# Patient Record
Sex: FEMALE | Race: WHITE | NOT HISPANIC OR LATINO | Employment: OTHER | URBAN - METROPOLITAN AREA
[De-identification: names, ages, dates, MRNs, and addresses within clinical notes are randomized per-mention and may not be internally consistent; named-entity substitution may affect disease eponyms.]

---

## 2017-01-04 ENCOUNTER — HOSPITAL ENCOUNTER (OUTPATIENT)
Dept: OTHER | Facility: HOSPITAL | Age: 77
Discharge: HOME | End: 2017-01-04
Attending: INTERNAL MEDICINE | Admitting: INTERNAL MEDICINE

## 2017-01-11 ENCOUNTER — HOSPITAL ENCOUNTER (OUTPATIENT)
Dept: PHARMACY | Facility: HOSPITAL | Age: 77
Discharge: HOME | End: 2017-01-11
Attending: INTERNAL MEDICINE | Admitting: INTERNAL MEDICINE

## 2017-01-18 ENCOUNTER — HOSPITAL ENCOUNTER (OUTPATIENT)
Dept: OTHER | Facility: HOSPITAL | Age: 77
Discharge: HOME | End: 2017-01-18
Attending: INTERNAL MEDICINE | Admitting: INTERNAL MEDICINE

## 2017-01-25 ENCOUNTER — HOSPITAL ENCOUNTER (OUTPATIENT)
Dept: PHARMACY | Facility: HOSPITAL | Age: 77
Discharge: HOME | End: 2017-01-25
Attending: INTERNAL MEDICINE | Admitting: INTERNAL MEDICINE

## 2017-02-08 ENCOUNTER — HOSPITAL ENCOUNTER (OUTPATIENT)
Dept: PHARMACY | Facility: HOSPITAL | Age: 77
Discharge: HOME | End: 2017-02-08
Attending: INTERNAL MEDICINE | Admitting: INTERNAL MEDICINE

## 2017-03-01 ENCOUNTER — HOSPITAL ENCOUNTER (OUTPATIENT)
Dept: PHARMACY | Facility: HOSPITAL | Age: 77
Discharge: HOME | End: 2017-03-01
Attending: INTERNAL MEDICINE | Admitting: INTERNAL MEDICINE

## 2017-03-08 ENCOUNTER — HOSPITAL ENCOUNTER (OUTPATIENT)
Dept: PHARMACY | Facility: HOSPITAL | Age: 77
Discharge: HOME | End: 2017-03-08
Attending: INTERNAL MEDICINE | Admitting: INTERNAL MEDICINE

## 2017-03-22 ENCOUNTER — HOSPITAL ENCOUNTER (OUTPATIENT)
Dept: PHARMACY | Facility: HOSPITAL | Age: 77
Discharge: HOME | End: 2017-03-22
Attending: INTERNAL MEDICINE | Admitting: INTERNAL MEDICINE

## 2017-04-12 ENCOUNTER — HOSPITAL ENCOUNTER (OUTPATIENT)
Dept: OTHER | Facility: HOSPITAL | Age: 77
Discharge: HOME | End: 2017-04-12
Attending: INTERNAL MEDICINE | Admitting: INTERNAL MEDICINE

## 2017-05-03 ENCOUNTER — HOSPITAL ENCOUNTER (OUTPATIENT)
Dept: PHARMACY | Facility: HOSPITAL | Age: 77
Discharge: HOME | End: 2017-05-03
Attending: INTERNAL MEDICINE | Admitting: INTERNAL MEDICINE

## 2017-05-31 ENCOUNTER — HOSPITAL ENCOUNTER (OUTPATIENT)
Dept: PHARMACY | Facility: HOSPITAL | Age: 77
Discharge: HOME | End: 2017-05-31
Attending: INTERNAL MEDICINE | Admitting: INTERNAL MEDICINE

## 2017-06-26 ENCOUNTER — HOSPITAL ENCOUNTER (OUTPATIENT)
Dept: PHARMACY | Facility: HOSPITAL | Age: 77
Discharge: HOME | End: 2017-06-26
Attending: INTERNAL MEDICINE | Admitting: INTERNAL MEDICINE

## 2017-08-23 ENCOUNTER — HOSPITAL ENCOUNTER (OUTPATIENT)
Dept: PHARMACY | Facility: HOSPITAL | Age: 77
Discharge: HOME | End: 2017-08-23
Attending: INTERNAL MEDICINE | Admitting: INTERNAL MEDICINE

## 2017-10-04 ENCOUNTER — HOSPITAL ENCOUNTER (OUTPATIENT)
Dept: PHARMACY | Facility: HOSPITAL | Age: 77
Discharge: HOME | End: 2017-10-04
Attending: INTERNAL MEDICINE | Admitting: INTERNAL MEDICINE

## 2017-11-15 ENCOUNTER — HOSPITAL ENCOUNTER (OUTPATIENT)
Dept: PHARMACY | Facility: HOSPITAL | Age: 77
Discharge: HOME | End: 2017-11-15
Attending: INTERNAL MEDICINE | Admitting: INTERNAL MEDICINE

## 2017-12-27 ENCOUNTER — HOSPITAL ENCOUNTER (OUTPATIENT)
Dept: PHARMACY | Facility: HOSPITAL | Age: 77
Discharge: HOME | End: 2017-12-27
Attending: INTERNAL MEDICINE | Admitting: INTERNAL MEDICINE

## 2018-02-07 ENCOUNTER — HOSPITAL ENCOUNTER (OUTPATIENT)
Dept: PHARMACY | Facility: HOSPITAL | Age: 78
Discharge: HOME | End: 2018-02-07
Attending: INTERNAL MEDICINE | Admitting: INTERNAL MEDICINE

## 2018-03-22 ENCOUNTER — HOSPITAL ENCOUNTER (OUTPATIENT)
Dept: PHARMACY | Facility: HOSPITAL | Age: 78
Discharge: HOME | End: 2018-03-22
Attending: INTERNAL MEDICINE | Admitting: INTERNAL MEDICINE

## 2018-05-02 ENCOUNTER — HOSPITAL ENCOUNTER (OUTPATIENT)
Dept: PHARMACY | Facility: HOSPITAL | Age: 78
Discharge: HOME | End: 2018-05-02
Attending: INTERNAL MEDICINE | Admitting: INTERNAL MEDICINE

## 2018-06-13 ENCOUNTER — HOSPITAL ENCOUNTER (OUTPATIENT)
Dept: PHARMACY | Facility: HOSPITAL | Age: 78
Discharge: HOME | End: 2018-06-13
Attending: INTERNAL MEDICINE | Admitting: INTERNAL MEDICINE

## 2018-07-20 ENCOUNTER — HOSPITAL ENCOUNTER (OUTPATIENT)
Dept: URGENT CARE | Facility: CLINIC | Age: 78
Setting detail: SPECIMEN
Discharge: HOME | End: 2018-07-20
Attending: EMERGENCY MEDICINE | Admitting: EMERGENCY MEDICINE

## 2018-07-20 LAB
BACTERIA SPEC AEROBE CULT: NORMAL
Lab: NORMAL
MICRO REPORT STATUS: NORMAL
SPECIMEN SOURCE: NORMAL

## 2018-07-25 ENCOUNTER — HOSPITAL ENCOUNTER (OUTPATIENT)
Dept: PHARMACY | Facility: HOSPITAL | Age: 78
Discharge: HOME | End: 2018-07-25
Attending: INTERNAL MEDICINE | Admitting: INTERNAL MEDICINE

## 2018-09-05 ENCOUNTER — HOSPITAL ENCOUNTER (OUTPATIENT)
Dept: PHARMACY | Facility: HOSPITAL | Age: 78
Discharge: HOME | End: 2018-09-05
Attending: INTERNAL MEDICINE | Admitting: INTERNAL MEDICINE

## 2018-10-17 ENCOUNTER — HOSPITAL ENCOUNTER (OUTPATIENT)
Dept: PHARMACY | Facility: HOSPITAL | Age: 78
Discharge: HOME | End: 2018-10-17
Attending: INTERNAL MEDICINE | Admitting: INTERNAL MEDICINE

## 2018-11-28 ENCOUNTER — HOSPITAL ENCOUNTER (OUTPATIENT)
Dept: PHARMACY | Facility: HOSPITAL | Age: 78
Discharge: HOME | End: 2018-11-28
Attending: INTERNAL MEDICINE | Admitting: INTERNAL MEDICINE

## 2019-01-09 ENCOUNTER — HOSPITAL ENCOUNTER (OUTPATIENT)
Dept: PHARMACY | Facility: HOSPITAL | Age: 79
Discharge: HOME | End: 2019-01-09
Attending: INTERNAL MEDICINE | Admitting: INTERNAL MEDICINE

## 2019-02-20 ENCOUNTER — HOSPITAL ENCOUNTER (OUTPATIENT)
Dept: GENERAL RADIOLOGY | Facility: HOSPITAL | Age: 79
Discharge: HOME | End: 2019-02-20
Attending: INTERNAL MEDICINE | Admitting: INTERNAL MEDICINE

## 2019-03-25 ENCOUNTER — HOSPITAL ENCOUNTER (OUTPATIENT)
Dept: PHARMACY | Facility: HOSPITAL | Age: 79
Discharge: HOME | End: 2019-03-25
Attending: INTERNAL MEDICINE | Admitting: INTERNAL MEDICINE

## 2019-04-03 ENCOUNTER — HOSPITAL ENCOUNTER (OUTPATIENT)
Dept: PHARMACY | Facility: HOSPITAL | Age: 79
Discharge: HOME | End: 2019-04-03
Attending: INTERNAL MEDICINE | Admitting: INTERNAL MEDICINE

## 2019-05-08 ENCOUNTER — HOSPITAL ENCOUNTER (OUTPATIENT)
Dept: PHARMACY | Facility: HOSPITAL | Age: 79
Discharge: HOME | End: 2019-05-08
Attending: INTERNAL MEDICINE | Admitting: INTERNAL MEDICINE

## 2019-05-16 ENCOUNTER — HOSPITAL ENCOUNTER (OUTPATIENT)
Dept: PHARMACY | Facility: HOSPITAL | Age: 79
Discharge: HOME | End: 2019-05-16
Attending: INTERNAL MEDICINE | Admitting: INTERNAL MEDICINE

## 2019-06-06 ENCOUNTER — HOSPITAL ENCOUNTER (OUTPATIENT)
Dept: PHARMACY | Facility: HOSPITAL | Age: 79
Discharge: HOME | End: 2019-06-06
Attending: INTERNAL MEDICINE | Admitting: INTERNAL MEDICINE

## 2019-06-17 ENCOUNTER — ANTICOAGULATION VISIT (OUTPATIENT)
Dept: PHARMACY | Facility: HOSPITAL | Age: 79
End: 2019-06-17

## 2019-06-17 DIAGNOSIS — I82.5Z9 CHRONIC DEEP VEIN THROMBOSIS (DVT) OF DISTAL VEIN OF LOWER EXTREMITY, UNSPECIFIED LATERALITY (HCC): Primary | ICD-10-CM

## 2019-06-17 LAB — INR PPP: 3.2 (ref 0.9–1.1)

## 2019-06-17 PROCEDURE — G0463 HOSPITAL OUTPT CLINIC VISIT: HCPCS

## 2019-06-17 PROCEDURE — 85610 PROTHROMBIN TIME: CPT

## 2019-06-17 PROCEDURE — 36416 COLLJ CAPILLARY BLOOD SPEC: CPT

## 2019-06-17 RX ORDER — WARFARIN SODIUM 2.5 MG/1
2.5 TABLET ORAL
COMMUNITY
Start: 2018-11-27 | End: 2023-02-24 | Stop reason: SDUPTHER

## 2019-06-17 RX ORDER — OMEPRAZOLE 40 MG/1
40 CAPSULE, DELAYED RELEASE ORAL DAILY PRN
COMMUNITY
Start: 2015-07-01 | End: 2022-02-27

## 2019-06-17 RX ORDER — WARFARIN SODIUM 2.5 MG/1
2.5 TABLET ORAL NIGHTLY
COMMUNITY
Start: 2015-01-18

## 2019-06-17 RX ORDER — ATENOLOL 100 MG/1
100 TABLET ORAL DAILY
COMMUNITY
Start: 2014-11-13

## 2019-06-17 RX ORDER — FENOFIBRATE 150 MG/1
160 CAPSULE ORAL DAILY
COMMUNITY
Start: 2014-11-13 | End: 2022-02-27

## 2019-06-17 RX ORDER — HYDROCHLOROTHIAZIDE 25 MG/1
50 TABLET ORAL DAILY
COMMUNITY
Start: 2014-11-13 | End: 2020-10-01

## 2019-06-17 RX ORDER — LEVOTHYROXINE SODIUM 0.05 MG/1
50 TABLET ORAL DAILY
COMMUNITY
Start: 2016-09-25 | End: 2020-10-01

## 2019-06-17 NOTE — PROGRESS NOTES
Anticoagulation Clinic Follow up Note    Today's INR: INR=3.2supratherapeutic    Previous INR:INR 3.2 from 1 week ago supratherapeutic    Previous dose change: no change  Missed Doses: none    Mediation changes: none    Diet changes: none    Lifestyle changes: none    Signs and Symptoms of bleeding: none    Disease state exacerbations: none    Planned procedures: none    Additional Education (if needed): not applicable     Thi VERONIKA Brain was provided dosing instructions and expressed verbal understanding and has no further questions at this time.    Visit specific notes: none    Plan:  1. Hold dose today then resume normal dosing: coumadin 2.5 mg daily except 5 mg on W, F and Sundays  2. RTC in 2 weeks      Deanna Eng  6/17/2019  11:07 AM

## 2019-07-03 ENCOUNTER — ANTICOAGULATION VISIT (OUTPATIENT)
Dept: PHARMACY | Facility: HOSPITAL | Age: 79
End: 2019-07-03

## 2019-07-03 DIAGNOSIS — I82.4Y9 DEEP VEIN THROMBOSIS (DVT) OF PROXIMAL LOWER EXTREMITY, UNSPECIFIED CHRONICITY, UNSPECIFIED LATERALITY (HCC): ICD-10-CM

## 2019-07-03 PROBLEM — I82.409 DVT (DEEP VENOUS THROMBOSIS): Status: ACTIVE | Noted: 2019-07-03

## 2019-07-03 LAB — INR PPP: 2.7 (ref 0.9–1.1)

## 2019-07-03 PROCEDURE — 36416 COLLJ CAPILLARY BLOOD SPEC: CPT

## 2019-07-03 PROCEDURE — G0463 HOSPITAL OUTPT CLINIC VISIT: HCPCS

## 2019-07-03 PROCEDURE — 85610 PROTHROMBIN TIME: CPT

## 2019-07-03 NOTE — PROGRESS NOTES
Anticoagulation Clinic Follow up Note    Today's INR: (2.7 , therapeutic)    Previous INR: (3.2, supratherapeutic, from 2 weeks ago )    Previous dose change: 1 held dose and then return to total weekly dose    Missed Doses: none    Mediation changes: none    Diet changes: none    Lifestyle changes: none    Signs and Symptoms of bleeding: none    Disease state exacerbations: none    Planned procedures: none    Additional Education (if needed): not applicable     Thi Allen was provided dosing instructions and expressed verbal understanding and has no further questions at this time.    Visit specific notes: none    Plan:  1. No Change. 2.5mg daily except 5mg WFSu  2. RTC in 3 weeks      Kirsten Smith RPH  7/3/2019  11:28 AM

## 2019-07-24 ENCOUNTER — ANTICOAGULATION VISIT (OUTPATIENT)
Dept: PHARMACY | Facility: HOSPITAL | Age: 79
End: 2019-07-24

## 2019-07-24 DIAGNOSIS — I82.4Y9 DEEP VEIN THROMBOSIS (DVT) OF PROXIMAL LOWER EXTREMITY, UNSPECIFIED CHRONICITY, UNSPECIFIED LATERALITY (HCC): ICD-10-CM

## 2019-07-24 LAB — INR PPP: 2.4 (ref 0.9–1.1)

## 2019-07-24 PROCEDURE — 36416 COLLJ CAPILLARY BLOOD SPEC: CPT

## 2019-07-24 PROCEDURE — 85610 PROTHROMBIN TIME: CPT

## 2019-08-21 ENCOUNTER — ANTICOAGULATION VISIT (OUTPATIENT)
Dept: PHARMACY | Facility: HOSPITAL | Age: 79
End: 2019-08-21

## 2019-08-21 DIAGNOSIS — I82.4Y9 DEEP VEIN THROMBOSIS (DVT) OF PROXIMAL LOWER EXTREMITY, UNSPECIFIED CHRONICITY, UNSPECIFIED LATERALITY (HCC): ICD-10-CM

## 2019-08-21 LAB — INR PPP: 2.3 (ref 0.9–1.1)

## 2019-08-21 PROCEDURE — 36416 COLLJ CAPILLARY BLOOD SPEC: CPT | Performed by: INTERNAL MEDICINE

## 2019-08-21 PROCEDURE — 85610 PROTHROMBIN TIME: CPT | Performed by: INTERNAL MEDICINE

## 2019-08-21 NOTE — PROGRESS NOTES
I have reviewed the notes, assessments, and/or procedures performed by Acosta Lewis, I concur with her/his documentation of Thi Allen.

## 2019-09-18 ENCOUNTER — APPOINTMENT (OUTPATIENT)
Dept: PHARMACY | Facility: HOSPITAL | Age: 79
End: 2019-09-18

## 2019-09-25 ENCOUNTER — ANTICOAGULATION VISIT (OUTPATIENT)
Dept: PHARMACY | Facility: HOSPITAL | Age: 79
End: 2019-09-25

## 2019-09-25 DIAGNOSIS — I82.4Y9 DEEP VEIN THROMBOSIS (DVT) OF PROXIMAL LOWER EXTREMITY, UNSPECIFIED CHRONICITY, UNSPECIFIED LATERALITY (HCC): ICD-10-CM

## 2019-09-25 LAB — INR PPP: 2.8 (ref 0.9–1.1)

## 2019-09-25 PROCEDURE — 36416 COLLJ CAPILLARY BLOOD SPEC: CPT | Performed by: INTERNAL MEDICINE

## 2019-09-25 PROCEDURE — 85610 PROTHROMBIN TIME: CPT | Performed by: INTERNAL MEDICINE

## 2019-09-25 NOTE — PROGRESS NOTES
Anticoagulation Clinic Follow up Note    Today's INR:2.8 therapeutic)    Previous INR:2.3 therapeutic from 4 week(s) ago)    Previous dose change:no change    Missed Doses: took dose late on Saturday night at 4 am Sunday Am so took 1/2 dose 2.5mg on Sunday Pm because afraid of taking too much inn 12 hr period per patient statement    Medication changes: none    Diet changes: none    Lifestyle changes: none    Signs and Symptoms of bleeding: none    Disease state exacerbations: none    Planned procedures: none    Additional Education (if needed): not applicable    Thi Allen was provided dosing instructions and expressed verbal understanding and has no further questions at this time.    Visit specific notes: none    Plan:  1. No change; coumadin 2.5 mg daily except 5 mg on Su, wed and Fridays.  2. RTC in 4 week(s).      Deanna Eng RPH  9/25/2019  10:28 AM

## 2019-10-21 PROBLEM — I65.23 CAROTID STENOSIS, BILATERAL: Status: ACTIVE | Noted: 2019-06-01

## 2019-10-21 PROBLEM — I10 ESSENTIAL HYPERTENSION WITH GOAL BLOOD PRESSURE LESS THAN 130/80: Status: ACTIVE | Noted: 2019-10-21

## 2019-10-21 PROBLEM — E03.4 HYPOTHYROIDISM DUE TO ACQUIRED ATROPHY OF THYROID: Status: ACTIVE | Noted: 2019-10-21

## 2019-10-21 PROBLEM — E78.1 HYPERTRIGLYCERIDEMIA: Status: ACTIVE | Noted: 2019-10-21

## 2019-10-21 PROBLEM — G56.03 CARPAL TUNNEL SYNDROME, BILATERAL: Status: ACTIVE | Noted: 2018-06-15

## 2019-10-21 PROBLEM — E07.9 THYROID DISORDER: Status: ACTIVE | Noted: 2019-10-21

## 2019-10-21 PROCEDURE — 87086 URINE CULTURE/COLONY COUNT: CPT | Performed by: NURSE PRACTITIONER

## 2019-10-21 PROCEDURE — 87147 CULTURE TYPE IMMUNOLOGIC: CPT | Performed by: NURSE PRACTITIONER

## 2019-10-21 PROCEDURE — 87186 SC STD MICRODIL/AGAR DIL: CPT | Performed by: NURSE PRACTITIONER

## 2019-10-23 ENCOUNTER — ANTICOAGULATION VISIT (OUTPATIENT)
Dept: PHARMACY | Facility: HOSPITAL | Age: 79
End: 2019-10-23

## 2019-10-23 DIAGNOSIS — I82.4Z9 DEEP VEIN THROMBOSIS (DVT) OF DISTAL VEIN OF LOWER EXTREMITY, UNSPECIFIED CHRONICITY, UNSPECIFIED LATERALITY (HCC): ICD-10-CM

## 2019-10-23 LAB — INR PPP: 3.3 (ref 1.8–2.8)

## 2019-10-23 PROCEDURE — 36416 COLLJ CAPILLARY BLOOD SPEC: CPT

## 2019-10-23 PROCEDURE — G0463 HOSPITAL OUTPT CLINIC VISIT: HCPCS

## 2019-10-23 PROCEDURE — 85610 PROTHROMBIN TIME: CPT

## 2019-10-23 NOTE — PROGRESS NOTES
Anticoagulation Clinic Follow up Note    Today's INR: 3.3 (supratherapeutic)    Previous INR: 2.8 (therapeutic from 4 week(s) ago)    Previous dose change: no change    Missed Doses: none    Medication changes: Patient reports starting to take cephalexin (may increase INR) on Monday, 10/21, for treatment of UTI for 7 days (stop date 10/28).    Diet changes: none    Lifestyle changes: none    Signs and Symptoms of bleeding: none    Disease state exacerbations: none    Planned procedures: none    Additional Education (if needed): Educated patient on signs/symptoms of bleeding including, but not limited to, blood in the stool/urine and bleeding that doesn't stop with applied pressure. Advised patient to go to the ER or call provider if any of these occur. Patient expressed understanding.    Thi Allen was provided dosing instructions and expressed verbal understanding and has no further questions at this time.    Visit specific notes: Given temporary cause of increase in INR, will decrease today's dose to 2.5 mg and resume previous therapeutic regimen. This change will result in a 10% decrease in TWD for this week.    Plan:  1. Decrease dose today (10/23) to 2.5 mg, then resume regimen of 2.5 mg daily except 5 mg on Sundays, Wednesdays, and Fridays  2. RTC in 2 week(s).      Zeina Vela RPH  10/23/2019  9:32 AM

## 2019-10-24 ENCOUNTER — TELEPHONE (OUTPATIENT)
Dept: URGENT CARE | Facility: CLINIC | Age: 79
End: 2019-10-24

## 2019-11-06 ENCOUNTER — ANTICOAGULATION VISIT (OUTPATIENT)
Dept: PHARMACY | Facility: HOSPITAL | Age: 79
End: 2019-11-06

## 2019-11-06 DIAGNOSIS — I82.4Z9 DEEP VEIN THROMBOSIS (DVT) OF DISTAL VEIN OF LOWER EXTREMITY, UNSPECIFIED CHRONICITY, UNSPECIFIED LATERALITY (HCC): ICD-10-CM

## 2019-11-06 LAB — INR PPP: 2.6 (ref 1.8–2.8)

## 2019-11-06 PROCEDURE — 85610 PROTHROMBIN TIME: CPT

## 2019-11-06 PROCEDURE — G0463 HOSPITAL OUTPT CLINIC VISIT: HCPCS

## 2019-11-06 PROCEDURE — 36416 COLLJ CAPILLARY BLOOD SPEC: CPT

## 2019-11-06 NOTE — PROGRESS NOTES
Anticoagulation Clinic Follow up Note    Today's INR: 2.6 (therapeutic)    Previous INR: 3.3 (supratherapeutic from 2 week(s) ago) due to interaction with cephalexin (can increase INR) with last dose taken on 10/28    Previous dose change: no change    Missed Doses: none    Medication changes: none    Diet changes: none    Lifestyle changes: none    Signs and Symptoms of bleeding: none    Disease state exacerbations: none    Planned procedures: Patient reports that she will need to have her pacemaker battery replaced sometime soon. As of her last cardiology visit, her battery was at 50%. Instructed patient to inform [facility] of when this would occur and patient agreed.     Additional Education (if needed): not applicable    Thi Allen was provided dosing instructions and expressed verbal understanding and has no further questions at this time.    Visit specific notes: n/a    Plan:  1. no change; warfarin 2.5 mg PO daily, except warfarin 5 mg PO on Sundays, Wednesdays, and Fridays. .  2. RTC in 4 week(s).      Zeina Vela RPH  11/6/2019  11:05 AM

## 2019-12-04 ENCOUNTER — ANTICOAGULATION VISIT (OUTPATIENT)
Dept: PHARMACY | Facility: HOSPITAL | Age: 79
End: 2019-12-04

## 2019-12-04 DIAGNOSIS — I82.4Z9 DEEP VEIN THROMBOSIS (DVT) OF DISTAL VEIN OF LOWER EXTREMITY, UNSPECIFIED CHRONICITY, UNSPECIFIED LATERALITY (HCC): ICD-10-CM

## 2019-12-04 LAB — INR PPP: 3.1 (ref 1.8–2.8)

## 2019-12-04 PROCEDURE — 85610 PROTHROMBIN TIME: CPT

## 2019-12-04 PROCEDURE — 36416 COLLJ CAPILLARY BLOOD SPEC: CPT

## 2019-12-04 PROCEDURE — G0463 HOSPITAL OUTPT CLINIC VISIT: HCPCS

## 2019-12-04 NOTE — PROGRESS NOTES
Anticoagulation Clinic Follow up Note    Today's INR: 3.1 (supratherapeutic)    Previous INR: 2.6 (therapeutic from 4 week(s) ago)    Previous dose change: no change    Missed Doses: none    Medication changes: Patient reports pulling a muscle in her back last week and taking Tylenol for the pain (could increase INR).     Diet changes: Patient reports not having any leafy green vegetables recently and says she usually has at least a few servings (could increase INR).    Lifestyle changes: none    Signs and Symptoms of bleeding: none    Disease state exacerbations: none    Planned procedures: none    Additional Education (if needed): not applicable    Thi Allen was provided dosing instructions and expressed verbal understanding and has no further questions at this time.    Visit specific notes: none    Plan:  1. no change; warfarin 2.5 mg PO daily, except warfarin 5 mg PO on Sunday, Wednesday, and Friday. .  2. Consume 1-2 servings of vitamin K containing foods  2. RTC in 2 week(s).      Zeina Vela, PharmD  12/4/2019  9:57 AM

## 2019-12-18 ENCOUNTER — ANTICOAGULATION VISIT (OUTPATIENT)
Dept: PHARMACY | Facility: HOSPITAL | Age: 79
End: 2019-12-18

## 2019-12-18 DIAGNOSIS — I82.4Z9 DEEP VEIN THROMBOSIS (DVT) OF DISTAL VEIN OF LOWER EXTREMITY, UNSPECIFIED CHRONICITY, UNSPECIFIED LATERALITY (HCC): ICD-10-CM

## 2019-12-18 LAB — INR PPP: 3.8 (ref 1.8–2.8)

## 2019-12-18 PROCEDURE — G0463 HOSPITAL OUTPT CLINIC VISIT: HCPCS

## 2019-12-18 PROCEDURE — 36416 COLLJ CAPILLARY BLOOD SPEC: CPT

## 2019-12-18 PROCEDURE — 85610 PROTHROMBIN TIME: CPT

## 2019-12-26 ENCOUNTER — ANTICOAGULATION VISIT (OUTPATIENT)
Dept: PHARMACY | Facility: HOSPITAL | Age: 79
End: 2019-12-26

## 2019-12-26 DIAGNOSIS — I82.4Z9 DEEP VEIN THROMBOSIS (DVT) OF DISTAL VEIN OF LOWER EXTREMITY, UNSPECIFIED CHRONICITY, UNSPECIFIED LATERALITY (HCC): ICD-10-CM

## 2019-12-26 LAB — INR PPP: 3.9 (ref 1.8–2.8)

## 2019-12-26 PROCEDURE — 85610 PROTHROMBIN TIME: CPT

## 2019-12-26 PROCEDURE — 36416 COLLJ CAPILLARY BLOOD SPEC: CPT

## 2019-12-26 PROCEDURE — G0463 HOSPITAL OUTPT CLINIC VISIT: HCPCS

## 2019-12-26 NOTE — PROGRESS NOTES
Anticoagulation Clinic Follow up Note    Today's INR: 3.9 (supratherapeutic)    Previous INR: 3.8 (supratherapeutic from 1 week(s) ago)    Previous dose change: Held dose then resumed previous regimen    Missed Doses: none    Medication changes: Patient completed course of Bactrim for a UTI on 12/19 (should have no effect on today's INR). Patient also has been taking Tylenol for a hurt back the past 3-4 days (may increase INR).    Diet changes: Patient reports she has not had much of an appetite and has not ate as much vitamin K containing foods as she typically does (may increase INR).    Lifestyle changes: none    Signs and Symptoms of bleeding: none    Disease state exacerbations: none    Planned procedures: none    Additional Education (if needed): not applicable    Thi Allen was provided dosing instructions and expressed verbal understanding and has no further questions at this time.    Visit specific notes: INR has steadily increased since early December. Patient has reported at the last three appointments that she has not had much of an appetite and has decreased her vitamin K intake. Given this, will make small decrease in TWD.    Plan:  1. decrease by 10 %; warfarin 2.5 mg PO daily, except warfarin 5 mg PO on Sundays and Wednesdays. .  2. RTC in 1.5 week(s).      Zeina Vela, PharmD, BCPS  12/26/2019  11:01 AM

## 2020-01-06 ENCOUNTER — ANTICOAGULATION VISIT (OUTPATIENT)
Dept: PHARMACY | Facility: HOSPITAL | Age: 80
End: 2020-01-06

## 2020-01-06 DIAGNOSIS — I82.4Z9 DEEP VEIN THROMBOSIS (DVT) OF DISTAL VEIN OF LOWER EXTREMITY, UNSPECIFIED CHRONICITY, UNSPECIFIED LATERALITY (HCC): ICD-10-CM

## 2020-01-06 LAB — INR PPP: 2.5 (ref 1.8–2.8)

## 2020-01-06 PROCEDURE — 36416 COLLJ CAPILLARY BLOOD SPEC: CPT

## 2020-01-06 PROCEDURE — G0463 HOSPITAL OUTPT CLINIC VISIT: HCPCS

## 2020-01-06 PROCEDURE — 85610 PROTHROMBIN TIME: CPT

## 2020-01-06 NOTE — PROGRESS NOTES
Anticoagulation Clinic Follow up Note    Today's INR: 2.5 (therapeutic)    Previous INR: 3.9 (supratherapeutic from 2 week(s) ago)    Previous dose change: decrease by 10 %    Missed Doses: none    Medication changes: none    Diet changes: Patient reports an increase in intake of vitamin k containing foods (may decrease INR).     Lifestyle changes: none    Signs and Symptoms of bleeding: none    Disease state exacerbations: none    Planned procedures: none    Additional Education (if needed): not applicable    Thi Allen was provided dosing instructions and expressed verbal understanding and has no further questions at this time.    Visit specific notes: Patient reports an appointment with a Urologist tomorrow (1/7/20). Patient said she would call [facility] if Urologist makes changes to any medications.    Plan:  1. no change; warfarin 2.5 mg PO daily, except warfarin 5 mg PO on Sundays and Wednesdays. .  2. RTC in 2 week(s).      Zeina Vela, PharmD, BCPS  1/6/2020  11:28 AM

## 2020-01-20 ENCOUNTER — ANTICOAGULATION VISIT (OUTPATIENT)
Dept: PHARMACY | Facility: HOSPITAL | Age: 80
End: 2020-01-20

## 2020-01-20 DIAGNOSIS — I82.4Z9 DEEP VEIN THROMBOSIS (DVT) OF DISTAL VEIN OF LOWER EXTREMITY, UNSPECIFIED CHRONICITY, UNSPECIFIED LATERALITY (HCC): ICD-10-CM

## 2020-01-20 LAB — INR PPP: 3.4 (ref 0.9–1.1)

## 2020-01-20 PROCEDURE — 36416 COLLJ CAPILLARY BLOOD SPEC: CPT

## 2020-01-20 PROCEDURE — G0463 HOSPITAL OUTPT CLINIC VISIT: HCPCS

## 2020-01-20 PROCEDURE — 85610 PROTHROMBIN TIME: CPT

## 2020-01-20 NOTE — PROGRESS NOTES
Anticoagulation Clinic Follow up Note    Today's INR: 3.4 supratherapeutic    Previous INR: 2.5 therapeutic from2 week(s) ago)    Previous dose change: nochange warfarin:[id number]}    Missed Doses: none    Medication changes: keflex x 7 days for uti ( third round of AB's for UTI)  Has 1 more round of AB so will not change dose.    Diet changes: none    Lifestyle changes: none    Signs and Symptoms of bleeding: none    Disease state exacerbations: none    Planned procedures: none    Additional Education (if needed): not applicable    Thi Allen was provided dosing instructions and expressed verbal understanding and has no further questions at this time.    Visit specific notes: none    Plan:  1.no change warfarin warfarin 2.5 mg daily except 5 mg on Su and Wed.  2. RTC in 4 week(s). ( same schedule as )  3. Eat 1 serving of Vit K containing foods today and tomorrow to lower the INR somewhat.      Deanna Eng RPH  1/20/2020  10:54 AM

## 2020-02-10 ENCOUNTER — ANTICOAGULATION VISIT (OUTPATIENT)
Dept: PHARMACY | Facility: HOSPITAL | Age: 80
End: 2020-02-10

## 2020-02-10 DIAGNOSIS — I82.4Z9 DEEP VEIN THROMBOSIS (DVT) OF DISTAL VEIN OF LOWER EXTREMITY, UNSPECIFIED CHRONICITY, UNSPECIFIED LATERALITY (HCC): ICD-10-CM

## 2020-02-10 LAB — INR PPP: 1.7 (ref 1.8–2.8)

## 2020-02-10 PROCEDURE — G0463 HOSPITAL OUTPT CLINIC VISIT: HCPCS

## 2020-02-10 PROCEDURE — 85610 PROTHROMBIN TIME: CPT

## 2020-02-10 PROCEDURE — 36416 COLLJ CAPILLARY BLOOD SPEC: CPT

## 2020-02-10 NOTE — PROGRESS NOTES
Anticoagulation Clinic Follow up Note    Today's INR: 1.7 (subtherapeutic)    Previous INR: 3.4 (supratherapeutic from 4 week(s) ago)    Previous dose change: no change    Missed Doses: none    Medication changes: Patient reports last dose of cephalexin was 1/22 (no effect on today's INR).    Diet changes: Patient reports having increased portions of broccoli since her last appointment (may decrease INR).    Lifestyle changes: none    Signs and Symptoms of bleeding: none    Disease state exacerbations: none    Planned procedures: Patient reports an upcoming eye procedure (potentially 3/17). She states her optometrist wants INR to be <2 prior to the surgery. Patient to discuss with optometrist whether she needs to hold warfarin and if she needs to have INR checked sooner than her next appointment. Patient to call [facility] after.    Additional Education (if needed): not applicable    Thi Allen was provided dosing instructions and expressed verbal understanding and has no further questions at this time.    Visit specific notes: Of note, patient reported her 5 mg days as Wednesday and Saturday (previously instructed to be Wednesday and Sunday). Changed instructions to reflect what patient has been taking.    [facility] will have patient return in 4 weeks given INR only slightly subtherapeutic (goal of 1.8-2.8) and patient likes to come to clinic when her  is also scheduled.    Plan:  1. no change; warfarin 2.5 mg PO daily, except warfarin 5 mg PO on Wednesday and Saturday.   2. RTC in 4 week(s).      Zeina Vela, PharmD, BCPS  2/10/2020  11:30 AM

## 2020-03-09 ENCOUNTER — ANTICOAGULATION VISIT (OUTPATIENT)
Dept: PHARMACY | Facility: HOSPITAL | Age: 80
End: 2020-03-09

## 2020-03-09 DIAGNOSIS — I82.409 ACUTE DEEP VEIN THROMBOSIS (DVT) OF LOWER EXTREMITY, UNSPECIFIED LATERALITY, UNSPECIFIED VEIN (HCC): ICD-10-CM

## 2020-03-09 LAB
INR PPP: 2.6 (ref 0.9–1.1)
INR PPP: 2.6 (ref 1.8–2.8)

## 2020-03-09 PROCEDURE — 85610 PROTHROMBIN TIME: CPT

## 2020-03-09 PROCEDURE — G0463 HOSPITAL OUTPT CLINIC VISIT: HCPCS

## 2020-03-09 PROCEDURE — 36416 COLLJ CAPILLARY BLOOD SPEC: CPT

## 2020-03-09 NOTE — PROGRESS NOTES
Anticoagulation Clinic Follow up Note    Today's INR: 2.6 (therapeutic)    Previous INR: 1.7 (subtherapeutic from 4 week(s) ago)    Previous dose change: no change    Missed Doses: none    Medication changes: none    Diet changes: none    Lifestyle changes: none    Signs and Symptoms of bleeding: none    Disease state exacerbations: none    Planned procedures: Eye surgery (3/17). Patient to follow-up with physician's office regarding which warfarin doses (if any) are to be held before her procedure.   Additional Education (if needed): not applicable    Thi Allen was provided dosing instructions and expressed verbal understanding and has no further questions at this time.    Visit specific notes: none    Plan:  1. no change; warfarin 2.5 mg PO daily, except warfarin 5 mg PO on Wednesdays, Saturdays. .  2. RTC in 4 week(s).      Abdiel Gonzales, Pharmacy Intern  3/9/2020  1:54 PM

## 2020-03-16 ENCOUNTER — ANTICOAGULATION VISIT (OUTPATIENT)
Dept: PHARMACY | Facility: HOSPITAL | Age: 80
End: 2020-03-16

## 2020-03-16 DIAGNOSIS — I82.409 ACUTE DEEP VEIN THROMBOSIS (DVT) OF LOWER EXTREMITY, UNSPECIFIED LATERALITY, UNSPECIFIED VEIN (HCC): ICD-10-CM

## 2020-03-16 LAB — INR PPP: 1.4 (ref 1.8–2.8)

## 2020-03-16 PROCEDURE — 36416 COLLJ CAPILLARY BLOOD SPEC: CPT

## 2020-03-16 PROCEDURE — 85610 PROTHROMBIN TIME: CPT

## 2020-03-16 PROCEDURE — G0463 HOSPITAL OUTPT CLINIC VISIT: HCPCS

## 2020-03-16 NOTE — PROGRESS NOTES
Anticoagulation Clinic Follow up Progress Note    Today's INR: 1.4 (subtherapeutic)    Previous INR: 2.6 (therapeutic from 1 week(s) ago)    Previous dose change: no change    Missed Doses: Patient began holding warfarin this past Saturday (3/14).    Medication changes: none    Diet changes: none    Lifestyle changes: none    Signs and Symptoms of bleeding: none    Disease state exacerbations: none    Planned procedures: Patient is having eye surgery tomorrow and needed INR below 2.    Additional Education (if needed): not applicable    Thi Allen was provided dosing instructions and expressed verbal understanding and has no further questions at this time.    Visit specific notes: Of note, patient has been injecting Lovenox 40 mg SQ daily since 3/14 because she reports this is what she was instructed to do when holding warfarin prior to her last eye surgery.    Plan:  1. Hold dose today as instructed by eye surgeon  2. Resume previous therapeutic regimen tomorrow after surgery; warfarin 2.5 mg PO daily, except warfarin 5 mg PO on Wednesday and Saturday.   3. RTC in 1 week(s).      Zeina Vela, PharmD, BCPS  3/16/2020  13:53

## 2020-03-23 ENCOUNTER — ANTICOAGULATION VISIT (OUTPATIENT)
Dept: PHARMACY | Facility: HOSPITAL | Age: 80
End: 2020-03-23

## 2020-03-23 DIAGNOSIS — I82.409 ACUTE DEEP VEIN THROMBOSIS (DVT) OF LOWER EXTREMITY, UNSPECIFIED LATERALITY, UNSPECIFIED VEIN (HCC): ICD-10-CM

## 2020-03-23 LAB — INR PPP: 1.5 (ref 1.8–2.8)

## 2020-03-23 PROCEDURE — G0463 HOSPITAL OUTPT CLINIC VISIT: HCPCS

## 2020-03-23 PROCEDURE — 85610 PROTHROMBIN TIME: CPT

## 2020-03-23 PROCEDURE — 36416 COLLJ CAPILLARY BLOOD SPEC: CPT

## 2020-03-23 NOTE — PROGRESS NOTES
Anticoagulation Clinic Follow up Progress Note    Today's INR: 1.5 (subtherapeutic)    Previous INR: 1.4 (subtherapeutic from 1 week(s) ago)    Previous dose change: Patient held dose that day due to procedure scheduled 3/17    Missed Doses: none    Medication changes: none    Diet changes: none    Lifestyle changes: none    Signs and Symptoms of bleeding: none    Disease state exacerbations: none    Planned procedures: none    Additional Education (if needed): Educated patient on signs/symptoms of a clot which include, but are not limited to, swelling, tenderness, or redness in one arm or leg or sudden shortness of breath. Instructed patient to call [facility], provider, or go to the ER if any of these occur and patient expressed understanding.    Thi Allen was provided dosing instructions and expressed verbal understanding and has no further questions at this time.    Visit specific notes: Patient has been previously therapeutic on this regimen.     Plan:  1. Resume previous regimen; warfarin 2.5 mg PO daily, except warfarin 5 mg PO on Wednesday and Saturday.   2. RTC in 2 week(s).      Zeina Vela, PharmD, BCPS  3/23/2020  11:27

## 2020-04-06 ENCOUNTER — APPOINTMENT (OUTPATIENT)
Dept: PHARMACY | Facility: HOSPITAL | Age: 80
End: 2020-04-06

## 2020-05-04 ENCOUNTER — ANTICOAGULATION VISIT (OUTPATIENT)
Dept: PHARMACY | Facility: HOSPITAL | Age: 80
End: 2020-05-04

## 2020-05-04 DIAGNOSIS — I82.409 ACUTE DEEP VEIN THROMBOSIS (DVT) OF LOWER EXTREMITY, UNSPECIFIED LATERALITY, UNSPECIFIED VEIN (HCC): ICD-10-CM

## 2020-05-04 LAB — INR PPP: 2.1 (ref 1.8–2.2)

## 2020-05-04 PROCEDURE — 85610 PROTHROMBIN TIME: CPT

## 2020-05-04 PROCEDURE — 36416 COLLJ CAPILLARY BLOOD SPEC: CPT

## 2020-05-04 PROCEDURE — G0463 HOSPITAL OUTPT CLINIC VISIT: HCPCS

## 2020-05-04 NOTE — PROGRESS NOTES
Anticoagulation Clinic Follow up Progress Note    Today's INR: 2.1 (therapeutic)    Previous INR: 1.5 (subtherapeutic from 6 week(s) ago due to holding for an eye procedure)    Previous dose change: no change    Missed Doses: none    Medication changes: none    Diet changes: none    Lifestyle changes: none    Signs and Symptoms of bleeding: none    Disease state exacerbations: none    Planned procedures: none    Additional Education (if needed): not applicable    Thi Allen was provided dosing instructions and expressed verbal understanding and has no further questions at this time.    Visit specific notes: This was a mobile INR appointment to reduce patient's risk of exposure during COVID-19 pandemic.    Plan:  1. no change; warfarin 2.5 mg PO daily, except warfarin 5 mg PO on Wednesday and Saturday.   2. RTC in 4 week(s).      Zeina Vela, PharmD, BCPS  5/4/2020  11:34

## 2020-06-01 ENCOUNTER — ANTICOAGULATION VISIT (OUTPATIENT)
Dept: PHARMACY | Facility: HOSPITAL | Age: 80
End: 2020-06-01

## 2020-06-01 DIAGNOSIS — I82.409 DEEP VEIN THROMBOSIS (DVT) OF LOWER EXTREMITY, UNSPECIFIED CHRONICITY, UNSPECIFIED LATERALITY, UNSPECIFIED VEIN (HCC): ICD-10-CM

## 2020-06-01 LAB — INR PPP: 2.7 (ref 1.8–2.8)

## 2020-06-01 PROCEDURE — G0463 HOSPITAL OUTPT CLINIC VISIT: HCPCS

## 2020-06-01 PROCEDURE — 85610 PROTHROMBIN TIME: CPT

## 2020-06-01 PROCEDURE — 36416 COLLJ CAPILLARY BLOOD SPEC: CPT

## 2020-06-01 NOTE — PROGRESS NOTES
Anticoagulation Clinic Follow up Progress Note    Today's INR: 2.7 (therapeutic)    Previous INR: 2.1 (therapeutic from 2 week(s) ago)    Previous dose change: no change    Missed Doses: none    Medication changes: none    Diet changes: none    Lifestyle changes: none    Signs and Symptoms of bleeding: none    Disease state exacerbations: none    Planned procedures: none    Additional Education (if needed): not applicable    Thi Allen was provided dosing instructions and expressed verbal understanding and has no further questions at this time.    Visit specific notes: none    Plan:  1. no change; warfarin 2.5 mg PO daily, except warfarin 5 mg PO on Wed/Sat. .  2. RTC in 4 week(s).      Christie Carvalho, PharmD  6/1/2020  13:52

## 2020-06-29 ENCOUNTER — ANTICOAGULATION VISIT (OUTPATIENT)
Dept: PHARMACY | Facility: HOSPITAL | Age: 80
End: 2020-06-29

## 2020-06-29 DIAGNOSIS — I82.409 DEEP VEIN THROMBOSIS (DVT) OF LOWER EXTREMITY, UNSPECIFIED CHRONICITY, UNSPECIFIED LATERALITY, UNSPECIFIED VEIN (HCC): ICD-10-CM

## 2020-06-29 LAB — INR PPP: 2.2 (ref 0.9–1.1)

## 2020-06-29 PROCEDURE — 36416 COLLJ CAPILLARY BLOOD SPEC: CPT

## 2020-06-29 PROCEDURE — G0463 HOSPITAL OUTPT CLINIC VISIT: HCPCS

## 2020-06-29 PROCEDURE — 85610 PROTHROMBIN TIME: CPT

## 2020-06-29 NOTE — PROGRESS NOTES
Anticoagulation Clinic Follow up Progress Note    Today's INR: 2.2    Previous INR: 2.7  from 4 week(s) ago)    Previous dose change: nochange     Missed Doses: none    Medication changes: none    Diet changes: none    Lifestyle changes: none    Signs and Symptoms of bleeding: none    Disease state exacerbations: none    Planned procedures: none    Additional Education (if needed): not applicable    Thi Allen was provided dosing instructions and expressed verbal understanding and has no further questions at this time.    Visit specific notes: none    Plan:  1.nochange warfarin:2.5 mg daily except 5mg wed and sat.  2. RTC in 5 week(s).      Deanna Eng RPH  6/29/2020  10:32

## 2020-08-05 ENCOUNTER — ANTICOAGULATION VISIT (OUTPATIENT)
Dept: PHARMACY | Facility: HOSPITAL | Age: 80
End: 2020-08-05

## 2020-08-05 DIAGNOSIS — I82.409 DEEP VEIN THROMBOSIS (DVT) OF LOWER EXTREMITY, UNSPECIFIED CHRONICITY, UNSPECIFIED LATERALITY, UNSPECIFIED VEIN (HCC): ICD-10-CM

## 2020-08-05 LAB — INR PPP: 1.8 (ref 0.9–1.1)

## 2020-08-05 PROCEDURE — 36416 COLLJ CAPILLARY BLOOD SPEC: CPT

## 2020-08-05 PROCEDURE — G0463 HOSPITAL OUTPT CLINIC VISIT: HCPCS

## 2020-08-05 PROCEDURE — 85610 PROTHROMBIN TIME: CPT

## 2020-08-05 NOTE — PROGRESS NOTES
Anticoagulation Clinic Follow up Progress Note    Today's INR: 1.8 (therapeutic)    Previous INR: 2.2 (therapeutic from 5 week(s) ago)    Previous dose change: no change    Missed Doses: none    Medication changes: none    Diet changes: none    Lifestyle changes: none    Signs and Symptoms of bleeding: none    Disease state exacerbations: none    Planned procedures: Patient reports she has a procedure scheduled on 8/12 to have her pacemaker replaced. Dr. ANABEL Thomas is performing the procedure and would like the patient to have her INR checked again on 8/10 and the results sent over. Once they have the 8/10 INR result they will then decide what to do with the patient's warfarin (holding, bridging, etc.). Per phone conversation with the office, they would like us to collect the INR then call Kennedi CARBALLO at [phone] ext. [phone] and relay the results. They will  the patient on what they would like her to do prior to the procedure.     Additional Education (if needed): not applicable    Thi Allen was provided dosing instructions and expressed verbal understanding and has no further questions at this time.    Visit specific notes: none    Plan:  1. no change; warfarin 2.5 mg PO daily, except warfarin 5 mg PO on Wednesday and Saturday.   2. RTC in 5 day(s).      Mg Alberto, Pharmacy Intern  8/5/2020  11:33

## 2020-08-05 NOTE — PROGRESS NOTES
I have reviewed the notes, assessments, and/or procedures performed by the pharmacy intern, I concur with her/his documentation of Thi Allen.

## 2020-08-10 ENCOUNTER — ANTICOAGULATION VISIT (OUTPATIENT)
Dept: PHARMACY | Facility: HOSPITAL | Age: 80
End: 2020-08-10

## 2020-08-10 DIAGNOSIS — I82.409 DEEP VEIN THROMBOSIS (DVT) OF LOWER EXTREMITY, UNSPECIFIED CHRONICITY, UNSPECIFIED LATERALITY, UNSPECIFIED VEIN (HCC): ICD-10-CM

## 2020-08-10 LAB — INR PPP: 2.1 (ref 0.9–1.1)

## 2020-08-10 PROCEDURE — 85610 PROTHROMBIN TIME: CPT

## 2020-08-10 PROCEDURE — 36416 COLLJ CAPILLARY BLOOD SPEC: CPT

## 2020-08-10 PROCEDURE — G0463 HOSPITAL OUTPT CLINIC VISIT: HCPCS

## 2020-08-10 NOTE — PROGRESS NOTES
Anticoagulation Clinic Follow up Progress Note    Today's INR: 2.1 (therapeutic)    Previous INR: 1.8 (therapeutic from 5 day(s) ago)    Previous dose change: no change    Missed Doses: none    Medication changes: none    Diet changes: none    Lifestyle changes: Patient reports she doesn't usually drink, but was celebrating buying a car this weekend and drank 1 beer on Saturday 8/8.    Signs and Symptoms of bleeding: none    Disease state exacerbations: none    Planned procedures: Patient is to receive a new pacemaker this Wednesday 8/12.    Additional Education (if needed): not applicable    Thi Allen was provided dosing instructions and expressed verbal understanding and has no further questions at this time.    Visit specific notes: Spoke with Kennedi Fuentes from the office of Mion Amado who is performing the procedure and gave Kennedi Fuentes the INR results for today. She stated because the patient is within her goal range that warfarin should be continued  in anticipation of the procedure. Kennedi Fuentes can be reached at [phone] ex. [phone] for follow questions.     Plan:  1. no change; warfarin 2.5 mg PO daily, except warfarin 5 mg PO on Wednesday and Saturday. .  2. RTC in 1.5 week(s).      Mg Alberto, Pharmacy Intern  8/10/2020  11:57

## 2020-08-17 ENCOUNTER — ANTICOAGULATION VISIT (OUTPATIENT)
Dept: PHARMACY | Facility: HOSPITAL | Age: 80
End: 2020-08-17

## 2020-08-17 DIAGNOSIS — I82.409 DEEP VEIN THROMBOSIS (DVT) OF LOWER EXTREMITY, UNSPECIFIED CHRONICITY, UNSPECIFIED LATERALITY, UNSPECIFIED VEIN (HCC): ICD-10-CM

## 2020-08-17 LAB — INR PPP: 2.1 (ref 1.8–2.8)

## 2020-08-17 PROCEDURE — G0463 HOSPITAL OUTPT CLINIC VISIT: HCPCS

## 2020-08-17 PROCEDURE — 85610 PROTHROMBIN TIME: CPT

## 2020-08-17 PROCEDURE — 36416 COLLJ CAPILLARY BLOOD SPEC: CPT

## 2020-08-17 NOTE — PROGRESS NOTES
Anticoagulation Clinic Follow up Progress Note    Today's INR: 2.1 (therapeutic)    Previous INR: 2.1 (therapeutic from 1 week(s) ago)    Previous dose change: no change    Missed Doses: none    Medication changes: Patient reports starting cephalexin last Wednesday (8/12) x 7 days (may increase INR). Effects of this interaction should be reflected in today's INR.     Diet changes: none    Lifestyle changes: none    Signs and Symptoms of bleeding: none    Disease state exacerbations: none    Planned procedures: none    Additional Education (if needed): not applicable    Thi Allen was provided dosing instructions and expressed verbal understanding and has no further questions at this time.    Visit specific notes: none    Plan:  1. no change; warfarin 2.5 mg PO daily, except warfarin 5 mg PO on Wednesday and Saturday.   2. RTC in 4 week(s).      Zeina Vela, PharmD  8/17/2020  12:33

## 2020-08-20 ENCOUNTER — APPOINTMENT (OUTPATIENT)
Dept: PHARMACY | Facility: HOSPITAL | Age: 80
End: 2020-08-20

## 2020-09-02 ENCOUNTER — TELEPHONE (OUTPATIENT)
Dept: PHARMACY | Facility: HOSPITAL | Age: 80
End: 2020-09-02

## 2020-09-02 NOTE — TELEPHONE ENCOUNTER
Patient called to report her sister is in the hospital and she is going to have to travel out of state. She says she may not be back for a while. Will cancel patient's appointment scheduled for 9/9 and patient to call to reschedule when she returns.

## 2020-09-16 ENCOUNTER — APPOINTMENT (OUTPATIENT)
Dept: PHARMACY | Facility: HOSPITAL | Age: 80
End: 2020-09-16

## 2020-10-01 ENCOUNTER — ANTICOAGULATION VISIT (OUTPATIENT)
Dept: PHARMACY | Facility: HOSPITAL | Age: 80
End: 2020-10-01

## 2020-10-01 DIAGNOSIS — I82.409 DEEP VEIN THROMBOSIS (DVT) OF LOWER EXTREMITY, UNSPECIFIED CHRONICITY, UNSPECIFIED LATERALITY, UNSPECIFIED VEIN (HCC): Primary | ICD-10-CM

## 2020-10-01 LAB — INR PPP: 2.7 (ref 0.9–1.1)

## 2020-10-01 PROCEDURE — 85610 PROTHROMBIN TIME: CPT

## 2020-10-01 PROCEDURE — G0463 HOSPITAL OUTPT CLINIC VISIT: HCPCS

## 2020-10-01 PROCEDURE — 36416 COLLJ CAPILLARY BLOOD SPEC: CPT

## 2020-10-01 NOTE — PROGRESS NOTES
Anticoagulation Clinic Follow up Progress Note    Today's INR: 2.7 (therapeutic)    Previous INR: 2.1 (therapeutic from 6 week(s) ago)    Previous dose change: no change    Missed Doses: none    Medication changes: none    Diet changes: none    Lifestyle changes: none    Signs and Symptoms of bleeding: none    Disease state exacerbations: none    Planned procedures: none    Additional Education (if needed): not applicable    Thi Allen was provided dosing instructions and expressed verbal understanding and has no further questions at this time.    Visit specific notes: Patient initially scheduled to return to clinic 4 weeks from last visit (8/17); however, patient unable to return 4 weeks later due to patients sister becoming ill and patient having to leave [facility] to attend to her [facility].     Plan:  1. no change; warfarin 2.5 mg PO daily, except warfarin 5 mg PO on Wednesday and Saturday. .  2. RTC in 6 week(s).      Allan Richmond, Pharmacy Intern  10/1/2020  12:55 EDT

## 2020-10-01 NOTE — PROGRESS NOTES
I have reviewed the notes, assessments, and/or procedures performed by Allan Price, I concur with her/his documentation of Thi Allen.

## 2020-11-12 ENCOUNTER — APPOINTMENT (OUTPATIENT)
Dept: PHARMACY | Facility: HOSPITAL | Age: 80
End: 2020-11-12

## 2020-11-12 ENCOUNTER — ANTICOAGULATION VISIT (OUTPATIENT)
Dept: PHARMACY | Facility: HOSPITAL | Age: 80
End: 2020-11-12

## 2020-11-12 LAB — INR PPP: 2.4 (ref 0.9–2.8)

## 2020-11-12 PROCEDURE — 36416 COLLJ CAPILLARY BLOOD SPEC: CPT

## 2020-11-12 PROCEDURE — G0463 HOSPITAL OUTPT CLINIC VISIT: HCPCS

## 2020-11-12 PROCEDURE — 85610 PROTHROMBIN TIME: CPT

## 2020-11-12 NOTE — PROGRESS NOTES
Anticoagulation Clinic Progress Note    INR Goal: 1.8-2.8  Today's INR: 2.4 (therapeutic)  Current warfarin dose: warfarin 2.5 mg PO daily, except warfarin 5 mg PO on Wednesday and Saturday.  Current total weekly dose = 22.5 mg per week.     Current Medications:   Prior to Admission medications    Medication Sig Start Date End Date Taking? Authorizing Provider   atenolol (TENORMIN) 100 MG tablet Take 100 mg by mouth Daily. 11/13/14   Drew Carson MD   Fenofibrate (LIPOFEN) 150 MG capsule Take 160 mg by mouth Daily. 11/13/14   Drew Carson MD   hydroCHLOROthiazide (HYDRODIURIL) 50 MG tablet Take 50 mg by mouth Daily. 8/31/19   Emergency, Nurse Juan RN   levothyroxine (SYNTHROID, LEVOTHROID) 50 MCG tablet TAKE 1 TABLET BY MOUTH ONCE DAILY 9/3/19   Emergency, Nurse Juan RN   lisinopril (PRINIVIL,ZESTRIL) 20 MG tablet Take 20 mg by mouth 2 (Two) Times a Day. 8/28/19   Emergency, Nurse Juan RN   Omega-3 Fatty Acids (FISH-EPA PO) Take 1,400 mg by mouth Daily.    Drew Carson MD   omeprazole (priLOSEC) 40 MG capsule Take 40 mg by mouth Daily As Needed. 7/1/15   Drew Carson MD   warfarin (COUMADIN) 2.5 MG tablet Take 2.5 mg by mouth. 11/27/18   Drew Carson MD   warfarin (COUMADIN) 5 MG tablet Take 5 mg by mouth 2 (Two) Times a Week. 1/18/15   Drew Carson MD       Clinic Interview:   Clinical Outcomes    Negatives:  Major bleeding event, Thromboembolic event, Anticoagulation-related hospital admission, Anticoagulation-related ED visit, Anticoagulation-related fatality   Patient Findings    Negatives:  Signs/symptoms of thrombosis, Signs/symptoms of bleeding, Laboratory test error suspected, Change in health, Change in alcohol use, Change in activity, Upcoming invasive procedure, Emergency department visit, Upcoming dental procedure, Missed doses, Extra doses, Change in medications, Change in diet/appetite, Hospital admission, Bruising, Other complaints         INR  History:  Anticoagulation Monitoring 2020 10/1/2020 2020   INR 2.10 2.70 2.40   INR Date 2020 10/1/2020 2020   INR Goal 2.0-2.5 2.0-2.5 2.0-2.5   Trend Same Same Same   Last Week Total 22.5 mg 22.5 mg 22.5 mg   Next Week Total 22.5 mg 22.5 mg 22.5 mg   Sun 2.5 mg 2.5 mg 2.5 mg   Mon 2.5 mg 2.5 mg 2.5 mg   Tue 2.5 mg 2.5 mg 2.5 mg   Wed 5 mg 5 mg 5 mg   Thu 2.5 mg 2.5 mg 2.5 mg   Fri 2.5 mg 2.5 mg 2.5 mg   Sat 5 mg 5 mg 5 mg   Visit Report - - -   Some recent data might be hidden       Anticoagulation Summary  As of 2020    INR goal:  2.0-2.5   TTR:  37.2 % (1.4 y)   INR used for dosin.40 (2020)   Warfarin maintenance plan:  5 mg every Wed, Sat; 2.5 mg all other days   Weekly warfarin total:  22.5 mg   No change documented:  Bekah Devi, Pharmacy Intern   Plan last modified:  Deanna Eng RPH (2020)   Next INR check:  2020   Priority:  Maintenance   Target end date:  Indefinite    Indications    DVT (deep venous thrombosis) (CMS/[facility]) [I82.409] [I82.409]             Anticoagulation Episode Summary     INR check location:  Anticoagulation Clinic    Preferred lab:      Send INR reminders to:  [facility] CLINICAL POOL    Comments:  **ACTUAL RANGE = 1.8-2.8**  CCA signed on: 2020  CCA Due: 21  Patient rights and responsibilities signed: 14        Anticoagulation Care Providers     Provider Role Specialty Phone number    Camila Lazcano MD Referring Internal Medicine [phone]          Education: Thi Allen has been instructed to call if any changes in medications, doses, concerns, etc. Patient was provided dosing instructions and expressed verbal understanding and has no further questions at this time.    Plan:  1. no change; warfarin 2.5 mg PO daily, except warfarin 5 mg PO on Wednesday and Saturday.    2. RTC in 6 week(s)    Bekah Devi, Pharmacy Intern  2020  11:16 EST

## 2020-12-23 ENCOUNTER — ANTICOAGULATION VISIT (OUTPATIENT)
Dept: PHARMACY | Facility: HOSPITAL | Age: 80
End: 2020-12-23

## 2020-12-23 LAB — INR PPP: 2.3 (ref 1.8–2.8)

## 2020-12-23 PROCEDURE — 36416 COLLJ CAPILLARY BLOOD SPEC: CPT | Performed by: INTERNAL MEDICINE

## 2020-12-23 PROCEDURE — 85610 PROTHROMBIN TIME: CPT | Performed by: INTERNAL MEDICINE

## 2020-12-23 PROCEDURE — G0463 HOSPITAL OUTPT CLINIC VISIT: HCPCS

## 2020-12-23 NOTE — PROGRESS NOTES
Anticoagulation Clinic Progress Note    INR Goal: 1.80-2.80  Today's INR: 2.3 (therapeutic)  Current warfarin dose: warfarin 2.5 mg PO daily, except warfarin 5 mg PO on /.  Current total weekly dose = 22.5 mg per week.     INR History:  Anticoagulation Monitoring 10/1/2020 2020 2020   INR 2.70 2.40 2.30   INR Date 10/1/2020 2020 2020   INR Goal 2.0-2.5 2.0-2.5 2.0-2.5   Trend Same Same Same   Last Week Total 22.5 mg 22.5 mg 22.5 mg   Next Week Total 22.5 mg 22.5 mg 22.5 mg   Sun 2.5 mg 2.5 mg 2.5 mg   Mon 2.5 mg 2.5 mg 2.5 mg   Tue 2.5 mg 2.5 mg 2.5 mg   Wed 5 mg 5 mg 5 mg   Thu 2.5 mg 2.5 mg 2.5 mg   Fri 2.5 mg 2.5 mg 2.5 mg   Sat 5 mg 5 mg 5 mg   Visit Report - - -   Some recent data might be hidden       Anticoagulation Summary  As of 2020    INR goal:  2.0-2.5   TTR:  41.9 % (1.5 y)   INR used for dosin.30 (2020)   Warfarin maintenance plan:  5 mg every Wed, Sat; 2.5 mg all other days   Weekly warfarin total:  22.5 mg   Plan last modified:  Deanna Eng RPH (2020)   Next INR check:  2/3/2021   Priority:  Maintenance   Target end date:  Indefinite    Indications    DVT (deep venous thrombosis) (CMS/[facility]) [I82.409] [I82.409]             Anticoagulation Episode Summary     INR check location:  Anticoagulation Clinic    Preferred lab:      Send INR reminders to:  [facility] CLINICAL POOL    Comments:  **ACTUAL RANGE = 1.8-2.8**  CCA signed on: 2020  CCA Due: 21  Patient rights and responsibilities signed: 14        Anticoagulation Care Providers     Provider Role Specialty Phone number    Camila Lazcano MD Referring Internal Medicine [phone]          Clinic Interview:   Clinical Outcomes    Negatives:  Major bleeding event, Thromboembolic event, Anticoagulation-related hospital admission, Anticoagulation-related ED visit, Anticoagulation-related fatality   Patient Findings    Negatives:  Signs/symptoms of thrombosis,  Signs/symptoms of bleeding, Laboratory test error suspected, Change in health, Change in alcohol use, Change in activity, Upcoming invasive procedure, Emergency department visit, Upcoming dental procedure, Missed doses, Extra doses, Change in medications, Change in diet/appetite, Hospital admission, Bruising, Other complaints         Current Medications:   Prior to Admission medications    Medication Sig Start Date End Date Taking? Authorizing Provider   atenolol (TENORMIN) 100 MG tablet Take 100 mg by mouth Daily. 11/13/14   Drew Carson MD   Fenofibrate (LIPOFEN) 150 MG capsule Take 160 mg by mouth Daily. 11/13/14   Drew Carson MD   hydroCHLOROthiazide (HYDRODIURIL) 50 MG tablet Take 50 mg by mouth Daily. 8/31/19   Emergency, Nurse Juan RN   levothyroxine (SYNTHROID, LEVOTHROID) 50 MCG tablet TAKE 1 TABLET BY MOUTH ONCE DAILY 9/3/19   Emergency, Nurse Juan RN   lisinopril (PRINIVIL,ZESTRIL) 20 MG tablet Take 20 mg by mouth 2 (Two) Times a Day. 8/28/19   Emergency, Nurse Juan RN   Omega-3 Fatty Acids (FISH-EPA PO) Take 1,400 mg by mouth Daily.    Drew Carson MD   omeprazole (priLOSEC) 40 MG capsule Take 40 mg by mouth Daily As Needed. 7/1/15   Drew Carson MD   warfarin (COUMADIN) 2.5 MG tablet Take 2.5 mg by mouth. 11/27/18   Drew Carson MD   warfarin (COUMADIN) 5 MG tablet Take 5 mg by mouth 2 (Two) Times a Week. 1/18/15   Drew Carson MD       Medical history:   Past Medical History:   Diagnosis Date   • Disease of thyroid gland    • Hyperlipidemia    • Hypertension        Social history:   Social History     Tobacco Use   • Smoking status: Former Smoker   • Smokeless tobacco: Never Used   • Tobacco comment: quit 35 yrs ago   Substance Use Topics   • Alcohol use: Yes     Frequency: Never     Comment: rare       Allergies:    Atorvastatin calcium, Colesevelam hcl, Colestipol hcl, Ezetimibe, Pitavastatin, Rosuvastatin calcium, and Statins    This patient  is managed via a collaborative agreement, pursuant to IC 25-26-16. The signed protocol is kept in the [facility] at [address] [place] [address].    Education: Thi Allen has been instructed to call if any changes in medications, doses, concerns, etc. Patient was provided dosing instructions and expressed verbal understanding and has no further questions at this time.    Plan:  1. no change; warfarin 2.5 mg PO daily, except warfarin 5 mg PO on Wednesdays/Saturdays.  Total weekly dose = 22.5 mg.   2. RTC in 6 week(s)    Machelle Nava, PharmD  12/23/2020  11:06 EST

## 2021-02-03 ENCOUNTER — ANTICOAGULATION VISIT (OUTPATIENT)
Dept: PHARMACY | Facility: HOSPITAL | Age: 81
End: 2021-02-03

## 2021-02-03 LAB — INR PPP: 2.3 (ref 0.9–1.1)

## 2021-02-03 PROCEDURE — 36416 COLLJ CAPILLARY BLOOD SPEC: CPT

## 2021-02-03 PROCEDURE — 85610 PROTHROMBIN TIME: CPT

## 2021-02-03 PROCEDURE — G0463 HOSPITAL OUTPT CLINIC VISIT: HCPCS

## 2021-02-03 NOTE — PROGRESS NOTES
Anticoagulation Clinic Progress Note    INR Goal: 1.8-2.8  Today's INR: 2.3 (therapeutic)  Current warfarin dose: warfarin 2.5 mg PO daily, except warfarin 5 mg PO on Wednesday and Saturday.  Current total weekly dose = 22.5 mg per week.     INR History:  Anticoagulation Monitoring 2020 2020 2/3/2021   INR 2.40 2.30 2.30   INR Date 2020 2020 2/3/2021   INR Goal 1.8-2.8 1.8-2.8 1.8-2.8   Trend Same Same Same   Last Week Total 22.5 mg 22.5 mg 22.5 mg   Next Week Total 22.5 mg 22.5 mg 22.5 mg   Sun 2.5 mg 2.5 mg 2.5 mg   Mon 2.5 mg 2.5 mg 2.5 mg   Tue 2.5 mg 2.5 mg 2.5 mg   Wed 5 mg 5 mg 5 mg   Thu 2.5 mg 2.5 mg 2.5 mg   Fri 2.5 mg 2.5 mg 2.5 mg   Sat 5 mg 5 mg 5 mg   Visit Report - - -   Some recent data might be hidden       Anticoagulation Summary  As of 2/3/2021    INR goal:  1.8-2.8   TTR:  60.2 % (1.6 y)   INR used for dosin.30 (2/3/2021)   Warfarin maintenance plan:  5 mg every Wed, Sat; 2.5 mg all other days   Weekly warfarin total:  22.5 mg   No change documented:  Fátima Dodge, PharmD   Plan last modified:  Deanna Eng RPH (2020)   Next INR check:  3/17/2021   Priority:  Maintenance   Target end date:  Indefinite    Indications    DVT (deep venous thrombosis) (CMS/[facility]) [I82.409] [I82.409]             Anticoagulation Episode Summary     INR check location:  Anticoagulation Clinic    Preferred lab:      Send INR reminders to:  [facility] CLINICAL POOL    Comments:  Patient rights and responsibilities signed: 14        Anticoagulation Care Providers     Provider Role Specialty Phone number    Camila Lazcano MD Referring Internal Medicine [phone]          Clinic Interview:   Clinical Outcomes    Negatives:  Major bleeding event, Thromboembolic event, Anticoagulation-related hospital admission, Anticoagulation-related ED visit, Anticoagulation-related fatality   Patient Findings    Negatives:  Signs/symptoms of thrombosis, Signs/symptoms of  bleeding, Laboratory test error suspected, Change in health, Change in alcohol use, Change in activity, Upcoming invasive procedure, Emergency department visit, Upcoming dental procedure, Missed doses, Extra doses, Change in medications, Change in diet/appetite, Hospital admission, Bruising, Other complaints         Current Medications:   Prior to Admission medications    Medication Sig Start Date End Date Taking? Authorizing Provider   atenolol (TENORMIN) 100 MG tablet Take 100 mg by mouth Daily. 11/13/14   Drew Carson MD   Fenofibrate (LIPOFEN) 150 MG capsule Take 160 mg by mouth Daily. 11/13/14   Drew Carson MD   hydroCHLOROthiazide (HYDRODIURIL) 50 MG tablet Take 50 mg by mouth Daily. 8/31/19   Emergency, Nurse Juan RN   levothyroxine (SYNTHROID, LEVOTHROID) 50 MCG tablet TAKE 1 TABLET BY MOUTH ONCE DAILY 9/3/19   Emergency, Nurse Juan RN   lisinopril (PRINIVIL,ZESTRIL) 20 MG tablet Take 20 mg by mouth 2 (Two) Times a Day. 8/28/19   [place], Nurse Juan RN   Omega-3 Fatty Acids (FISH-EPA PO) Take 1,400 mg by mouth Daily.    Drew Carson MD   omeprazole (priLOSEC) 40 MG capsule Take 40 mg by mouth Daily As Needed. 7/1/15   Drew Carson MD   warfarin (COUMADIN) 2.5 MG tablet Take 2.5 mg by mouth. 11/27/18   Drew Carson MD   warfarin (COUMADIN) 5 MG tablet Take 5 mg by mouth 2 (Two) Times a Week. 1/18/15   Drew Carson MD       Medical history:   Past Medical History:   Diagnosis Date   • Disease of thyroid gland    • Hyperlipidemia    • Hypertension        Social history:   Social History     Tobacco Use   • Smoking status: Former Smoker   • Smokeless tobacco: Never Used   • Tobacco comment: quit 35 yrs ago   Substance Use Topics   • Alcohol use: Yes     Frequency: Never     Comment: rare       Allergies:    Atorvastatin calcium, Colesevelam hcl, Colestipol hcl, Ezetimibe, Pitavastatin, Rosuvastatin calcium, and Statins    This patient is managed via a  collaborative agreement, pursuant to IC 25-26-16. The signed protocol is kept in the [facility] at [address] [place] [address].    Education: Thi Allen has been instructed to call if any changes in medications, doses, concerns, etc. Patient was provided dosing instructions and expressed verbal understanding and has no further questions at this time.    Plan:  1. no change; warfarin 2.5 mg PO daily, except warfarin 5 mg PO on Wednesday and Saturday.   Total weekly dose = 22.5 mg.   2. RTC in 6 week(s)    Fátima Dodge, PharmD  2/3/2021  11:31 EST

## 2021-02-19 ENCOUNTER — TELEPHONE (OUTPATIENT)
Dept: PHARMACY | Facility: HOSPITAL | Age: 81
End: 2021-02-19

## 2021-02-19 NOTE — TELEPHONE ENCOUNTER
The patient called to schedule appointment for visit to check the INR before surgery that she will have at the fifth of march for right breast biobsy. Patient appointment was created for the 3/3 at 3:15pm and the 3/4 at 9:45am. She also was advised to stop warfarin prior to the surgery day by doctor Neno 3 days prior to procedure (will stop warfarin 3/3, with plan to restart either 3/5 after surgery, or next day 3/6)

## 2021-02-22 PROBLEM — R20.2 NUMBNESS AND TINGLING IN BOTH HANDS: Status: ACTIVE | Noted: 2018-06-08

## 2021-02-22 PROBLEM — R39.15 URINARY URGENCY: Status: ACTIVE | Noted: 2019-02-26

## 2021-02-22 PROBLEM — N30.00 ACUTE CYSTITIS: Status: ACTIVE | Noted: 2018-07-20

## 2021-02-22 PROBLEM — I82.4Z9 LOWER LEG DVT (DEEP VENOUS THROMBOSIS): Status: ACTIVE | Noted: 2021-02-22

## 2021-02-22 PROBLEM — N28.1 BILATERAL RENAL CYSTS: Status: ACTIVE | Noted: 2020-01-01

## 2021-02-22 PROBLEM — I44.30 AV BLOCK: Status: ACTIVE | Noted: 2021-02-22

## 2021-02-22 PROBLEM — G56.10 MEDIAN NEUROPATHY: Status: ACTIVE | Noted: 2018-12-01

## 2021-02-22 PROBLEM — E03.4 HYPOTHYROIDISM DUE TO ACQUIRED ATROPHY OF THYROID: Status: ACTIVE | Noted: 2021-02-22

## 2021-02-22 PROBLEM — M81.0 OSTEOPOROSIS: Status: ACTIVE | Noted: 2021-02-22

## 2021-02-22 PROBLEM — I49.5 SSS (SICK SINUS SYNDROME): Status: ACTIVE | Noted: 2020-06-18

## 2021-02-22 PROBLEM — I65.23 CAROTID STENOSIS, BILATERAL: Status: ACTIVE | Noted: 2019-06-01

## 2021-02-22 PROBLEM — R20.0 NUMBNESS AND TINGLING IN BOTH HANDS: Status: ACTIVE | Noted: 2018-06-08

## 2021-03-03 ENCOUNTER — APPOINTMENT (OUTPATIENT)
Dept: PHARMACY | Facility: HOSPITAL | Age: 81
End: 2021-03-03

## 2021-03-04 ENCOUNTER — ANTICOAGULATION VISIT (OUTPATIENT)
Dept: PHARMACY | Facility: HOSPITAL | Age: 81
End: 2021-03-04

## 2021-03-04 LAB — INR PPP: 1.6 (ref 1.8–2.8)

## 2021-03-04 PROCEDURE — 85610 PROTHROMBIN TIME: CPT

## 2021-03-04 PROCEDURE — G0463 HOSPITAL OUTPT CLINIC VISIT: HCPCS

## 2021-03-04 PROCEDURE — 36416 COLLJ CAPILLARY BLOOD SPEC: CPT

## 2021-03-04 NOTE — PROGRESS NOTES
Anticoagulation Clinic Progress Note    INR Goal: 1.8-2.8  Today's INR: 1.6 (subtherapeutic)  Current warfarin dose: warfarin 2.5 mg PO daily, except warfarin 5 mg PO on Wednesday and Saturday. But patient has been holding since 3/2 for breast biopsy on 3/5. Current total weekly dose = 22.5 mg per week.     INR History:  Anticoagulation Monitoring 11/12/2020 12/23/2020 2/3/2021   INR 2.40 2.30 2.30   INR Date 11/12/2020 12/23/2020 2/3/2021   INR Goal 1.8-2.8 1.8-2.8 1.8-2.8   Trend Same Same Same   Last Week Total 22.5 mg 22.5 mg 22.5 mg   Next Week Total 22.5 mg 22.5 mg 22.5 mg   Sun 2.5 mg 2.5 mg 2.5 mg   Mon 2.5 mg 2.5 mg 2.5 mg   Tue 2.5 mg 2.5 mg 2.5 mg   Wed 5 mg 5 mg 5 mg   Thu 2.5 mg 2.5 mg 2.5 mg   Fri 2.5 mg 2.5 mg 2.5 mg   Sat 5 mg 5 mg 5 mg   Visit Report - - -   Some recent data might be hidden           Clinic Interview:   Clinical Outcomes    Negatives:  Major bleeding event, Thromboembolic event, Anticoagulation-related hospital admission, Anticoagulation-related ED visit, Anticoagulation-related fatality   Patient Findings    Positives:  Upcoming invasive procedure (Breast biopsy 3/5), Missed doses (Patient stopped warfarin on 3/2 for breast biopsy on 3/5.)   Negatives:  Signs/symptoms of thrombosis, Signs/symptoms of bleeding, Laboratory test error suspected, Change in health, Change in alcohol use, Change in activity, Emergency department visit, Upcoming dental procedure (PT stoped the warfarin on 03/01 for precedue appointment regarding chest biobsy ), Extra doses, Change in medications, Change in diet/appetite, Hospital admission, Bruising (PT stoped the warfarin on 03/01 to perform the breast biobsy procedure ), Other complaints             Current Medications:   Prior to Admission medications    Medication Sig Start Date End Date Taking? Authorizing Provider   atenolol (TENORMIN) 100 MG tablet Take 100 mg by mouth Daily. 11/13/14   Provider, MD Drew   cefdinir (OMNICEF) 300 MG capsule  Take 1 capsule by mouth 2 (Two) Times a Day. 2/22/21   Garret Garcia MD   Fenofibrate (LIPOFEN) 150 MG capsule Take 160 mg by mouth Daily. 11/13/14   Drew Carson MD   hydroCHLOROthiazide (HYDRODIURIL) 50 MG tablet Take 50 mg by mouth Daily. 8/31/19   Emergency, Nurse CRISTIANO Martinez   levothyroxine (SYNTHROID, LEVOTHROID) 50 MCG tablet TAKE 1 TABLET BY MOUTH ONCE DAILY 9/3/19   Emergency, Nurse CRISTIANO Martinez   lisinopril (PRINIVIL,ZESTRIL) 20 MG tablet Take 20 mg by mouth 2 (Two) Times a Day. 8/28/19   Emergency, Nurse CRISTIANO Martinez   Omega-3 Fatty Acids (FISH-EPA PO) Take 1,400 mg by mouth Daily.    Drew Carson MD   omeprazole (priLOSEC) 40 MG capsule Take 40 mg by mouth Daily As Needed. 7/1/15   Drew Carson MD   phenazopyridine (Pyridium) 200 MG tablet 1 tablet p.o. 3 times daily before meals 2/22/21   Garret Garcia MD   warfarin (COUMADIN) 2.5 MG tablet Take 2.5 mg by mouth. 11/27/18   Drew Carson MD   warfarin (COUMADIN) 5 MG tablet Take 5 mg by mouth 2 (Two) Times a Week. 1/18/15   Drew Carson MD       Medical history:   Past Medical History:   Diagnosis Date   • Disease of thyroid gland    • Hyperlipidemia    • Hypertension        Social history:   Social History     Tobacco Use   • Smoking status: Former Smoker   • Smokeless tobacco: Never Used   • Tobacco comment: quit 35 yrs ago   Substance Use Topics   • Alcohol use: Yes     Frequency: Never     Comment: rare       Allergies:    Atorvastatin calcium, Colesevelam hcl, Colestipol hcl, Ezetimibe, Pitavastatin, Rosuvastatin calcium, and Statins    Vaccine History:     There is no immunization history on file for this patient.    This patient is managed via a collaborative agreement, pursuant to IC 25-26-16. The signed protocol is kept in the [facility] at [address] [place] [address].    Education: Thi Allen has been instructed to call if any changes in medications,  doses, concerns, etc. Patient was provided dosing instructions and expressed verbal understanding and has no further questions at this time.    Plan:  1. Anticoagulation   - no change; warfarin 2.5 mg PO daily, except warfarin 5 mg PO on Wednesday and Saturday.   Total weekly dose = 22.5 mg.   - RTC in 1 week(s)    - PT will be having breast biopsy on 03/05 and warfarin treatment was stopped on 03/02 in preparation. Warfarin to be resumed at usual dose of 2.5mg daily except 5mg on Wednesday and Saturday by 03/06. PT will return for INR check in 1 week. If INR is therapeutic at that time, recommend resuming every 6 week appointments.    2. Vaccines   Patient is in need of the following vaccines: Pneumovax.  Patient declined vaccinations today.     Rogelio Rhoades, Pharmacy Intern  3/4/2021  10:30 EST

## 2021-03-12 ENCOUNTER — ANTICOAGULATION VISIT (OUTPATIENT)
Dept: PHARMACY | Facility: HOSPITAL | Age: 81
End: 2021-03-12

## 2021-03-12 LAB — INR PPP: 2.1 (ref 0.9–1.1)

## 2021-03-12 PROCEDURE — G0463 HOSPITAL OUTPT CLINIC VISIT: HCPCS

## 2021-03-12 PROCEDURE — 85610 PROTHROMBIN TIME: CPT

## 2021-03-12 PROCEDURE — 36416 COLLJ CAPILLARY BLOOD SPEC: CPT

## 2021-03-12 NOTE — PROGRESS NOTES
Anticoagulation Clinic Progress Note    INR Goal: 1.8-2.8  Today's INR: 2.1 (therapeutic)  Current warfarin dose: warfarin 2.5 mg PO daily, except warfarin 5 mg PO on Wednesday and Saturday .  Current total weekly dose = 22.5 mg per week.     INR History:  Anticoagulation Monitoring 2/3/2021 3/4/2021 3/12/2021   INR 2.30 1.60 2.10   INR Date 2/3/2021 3/4/2021 3/12/2021   INR Goal 1.8-2.8 1.8-2.8 1.8-2.8   Trend Same Same Same   Last Week Total 22.5 mg 15 mg 20 mg   Next Week Total 22.5 mg 17.5 mg 22.5 mg   Sun 2.5 mg 2.5 mg 2.5 mg   Mon 2.5 mg 2.5 mg 2.5 mg   Tue 2.5 mg 2.5 mg 2.5 mg   Wed 5 mg 5 mg 5 mg   Thu 2.5 mg Hold (3/4); Otherwise 2.5 mg 2.5 mg   Fri 2.5 mg Hold (3/5) 2.5 mg   Sat 5 mg 5 mg 5 mg   Visit Report - - -   Some recent data might be hidden       Anticoagulation Summary  As of 3/12/2021    INR goal:  1.8-2.8   TTR:  60.8 % (1.7 y)   INR used for dosin.10 (3/12/2021)   Warfarin maintenance plan:  5 mg every Wed, Sat; 2.5 mg all other days   Weekly warfarin total:  22.5 mg   No change documented:  Mini Farfan, PharmD   Plan last modified:  Deanna Eng RPH (2020)   Next INR check:  3/26/2021   Priority:  Maintenance   Target end date:  Indefinite    Indications    DVT (deep venous thrombosis) (CMS/[facility]) [I82.409] [I82.409]             Anticoagulation Episode Summary     INR check location:  Anticoagulation Clinic    Preferred lab:      Send INR reminders to:  [facility] CLINICAL POOL    Comments:  Patient rights and responsibilities signed: 14        Anticoagulation Care Providers     Provider Role Specialty Phone number    Camila Lazcano MD Referring Internal Medicine [phone]          Clinic Interview:   Patient Findings     Negatives:  Signs/symptoms of thrombosis, Signs/symptoms of bleeding,   Laboratory test error suspected, Change in health, Change in alcohol use,   Change in activity, Upcoming invasive procedure, Emergency department   visit, Upcoming  dental procedure, Missed doses, Extra doses, Change in   medications, Change in diet/appetite, Hospital admission, Bruising, Other   complaints      Clinical Outcomes     Negatives:  Major bleeding event, Thromboembolic event,   Anticoagulation-related hospital admission, Anticoagulation-related ED   visit, Anticoagulation-related fatality        Current Medications:   Prior to Admission medications    Medication Sig Start Date End Date Taking? Authorizing Provider   atenolol (TENORMIN) 100 MG tablet Take 100 mg by mouth Daily. 11/13/14   Drew Carson MD   cefdinir (OMNICEF) 300 MG capsule Take 1 capsule by mouth 2 (Two) Times a Day. 2/22/21   Garret Garcia MD   Fenofibrate (LIPOFEN) 150 MG capsule Take 160 mg by mouth Daily. 11/13/14   Drew Carson MD   hydroCHLOROthiazide (HYDRODIURIL) 50 MG tablet Take 50 mg by mouth Daily. 8/31/19   Emergency, Nurse CRISTIANO Martinez   levothyroxine (SYNTHROID, LEVOTHROID) 50 MCG tablet TAKE 1 TABLET BY MOUTH ONCE DAILY 9/3/19   Emergency, Nurse CRISTIANO Martinez   lisinopril (PRINIVIL,ZESTRIL) 20 MG tablet Take 20 mg by mouth 2 (Two) Times a Day. 8/28/19   Emergency, Nurse Juan RN   Omega-3 Fatty Acids (FISH-EPA PO) Take 1,400 mg by mouth Daily.    Drew Carson MD   omeprazole (priLOSEC) 40 MG capsule Take 40 mg by mouth Daily As Needed. 7/1/15   Drew Carson MD   phenazopyridine (Pyridium) 200 MG tablet 1 tablet p.o. 3 times daily before meals 2/22/21   Garret Garcia MD   warfarin (COUMADIN) 2.5 MG tablet Take 2.5 mg by mouth. 11/27/18   Drew Carson MD   warfarin (COUMADIN) 5 MG tablet Take 5 mg by mouth 2 (Two) Times a Week. 1/18/15   Drew Carson MD       Medical history:   Past Medical History:   Diagnosis Date   • Disease of thyroid gland    • Hyperlipidemia    • Hypertension        Social history:   Social History     Tobacco Use   • Smoking status: Former Smoker   • Smokeless tobacco: Never Used   • Tobacco comment:  quit 35 yrs ago   Substance Use Topics   • Alcohol use: Yes     Comment: rare       Allergies:    Atorvastatin calcium, Colesevelam hcl, Colestipol hcl, Ezetimibe, Pitavastatin, Rosuvastatin calcium, and Statins    Vaccine History:   Immunization History   Administered Date(s) Administered   • Fluzone High Dose =>65 Years ([employer] ONLY) 12/16/2020   • Hepatitis A 05/30/2018, 12/03/2018   • Hepatitis B 12/09/2019, 01/09/2020, 06/11/2020   • Pneumococcal Conjugate 13-Valent (PCV13) 10/13/2015   • Pneumococcal Polysaccharide (PPSV23) 09/28/2008   • Shingrix 06/11/2018, 12/03/2018   • Tdap 11/16/2016       This patient is managed via a collaborative agreement, pursuant to IC 25-26-16. The signed protocol is kept in the [facility] at [address] [place] [address].    Education: Thi Allen has been instructed to call if any changes in medications, doses, concerns, etc. Patient was provided dosing instructions and expressed verbal understanding and has no further questions at this time.    Plan:  1. Anticoagulation   - no change; warfarin 2.5 mg PO daily, except warfarin 5 mg PO on Wednesday and Saturday . .   Total weekly dose = 22.5 mg.   - RTC in 2 weeks on 3/26/21  - Patient resumed the treatment with the warfarin after her procedure, and she is therapeutic today on the low end. She agreed to come back after two weeks for follow up. If therapeutic visit, consider going back to q6 weeks monitoring.    Fátima Dodge, PharmD  3/15/2021  13:44 EDT

## 2021-03-16 ENCOUNTER — TELEPHONE (OUTPATIENT)
Dept: PHARMACY | Facility: HOSPITAL | Age: 81
End: 2021-03-16

## 2021-03-16 NOTE — TELEPHONE ENCOUNTER
Patient returned missed call from [facility] to verify date and time of her upcoming appointment. Pharmacist verified information and patient verbalized understanding to RTC on 3/26/21 at 10am.

## 2021-03-17 ENCOUNTER — APPOINTMENT (OUTPATIENT)
Dept: PHARMACY | Facility: HOSPITAL | Age: 81
End: 2021-03-17

## 2021-03-26 ENCOUNTER — APPOINTMENT (OUTPATIENT)
Dept: PHARMACY | Facility: HOSPITAL | Age: 81
End: 2021-03-26

## 2021-03-31 ENCOUNTER — ANTICOAGULATION VISIT (OUTPATIENT)
Dept: PHARMACY | Facility: HOSPITAL | Age: 81
End: 2021-03-31

## 2021-03-31 LAB — INR PPP: 2.6 (ref 1.8–2.8)

## 2021-03-31 PROCEDURE — 85610 PROTHROMBIN TIME: CPT

## 2021-03-31 PROCEDURE — G0463 HOSPITAL OUTPT CLINIC VISIT: HCPCS

## 2021-03-31 PROCEDURE — 36416 COLLJ CAPILLARY BLOOD SPEC: CPT

## 2021-03-31 NOTE — PROGRESS NOTES
Anticoagulation Clinic Progress Note    INR Goal: 1.8-2.8  Today's INR: 2.6 (therapeutic)  Current warfarin dose: warfarin 2.5 mg PO daily, except warfarin 5 mg PO on Wednesday and Saturday.  Current total weekly dose = 22.5 mg per week.     INR History:  Anticoagulation Monitoring 3/4/2021 3/12/2021 3/31/2021   INR 1.60 2.10 2.60   INR Date 3/4/2021 3/12/2021 3/31/2021   INR Goal 1.8-2.8 1.8-2.8 1.8-2.8   Trend Same Same Same   Last Week Total 15 mg 20 mg 22.5 mg   Next Week Total 17.5 mg 22.5 mg 22.5 mg   Sun 2.5 mg 2.5 mg 2.5 mg   Mon 2.5 mg 2.5 mg 2.5 mg   Tue 2.5 mg 2.5 mg 2.5 mg   Wed 5 mg 5 mg 5 mg   Thu Hold (3/4); Otherwise 2.5 mg 2.5 mg 2.5 mg   Fri Hold (3/5) 2.5 mg 2.5 mg   Sat 5 mg 5 mg 5 mg   Visit Report - - -   Some recent data might be hidden       Anticoagulation Summary  As of 3/31/2021    INR goal:  1.8-2.8   TTR:  61.9 % (1.8 y)   INR used for dosin.60 (3/31/2021)   Warfarin maintenance plan:  5 mg every Wed, Sat; 2.5 mg all other days   Weekly warfarin total:  22.5 mg   Plan last modified:  Deanna Eng RP (2020)   Next INR check:  2021   Priority:  Maintenance   Target end date:  Indefinite    Indications    DVT (deep venous thrombosis) (CMS/[facility]) [I82.409] [I82.409]             Anticoagulation Episode Summary     INR check location:  Anticoagulation Clinic    Preferred lab:      Send INR reminders to:  [facility] CLINICAL POOL    Comments:  Patient rights and responsibilities signed: 14        Anticoagulation Care Providers     Provider Role Specialty Phone number    Camila Lazcano MD Referring Internal Medicine [phone]          Clinic Interview:   Patient Findings     Negatives:  Signs/symptoms of thrombosis, Signs/symptoms of bleeding,   Laboratory test error suspected, Change in health, Change in alcohol use,   Change in activity, Upcoming invasive procedure, Emergency department   visit, Upcoming dental procedure, Missed doses, Extra doses,  Change in   medications, Change in diet/appetite, Hospital admission, Bruising, Other   complaints      Clinical Outcomes     Negatives:  Major bleeding event, Thromboembolic event,   Anticoagulation-related hospital admission, Anticoagulation-related ED   visit, Anticoagulation-related fatality        Current Medications:   Prior to Admission medications    Medication Sig Start Date End Date Taking? Authorizing Provider   atenolol (TENORMIN) 100 MG tablet Take 100 mg by mouth Daily. 11/13/14   Drew Carson MD   Fenofibrate (LIPOFEN) 150 MG capsule Take 160 mg by mouth Daily. 11/13/14   Drew Carson MD   hydroCHLOROthiazide (HYDRODIURIL) 50 MG tablet Take 50 mg by mouth Daily. 8/31/19   Emergency, Nurse Juan RN   levothyroxine (SYNTHROID, LEVOTHROID) 50 MCG tablet TAKE 1 TABLET BY MOUTH ONCE DAILY 9/3/19   Emergency, Nurse Juan RN   lisinopril (PRINIVIL,ZESTRIL) 20 MG tablet Take 20 mg by mouth 2 (Two) Times a Day. 8/28/19   Emergency, Nurse Juan RN   Omega-3 Fatty Acids (FISH-EPA PO) Take 1,400 mg by mouth Daily.    Drew Carson MD   omeprazole (priLOSEC) 40 MG capsule Take 40 mg by mouth Daily As Needed. 7/1/15   Drew Carson MD   warfarin (COUMADIN) 2.5 MG tablet Take 2.5 mg by mouth. 11/27/18   Drew Carson MD   warfarin (COUMADIN) 5 MG tablet Take 5 mg by mouth 2 (Two) Times a Week. 1/18/15   Drew Carson MD       Medical history:   Past Medical History:   Diagnosis Date   • Disease of thyroid gland    • Hyperlipidemia    • Hypertension        Social history:   Social History     Tobacco Use   • Smoking status: Former Smoker   • Smokeless tobacco: Never Used   • Tobacco comment: quit 35 yrs ago   Substance Use Topics   • Alcohol use: Yes     Comment: rare       Allergies:    Atorvastatin calcium, Colesevelam hcl, Colestipol hcl, Ezetimibe, Pitavastatin, Rosuvastatin calcium, and Statins    Vaccine History:   Immunization History   Administered Date(s)  Administered   • Fluzone High Dose =>65 Years ([employer] ONLY) 12/16/2020   • Hepatitis A 05/30/2018, 12/03/2018   • Hepatitis B 12/09/2019, 01/09/2020, 06/11/2020   • Pneumococcal Conjugate 13-Valent (PCV13) 10/13/2015   • Pneumococcal Polysaccharide (PPSV23) 09/28/2008   • Shingrix 06/11/2018, 12/03/2018   • Tdap 11/16/2016       This patient is managed via a collaborative agreement, pursuant to IC 25-26-16. The signed protocol is kept in the [facility] at [address] [place] [address].    Education: Thi Allen has been instructed to call if any changes in medications, doses, concerns, etc. Patient was provided dosing instructions and expressed verbal understanding and has no further questions at this time.    Plan:  1. Anticoagulation   - no change; warfarin 2.5 mg PO daily, except warfarin 5  mg PO on Wednesday and Saturday.   Total weekly dose = 22.5 mg.   - RTC in 6 week(s)    2. Vaccines   Patient is up-to-date on all vaccines at this time      Hattie Rodrigues [facility]  3/31/2021  11:47 EDT

## 2021-05-12 ENCOUNTER — ANTICOAGULATION VISIT (OUTPATIENT)
Dept: PHARMACY | Facility: HOSPITAL | Age: 81
End: 2021-05-12

## 2021-05-12 LAB — INR PPP: 2.2 (ref 1.8–2.8)

## 2021-05-12 PROCEDURE — G0463 HOSPITAL OUTPT CLINIC VISIT: HCPCS

## 2021-05-12 PROCEDURE — 36416 COLLJ CAPILLARY BLOOD SPEC: CPT

## 2021-05-12 PROCEDURE — 85610 PROTHROMBIN TIME: CPT

## 2021-05-12 NOTE — PROGRESS NOTES
Anticoagulation Clinic Progress Note    INR Goal: 1.8-2.8  Today's INR: 2.20 (therapeutic)  Current warfarin dose: warfarin 2.5 mg PO daily, except warfarin 5 mg PO on Wednesday and Saturday.  Current total weekly dose = 22.5 mg per week.     INR History:  Anticoagulation Monitoring 3/12/2021 3/31/2021 2021   INR 2.10 2.60 2.20   INR Date 3/12/2021 3/31/2021 2021   INR Goal 1.8-2.8 1.8-2.8 1.8-2.8   Trend Same Same Same   Last Week Total 20 mg 22.5 mg 22.5 mg   Next Week Total 22.5 mg 22.5 mg 22.5 mg   Sun 2.5 mg 2.5 mg 2.5 mg   Mon 2.5 mg 2.5 mg 2.5 mg   Tue 2.5 mg 2.5 mg 2.5 mg   Wed 5 mg 5 mg 5 mg   Thu 2.5 mg 2.5 mg 2.5 mg   Fri 2.5 mg 2.5 mg 2.5 mg   Sat 5 mg 5 mg 5 mg   Visit Report - - -   Some recent data might be hidden       Anticoagulation Summary  As of 2021    INR goal:  1.8-2.8   TTR:  64.3 % (1.9 y)   INR used for dosin.20 (2021)   Warfarin maintenance plan:  5 mg every Wed, Sat; 2.5 mg all other days   Weekly warfarin total:  22.5 mg   No change documented:  Isaias Nicholas, Pharmacy Intern   Plan last modified:  Deanna Eng RP (2020)   Next INR check:  2021   Priority:  Maintenance   Target end date:  Indefinite    Indications    DVT (deep venous thrombosis) (CMS/[facility]) [I82.409] [I82.409]             Anticoagulation Episode Summary     INR check location:  Anticoagulation Clinic    Preferred lab:      Send INR reminders to:  [facility] CLINICAL POOL    Comments:  Patient rights and responsibilities signed: 14        Anticoagulation Care Providers     Provider Role Specialty Phone number    Camila Lazcano MD Referring Internal Medicine [phone]          Clinic Interview:   Patient Findings     Positives:  Change in diet/appetite (Patient reported eating more vitamin   K rich foods (broccoli). )    Negatives:  Signs/symptoms of thrombosis, Signs/symptoms of bleeding,   Laboratory test error suspected, Change in health, Change in  alcohol use,   Change in activity, Upcoming invasive procedure, Emergency department   visit, Upcoming dental procedure, Missed doses, Extra doses, Change in   medications, Hospital admission, Bruising, Other complaints      Clinical Outcomes     Negatives:  Major bleeding event, Thromboembolic event,   Anticoagulation-related hospital admission, Anticoagulation-related ED   visit, Anticoagulation-related fatality        Current Medications:   Prior to Admission medications    Medication Sig Start Date End Date Taking? Authorizing Provider   atenolol (TENORMIN) 100 MG tablet Take 100 mg by mouth Daily. 11/13/14   Drew Carson MD   Fenofibrate (LIPOFEN) 150 MG capsule Take 160 mg by mouth Daily. 11/13/14   Drew Carson MD   hydroCHLOROthiazide (HYDRODIURIL) 50 MG tablet Take 50 mg by mouth Daily. 8/31/19   Emergency, Nurse Juan RN   levothyroxine (SYNTHROID, LEVOTHROID) 50 MCG tablet TAKE 1 TABLET BY MOUTH ONCE DAILY 9/3/19   Emergency, Nurse Juan RN   lisinopril (PRINIVIL,ZESTRIL) 20 MG tablet Take 20 mg by mouth 2 (Two) Times a Day. 8/28/19   Emergency, Nurse Juan RN   Omega-3 Fatty Acids (FISH-EPA PO) Take 1,400 mg by mouth Daily.    Drew Carson MD   omeprazole (priLOSEC) 40 MG capsule Take 40 mg by mouth Daily As Needed. 7/1/15   Drew Carson MD   warfarin (COUMADIN) 2.5 MG tablet Take 2.5 mg by mouth. 11/27/18   Drew Carson MD   warfarin (COUMADIN) 5 MG tablet Take 5 mg by mouth 2 (Two) Times a Week. 1/18/15   Drew Carson MD       Medical history:   Past Medical History:   Diagnosis Date   • Disease of thyroid gland    • Hyperlipidemia    • Hypertension        Social history:   Social History     Tobacco Use   • Smoking status: Former Smoker   • Smokeless tobacco: Never Used   • Tobacco comment: quit 35 yrs ago   Substance Use Topics   • Alcohol use: Yes     Comment: rare       Allergies:    Atorvastatin calcium, Colesevelam hcl, Colestipol hcl,  Ezetimibe, Pitavastatin, Rosuvastatin calcium, and Statins    Vaccine History:   Immunization History   Administered Date(s) Administered   • Fluzone High Dose =>65 Years ([employer] ONLY) 12/16/2020   • Hepatitis A 05/30/2018, 12/03/2018   • Hepatitis B 12/09/2019, 01/09/2020, 06/11/2020   • Pneumococcal Conjugate 13-Valent (PCV13) 10/13/2015   • Pneumococcal Polysaccharide (PPSV23) 09/28/2008   • Shingrix 06/11/2018, 12/03/2018   • Tdap 11/16/2016       This patient is managed via a collaborative agreement, pursuant to IC 25-26-16. The signed protocol is kept in the [facility] at [address] [place] [address].    Education: Thi Allen has been instructed to call if any changes in medications, doses, concerns, etc. Patient was provided dosing instructions and expressed verbal understanding and has no further questions at this time.    Plan:  1. Anticoagulation   - no change; warfarin 2.5 mg PO daily, except warfarin 5 mg PO on Wednesday and Saturday.   Total weekly dose = 22.5 mg.   - RTC in 6 weeks        Isaias Nicholas, Pharmacy Intern  5/12/2021  16:26 EDT

## 2021-05-12 NOTE — PROGRESS NOTES
Anticoagulation Clinic Progress Note    INR Goal: 1.8-2.8  Today's INR: 2.20 (therapeutic)  Current warfarin dose: warfarin 2.5 mg PO daily, except warfarin 5 mg PO on Wednesday and Saturday.  Current total weekly dose = 22.5 mg per week.     INR History:  Anticoagulation Monitoring 3/12/2021 3/31/2021 2021   INR 2.10 2.60 2.20   INR Date 3/12/2021 3/31/2021 2021   INR Goal 1.8-2.8 1.8-2.8 1.8-2.8   Trend Same Same Same   Last Week Total 20 mg 22.5 mg 22.5 mg   Next Week Total 22.5 mg 22.5 mg 22.5 mg   Sun 2.5 mg 2.5 mg 2.5 mg   Mon 2.5 mg 2.5 mg 2.5 mg   Tue 2.5 mg 2.5 mg 2.5 mg   Wed 5 mg 5 mg 5 mg   Thu 2.5 mg 2.5 mg 2.5 mg   Fri 2.5 mg 2.5 mg 2.5 mg   Sat 5 mg 5 mg 5 mg   Visit Report - - -   Some recent data might be hidden       Anticoagulation Summary  As of 2021    INR goal:  1.8-2.8   TTR:  64.3 % (1.9 y)   INR used for dosin.20 (2021)   Warfarin maintenance plan:  5 mg every Wed, Sat; 2.5 mg all other days   Weekly warfarin total:  22.5 mg   No change documented:  Isaias Nicholas, Pharmacy Intern   Plan last modified:  Deanna Eng [facility] (2020)   Next INR check:     Priority:  Maintenance   Target end date:  Indefinite    Indications    DVT (deep venous thrombosis) (CMS/[facility]) [I82.409] [I82.409]             Anticoagulation Episode Summary     INR check location:  Anticoagulation Clinic    Preferred lab:      Send INR reminders to:  [facility] CLINICAL POOL    Comments:  Patient rights and responsibilities signed: 14        Anticoagulation Care Providers     Provider Role Specialty Phone number    Camila Lazcano MD Referring Internal Medicine [phone]          Clinic Interview:   Patient Findings     Positives:  Change in diet/appetite (Patient reported eating more vitamin   K rich foods (broccoli). )        Current Medications:   Prior to Admission medications    Medication Sig Start Date End Date Taking? Authorizing Provider   atenolol (TENORMIN)  100 MG tablet Take 100 mg by mouth Daily. 11/13/14   Drew Carson MD   Fenofibrate (LIPOFEN) 150 MG capsule Take 160 mg by mouth Daily. 11/13/14   Drew Carson MD   hydroCHLOROthiazide (HYDRODIURIL) 50 MG tablet Take 50 mg by mouth Daily. 8/31/19   Emergency, Nurse CRISTIANO Martinez   levothyroxine (SYNTHROID, LEVOTHROID) 50 MCG tablet TAKE 1 TABLET BY MOUTH ONCE DAILY 9/3/19   Emergency, Nurse Juan RN   lisinopril (PRINIVIL,ZESTRIL) 20 MG tablet Take 20 mg by mouth 2 (Two) Times a Day. 8/28/19   Emergency, Nurse Juan RN   Omega-3 Fatty Acids (FISH-EPA PO) Take 1,400 mg by mouth Daily.    Drew Carson MD   omeprazole (priLOSEC) 40 MG capsule Take 40 mg by mouth Daily As Needed. 7/1/15   Drew Carson MD   warfarin (COUMADIN) 2.5 MG tablet Take 2.5 mg by mouth. 11/27/18   Drew Carson MD   warfarin (COUMADIN) 5 MG tablet Take 5 mg by mouth 2 (Two) Times a Week. 1/18/15   Drew Carson MD       Medical history:   Past Medical History:   Diagnosis Date   • Disease of thyroid gland    • Hyperlipidemia    • Hypertension        Social history:   Social History     Tobacco Use   • Smoking status: Former Smoker   • Smokeless tobacco: Never Used   • Tobacco comment: quit 35 yrs ago   Substance Use Topics   • Alcohol use: Yes     Comment: rare       Allergies:    Atorvastatin calcium, Colesevelam hcl, Colestipol hcl, Ezetimibe, Pitavastatin, Rosuvastatin calcium, and Statins    Vaccine History:   Immunization History   Administered Date(s) Administered   • Fluzone High Dose =>65 Years ([employer] ONLY) 12/16/2020   • Hepatitis A 05/30/2018, 12/03/2018   • Hepatitis B 12/09/2019, 01/09/2020, 06/11/2020   • Pneumococcal Conjugate 13-Valent (PCV13) 10/13/2015   • Pneumococcal Polysaccharide (PPSV23) 09/28/2008   • Shingrix 06/11/2018, 12/03/2018   • Tdap 11/16/2016       This patient is managed via a collaborative agreement, pursuant to IC 25-26-16. The signed protocol is kept in the  [facility] at [facility], [address] [place] [address], [place] [address].    Education: Thi Allen has been instructed to call if any changes in medications, doses, concerns, etc. Patient was provided dosing instructions and expressed verbal understanding and has no further questions at this time.    Plan:  1. Anticoagulation   - no change; warfarin 2.5 mg PO daily, except warfarin 5 mg PO on Wednesday and Saturday.   Total weekly dose = 22.5 mg.   - RTC in 6 weeks        Isaias Nicholas, Pharmacy Intern  5/12/2021  16:07 EDT

## 2021-06-23 ENCOUNTER — ANTICOAGULATION VISIT (OUTPATIENT)
Dept: PHARMACY | Facility: HOSPITAL | Age: 81
End: 2021-06-23

## 2021-06-23 LAB — INR PPP: 2 (ref 1.8–2.8)

## 2021-06-23 PROCEDURE — G0463 HOSPITAL OUTPT CLINIC VISIT: HCPCS

## 2021-06-23 PROCEDURE — 85610 PROTHROMBIN TIME: CPT

## 2021-06-23 PROCEDURE — 36416 COLLJ CAPILLARY BLOOD SPEC: CPT

## 2021-06-23 NOTE — PROGRESS NOTES
Anticoagulation Clinic Progress Note    INR Goal: 1.8-2.8  Today's INR: 2.0 (therapeutic)  Current warfarin dose: warfarin 2.5 mg PO daily, except warfarin 5 mg PO on Wednesday and Saturday.  Current total weekly dose = 22.5 mg per week.     INR History:  Anticoagulation Monitoring 3/31/2021 2021 2021   INR 2.60 2.20 2.00   INR Date 3/31/2021 2021 2021   INR Goal 1.8-2.8 1.8-2.8 1.8-2.8   Trend Same Same Same   Last Week Total 22.5 mg 22.5 mg 22.5 mg   Next Week Total 22.5 mg 22.5 mg 22.5 mg   Sun 2.5 mg 2.5 mg 2.5 mg   Mon 2.5 mg 2.5 mg 2.5 mg   Tue 2.5 mg 2.5 mg 2.5 mg   Wed 5 mg 5 mg 5 mg   Thu 2.5 mg 2.5 mg 2.5 mg   Fri 2.5 mg 2.5 mg 2.5 mg   Sat 5 mg 5 mg 5 mg   Visit Report - - -   Some recent data might be hidden       Anticoagulation Summary  As of 2021    INR goal:  1.8-2.8   TTR:  66.3 % (2 y)   INR used for dosin.00 (2021)   Warfarin maintenance plan:  5 mg every Wed, Sat; 2.5 mg all other days   Weekly warfarin total:  22.5 mg   No change documented:  Zeina Vela, PharmD   Plan last modified:  Deanna Eng RPH (2020)   Next INR check:  2021   Priority:  Maintenance   Target end date:  Indefinite    Indications    DVT (deep venous thrombosis) (CMS/[facility]) [I82.409] [I82.409]             Anticoagulation Episode Summary     INR check location:  Anticoagulation Clinic    Preferred lab:      Send INR reminders to:  [facility] CLINICAL POOL    Comments:  Patient rights and responsibilities signed: 14        Anticoagulation Care Providers     Provider Role Specialty Phone number    Camila Lazcano MD Referring Internal Medicine [phone]          Clinic Interview:   Patient Findings     Negatives:  Signs/symptoms of thrombosis, Signs/symptoms of bleeding,   Laboratory test error suspected, Change in health, Change in alcohol use,   Change in activity, Upcoming invasive procedure, Emergency department   visit, Upcoming dental procedure,  Missed doses, Extra doses, Change in   medications, Change in diet/appetite, Hospital admission, Bruising, Other   complaints      Clinical Outcomes     Negatives:  Major bleeding event, Thromboembolic event,   Anticoagulation-related hospital admission, Anticoagulation-related ED   visit, Anticoagulation-related fatality        Current Medications:   Prior to Admission medications    Medication Sig Start Date End Date Taking? Authorizing Provider   atenolol (TENORMIN) 100 MG tablet Take 100 mg by mouth Daily. 11/13/14   Drew Carson MD   Fenofibrate (LIPOFEN) 150 MG capsule Take 160 mg by mouth Daily. 11/13/14   Drew Carson MD   hydroCHLOROthiazide (HYDRODIURIL) 50 MG tablet Take 50 mg by mouth Daily. 8/31/19   Emergency, Nurse Juan RN   levothyroxine (SYNTHROID, LEVOTHROID) 50 MCG tablet TAKE 1 TABLET BY MOUTH ONCE DAILY 9/3/19   Emergency, Nurse Juan RN   lisinopril (PRINIVIL,ZESTRIL) 20 MG tablet Take 20 mg by mouth 2 (Two) Times a Day. 8/28/19   Emergency, Nurse Juan RN   Omega-3 Fatty Acids (FISH-EPA PO) Take 1,400 mg by mouth Daily.    Drew Carson MD   omeprazole (priLOSEC) 40 MG capsule Take 40 mg by mouth Daily As Needed. 7/1/15   Drew Carson MD   warfarin (COUMADIN) 2.5 MG tablet Take 2.5 mg by mouth. 11/27/18   Drew Carson MD   warfarin (COUMADIN) 5 MG tablet Take 5 mg by mouth 2 (Two) Times a Week. 1/18/15   Drew Carson MD       Medical history:   Past Medical History:   Diagnosis Date   • Disease of thyroid gland    • Hyperlipidemia    • Hypertension        Social history:   Social History     Tobacco Use   • Smoking status: Former Smoker   • Smokeless tobacco: Never Used   • Tobacco comment: quit 35 yrs ago   Substance Use Topics   • Alcohol use: Yes     Comment: rare       Allergies:    Atorvastatin calcium, Colesevelam hcl, Colestipol hcl, Ezetimibe, Pitavastatin, Rosuvastatin calcium, and Statins    Vaccine History:   Immunization History    Administered Date(s) Administered   • Fluzone High Dose =>65 Years ([employer] ONLY) 12/16/2020   • Hepatitis A 05/30/2018, 12/03/2018   • Hepatitis B 12/09/2019, 01/09/2020, 06/11/2020   • Pneumococcal Conjugate 13-Valent (PCV13) 10/13/2015   • Pneumococcal Polysaccharide (PPSV23) 09/28/2008   • Shingrix 06/11/2018, 12/03/2018   • Tdap 11/16/2016       This patient is managed via a collaborative agreement, pursuant to IC 25-26-16. The signed protocol is kept in the [facility] at [address] [place] [address].    Education: Thi Allen has been instructed to call if any changes in medications, doses, concerns, etc. Patient was provided dosing instructions and expressed verbal understanding and has no further questions at this time.    Plan:  1. Anticoagulation   - no change; warfarin 2.5 mg PO daily, except warfarin 5 mg PO on Wednesday and Saturday.  Total weekly dose = 22.5 mg.   - RTC in 6 week(s)    2. Vaccines   Patient is up-to-date on all vaccines at this time .    Zeina Vela, PharmD  6/23/2021  13:41 EDT

## 2021-08-04 ENCOUNTER — ANTICOAGULATION VISIT (OUTPATIENT)
Dept: PHARMACY | Facility: HOSPITAL | Age: 81
End: 2021-08-04

## 2021-08-04 LAB — INR PPP: 3.1 (ref 1.8–2.8)

## 2021-08-04 PROCEDURE — G0463 HOSPITAL OUTPT CLINIC VISIT: HCPCS

## 2021-08-04 PROCEDURE — 36416 COLLJ CAPILLARY BLOOD SPEC: CPT

## 2021-08-04 PROCEDURE — 85610 PROTHROMBIN TIME: CPT

## 2021-08-04 NOTE — PROGRESS NOTES
Anticoagulation Clinic Progress Note    INR Goal: 1.8-2.8  Today's INR: 3.1 (supratherapeutic)  Current warfarin dose: warfarin 2.5 mg PO daily, except warfarin 5 mg PO on Wednesday and Saturday.  Current total weekly dose = 22.5 mg per week.     INR History:  Anticoagulation Monitoring 5/12/2021 6/23/2021 8/4/2021   INR 2.20 2.00 3.10   INR Date 5/12/2021 6/23/2021 8/4/2021   INR Goal 1.8-2.8 1.8-2.8 1.8-2.8   Trend Same Same Same   Last Week Total 22.5 mg 22.5 mg 22.5 mg   Next Week Total 22.5 mg 22.5 mg 22.5 mg   Sun 2.5 mg 2.5 mg 2.5 mg   Mon 2.5 mg 2.5 mg 2.5 mg   Tue 2.5 mg 2.5 mg 2.5 mg   Wed 5 mg 5 mg 5 mg   Thu 2.5 mg 2.5 mg 2.5 mg   Fri 2.5 mg 2.5 mg 2.5 mg   Sat 5 mg 5 mg 5 mg   Visit Report - - -   Some recent data might be hidden       Anticoagulation Summary  As of 8/4/2021    INR goal:  1.8-2.8   TTR:  66.7 % (2.1 y)   INR used for dosing:  3.10 (8/4/2021)   Warfarin maintenance plan:  5 mg every Wed, Sat; 2.5 mg all other days   Weekly warfarin total:  22.5 mg   No change documented:  Zeina Vela, PharmD   Plan last modified:  Deanna Eng RPH (6/29/2020)   Next INR check:  9/1/2021   Priority:  Maintenance   Target end date:  Indefinite    Indications    DVT (deep venous thrombosis) (CMS/[facility]) [I82.409] [I82.409]             Anticoagulation Episode Summary     INR check location:  Anticoagulation Clinic    Preferred lab:      Send INR reminders to:  [facility] CLINICAL POOL    Comments:  Patient rights and responsibilities signed: 11/23/14        Anticoagulation Care Providers     Provider Role Specialty Phone number    Camila Lazcano MD Referring Internal Medicine [phone]          Clinic Interview:   Patient Findings     Positives:  Change in health (Patient under increased stress due to   spouse having knee surgery recently (may increase INR).)    Negatives:  Signs/symptoms of thrombosis, Signs/symptoms of bleeding,   Laboratory test error suspected, Change in alcohol  use, Change in   activity, Upcoming invasive procedure, Emergency department visit,   Upcoming dental procedure, Missed doses, Extra doses, Change in   medications, Change in diet/appetite, Hospital admission, Bruising, Other   complaints      Clinical Outcomes     Negatives:  Major bleeding event, Thromboembolic event,   Anticoagulation-related hospital admission, Anticoagulation-related ED   visit, Anticoagulation-related fatality        Current Medications:   Prior to Admission medications    Medication Sig Start Date End Date Taking? Authorizing Provider   atenolol (TENORMIN) 100 MG tablet Take 100 mg by mouth Daily. 11/13/14   Drew Carson MD   Fenofibrate (LIPOFEN) 150 MG capsule Take 160 mg by mouth Daily. 11/13/14   Drew Carson MD   hydroCHLOROthiazide (HYDRODIURIL) 50 MG tablet Take 50 mg by mouth Daily. 8/31/19   Emergency, Nurse Juan RN   levothyroxine (SYNTHROID, LEVOTHROID) 50 MCG tablet TAKE 1 TABLET BY MOUTH ONCE DAILY 9/3/19   Emergency, Nurse Juan RN   lisinopril (PRINIVIL,ZESTRIL) 20 MG tablet Take 20 mg by mouth 2 (Two) Times a Day. 8/28/19   Emergency, Nurse Juan RN   Omega-3 Fatty Acids (FISH-EPA PO) Take 1,400 mg by mouth Daily.    Drew Carson MD   omeprazole (priLOSEC) 40 MG capsule Take 40 mg by mouth Daily As Needed. 7/1/15   Drew Carson MD   warfarin (COUMADIN) 2.5 MG tablet Take 2.5 mg by mouth. 11/27/18   Drew Carson MD   warfarin (COUMADIN) 5 MG tablet Take 5 mg by mouth 2 (Two) Times a Week. 1/18/15   Drew Carson MD       Medical history:   Past Medical History:   Diagnosis Date   • Disease of thyroid gland    • Hyperlipidemia    • Hypertension        Social history:   Social History     Tobacco Use   • Smoking status: Former Smoker   • Smokeless tobacco: Never Used   • Tobacco comment: quit 35 yrs ago   Substance Use Topics   • Alcohol use: Yes     Comment: rare       Allergies:    Atorvastatin calcium, Colesevelam hcl,  Colestipol hcl, Ezetimibe, Pitavastatin, Rosuvastatin calcium, and Statins    Vaccine History:   Immunization History   Administered Date(s) Administered   • Fluzone High Dose =>65 Years ([employer] ONLY) 12/16/2020   • Hepatitis A 05/30/2018, 12/03/2018   • Hepatitis B 12/09/2019, 01/09/2020, 06/11/2020   • Pneumococcal Conjugate 13-Valent (PCV13) 10/13/2015   • Pneumococcal Polysaccharide (PPSV23) 09/28/2008   • Shingrix 06/11/2018, 12/03/2018   • Tdap 11/16/2016       This patient is managed via a collaborative agreement, pursuant to IC 25-26-16. The signed protocol is kept in the [facility] at [address] [place] [address].    Education: Thi Allen has been instructed to call if any changes in medications, doses, concerns, etc. Patient was provided dosing instructions and expressed verbal understanding and has no further questions at this time.    Plan:  1. Anticoagulation   - no change; warfarin 2.5 mg PO daily, except warfarin 5 mg PO on Wednesday and Saturday.  Total weekly dose = 22.5 mg.   - Consume a few servings of vitamin K containing foods in the next few days.  - Educated patient on signs/symptoms to monitor for and when to seek medical attention.  - RTC in 4 week(s) - if INR therapeutic at that time can resume every 6 week appointments    2. Vaccines   Patient is up-to-date on all vaccines at this time      Zeina Vela, GeorgeD  8/4/2021  14:18 EDT

## 2021-09-01 ENCOUNTER — ANTICOAGULATION VISIT (OUTPATIENT)
Dept: PHARMACY | Facility: HOSPITAL | Age: 81
End: 2021-09-01

## 2021-09-01 LAB — INR PPP: 2.9 (ref 0.9–1.1)

## 2021-09-01 PROCEDURE — G0463 HOSPITAL OUTPT CLINIC VISIT: HCPCS

## 2021-09-01 PROCEDURE — 36416 COLLJ CAPILLARY BLOOD SPEC: CPT

## 2021-09-01 PROCEDURE — 85610 PROTHROMBIN TIME: CPT

## 2021-09-01 NOTE — PROGRESS NOTES
Anticoagulation Clinic Progress Note    INR Goal: 1.8-2.8  Today's INR: 2.9 (supratherapeutic for patient's specific goal of 1.8-2.8 however would be therapeutic under typical INR goal for patient's indication)  Current warfarin dose: warfarin 2.5 mg PO daily, except warfarin 5 mg PO on Wednesday and Saturday.  Current total weekly dose = 22.5 mg per week.     INR History:  Anticoagulation Monitoring 2021   INR 2.00 3.10 2.90   INR Date 2021   INR Goal 1.8-2.8 1.8-2.8 1.8-2.8   Trend Same Same Same   Last Week Total 22.5 mg 22.5 mg 22.5 mg   Next Week Total 22.5 mg 22.5 mg 22.5 mg   Sun 2.5 mg 2.5 mg 2.5 mg   Mon 2.5 mg 2.5 mg 2.5 mg   Tue 2.5 mg 2.5 mg 2.5 mg   Wed 5 mg 5 mg 5 mg   Thu 2.5 mg 2.5 mg 2.5 mg   Fri 2.5 mg 2.5 mg 2.5 mg   Sat 5 mg 5 mg 5 mg   Visit Report - - -   Some recent data might be hidden       Anticoagulation Summary  As of 2021    INR goal:  1.8-2.8   TTR:  64.3 % (2.2 y)   INR used for dosin.90 (2021)   Warfarin maintenance plan:  5 mg every Wed, Sat; 2.5 mg all other days   Weekly warfarin total:  22.5 mg   No change documented:  Mairlee Zarco, Pharmacy Intern   Plan last modified:  Deanna Eng RPH (2020)   Next INR check:  2021   Priority:  Maintenance   Target end date:  Indefinite    Indications    DVT (deep venous thrombosis) (CMS/[facility]) [I82.409] [I82.409]             Anticoagulation Episode Summary     INR check location:  Anticoagulation Clinic    Preferred lab:      Send INR reminders to:  [facility] CLINICAL POOL    Comments:  Patient rights and responsibilities signed: 14        Anticoagulation Care Providers     Provider Role Specialty Phone number    Camila Lazcano MD Referring Internal Medicine [phone]          Clinic Interview:   Patient Findings     Negatives:  Signs/symptoms of thrombosis, Signs/symptoms of bleeding,   Laboratory test error suspected, Change in health, Change in  alcohol use,   Change in activity, Upcoming invasive procedure, Emergency department   visit, Upcoming dental procedure, Missed doses, Extra doses, Change in   medications, Change in diet/appetite, Hospital admission, Bruising, Other   complaints      Clinical Outcomes     Negatives:  Major bleeding event, Thromboembolic event,   Anticoagulation-related hospital admission, Anticoagulation-related ED   visit, Anticoagulation-related fatality        Current Medications:   Prior to Admission medications    Medication Sig Start Date End Date Taking? Authorizing Provider   atenolol (TENORMIN) 100 MG tablet Take 100 mg by mouth Daily. 11/13/14   Drew Carson MD   Fenofibrate (LIPOFEN) 150 MG capsule Take 160 mg by mouth Daily. 11/13/14   Drew Carson MD   hydroCHLOROthiazide (HYDRODIURIL) 50 MG tablet Take 50 mg by mouth Daily. 8/31/19   Emergency, Nurse uJan RN   levothyroxine (SYNTHROID, LEVOTHROID) 50 MCG tablet TAKE 1 TABLET BY MOUTH ONCE DAILY 9/3/19   Emergency, Nurse Juan RN   lisinopril (PRINIVIL,ZESTRIL) 20 MG tablet Take 20 mg by mouth 2 (Two) Times a Day. 8/28/19   Emergency, Nurse Juan RN   Omega-3 Fatty Acids (FISH-EPA PO) Take 1,400 mg by mouth Daily.    Drew Carson MD   omeprazole (priLOSEC) 40 MG capsule Take 40 mg by mouth Daily As Needed. 7/1/15   Drew Carson MD   warfarin (COUMADIN) 2.5 MG tablet Take 2.5 mg by mouth. 11/27/18   Drew Carson MD   warfarin (COUMADIN) 5 MG tablet Take 5 mg by mouth 2 (Two) Times a Week. 1/18/15   Drew Carson MD       Medical history:   Past Medical History:   Diagnosis Date   • Disease of thyroid gland    • Hyperlipidemia    • Hypertension        Social history:   Social History     Tobacco Use   • Smoking status: Former Smoker   • Smokeless tobacco: Never Used   • Tobacco comment: quit 35 yrs ago   Substance Use Topics   • Alcohol use: Yes     Comment: rare       Allergies:    Atorvastatin calcium, Colesevelam  hcl, Colestipol hcl, Ezetimibe, Pitavastatin, Rosuvastatin calcium, and Statins    Vaccine History:   Immunization History   Administered Date(s) Administered   • Fluzone High Dose =>65 Years ([employer] ONLY) 12/16/2020   • Hepatitis A 05/30/2018, 12/03/2018   • Hepatitis B 12/09/2019, 01/09/2020, 06/11/2020   • Pneumococcal Conjugate 13-Valent (PCV13) 10/13/2015   • Pneumococcal Polysaccharide (PPSV23) 09/28/2008   • Shingrix 06/11/2018, 12/03/2018   • Tdap 11/16/2016       This patient is managed via a collaborative agreement, pursuant to IC 25-26-16. The signed protocol is kept in the [facility] at [address] [place] [address].    Education: Thi Allen has been instructed to call if any changes in medications, doses, concerns, etc. Patient was provided dosing instructions and expressed verbal understanding and has no further questions at this time.    Plan:  1. Anticoagulation   - no change; warfarin 2.5 mg PO daily, except warfarin 5 mg PO on Wednesday and Saturday.   Total weekly dose = 22.5 mg.   - Discussed with patient to increase vitamin K containing food consumption if desired.  - RTC in 4 week(s)    2. Vaccines   Patient is up-to-date on all vaccines at this time       Marilee Zarco, Pharmacy Intern  9/1/2021  11:04 EDT

## 2021-09-29 ENCOUNTER — ANTICOAGULATION VISIT (OUTPATIENT)
Dept: PHARMACY | Facility: HOSPITAL | Age: 81
End: 2021-09-29

## 2021-09-29 LAB — INR PPP: 1.9 (ref 1.8–2.8)

## 2021-09-29 PROCEDURE — G0463 HOSPITAL OUTPT CLINIC VISIT: HCPCS

## 2021-09-29 PROCEDURE — 85610 PROTHROMBIN TIME: CPT

## 2021-09-29 PROCEDURE — 36416 COLLJ CAPILLARY BLOOD SPEC: CPT

## 2021-09-29 NOTE — PROGRESS NOTES
Anticoagulation Clinic Progress Note    INR Goal: 1.8-2.8  Today's INR: 1.9 (therapeutic)  Current warfarin dose: warfarin 2.5 mg PO daily, except warfarin 5 mg PO on Wednesday and Saturday.  Current total weekly dose = 22.5 mg per week.     INR History:  Anticoagulation Monitoring 2021   INR 3.10 2.90 1.90   INR Date 2021   INR Goal 1.8-2.8 1.8-2.8 1.8-2.8   Trend Same Same Same   Last Week Total 22.5 mg 22.5 mg 22.5 mg   Next Week Total 22.5 mg 22.5 mg 22.5 mg   Sun 2.5 mg 2.5 mg 2.5 mg   Mon 2.5 mg 2.5 mg 2.5 mg   Tue 2.5 mg 2.5 mg 2.5 mg   Wed 5 mg 5 mg 5 mg   Thu 2.5 mg 2.5 mg 2.5 mg   Fri 2.5 mg 2.5 mg 2.5 mg   Sat 5 mg 5 mg 5 mg   Visit Report - - -   Some recent data might be hidden       Anticoagulation Summary  As of 2021    INR goal:  1.8-2.8   TTR:  65.2 % (2.3 y)   INR used for dosin.90 (2021)   Warfarin maintenance plan:  5 mg every Wed, Sat; 2.5 mg all other days   Weekly warfarin total:  22.5 mg   No change documented:  Zeina Gomez, PharmD   Plan last modified:  Deanna Eng [facility] (2020)   Next INR check:     Priority:  Maintenance   Target end date:  Indefinite    Indications    DVT (deep venous thrombosis) (CMS/[facility]) [I82.409] [I82.409]             Anticoagulation Episode Summary     INR check location:  Anticoagulation Clinic    Preferred lab:      Send INR reminders to:  [facility] CLINICAL POOL    Comments:  Patient rights and responsibilities signed: 14        Anticoagulation Care Providers     Provider Role Specialty Phone number    Camila Lazcano MD Referring Internal Medicine [phone]          Clinic Interview:   Patient Findings     Positives:  Change in health (Patient reports increased stress due to her    having to recently go to ED (may increase INR).), Missed doses   (Patient reports possibly missing a dose a few weeks ago but is unsure (no   effect on today's INR).), Change in  diet/appetite (Patient states she has   been eating less in general (may increase INR).)    Negatives:  Signs/symptoms of thrombosis, Signs/symptoms of bleeding,   Laboratory test error suspected, Change in alcohol use, Change in   activity, Upcoming invasive procedure, Emergency department visit,   Upcoming dental procedure, Extra doses, Change in medications, Hospital   admission, Bruising, Other complaints      Clinical Outcomes     Negatives:  Major bleeding event, Thromboembolic event,   Anticoagulation-related hospital admission, Anticoagulation-related ED   visit, Anticoagulation-related fatality        Current Medications:   Prior to Admission medications    Medication Sig Start Date End Date Taking? Authorizing Provider   atenolol (TENORMIN) 100 MG tablet Take 100 mg by mouth Daily. 11/13/14   Drew Carson MD   Fenofibrate (LIPOFEN) 150 MG capsule Take 160 mg by mouth Daily. 11/13/14   Drew Carson MD   hydroCHLOROthiazide (HYDRODIURIL) 50 MG tablet Take 50 mg by mouth Daily. 8/31/19   Emergency, Nurse Juan RN   levothyroxine (SYNTHROID, LEVOTHROID) 50 MCG tablet TAKE 1 TABLET BY MOUTH ONCE DAILY 9/3/19   Emergency, Nurse Juan RN   lisinopril (PRINIVIL,ZESTRIL) 20 MG tablet Take 20 mg by mouth 2 (Two) Times a Day. 8/28/19   Emergency, Nurse Juan RN   Omega-3 Fatty Acids (FISH-EPA PO) Take 1,400 mg by mouth Daily.    Drew Carson MD   omeprazole (priLOSEC) 40 MG capsule Take 40 mg by mouth Daily As Needed. 7/1/15   Drew Carson MD   warfarin (COUMADIN) 2.5 MG tablet Take 2.5 mg by mouth. 11/27/18   Drew Carson MD   warfarin (COUMADIN) 5 MG tablet Take 5 mg by mouth 2 (Two) Times a Week. 1/18/15   Drew Carson MD       Medical history:   Past Medical History:   Diagnosis Date   • Disease of thyroid gland    • Hyperlipidemia    • Hypertension        Social history:   Social History     Tobacco Use   • Smoking status: Former Smoker   • Smokeless tobacco:  Never Used   • Tobacco comment: quit 35 yrs ago   Substance Use Topics   • Alcohol use: Yes     Comment: rare       Allergies:    Atorvastatin calcium, Colesevelam hcl, Colestipol hcl, Ezetimibe, Pitavastatin, Rosuvastatin calcium, and Statins    Vaccine History:   Immunization History   Administered Date(s) Administered   • Fluzone High Dose =>65 Years ([employer] ONLY) 12/16/2020   • Hepatitis A 05/30/2018, 12/03/2018   • Hepatitis B 12/09/2019, 01/09/2020, 06/11/2020   • Pneumococcal Conjugate 13-Valent (PCV13) 10/13/2015   • Pneumococcal Polysaccharide (PPSV23) 09/28/2008   • Shingrix 06/11/2018, 12/03/2018   • Tdap 11/16/2016       This patient is managed via a collaborative agreement, pursuant to IC 25-26-16. The signed protocol is kept in the [facility] at [address] [place] [address].    Education: Thi Allen has been instructed to call if any changes in medications, doses, concerns, etc. Patient was provided dosing instructions and expressed verbal understanding and has no further questions at this time.    Plan:  1. Anticoagulation   - no change; warfarin 2.5 mg PO daily, except warfarin 5 mg PO on Wednesday and Saturday.  Total weekly dose = 22.5 mg.   - RTC in 4 week(s)    2. Vaccines   Patient is in need of the following vaccines: Flu.  Did not discuss at today's appointment but will address at next appointment.    Zeina Gomez, PharmD  9/29/2021  11:37 EDT

## 2021-10-28 ENCOUNTER — ANTICOAGULATION VISIT (OUTPATIENT)
Dept: PHARMACY | Facility: HOSPITAL | Age: 81
End: 2021-10-28

## 2021-10-28 LAB — INR PPP: 2 (ref 1.8–2.8)

## 2021-10-28 PROCEDURE — 36416 COLLJ CAPILLARY BLOOD SPEC: CPT

## 2021-10-28 PROCEDURE — G0463 HOSPITAL OUTPT CLINIC VISIT: HCPCS

## 2021-10-28 PROCEDURE — 85610 PROTHROMBIN TIME: CPT

## 2021-10-28 NOTE — PROGRESS NOTES
Anticoagulation Clinic Progress Note    INR Goal: 1.8-2.8  Today's INR: 2.0 (therapeutic)  Current warfarin dose: warfarin 2.5 mg PO daily, except warfarin 5 mg PO on Wednesday and Saturday.  Current total weekly dose = 22.5 mg per week.     INR History:  Anticoagulation Monitoring 2021 2021 10/28/2021   INR 2.90 1.90 2.00   INR Date 2021 2021 10/28/2021   INR Goal 1.8-2.8 1.8-2.8 1.8-2.8   Trend Same Same Same   Last Week Total 22.5 mg 22.5 mg 22.5 mg   Next Week Total 22.5 mg 22.5 mg 22.5 mg   Sun 2.5 mg 2.5 mg 2.5 mg   Mon 2.5 mg 2.5 mg 2.5 mg   Tue 2.5 mg 2.5 mg 2.5 mg   Wed 5 mg 5 mg 5 mg   Thu 2.5 mg 2.5 mg 2.5 mg   Fri 2.5 mg 2.5 mg 2.5 mg   Sat 5 mg 5 mg 5 mg   Visit Report - - -   Some recent data might be hidden       Anticoagulation Summary  As of 10/28/2021    INR goal:  1.8-2.8   TTR:  66.4 % (2.3 y)   INR used for dosin.00 (10/28/2021)   Warfarin maintenance plan:  5 mg every Wed, Sat; 2.5 mg all other days   Weekly warfarin total:  22.5 mg   No change documented:  Zeina Gomez, PharmD   Plan last modified:  Deanna Eng RPH (2020)   Next INR check:  2021   Priority:  Maintenance   Target end date:  Indefinite    Indications    DVT (deep venous thrombosis) (CMS/[facility]) [I82.409] [I82.409]             Anticoagulation Episode Summary     INR check location:  Anticoagulation Clinic    Preferred lab:      Send INR reminders to:  [facility] CLINICAL POOL    Comments:  Patient rights and responsibilities signed: 14        Anticoagulation Care Providers     Provider Role Specialty Phone number    Camila Lazcano MD Referring Internal Medicine [phone]          Clinic Interview:   Patient Findings     Negatives:  Signs/symptoms of thrombosis, Signs/symptoms of bleeding,   Laboratory test error suspected, Change in health, Change in alcohol use,   Change in activity, Upcoming invasive procedure, Emergency department   visit, Upcoming dental procedure,  Missed doses, Extra doses, Change in   medications, Change in diet/appetite, Hospital admission, Bruising, Other   complaints      Clinical Outcomes     Negatives:  Major bleeding event, Thromboembolic event,   Anticoagulation-related hospital admission, Anticoagulation-related ED   visit, Anticoagulation-related fatality        Current Medications:   Prior to Admission medications    Medication Sig Start Date End Date Taking? Authorizing Provider   atenolol (TENORMIN) 100 MG tablet Take 100 mg by mouth Daily. 11/13/14   Drew Carson MD   Fenofibrate (LIPOFEN) 150 MG capsule Take 160 mg by mouth Daily. 11/13/14   Drew Carson MD   hydroCHLOROthiazide (HYDRODIURIL) 50 MG tablet Take 50 mg by mouth Daily. 8/31/19   Emergency, Nurse Juan RN   levothyroxine (SYNTHROID, LEVOTHROID) 50 MCG tablet TAKE 1 TABLET BY MOUTH ONCE DAILY 9/3/19   Emergency, Nurse Juan RN   lisinopril (PRINIVIL,ZESTRIL) 20 MG tablet Take 20 mg by mouth 2 (Two) Times a Day. 8/28/19   Emergency, Nurse Juan RN   Omega-3 Fatty Acids (FISH-EPA PO) Take 1,400 mg by mouth Daily.    Drew Carson MD   omeprazole (priLOSEC) 40 MG capsule Take 40 mg by mouth Daily As Needed. 7/1/15   Drew Carson MD   warfarin (COUMADIN) 2.5 MG tablet Take 2.5 mg by mouth. 11/27/18   Drew Carson MD   warfarin (COUMADIN) 5 MG tablet Take 5 mg by mouth 2 (Two) Times a Week. 1/18/15   Drew Carson MD       Medical history:   Past Medical History:   Diagnosis Date   • Disease of thyroid gland    • Hyperlipidemia    • Hypertension        Social history:   Social History     Tobacco Use   • Smoking status: Former Smoker   • Smokeless tobacco: Never Used   • Tobacco comment: quit 35 yrs ago   Substance Use Topics   • Alcohol use: Yes     Comment: rare       Allergies:    Atorvastatin calcium, Colesevelam hcl, Colestipol hcl, Ezetimibe, Pitavastatin, Rosuvastatin calcium, and Statins    Vaccine History:   Immunization History    Administered Date(s) Administered   • Fluzone High Dose =>65 Years ([employer] ONLY) 12/16/2020   • Hepatitis A 05/30/2018, 12/03/2018   • Hepatitis B 12/09/2019, 01/09/2020, 06/11/2020   • Pneumococcal Conjugate 13-Valent (PCV13) 10/13/2015   • Pneumococcal Polysaccharide (PPSV23) 09/28/2008   • Shingrix 06/11/2018, 12/03/2018   • Tdap 11/16/2016       This patient is managed via a collaborative agreement, pursuant to IC 25-26-16. The signed protocol is kept in the [facility] at [address] [place] [address].    Education: Thi Allen has been instructed to call if any changes in medications, doses, concerns, etc. Patient was provided dosing instructions and expressed verbal understanding and has no further questions at this time.    Plan:  1. Anticoagulation   - no change; warfarin 2.5 mg PO daily, except warfarin 5 mg PO on Wednesday and Saturday.  Total weekly dose = 22.5 mg.   - RTC in 6 week(s) as patient was previously being monitored every 6 weeks.    Zeina Gomez, GeorgeD  10/28/2021  11:46 EDT

## 2021-12-08 ENCOUNTER — ANTICOAGULATION VISIT (OUTPATIENT)
Dept: PHARMACY | Facility: HOSPITAL | Age: 81
End: 2021-12-08

## 2021-12-08 LAB — INR PPP: 2 (ref 1.8–2.8)

## 2021-12-08 PROCEDURE — 85610 PROTHROMBIN TIME: CPT

## 2021-12-08 PROCEDURE — 36416 COLLJ CAPILLARY BLOOD SPEC: CPT

## 2021-12-08 PROCEDURE — G0463 HOSPITAL OUTPT CLINIC VISIT: HCPCS

## 2021-12-08 NOTE — PROGRESS NOTES
Anticoagulation Clinic Progress Note    INR Goal: 1.8-2.8  Today's INR: 2.0 (therapeutic)  Current warfarin dose: warfarin 2.5 mg PO daily, except warfarin 5 mg PO on Wednesday and Saturday.  Current total weekly dose = 22.5 mg per week.     INR History:  Anticoagulation Monitoring 2021 10/28/2021 2021   INR 1.90 2.00 2.00   INR Date 2021 10/28/2021 2021   INR Goal 1.8-2.8 1.8-2.8 1.8-2.8   Trend Same Same Same   Last Week Total 22.5 mg 22.5 mg 22.5 mg   Next Week Total 22.5 mg 22.5 mg 22.5 mg   Sun 2.5 mg 2.5 mg 2.5 mg   Mon 2.5 mg 2.5 mg 2.5 mg   Tue 2.5 mg 2.5 mg 2.5 mg   Wed 5 mg 5 mg 5 mg   Thu 2.5 mg 2.5 mg 2.5 mg   Fri 2.5 mg 2.5 mg 2.5 mg   Sat 5 mg 5 mg 5 mg   Visit Report - - -   Some recent data might be hidden       Anticoagulation Summary  As of 2021    INR goal:  1.8-2.8   TTR:  67.9 % (2.5 y)   INR used for dosin.00 (2021)   Warfarin maintenance plan:  5 mg every Wed, Sat; 2.5 mg all other days   Weekly warfarin total:  22.5 mg   No change documented:  Zeina Gomez, PharmD   Plan last modified:  Deanna Eng RPH (2020)   Next INR check:  2022   Priority:  Maintenance   Target end date:  Indefinite    Indications    DVT (deep venous thrombosis) (CMS/[facility]) [I82.409] [I82.409]             Anticoagulation Episode Summary     INR check location:  Anticoagulation Clinic    Preferred lab:      Send INR reminders to:  [facility] CLINICAL POOL    Comments:  Patient rights and responsibilities signed: 14        Anticoagulation Care Providers     Provider Role Specialty Phone number    Camila Lazcano MD Referring Internal Medicine [phone]          Clinic Interview:   Patient Findings     Positives:  Missed doses (Patient missed dose at least 3 weeks ago (no   effect on today's INR).)    Negatives:  Signs/symptoms of thrombosis, Signs/symptoms of bleeding,   Laboratory test error suspected, Change in health, Change in alcohol use,   Change  in activity, Upcoming invasive procedure, Emergency department   visit, Upcoming dental procedure, Extra doses, Change in medications,   Change in diet/appetite, Hospital admission, Bruising, Other complaints      Clinical Outcomes     Negatives:  Major bleeding event, Thromboembolic event,   Anticoagulation-related hospital admission, Anticoagulation-related ED   visit, Anticoagulation-related fatality        Current Medications:   Prior to Admission medications    Medication Sig Start Date End Date Taking? Authorizing Provider   atenolol (TENORMIN) 100 MG tablet Take 100 mg by mouth Daily. 11/13/14   Drew Carson MD   Fenofibrate (LIPOFEN) 150 MG capsule Take 160 mg by mouth Daily. 11/13/14   Drew Carson MD   hydroCHLOROthiazide (HYDRODIURIL) 50 MG tablet Take 50 mg by mouth Daily. 8/31/19   Emergency, Nurse Juan RN   levothyroxine (SYNTHROID, LEVOTHROID) 50 MCG tablet TAKE 1 TABLET BY MOUTH ONCE DAILY 9/3/19   Emergency, Nurse Juan RN   lisinopril (PRINIVIL,ZESTRIL) 20 MG tablet Take 20 mg by mouth 2 (Two) Times a Day. 8/28/19   Emergency, Nurse Juan RN   Omega-3 Fatty Acids (FISH-EPA PO) Take 1,400 mg by mouth Daily.    Drew Carson MD   omeprazole (priLOSEC) 40 MG capsule Take 40 mg by mouth Daily As Needed. 7/1/15   Drew Carson MD   warfarin (COUMADIN) 2.5 MG tablet Take 2.5 mg by mouth. 11/27/18   Drew Carson MD   warfarin (COUMADIN) 5 MG tablet Take 5 mg by mouth 2 (Two) Times a Week. 1/18/15   Drew Carson MD       Medical history:   Past Medical History:   Diagnosis Date   • Disease of thyroid gland    • Hyperlipidemia    • Hypertension        Social history:   Social History     Tobacco Use   • Smoking status: Former Smoker   • Smokeless tobacco: Never Used   • Tobacco comment: quit 35 yrs ago   Substance Use Topics   • Alcohol use: Yes     Comment: rare       Allergies:    Atorvastatin calcium, Colesevelam hcl, Colestipol hcl, Ezetimibe,  Pitavastatin, Rosuvastatin calcium, and Statins    Vaccine History:   Immunization History   Administered Date(s) Administered   • Fluzone High Dose =>65 Years ([facility] ONLY) 12/16/2020   • Hepatitis A 05/30/2018, 12/03/2018   • Hepatitis B 12/09/2019, 01/09/2020, 06/11/2020   • Pneumococcal Conjugate 13-Valent (PCV13) 10/13/2015   • Pneumococcal Polysaccharide (PPSV23) 09/28/2008   • Shingrix 06/11/2018, 12/03/2018   • Tdap 11/16/2016       This patient is managed via a collaborative agreement, pursuant to IC 25-26-16. The signed protocol is kept in the [facility] at [address] [place] [address].    Education: Thi Allen has been instructed to call if any changes in medications, doses, concerns, etc. Patient was provided dosing instructions and expressed verbal understanding and has no further questions at this time.    Plan:  1. Anticoagulation   - no change; warfarin 2.5 mg PO daily, except warfarin 5 mg PO on Wednesday and Saturday.  Total weekly dose = 22.5 mg.   - RTC in 6 week(s)    Zeina Gomez PharmD  12/8/2021  12:17 EST

## 2021-12-09 ENCOUNTER — APPOINTMENT (OUTPATIENT)
Dept: PHARMACY | Facility: HOSPITAL | Age: 81
End: 2021-12-09

## 2022-01-04 ENCOUNTER — TRANSCRIBE ORDERS (OUTPATIENT)
Dept: ADMINISTRATIVE | Facility: HOSPITAL | Age: 82
End: 2022-01-04

## 2022-01-04 DIAGNOSIS — Z82.49 FAMILY HISTORY OF CAROTID ARTERY STENOSIS: Primary | ICD-10-CM

## 2022-01-13 ENCOUNTER — HOSPITAL ENCOUNTER (OUTPATIENT)
Dept: CARDIOLOGY | Facility: HOSPITAL | Age: 82
Discharge: HOME | End: 2022-01-13
Admitting: INTERNAL MEDICINE

## 2022-01-13 DIAGNOSIS — Z82.49 FAMILY HISTORY OF CAROTID ARTERY STENOSIS: ICD-10-CM

## 2022-01-13 LAB
BH CV XLRA MEAS LEFT BULB PSV: -70.2 CM/SEC
BH CV XLRA MEAS LEFT CCA RATIO VEL: 82.6 CM/SEC
BH CV XLRA MEAS LEFT DIST CCA EDV: 14.3 CM/SEC
BH CV XLRA MEAS LEFT DIST CCA PSV: 66.8 CM/SEC
BH CV XLRA MEAS LEFT DIST ICA EDV: -22 CM/SEC
BH CV XLRA MEAS LEFT DIST ICA PSV: -76.3 CM/SEC
BH CV XLRA MEAS LEFT ICA RATIO VEL: -140 CM/SEC
BH CV XLRA MEAS LEFT ICA/CCA RATIO: -1.7
BH CV XLRA MEAS LEFT PROX CCA EDV: 14.3 CM/SEC
BH CV XLRA MEAS LEFT PROX CCA PSV: 82.6 CM/SEC
BH CV XLRA MEAS LEFT PROX ECA PSV: -76.7 CM/SEC
BH CV XLRA MEAS LEFT PROX ICA EDV: -34.5 CM/SEC
BH CV XLRA MEAS LEFT PROX ICA PSV: -140 CM/SEC
BH CV XLRA MEAS LEFT PROX SCLA PSV: 118 CM/SEC
BH CV XLRA MEAS LEFT VERTEBRAL A PSV: -53.6 CM/SEC
BH CV XLRA MEAS RIGHT BULB PSV: -73.5 CM/SEC
BH CV XLRA MEAS RIGHT CCA RATIO VEL: -74.6 CM/SEC
BH CV XLRA MEAS RIGHT DIST CCA EDV: -18.1 CM/SEC
BH CV XLRA MEAS RIGHT DIST CCA PSV: -74.6 CM/SEC
BH CV XLRA MEAS RIGHT DIST ICA EDV: -19.9 CM/SEC
BH CV XLRA MEAS RIGHT DIST ICA PSV: -92.6 CM/SEC
BH CV XLRA MEAS RIGHT ICA RATIO VEL: -104 CM/SEC
BH CV XLRA MEAS RIGHT ICA/CCA RATIO: 1.4
BH CV XLRA MEAS RIGHT PROX CCA EDV: 14.8 CM/SEC
BH CV XLRA MEAS RIGHT PROX CCA PSV: 69.7 CM/SEC
BH CV XLRA MEAS RIGHT PROX ECA PSV: -80.1 CM/SEC
BH CV XLRA MEAS RIGHT PROX ICA EDV: -16.8 CM/SEC
BH CV XLRA MEAS RIGHT PROX ICA PSV: -104 CM/SEC
BH CV XLRA MEAS RIGHT PROX SCLA PSV: 146 CM/SEC
BH CV XLRA MEAS RIGHT VERTEBRAL A PSV: 39.2 CM/SEC
LEFT ARM BP: NORMAL MMHG
MAXIMAL PREDICTED HEART RATE: 141 BPM
RIGHT ARM BP: NORMAL MMHG
STRESS TARGET HR: 120 BPM

## 2022-01-13 PROCEDURE — 93880 EXTRACRANIAL BILAT STUDY: CPT

## 2022-01-19 ENCOUNTER — ANTICOAGULATION VISIT (OUTPATIENT)
Dept: PHARMACY | Facility: HOSPITAL | Age: 82
End: 2022-01-19

## 2022-01-19 LAB — INR PPP: 2.4 (ref 1.8–2.8)

## 2022-01-19 PROCEDURE — G0463 HOSPITAL OUTPT CLINIC VISIT: HCPCS

## 2022-01-19 PROCEDURE — 36416 COLLJ CAPILLARY BLOOD SPEC: CPT

## 2022-01-19 PROCEDURE — 85610 PROTHROMBIN TIME: CPT

## 2022-01-19 NOTE — PROGRESS NOTES
Anticoagulation Clinic Progress Note    INR Goal: 1.8-2.8  Today's INR: 2.4 (therapeutic)  Current warfarin dose: warfarin 2.5 mg PO daily, except warfarin 5 mg PO on Wednesday and .  Current total weekly dose = 22.5 mg per week.     INR History:  Anticoagulation Monitoring 10/28/2021 2021 2022   INR 2.00 2.00 2.40   INR Date 10/28/2021 2021 2022   INR Goal 1.8-2.8 1.8-2.8 1.8-2.8   Trend Same Same Same   Last Week Total 22.5 mg 22.5 mg 22.5 mg   Next Week Total 22.5 mg 22.5 mg 22.5 mg   Sun 2.5 mg 2.5 mg 2.5 mg   Mon 2.5 mg 2.5 mg 2.5 mg   Tue 2.5 mg 2.5 mg 2.5 mg   Wed 5 mg 5 mg 5 mg   Thu 2.5 mg 2.5 mg 2.5 mg   Fri 2.5 mg 2.5 mg 2.5 mg   Sat 5 mg 5 mg 5 mg   Visit Report - - -   Some recent data might be hidden       Anticoagulation Summary  As of 2022    INR goal:  1.8-2.8   TTR:  69.4 % (2.6 y)   INR used for dosin.40 (2022)   Warfarin maintenance plan:  5 mg every Wed, Sat; 2.5 mg all other days   Weekly warfarin total:  22.5 mg   No change documented:  Alice Pantoja, PharmD   Plan last modified:  Deanna Eng RPH (2020)   Next INR check:  3/2/2022   Priority:  Maintenance   Target end date:  Indefinite    Indications    DVT (deep venous thrombosis) (CMS/[facility]) [I82.409] [I82.409]             Anticoagulation Episode Summary     INR check location:  Anticoagulation Clinic    Preferred lab:      Send INR reminders to:  [facility] CLINICAL POOL    Comments:  Patient rights and responsibilities signed: 14        Anticoagulation Care Providers     Provider Role Specialty Phone number    Camila Lazcano MD Referring Internal Medicine [phone]          Clinic Interview:   Patient Findings     Negatives:  Signs/symptoms of thrombosis, Signs/symptoms of bleeding,   Laboratory test error suspected, Change in health, Change in alcohol use,   Change in activity, Upcoming invasive procedure, Emergency department   visit, Upcoming dental procedure,  Missed doses, Extra doses, Change in   medications, Change in diet/appetite, Hospital admission, Bruising, Other   complaints      Clinical Outcomes     Negatives:  Major bleeding event, Thromboembolic event,   Anticoagulation-related hospital admission, Anticoagulation-related ED   visit, Anticoagulation-related fatality        Current Medications:   Prior to Admission medications    Medication Sig Start Date End Date Taking? Authorizing Provider   atenolol (TENORMIN) 100 MG tablet Take 100 mg by mouth Daily. 11/13/14   Drew Carson MD   Fenofibrate (LIPOFEN) 150 MG capsule Take 160 mg by mouth Daily. 11/13/14   Drew Carson MD   hydroCHLOROthiazide (HYDRODIURIL) 50 MG tablet Take 50 mg by mouth Daily. 8/31/19   Emergency, Nurse Juan RN   levothyroxine (SYNTHROID, LEVOTHROID) 50 MCG tablet TAKE 1 TABLET BY MOUTH ONCE DAILY 9/3/19   Emergency, Nurse Juan RN   lisinopril (PRINIVIL,ZESTRIL) 20 MG tablet Take 20 mg by mouth 2 (Two) Times a Day. 8/28/19   Emergency, Nurse Juan RN   Omega-3 Fatty Acids (FISH-EPA PO) Take 1,400 mg by mouth Daily.    Drew Carson MD   omeprazole (priLOSEC) 40 MG capsule Take 40 mg by mouth Daily As Needed. 7/1/15   Drew Carson MD   warfarin (COUMADIN) 2.5 MG tablet Take 2.5 mg by mouth. 11/27/18   Drew Carson MD   warfarin (COUMADIN) 5 MG tablet Take 5 mg by mouth 2 (Two) Times a Week. 1/18/15   Drew Carson MD       Medical history:   Past Medical History:   Diagnosis Date   • Disease of thyroid gland    • Hyperlipidemia    • Hypertension        Social history:   Social History     Tobacco Use   • Smoking status: Former Smoker   • Smokeless tobacco: Never Used   • Tobacco comment: quit 35 yrs ago   Substance Use Topics   • Alcohol use: Yes     Comment: rare       Allergies:    Atorvastatin calcium, Colesevelam hcl, Colestipol hcl, Ezetimibe, Pitavastatin, Rosuvastatin calcium, and Statins    Vaccine History:   Immunization History    Administered Date(s) Administered   • COVID-19 (PFIZER) PURPLE CAP 02/03/2021, 02/25/2021, 10/06/2021   • Fluzone High Dose =>65 Years ([facility] ONLY) 12/16/2020   • Hepatitis A 05/30/2018, 12/03/2018   • Hepatitis B 12/09/2019, 01/09/2020, 06/11/2020   • Pneumococcal Conjugate 13-Valent (PCV13) 10/13/2015   • Pneumococcal Polysaccharide (PPSV23) 09/28/2008   • Shingrix 06/11/2018, 12/03/2018   • Tdap 11/16/2016       This patient is managed via a collaborative agreement, pursuant to IC 25-26-16. The signed protocol is kept in the [facility] at [address] [place] [address].    Education: Thi Allen has been instructed to call if any changes in medications, doses, concerns, etc. Patient was provided dosing instructions and expressed verbal understanding and has no further questions at this time.    Plan:  1. Anticoagulation   - no change; warfarin 2.5 mg PO daily, except warfarin 5 mg PO on Wednesday and Saturdays.   Total weekly dose = 22.5 mg.   - RTC in 6 week(s)        Samm Pantoja, PharmD  1/19/2022  11:25 EST

## 2022-02-27 PROCEDURE — 87086 URINE CULTURE/COLONY COUNT: CPT | Performed by: NURSE PRACTITIONER

## 2022-03-02 ENCOUNTER — APPOINTMENT (OUTPATIENT)
Dept: PHARMACY | Facility: HOSPITAL | Age: 82
End: 2022-03-02

## 2022-03-16 NOTE — PROGRESS NOTES
Anticoagulation Clinic Follow up Note    Today's INR: 3.8 (supratherapeutic)    Previous INR: 3.1 (supratherapeutic from 2 week(s) ago)    Previous dose change: no change    Missed Doses: none    Medication changes: Patient is taking Bactrim 12/16-12/19 (may increase INR).    Diet changes: Patient reports she did not increase intake of vitamin K containing foods, as discussed at previous appointment (may increase INR).    Lifestyle changes: none    Signs and Symptoms of bleeding: none    Disease state exacerbations: none    Planned procedures: none    Additional Education (if needed): Educated patient on signs/symptoms of bleeding which include, but are not limited to, any bleeding of prolonged duration that does not stop with applied pressure or blood in stool/urine. Instructed patient to contact [facility], provider, or go to ED if any of these occur.    Thi Allen was provided dosing instructions and expressed verbal understanding and has no further questions at this time.    Visit specific notes: none    Plan:  1. Hold today's dose, then resume previous regimen of 5 mg on Sundays, Wednesdays, and Fridays and 2.5 mg all other days.  2. Have 1-2 servings of vitamin K containing foods.  3. RTC in 1 week(s).      George MorleyD, BCPS  12/18/2019  11:32 AM     show

## 2022-03-17 ENCOUNTER — ANTICOAGULATION VISIT (OUTPATIENT)
Dept: PHARMACY | Facility: HOSPITAL | Age: 82
End: 2022-03-17

## 2022-03-17 LAB — INR PPP: 2.8 (ref 1.8–2.8)

## 2022-03-17 PROCEDURE — 36416 COLLJ CAPILLARY BLOOD SPEC: CPT

## 2022-03-17 PROCEDURE — G0463 HOSPITAL OUTPT CLINIC VISIT: HCPCS

## 2022-03-17 PROCEDURE — 85610 PROTHROMBIN TIME: CPT

## 2022-03-17 NOTE — PROGRESS NOTES
Anticoagulation Clinic Progress Note    INR Goal: 1.8-2.8  Today's INR: 2.8 (therapeutic)  Current warfarin dose: warfarin 2.5 mg PO daily, except warfarin 5 mg PO on Wednesday and .  Current total weekly dose = 22.5 mg per week.     INR History:  Anticoagulation Monitoring 2021 2022 3/17/2022   INR 2.00 2.40 2.80   INR Date 2021 2022 3/17/2022   INR Goal 1.8-2.8 1.8-2.8 1.8-2.8   Trend Same Same Same   Last Week Total 22.5 mg 22.5 mg 22.5 mg   Next Week Total 22.5 mg 22.5 mg 22.5 mg   Sun 2.5 mg 2.5 mg 2.5 mg   Mon 2.5 mg 2.5 mg 2.5 mg   Tue 2.5 mg 2.5 mg 2.5 mg   Wed 5 mg 5 mg 5 mg   Thu 2.5 mg 2.5 mg 2.5 mg   Fri 2.5 mg 2.5 mg 2.5 mg   Sat 5 mg 5 mg 5 mg   Visit Report - - -   Some recent data might be hidden       Anticoagulation Summary  As of 3/17/2022    INR goal:  1.8-2.8   TTR:  71.1 % (2.7 y)   INR used for dosin.80 (3/17/2022)   Warfarin maintenance plan:  5 mg every Wed, Sat; 2.5 mg all other days   Weekly warfarin total:  22.5 mg   No change documented:  Alice Pantoja, PharmD   Plan last modified:  Deanna Eng RPH (2020)   Next INR check:  2022   Priority:  Maintenance   Target end date:  Indefinite    Indications    DVT (deep venous thrombosis) (CMS/[facility]) [I82.409] [I82.409]             Anticoagulation Episode Summary     INR check location:  Anticoagulation Clinic    Preferred lab:      Send INR reminders to:  [facility] CLINICAL POOL    Comments:  Patient rights and responsibilities signed: 14        Anticoagulation Care Providers     Provider Role Specialty Phone number    Camila Lazcano MD Referring Internal Medicine [phone]          Clinic Interview:   Patient Findings     Negatives:  Signs/symptoms of thrombosis, Signs/symptoms of bleeding,   Laboratory test error suspected, Change in health, Change in alcohol use,   Change in activity, Upcoming invasive procedure, Emergency department   visit, Upcoming dental procedure,  Missed doses, Extra doses, Change in   medications, Change in diet/appetite, Hospital admission, Bruising, Other   complaints      Clinical Outcomes     Negatives:  Major bleeding event, Thromboembolic event,   Anticoagulation-related hospital admission, Anticoagulation-related ED   visit, Anticoagulation-related fatality        Current Medications:   Prior to Admission medications    Medication Sig Start Date End Date Taking? Authorizing Provider   atenolol (TENORMIN) 100 MG tablet Take 100 mg by mouth Daily. 11/13/14   Drew Carson MD   fenofibrate 160 MG tablet Take 160 mg by mouth Daily. 2/22/22   Emergency, Nurse Juan RN   levothyroxine (SYNTHROID, LEVOTHROID) 50 MCG tablet TAKE 1 TABLET BY MOUTH ONCE DAILY 9/3/19   Emergency, Nurse Juan RN   lisinopril (PRINIVIL,ZESTRIL) 20 MG tablet Take 20 mg by mouth 2 (Two) Times a Day. 8/28/19   Emergency, Nurse Juan RN   Omega-3 Fatty Acids (FISH-EPA PO) Take 1,400 mg by mouth Daily.    Drew Carson MD   warfarin (COUMADIN) 2.5 MG tablet Take 2.5 mg by mouth. 11/27/18   Drew Carson MD   warfarin (COUMADIN) 5 MG tablet Take 5 mg by mouth 2 (Two) Times a Week. 1/18/15   Drew Carson MD       Medical history:   Past Medical History:   Diagnosis Date   • Disease of thyroid gland    • Hyperlipidemia    • Hypertension        Social history:   Social History     Tobacco Use   • Smoking status: Former Smoker   • Smokeless tobacco: Never Used   • Tobacco comment: quit 35 yrs ago   Substance Use Topics   • Alcohol use: Yes     Comment: rare       Allergies:    Atorvastatin, Atorvastatin calcium, Colesevelam hcl, Colestipol hcl, Ezetimibe, Pitavastatin, Rosuvastatin, Rosuvastatin calcium, Simvastatin, and Statins    Vaccine History:   Immunization History   Administered Date(s) Administered   • COVID-19 (PFIZER) PURPLE CAP 02/03/2021, 02/25/2021, 10/06/2021   • Fluzone High Dose =>65 Years ([facility] ONLY) 12/16/2020   • Hepatitis A 05/30/2018,  12/03/2018   • Hepatitis B 12/09/2019, 01/09/2020, 06/11/2020   • Pneumococcal Conjugate 13-Valent (PCV13) 10/13/2015   • Pneumococcal Polysaccharide (PPSV23) 09/28/2008   • Shingrix 06/11/2018, 12/03/2018   • Tdap 11/16/2016       This patient is managed via a collaborative agreement, pursuant to IC 25-26-16. The signed protocol is kept in the [facility] at [address] [place] [address].    Education: Thi Allen has been instructed to call if any changes in medications, doses, concerns, etc. Patient was provided dosing instructions and expressed verbal understanding and has no further questions at this time.    Plan:  1. Anticoagulation   - no change; warfarin 2.5 mg PO daily, except warfarin 5 mg PO on Wednesday and Saturdays.   Total weekly dose = 22.5 mg.   - RTC in 6 week(s)        Samm Pantoja, PharmD  3/17/2022  14:03 EDT

## 2022-04-26 ENCOUNTER — ANTICOAGULATION VISIT (OUTPATIENT)
Dept: PHARMACY | Facility: HOSPITAL | Age: 82
End: 2022-04-26

## 2022-04-26 DIAGNOSIS — I82.4Z9 DEEP VEIN THROMBOSIS (DVT) OF DISTAL VEIN OF LOWER EXTREMITY, UNSPECIFIED CHRONICITY, UNSPECIFIED LATERALITY: Primary | ICD-10-CM

## 2022-04-26 LAB — INR PPP: 3.3 (ref 1.8–2.8)

## 2022-04-26 PROCEDURE — G0463 HOSPITAL OUTPT CLINIC VISIT: HCPCS

## 2022-04-26 PROCEDURE — 85610 PROTHROMBIN TIME: CPT

## 2022-04-26 PROCEDURE — 36416 COLLJ CAPILLARY BLOOD SPEC: CPT

## 2022-04-26 NOTE — PROGRESS NOTES
Anticoagulation Clinic Progress Note    INR Goal: 1.8-2.8  Today's INR: 3.3 (supratherapeutic)  Current warfarin dose: warfarin 2.5 mg PO daily, except warfarin 5 mg PO on Wednesday and Saturday .  Current total weekly dose = 22.5 mg per week.     INR History:  Anticoagulation Monitoring 1/19/2022 3/17/2022 4/26/2022   INR 2.40 2.80 3.30   INR Date 1/19/2022 3/17/2022 4/26/2022   INR Goal 1.8-2.8 1.8-2.8 1.8-2.8   Trend Same Same Down   Last Week Total 22.5 mg 22.5 mg 22.5 mg   Next Week Total 22.5 mg 22.5 mg 20 mg   Sun 2.5 mg 2.5 mg 2.5 mg   Mon 2.5 mg 2.5 mg 2.5 mg   Tue 2.5 mg 2.5 mg 2.5 mg   Wed 5 mg 5 mg 2.5 mg   Thu 2.5 mg 2.5 mg 2.5 mg   Fri 2.5 mg 2.5 mg 2.5 mg   Sat 5 mg 5 mg 5 mg   Visit Report - - -   Some recent data might be hidden       Anticoagulation Summary  As of 4/26/2022    INR goal:  1.8-2.8   TTR:  68.3 % (2.8 y)   INR used for dosing:  3.30 (4/26/2022)   Warfarin maintenance plan:  5 mg every Sat; 2.5 mg all other days   Weekly warfarin total:  20 mg   Plan last modified:  Ryder Fuentes, Pharmacy Intern (4/26/2022)   Next INR check:  5/13/2022   Priority:  Maintenance   Target end date:  Indefinite    Indications    DVT (deep venous thrombosis) (CMS/[facility]) [I82.409] [I82.409]             Anticoagulation Episode Summary     INR check location:  Anticoagulation Clinic    Preferred lab:      Send INR reminders to:  [facility] CLINICAL POOL    Comments:  Patient rights and responsibilities signed: 11/23/14        Anticoagulation Care Providers     Provider Role Specialty Phone number    Camila Lazcano MD Referring Internal Medicine [phone]          Clinic Interview:   Clinical Outcomes    Negatives:  Major bleeding event, Thromboembolic event, Anticoagulation-related hospital admission, Anticoagulation-related ED visit, Anticoagulation-related fatality       Patient Findings    Positives:  Change in diet/appetite (Patient reports a 30 lb unintentional weight loss (may affect INR).)    Negatives:  Signs/symptoms of thrombosis, Signs/symptoms of bleeding, Laboratory test error suspected, Change in health, Change in alcohol use, Change in activity, Upcoming invasive procedure, Emergency department visit, Upcoming dental procedure, Missed doses, Extra doses, Change in medications, Hospital admission, Bruising, Other complaints            Current Medications:   Prior to Admission medications    Medication Sig Start Date End Date Taking? Authorizing Provider   atenolol (TENORMIN) 100 MG tablet Take 100 mg by mouth Daily. 11/13/14   Drew Carson MD   fenofibrate 160 MG tablet Take 160 mg by mouth Daily. 2/22/22   Emergency, Nurse Juan RN   levothyroxine (SYNTHROID, LEVOTHROID) 50 MCG tablet TAKE 1 TABLET BY MOUTH ONCE DAILY 9/3/19   Emergency, Nurse Juan RN   lisinopril (PRINIVIL,ZESTRIL) 20 MG tablet Take 20 mg by mouth 2 (Two) Times a Day. 8/28/19   Emergency, Nurse Juan RN   Omega-3 Fatty Acids (FISH-EPA PO) Take 1,400 mg by mouth Daily.    Drew Carson MD   warfarin (COUMADIN) 2.5 MG tablet Take 2.5 mg by mouth. 11/27/18   Drew Carson MD   warfarin (COUMADIN) 5 MG tablet Take 5 mg by mouth 2 (Two) Times a Week. 1/18/15   Drew Carson MD       Medical history:   Past Medical History:   Diagnosis Date   • Disease of thyroid gland    • Hyperlipidemia    • Hypertension        Social history:   Social History     Tobacco Use   • Smoking status: Former Smoker   • Smokeless tobacco: Never Used   • Tobacco comment: quit 35 yrs ago   Substance Use Topics   • Alcohol use: Yes     Comment: rare       Allergies:    Atorvastatin, Atorvastatin calcium, Colesevelam hcl, Colestipol hcl, Ezetimibe, Pitavastatin, Rosuvastatin, Rosuvastatin calcium, Simvastatin, and Statins    Vaccine History:   Immunization History   Administered Date(s) Administered   • COVID-19 (PFIZER) PURPLE CAP 02/03/2021, 02/25/2021, 10/06/2021   • Fluzone High Dose =>65 Years ([facility] ONLY) 12/16/2020    • Hepatitis A 05/30/2018, 12/03/2018   • Hepatitis B 12/09/2019, 01/09/2020, 06/11/2020   • Pneumococcal Conjugate 13-Valent (PCV13) 10/13/2015   • Pneumococcal Polysaccharide (PPSV23) 09/28/2008   • Shingrix 06/11/2018, 12/03/2018   • Tdap 11/16/2016       This patient is managed via a collaborative agreement, pursuant to IC 25-26-16. The signed protocol is kept in the [facility] at [address] [place] [address].    Education: Thi Allen has been instructed to call if any changes in medications, doses, concerns, etc. Patient was provided dosing instructions and expressed verbal understanding and has no further questions at this time.    Plan:  1. Anticoagulation   - decrease by 11 %; warfarin 2.5 mg PO daily, except warfarin 5 mg PO on Saturday.  New total weekly dose = 20 mg.   - RTC in 2.5 week(s)  - Discussed with patient to monitor for any signs and symptoms of bleeding/brusing and when to seek medical attention.     Ryder Fuentes, Pharmacy Intern  4/26/2022  12:56 EDT

## 2022-05-13 ENCOUNTER — ANTICOAGULATION VISIT (OUTPATIENT)
Dept: PHARMACY | Facility: HOSPITAL | Age: 82
End: 2022-05-13

## 2022-05-13 DIAGNOSIS — I82.4Z9 DEEP VEIN THROMBOSIS (DVT) OF DISTAL VEIN OF LOWER EXTREMITY, UNSPECIFIED CHRONICITY, UNSPECIFIED LATERALITY: Primary | ICD-10-CM

## 2022-05-13 LAB — INR PPP: 1.7 (ref 1.8–2.8)

## 2022-05-13 PROCEDURE — 36416 COLLJ CAPILLARY BLOOD SPEC: CPT

## 2022-05-13 PROCEDURE — 85610 PROTHROMBIN TIME: CPT

## 2022-05-13 PROCEDURE — G0463 HOSPITAL OUTPT CLINIC VISIT: HCPCS

## 2022-05-13 NOTE — PROGRESS NOTES
Anticoagulation Clinic Progress Note    INR Goal: 1.8-2.8  Today's INR: 1.7 (subtherapeutic)  Current warfarin dose: warfarin 2.5 mg PO daily, except warfarin 5 mg PO on Saturday.  Current total weekly dose = 20 mg per week.     INR History:  Anticoagulation Monitoring 3/17/2022 2022 2022   INR 2.80 3.3 1.70   INR Date 3/17/2022 2022 2022   INR Goal 1.8-2.8 1.8-2.8 1.8-2.8   Trend Same Down Same   Last Week Total 22.5 mg 22.5 mg 20 mg   Next Week Total 22.5 mg 20 mg 20 mg   Sun 2.5 mg 2.5 mg 2.5 mg   Mon 2.5 mg 2.5 mg 2.5 mg   Tue 2.5 mg 2.5 mg 2.5 mg   Wed 5 mg 2.5 mg 2.5 mg   Thu 2.5 mg 2.5 mg 2.5 mg   Fri 2.5 mg 2.5 mg 2.5 mg   Sat 5 mg 5 mg 5 mg   Visit Report - - -   Some recent data might be hidden       Anticoagulation Summary  As of 2022    INR goal:  1.8-2.8   TTR:  68.2 % (2.9 y)   INR used for dosin.70 (2022)   Warfarin maintenance plan:  5 mg every Sat; 2.5 mg all other days   Weekly warfarin total:  20 mg   No change documented:  Zeina Gomez, PharmD   Plan last modified:  Ryder Fuentes, Pharmacy Intern (2022)   Next INR check:  2022   Priority:  Maintenance   Target end date:  Indefinite    Indications    DVT (deep venous thrombosis) (CMS/[facility]) [I82.409] [I82.409]             Anticoagulation Episode Summary     INR check location:  Anticoagulation Clinic    Preferred lab:      Send INR reminders to:  [facility] CLINICAL POOL    Comments:  Patient rights and responsibilities signed: 14        Anticoagulation Care Providers     Provider Role Specialty Phone number    Camila Lazcano MD Referring Internal Medicine [phone]          Clinic Interview:   Patient Findings     Positives:  Missed doses (Held 5/3- for colonoscopy on  (will cause   decreased INR)), Change in diet/appetite (Patient reports she has been   eating more the past week (may affect INR).)    Negatives:  Signs/symptoms of thrombosis, Signs/symptoms of bleeding,    Laboratory test error suspected, Change in health, Change in alcohol use,   Change in activity, Upcoming invasive procedure, Emergency department   visit, Upcoming dental procedure, Extra doses, Change in medications,   Hospital admission, Bruising, Other complaints      Clinical Outcomes     Negatives:  Major bleeding event, Thromboembolic event,   Anticoagulation-related hospital admission, Anticoagulation-related ED   visit, Anticoagulation-related fatality        Current Medications:   Prior to Admission medications    Medication Sig Start Date End Date Taking? Authorizing Provider   atenolol (TENORMIN) 100 MG tablet Take 100 mg by mouth Daily. 11/13/14   Drew Carson MD   fenofibrate 160 MG tablet Take 160 mg by mouth Daily. 2/22/22   Emergency, Nurse Juan RN   levothyroxine (SYNTHROID, LEVOTHROID) 50 MCG tablet TAKE 1 TABLET BY MOUTH ONCE DAILY 9/3/19   Emergency, Nurse Juan RN   lisinopril (PRINIVIL,ZESTRIL) 20 MG tablet Take 20 mg by mouth 2 (Two) Times a Day. 8/28/19   Emergency, Nurse Juan RN   Omega-3 Fatty Acids (FISH-EPA PO) Take 1,400 mg by mouth Daily.    Drew Carson MD   warfarin (COUMADIN) 2.5 MG tablet Take 2.5 mg by mouth. 11/27/18   Drew Carson MD   warfarin (COUMADIN) 5 MG tablet Take 5 mg by mouth 2 (Two) Times a Week. 1/18/15   Drew Carson MD       Medical history:   Past Medical History:   Diagnosis Date   • Disease of thyroid gland    • Hyperlipidemia    • Hypertension        Social history:   Social History     Tobacco Use   • Smoking status: Former Smoker   • Smokeless tobacco: Never Used   • Tobacco comment: quit 35 yrs ago   Substance Use Topics   • Alcohol use: Yes     Comment: rare       Allergies:    Atorvastatin, Atorvastatin calcium, Colesevelam hcl, Colestipol hcl, Ezetimibe, Pitavastatin, Rosuvastatin, Rosuvastatin calcium, Simvastatin, and Statins    Vaccine History:   Immunization History   Administered Date(s) Administered   •  COVID-19 (PFIZER) PURPLE CAP 02/03/2021, 02/25/2021, 10/06/2021   • Fluzone High Dose =>65 Years ([facility] ONLY) 12/16/2020   • Hepatitis A 05/30/2018, 12/03/2018   • Hepatitis B 12/09/2019, 01/09/2020, 06/11/2020   • Pneumococcal Conjugate 13-Valent (PCV13) 10/13/2015   • Pneumococcal Polysaccharide (PPSV23) 09/28/2008   • Shingrix 06/11/2018, 12/03/2018   • Tdap 11/16/2016       This patient is managed via a collaborative agreement, pursuant to IC 25-26-16. The signed protocol is kept in the [facility] at [address] [place] [address].    Education: Thi Allen has been instructed to call if any changes in medications, doses, concerns, etc. Patient was provided dosing instructions and expressed verbal understanding and has no further questions at this time.    Plan:  1. Anticoagulation   - no change; warfarin 2.5 mg PO daily, except warfarin 5 mg PO on Saturday.  Total weekly dose = 20 mg.   - Educated patient on signs/symptoms to monitor for and when to seek medical attention.   - Instructed patient to contact [facility] if she sees that she is eating significantly more as this will affect INR.  - RTC in 3.5 week(s)    Zeina Gomez, GeorgeD  5/13/2022  12:11 EDT

## 2022-06-06 ENCOUNTER — ANTICOAGULATION VISIT (OUTPATIENT)
Dept: PHARMACY | Facility: HOSPITAL | Age: 82
End: 2022-06-06

## 2022-06-06 DIAGNOSIS — I82.401 ACUTE DEEP VEIN THROMBOSIS (DVT) OF RIGHT LOWER EXTREMITY, UNSPECIFIED VEIN: Primary | ICD-10-CM

## 2022-06-06 LAB — INR PPP: 2.7 (ref 1.8–2.8)

## 2022-06-06 PROCEDURE — G0463 HOSPITAL OUTPT CLINIC VISIT: HCPCS

## 2022-06-06 PROCEDURE — 85610 PROTHROMBIN TIME: CPT

## 2022-06-06 PROCEDURE — 36416 COLLJ CAPILLARY BLOOD SPEC: CPT

## 2022-06-06 NOTE — PROGRESS NOTES
Anticoagulation Clinic Progress Note    INR Goal: 1.8-2.8  Today's INR: 2.7 (therapeutic)  Current warfarin dose: warfarin 2.5 mg PO daily, except warfarin 5 mg PO on .  Current total weekly dose = 20 mg per week.     INR History:  Anticoagulation Monitoring 2022   INR 3.3 1.70 2.70   INR Date 2022   INR Goal 1.8-2.8 1.8-2.8 1.8-2.8   Trend Down Same Same   Last Week Total 22.5 mg 20 mg 20 mg   Next Week Total 20 mg 20 mg 20 mg   Sun 2.5 mg 2.5 mg 2.5 mg   Mon 2.5 mg 2.5 mg 2.5 mg   Tue 2.5 mg 2.5 mg 2.5 mg   Wed 2.5 mg 2.5 mg 2.5 mg   Thu 2.5 mg 2.5 mg 2.5 mg   Fri 2.5 mg 2.5 mg 2.5 mg   Sat 5 mg 5 mg 5 mg   Visit Report - - -   Some recent data might be hidden       Anticoagulation Summary  As of 2022    INR goal:  1.8-2.8   TTR:  68.7 % (2.9 y)   INR used for dosin.70 (2022)   Warfarin maintenance plan:  5 mg every Sat; 2.5 mg all other days   Weekly warfarin total:  20 mg   No change documented:  Alice Pantoja, PharmD   Plan last modified:  Ryder Fuentes, Pharmacy Intern (2022)   Next INR check:  2022   Priority:  Maintenance   Target end date:  Indefinite    Indications    DVT (deep venous thrombosis) (CMS/[facility]) [I82.409] [I82.409]             Anticoagulation Episode Summary     INR check location:  Anticoagulation Clinic    Preferred lab:      Send INR reminders to:  [facility] CLINICAL POOL    Comments:  Patient rights and responsibilities signed: 14        Anticoagulation Care Providers     Provider Role Specialty Phone number    Camila Lazcano MD Referring Internal Medicine [phone]          Clinic Interview:   Patient Findings     Negatives:  Signs/symptoms of thrombosis, Signs/symptoms of bleeding,   Laboratory test error suspected, Change in health, Change in alcohol use,   Change in activity, Upcoming invasive procedure, Emergency department   visit, Upcoming dental procedure, Missed doses, Extra doses,  Change in   medications, Change in diet/appetite, Hospital admission, Bruising, Other   complaints      Clinical Outcomes     Negatives:  Major bleeding event, Thromboembolic event,   Anticoagulation-related hospital admission, Anticoagulation-related ED   visit, Anticoagulation-related fatality        Current Medications:   Prior to Admission medications    Medication Sig Start Date End Date Taking? Authorizing Provider   atenolol (TENORMIN) 100 MG tablet Take 100 mg by mouth Daily. 11/13/14   Drew Carson MD   fenofibrate 160 MG tablet Take 160 mg by mouth Daily. 2/22/22   Emergency, Nurse Juan RN   levothyroxine (SYNTHROID, LEVOTHROID) 50 MCG tablet TAKE 1 TABLET BY MOUTH ONCE DAILY 9/3/19   Emergency, Nurse Juan RN   lisinopril (PRINIVIL,ZESTRIL) 20 MG tablet Take 20 mg by mouth 2 (Two) Times a Day. 8/28/19   Emergency, Nurse Juan RN   Omega-3 Fatty Acids (FISH-EPA PO) Take 1,400 mg by mouth Daily.    Drew Carson MD   warfarin (COUMADIN) 2.5 MG tablet Take 2.5 mg by mouth. 11/27/18   Drew Carson MD   warfarin (COUMADIN) 5 MG tablet Take 5 mg by mouth 2 (Two) Times a Week. 1/18/15   Drew Carson MD       Medical history:   Past Medical History:   Diagnosis Date   • Disease of thyroid gland    • Hyperlipidemia    • Hypertension        Social history:   Social History     Tobacco Use   • Smoking status: Former Smoker   • Smokeless tobacco: Never Used   • Tobacco comment: quit 35 yrs ago   Substance Use Topics   • Alcohol use: Yes     Comment: rare       Allergies:    Atorvastatin, Atorvastatin calcium, Colesevelam hcl, Colestipol hcl, Ezetimibe, Pitavastatin, Rosuvastatin, Rosuvastatin calcium, Simvastatin, and Statins    Vaccine History:   Immunization History   Administered Date(s) Administered   • COVID-19 (PFIZER) PURPLE CAP 02/03/2021, 02/25/2021, 10/06/2021   • Fluzone High Dose =>65 Years ([facility] ONLY) 12/16/2020   • Hepatitis A 05/30/2018, 12/03/2018   • Hepatitis B  12/09/2019, 01/09/2020, 06/11/2020   • Pneumococcal Conjugate 13-Valent (PCV13) 10/13/2015   • Pneumococcal Polysaccharide (PPSV23) 09/28/2008   • Shingrix 06/11/2018, 12/03/2018   • Tdap 11/16/2016       This patient is managed via a collaborative agreement, pursuant to IC 25-26-16. The signed protocol is kept in the [facility] at [address] [place] [address].    Education: Thi Allen has been instructed to call if any changes in medications, doses, concerns, etc. Patient was provided dosing instructions and expressed verbal understanding and has no further questions at this time.    Plan:  1. Anticoagulation   - no change; warfarin 2.5 mg PO daily, except warfarin 5 mg PO on Saturdays.   Total weekly dose = 20 mg.   - RTC in 4 week(s)      Samm Pantoja, PharmD  6/6/2022  12:30 EDT

## 2022-07-05 ENCOUNTER — ANTICOAGULATION VISIT (OUTPATIENT)
Dept: PHARMACY | Facility: HOSPITAL | Age: 82
End: 2022-07-05

## 2022-07-05 DIAGNOSIS — I82.4Z9 DEEP VEIN THROMBOSIS (DVT) OF DISTAL VEIN OF LOWER EXTREMITY, UNSPECIFIED CHRONICITY, UNSPECIFIED LATERALITY: Primary | ICD-10-CM

## 2022-07-05 LAB — INR PPP: 2 (ref 1.8–2.8)

## 2022-07-05 PROCEDURE — 85610 PROTHROMBIN TIME: CPT

## 2022-07-05 PROCEDURE — G0463 HOSPITAL OUTPT CLINIC VISIT: HCPCS

## 2022-07-05 PROCEDURE — 36416 COLLJ CAPILLARY BLOOD SPEC: CPT

## 2022-07-05 NOTE — PROGRESS NOTES
Anticoagulation Clinic Progress Note    INR Goal: 1.8-2.8  Today's INR: 2.0 (therapeutic)  Current warfarin dose: warfarin 2.5 mg PO daily, except warfarin 5 mg PO on Saturday.  Current total weekly dose = 20 mg per week.     INR History:  Anticoagulation Monitoring 2022   INR 1.70 2.70 2.00   INR Date 2022   INR Goal 1.8-2.8 1.8-2.8 1.8-2.8   Trend Same Same Same   Last Week Total 20 mg 20 mg 20 mg   Next Week Total 20 mg 20 mg 20 mg   Sun 2.5 mg 2.5 mg 2.5 mg   Mon 2.5 mg 2.5 mg 2.5 mg   Tue 2.5 mg 2.5 mg 2.5 mg   Wed 2.5 mg 2.5 mg 2.5 mg   Thu 2.5 mg 2.5 mg 2.5 mg   Fri 2.5 mg 2.5 mg 2.5 mg   Sat 5 mg 5 mg 5 mg   Visit Report - - -   Some recent data might be hidden       Anticoagulation Summary  As of 2022    INR goal:  1.8-2.8   TTR:  69.5 % (3 y)   INR used for dosin.00 (2022)   Warfarin maintenance plan:  5 mg every Sat; 2.5 mg all other days   Weekly warfarin total:  20 mg   No change documented:  Zeina Gomez, PharmD   Plan last modified:  Ryder Fuentes, Pharmacy Intern (2022)   Next INR check:  2022   Priority:  Maintenance   Target end date:  Indefinite    Indications    DVT (deep venous thrombosis) (CMS/[facility]) [I82.409] [I82.409]             Anticoagulation Episode Summary     INR check location:  Anticoagulation Clinic    Preferred lab:      Send INR reminders to:  [facility] CLINICAL POOL    Comments:  Patient rights and responsibilities signed: 14        Anticoagulation Care Providers     Provider Role Specialty Phone number    Camila Lazcano MD Referring Internal Medicine [phone]          Clinic Interview:   Patient Findings     Negatives:  Signs/symptoms of thrombosis, Signs/symptoms of bleeding,   Laboratory test error suspected, Change in health, Change in alcohol use,   Change in activity, Upcoming invasive procedure, Emergency department   visit, Upcoming dental procedure, Missed doses, Extra doses, Change  in   medications, Change in diet/appetite, Hospital admission, Bruising, Other   complaints      Clinical Outcomes     Negatives:  Major bleeding event, Thromboembolic event,   Anticoagulation-related hospital admission, Anticoagulation-related ED   visit, Anticoagulation-related fatality        Current Medications:   Prior to Admission medications    Medication Sig Start Date End Date Taking? Authorizing Provider   atenolol (TENORMIN) 100 MG tablet Take 100 mg by mouth Daily. 11/13/14   Drew Carson MD   fenofibrate 160 MG tablet Take 160 mg by mouth Daily. 2/22/22   Emergency, Nurse Juan RN   levothyroxine (SYNTHROID, LEVOTHROID) 50 MCG tablet TAKE 1 TABLET BY MOUTH ONCE DAILY 9/3/19   Emergency, Nurse Juan RN   lisinopril (PRINIVIL,ZESTRIL) 20 MG tablet Take 20 mg by mouth 2 (Two) Times a Day. 8/28/19   Emergency, Nurse Juan RN   Omega-3 Fatty Acids (FISH-EPA PO) Take 1,400 mg by mouth Daily.    Drew Carson MD   warfarin (COUMADIN) 2.5 MG tablet Take 2.5 mg by mouth. 11/27/18   Drew Carson MD   warfarin (COUMADIN) 5 MG tablet Take 5 mg by mouth 2 (Two) Times a Week. 1/18/15   Drew Carson MD       Medical history:   Past Medical History:   Diagnosis Date   • Disease of thyroid gland    • Hyperlipidemia    • Hypertension        Social history:   Social History     Tobacco Use   • Smoking status: Former Smoker   • Smokeless tobacco: Never Used   • Tobacco comment: quit 35 yrs ago   Substance Use Topics   • Alcohol use: Yes     Comment: rare       Allergies:    Atorvastatin, Atorvastatin calcium, Colesevelam hcl, Colestipol hcl, Ezetimibe, Pitavastatin, Rosuvastatin, Rosuvastatin calcium, Simvastatin, and Statins    Vaccine History:   Immunization History   Administered Date(s) Administered   • COVID-19 (PFIZER) PURPLE CAP 02/03/2021, 02/25/2021, 10/06/2021   • Fluzone High Dose =>65 Years ([facility] ONLY) 12/16/2020   • Hepatitis A 05/30/2018, 12/03/2018   • Hepatitis B  12/09/2019, 01/09/2020, 06/11/2020   • Pneumococcal Conjugate 13-Valent (PCV13) 10/13/2015   • Pneumococcal Polysaccharide (PPSV23) 09/28/2008   • Shingrix 06/11/2018, 12/03/2018   • Tdap 11/16/2016       This patient is managed via a collaborative agreement, pursuant to IC 25-26-16. The signed protocol is kept in the [facility] at [address] [place] [address].    Education: Thi Allen has been instructed to call if any changes in medications, doses, concerns, etc. Patient was provided dosing instructions and expressed verbal understanding and has no further questions at this time.    Plan:  1. Anticoagulation   - no change; warfarin 2.5 mg PO daily, except warfarin 5 mg PO on Saturday.  Total weekly dose = 20 mg.   - RTC in 6 week(s)    George LockD  7/5/2022  16:25 EDT

## 2022-07-06 ENCOUNTER — APPOINTMENT (OUTPATIENT)
Dept: PHARMACY | Facility: HOSPITAL | Age: 82
End: 2022-07-06

## 2022-07-22 ENCOUNTER — TRANSCRIBE ORDERS (OUTPATIENT)
Dept: ADMINISTRATIVE | Facility: HOSPITAL | Age: 82
End: 2022-07-22

## 2022-07-22 DIAGNOSIS — Z86.79 HISTORY OF ABDOMINAL AORTIC ANEURYSM (AAA): ICD-10-CM

## 2022-07-22 DIAGNOSIS — I77.9 UNILATERAL CAROTID ARTERY DISEASE: Primary | ICD-10-CM

## 2022-07-22 DIAGNOSIS — I73.9 PAD (PERIPHERAL ARTERY DISEASE): Primary | ICD-10-CM

## 2022-07-22 DIAGNOSIS — I73.9 CLAUDICATION: ICD-10-CM

## 2022-07-22 DIAGNOSIS — R29.898 WEAKNESS OF BACK: ICD-10-CM

## 2022-07-22 DIAGNOSIS — M54.16 BILATERAL LUMBAR RADICULOPATHY: Primary | ICD-10-CM

## 2022-07-22 DIAGNOSIS — I34.0 ACUTE MITRAL REGURGITATION: ICD-10-CM

## 2022-07-22 DIAGNOSIS — R53.1 WEAKNESS: ICD-10-CM

## 2022-08-15 ENCOUNTER — APPOINTMENT (OUTPATIENT)
Dept: CARDIOLOGY | Facility: HOSPITAL | Age: 82
End: 2022-08-15

## 2022-08-16 ENCOUNTER — ANTICOAGULATION VISIT (OUTPATIENT)
Dept: PHARMACY | Facility: HOSPITAL | Age: 82
End: 2022-08-16

## 2022-08-16 DIAGNOSIS — I82.401 ACUTE DEEP VEIN THROMBOSIS (DVT) OF RIGHT LOWER EXTREMITY, UNSPECIFIED VEIN: Primary | ICD-10-CM

## 2022-08-16 LAB — INR PPP: 2.6 (ref 1.8–2.8)

## 2022-08-16 PROCEDURE — G0463 HOSPITAL OUTPT CLINIC VISIT: HCPCS

## 2022-08-16 PROCEDURE — 85610 PROTHROMBIN TIME: CPT

## 2022-08-16 PROCEDURE — 36416 COLLJ CAPILLARY BLOOD SPEC: CPT

## 2022-08-16 NOTE — PROGRESS NOTES
Anticoagulation Clinic Progress Note    INR Goal: 1.8-2.8  Today's INR: 2.6 (therapeutic)  Current warfarin dose: warfarin 2.5 mg PO daily, except warfarin 5 mg PO on .  Current total weekly dose = 20 mg per week.     INR History:  Anticoagulation Monitoring 2022   INR 2.70 2.00 2.60   INR Date 2022   INR Goal 1.8-2.8 1.8-2.8 1.8-2.8   Trend Same Same Same   Last Week Total 20 mg 20 mg 20 mg   Next Week Total 20 mg 20 mg 20 mg   Sun 2.5 mg 2.5 mg 2.5 mg   Mon 2.5 mg 2.5 mg 2.5 mg   Tue 2.5 mg 2.5 mg 2.5 mg   Wed 2.5 mg 2.5 mg 2.5 mg   Thu 2.5 mg 2.5 mg 2.5 mg   Fri 2.5 mg 2.5 mg 2.5 mg   Sat 5 mg 5 mg 5 mg   Visit Report - - -   Some recent data might be hidden       Anticoagulation Summary  As of 2022    INR goal:  1.8-2.8   TTR:  70.6 % (3.1 y)   INR used for dosin.60 (2022)   Warfarin maintenance plan:  5 mg every Sat; 2.5 mg all other days   Weekly warfarin total:  20 mg   No change documented:  Alice Pantoja, PharmD   Plan last modified:  Ryder Fuentes, Pharmacy Intern (2022)   Next INR check:     Priority:  Maintenance   Target end date:  Indefinite    Indications    DVT (deep venous thrombosis) (CMS/[facility]) [I82.409] [I82.409]             Anticoagulation Episode Summary     INR check location:  Anticoagulation Clinic    Preferred lab:      Send INR reminders to:  [facility] CLINICAL POOL    Comments:  Patient rights and responsibilities signed: 14        Anticoagulation Care Providers     Provider Role Specialty Phone number    Camila Lazcano MD Referring Internal Medicine [phone]          Clinic Interview:   Patient Findings     Negatives:  Signs/symptoms of thrombosis, Signs/symptoms of bleeding,   Laboratory test error suspected, Change in health, Change in alcohol use,   Change in activity, Upcoming invasive procedure, Emergency department   visit, Upcoming dental procedure, Missed doses, Extra doses, Change  in   medications, Change in diet/appetite, Hospital admission, Bruising, Other   complaints      Clinical Outcomes     Negatives:  Major bleeding event, Thromboembolic event,   Anticoagulation-related hospital admission, Anticoagulation-related ED   visit, Anticoagulation-related fatality        Current Medications:   Prior to Admission medications    Medication Sig Start Date End Date Taking? Authorizing Provider   atenolol (TENORMIN) 100 MG tablet Take 100 mg by mouth Daily. 11/13/14   Drew Carson MD   fenofibrate 160 MG tablet Take 160 mg by mouth Daily. 2/22/22   Emergency, Nurse Juan RN   levothyroxine (SYNTHROID, LEVOTHROID) 50 MCG tablet TAKE 1 TABLET BY MOUTH ONCE DAILY 9/3/19   Emergency, Nurse Juan RN   lisinopril (PRINIVIL,ZESTRIL) 20 MG tablet Take 20 mg by mouth 2 (Two) Times a Day. 8/28/19   Emergency, Nurse Juan RN   Omega-3 Fatty Acids (FISH-EPA PO) Take 1,400 mg by mouth Daily.    Drew Carson MD   warfarin (COUMADIN) 2.5 MG tablet Take 2.5 mg by mouth. 11/27/18   Drew Carson MD   warfarin (COUMADIN) 5 MG tablet Take 5 mg by mouth 2 (Two) Times a Week. 1/18/15   Drew Carson MD       Medical history:   Past Medical History:   Diagnosis Date   • Disease of thyroid gland    • Hyperlipidemia    • Hypertension        Social history:   Social History     Tobacco Use   • Smoking status: Former Smoker   • Smokeless tobacco: Never Used   • Tobacco comment: quit 35 yrs ago   Substance Use Topics   • Alcohol use: Yes     Comment: rare       Allergies:    Atorvastatin, Atorvastatin calcium, Colesevelam hcl, Colestipol hcl, Ezetimibe, Pitavastatin, Rosuvastatin, Rosuvastatin calcium, Simvastatin, and Statins    Vaccine History:   Immunization History   Administered Date(s) Administered   • COVID-19 (PFIZER) PURPLE CAP 02/03/2021, 02/25/2021, 10/06/2021   • Covid-19 (Pfizer) Gray Cap 07/25/2022   • Fluzone High Dose =>65 Years ([facility] ONLY) 12/16/2020   • Hepatitis A  05/30/2018, 12/03/2018   • Hepatitis B 12/09/2019, 01/09/2020, 06/11/2020   • Pneumococcal Conjugate 13-Valent (PCV13) 10/13/2015   • Pneumococcal Polysaccharide (PPSV23) 09/28/2008   • Shingrix 06/11/2018, 12/03/2018   • Tdap 11/16/2016       This patient is managed via a collaborative agreement, pursuant to IC 25-26-16. The signed protocol is kept in the [facility] at [address] [place] [address].    Education: Thi Allen has been instructed to call if any changes in medications, doses, concerns, etc. Patient was provided dosing instructions and expressed verbal understanding and has no further questions at this time.    Plan:  1. Anticoagulation   - no change; warfarin 2.5 mg PO daily, except warfarin 5 mg PO on Saturdays.   Total weekly dose = 20 mg.   - RTC in 4 week(s)      Samm Pantoja, PharmD  8/16/2022  10:55 EDT

## 2022-08-17 ENCOUNTER — HOSPITAL ENCOUNTER (OUTPATIENT)
Dept: ULTRASOUND IMAGING | Facility: HOSPITAL | Age: 82
Discharge: HOME | End: 2022-08-17

## 2022-08-17 ENCOUNTER — HOSPITAL ENCOUNTER (OUTPATIENT)
Dept: CARDIOLOGY | Facility: HOSPITAL | Age: 82
Discharge: HOME | End: 2022-08-17

## 2022-08-17 ENCOUNTER — HOSPITAL ENCOUNTER (OUTPATIENT)
Dept: CT IMAGING | Facility: HOSPITAL | Age: 82
Discharge: HOME | End: 2022-08-17

## 2022-08-17 VITALS
DIASTOLIC BLOOD PRESSURE: 76 MMHG | HEIGHT: 60 IN | SYSTOLIC BLOOD PRESSURE: 143 MMHG | BODY MASS INDEX: 26.5 KG/M2 | WEIGHT: 135 LBS

## 2022-08-17 DIAGNOSIS — I73.9 PAD (PERIPHERAL ARTERY DISEASE): ICD-10-CM

## 2022-08-17 DIAGNOSIS — R29.898 WEAKNESS OF BACK: ICD-10-CM

## 2022-08-17 DIAGNOSIS — M54.16 BILATERAL LUMBAR RADICULOPATHY: ICD-10-CM

## 2022-08-17 DIAGNOSIS — R53.1 WEAKNESS: ICD-10-CM

## 2022-08-17 DIAGNOSIS — I34.0 ACUTE MITRAL REGURGITATION: ICD-10-CM

## 2022-08-17 DIAGNOSIS — I73.9 CLAUDICATION: ICD-10-CM

## 2022-08-17 DIAGNOSIS — I77.9 UNILATERAL CAROTID ARTERY DISEASE: ICD-10-CM

## 2022-08-17 DIAGNOSIS — Z86.79 HISTORY OF ABDOMINAL AORTIC ANEURYSM (AAA): ICD-10-CM

## 2022-08-17 LAB
BH CV LOWER ARTERIAL LEFT ABI RATIO: 1.3
BH CV LOWER ARTERIAL LEFT DORSALIS PEDIS SYS MAX: 173
BH CV LOWER ARTERIAL LEFT GREAT TOE SYS MAX: 111
BH CV LOWER ARTERIAL LEFT POST TIBIAL SYS MAX: 181
BH CV LOWER ARTERIAL LEFT TBI RATIO: 0.8
BH CV LOWER ARTERIAL RIGHT ABI RATIO: 1.26
BH CV LOWER ARTERIAL RIGHT DORSALIS PEDIS SYS MAX: 159
BH CV LOWER ARTERIAL RIGHT GREAT TOE SYS MAX: 116
BH CV LOWER ARTERIAL RIGHT POST TIBIAL SYS MAX: 175
BH CV LOWER ARTERIAL RIGHT TBI RATIO: 0.83
BH CV XLRA MEAS LEFT DIST CCA EDV: -17.2 CM/SEC
BH CV XLRA MEAS LEFT DIST CCA PSV: -86 CM/SEC
BH CV XLRA MEAS LEFT DIST ICA EDV: -18.2 CM/SEC
BH CV XLRA MEAS LEFT DIST ICA PSV: -69.8 CM/SEC
BH CV XLRA MEAS LEFT ICA/CCA RATIO: 1.23
BH CV XLRA MEAS LEFT PROX CCA EDV: 12.9 CM/SEC
BH CV XLRA MEAS LEFT PROX CCA PSV: 72.9 CM/SEC
BH CV XLRA MEAS LEFT PROX ECA PSV: -55.7 CM/SEC
BH CV XLRA MEAS LEFT PROX ICA EDV: -30.4 CM/SEC
BH CV XLRA MEAS LEFT PROX ICA PSV: -106 CM/SEC
BH CV XLRA MEAS LEFT PROX SCLA PSV: -51.3 CM/SEC
BH CV XLRA MEAS LEFT VERTEBRAL A EDV: -9.4 CM/SEC
BH CV XLRA MEAS LEFT VERTEBRAL A PSV: -54.9 CM/SEC
BH CV XLRA MEAS RIGHT DIST CCA EDV: -14.7 CM/SEC
BH CV XLRA MEAS RIGHT DIST CCA PSV: -60.2 CM/SEC
BH CV XLRA MEAS RIGHT DIST ICA EDV: -22.5 CM/SEC
BH CV XLRA MEAS RIGHT DIST ICA PSV: -86.7 CM/SEC
BH CV XLRA MEAS RIGHT ICA/CCA RATIO: 1.71
BH CV XLRA MEAS RIGHT PROX CCA EDV: 9.1 CM/SEC
BH CV XLRA MEAS RIGHT PROX CCA PSV: 58.1 CM/SEC
BH CV XLRA MEAS RIGHT PROX ECA PSV: -68.3 CM/SEC
BH CV XLRA MEAS RIGHT PROX ICA EDV: -24.1 CM/SEC
BH CV XLRA MEAS RIGHT PROX ICA PSV: -103 CM/SEC
BH CV XLRA MEAS RIGHT PROX SCLA PSV: 63.1 CM/SEC
BH CV XLRA MEAS RIGHT VERTEBRAL A EDV: -7 CM/SEC
BH CV XLRA MEAS RIGHT VERTEBRAL A PSV: -35.7 CM/SEC
LEFT ARM BP: 138 MMHG
MAXIMAL PREDICTED HEART RATE: 140 BPM
MAXIMAL PREDICTED HEART RATE: 140 BPM
RIGHT ARM BP: 139 MMHG
STRESS TARGET HR: 119 BPM
STRESS TARGET HR: 119 BPM
UPPER ARTERIAL LEFT ARM BRACHIAL SYS MAX: 138 MMHG
UPPER ARTERIAL RIGHT ARM BRACHIAL SYS MAX: 139 MMHG

## 2022-08-17 PROCEDURE — 76775 US EXAM ABDO BACK WALL LIM: CPT

## 2022-08-17 PROCEDURE — 93306 TTE W/DOPPLER COMPLETE: CPT

## 2022-08-17 PROCEDURE — 72131 CT LUMBAR SPINE W/O DYE: CPT

## 2022-08-17 PROCEDURE — 93880 EXTRACRANIAL BILAT STUDY: CPT

## 2022-08-17 PROCEDURE — 93922 UPR/L XTREMITY ART 2 LEVELS: CPT

## 2022-08-17 PROCEDURE — 93306 TTE W/DOPPLER COMPLETE: CPT | Performed by: INTERNAL MEDICINE

## 2022-08-18 LAB
BH CV ECHO MEAS - ACS: 1.41 CM
BH CV ECHO MEAS - AI P1/2T: 652.9 MSEC
BH CV ECHO MEAS - AO MAX PG: 4.9 MMHG
BH CV ECHO MEAS - AO MEAN PG: 2.5 MMHG
BH CV ECHO MEAS - AO ROOT DIAM: 2.7 CM
BH CV ECHO MEAS - AO V2 MAX: 110.2 CM/SEC
BH CV ECHO MEAS - AO V2 VTI: 23.3 CM
BH CV ECHO MEAS - AVA(I,D): 1.86 CM2
BH CV ECHO MEAS - EDV(CUBED): 80.5 ML
BH CV ECHO MEAS - EDV(MOD-SP4): 83.5 ML
BH CV ECHO MEAS - EF(MOD-BP): 46 %
BH CV ECHO MEAS - EF(MOD-SP4): 45.7 %
BH CV ECHO MEAS - ESV(CUBED): 35.9 ML
BH CV ECHO MEAS - ESV(MOD-SP4): 45.3 ML
BH CV ECHO MEAS - FS: 23.6 %
BH CV ECHO MEAS - IVS/LVPW: 0.75 CM
BH CV ECHO MEAS - IVSD: 0.95 CM
BH CV ECHO MEAS - LA DIMENSION: 3.9 CM
BH CV ECHO MEAS - LV DIASTOLIC VOL/BSA (35-75): 52.9 CM2
BH CV ECHO MEAS - LV MASS(C)D: 164 GRAMS
BH CV ECHO MEAS - LV MAX PG: 2.8 MMHG
BH CV ECHO MEAS - LV MEAN PG: 1.2 MMHG
BH CV ECHO MEAS - LV SYSTOLIC VOL/BSA (12-30): 28.7 CM2
BH CV ECHO MEAS - LV V1 MAX: 83.7 CM/SEC
BH CV ECHO MEAS - LV V1 VTI: 18.3 CM
BH CV ECHO MEAS - LVIDD: 4.3 CM
BH CV ECHO MEAS - LVIDS: 3.3 CM
BH CV ECHO MEAS - LVOT AREA: 2.37 CM2
BH CV ECHO MEAS - LVOT DIAM: 1.74 CM
BH CV ECHO MEAS - LVPWD: 1.26 CM
BH CV ECHO MEAS - MR MAX PG: 120.6 MMHG
BH CV ECHO MEAS - MR MAX VEL: 549 CM/SEC
BH CV ECHO MEAS - MV A MAX VEL: 107.4 CM/SEC
BH CV ECHO MEAS - MV DEC SLOPE: 1029 CM/SEC2
BH CV ECHO MEAS - MV DEC TIME: 0.17 MSEC
BH CV ECHO MEAS - MV E MAX VEL: 171 CM/SEC
BH CV ECHO MEAS - MV E/A: 1.59
BH CV ECHO MEAS - MV MAX PG: 13.8 MMHG
BH CV ECHO MEAS - MV MEAN PG: 4.3 MMHG
BH CV ECHO MEAS - MV V2 VTI: 36.7 CM
BH CV ECHO MEAS - MVA(VTI): 1.18 CM2
BH CV ECHO MEAS - PA ACC TIME: 0.15 SEC
BH CV ECHO MEAS - PA PR(ACCEL): 10.5 MMHG
BH CV ECHO MEAS - PA V2 MAX: 68.5 CM/SEC
BH CV ECHO MEAS - PULM A REVS DUR: 0.1 SEC
BH CV ECHO MEAS - PULM A REVS VEL: 32.7 CM/SEC
BH CV ECHO MEAS - PULM DIAS VEL: 62.1 CM/SEC
BH CV ECHO MEAS - PULM S/D: 1.29
BH CV ECHO MEAS - PULM SYS VEL: 79.9 CM/SEC
BH CV ECHO MEAS - RAP SYSTOLE: 12 MMHG
BH CV ECHO MEAS - RV MAX PG: 1.33 MMHG
BH CV ECHO MEAS - RV V1 MAX: 57.7 CM/SEC
BH CV ECHO MEAS - RV V1 VTI: 12.4 CM
BH CV ECHO MEAS - RVDD: 2.16 CM
BH CV ECHO MEAS - RVSP: 51.9 MMHG
BH CV ECHO MEAS - SI(MOD-SP4): 24.2 ML/M2
BH CV ECHO MEAS - SV(LVOT): 43.3 ML
BH CV ECHO MEAS - SV(MOD-SP4): 38.2 ML
BH CV ECHO MEAS - TR MAX PG: 39.9 MMHG
BH CV ECHO MEAS - TR MAX VEL: 314.9 CM/SEC
MAXIMAL PREDICTED HEART RATE: 140 BPM
STRESS TARGET HR: 119 BPM

## 2022-09-13 ENCOUNTER — ANTICOAGULATION VISIT (OUTPATIENT)
Dept: PHARMACY | Facility: HOSPITAL | Age: 82
End: 2022-09-13

## 2022-09-13 LAB — INR PPP: 1.9 (ref 1.8–2.8)

## 2022-09-13 PROCEDURE — 85610 PROTHROMBIN TIME: CPT

## 2022-09-13 PROCEDURE — G0463 HOSPITAL OUTPT CLINIC VISIT: HCPCS

## 2022-09-13 PROCEDURE — 36416 COLLJ CAPILLARY BLOOD SPEC: CPT

## 2022-09-13 NOTE — PROGRESS NOTES
Anticoagulation Clinic Progress Note    INR Goal: 1.8-2.8  Today's INR: 1.9 (therapeutic)  Current warfarin dose: warfarin 2.5 mg PO daily, except warfarin 5 mg PO on Saturday.  Current total weekly dose = 20 mg per week.     INR History:  Anticoagulation Monitoring 2022   INR 2.00 2.60 1.90   INR Date 2022   INR Goal 1.8-2.8 1.8-2.8 1.8-2.8   Trend Same Same Same   Last Week Total 20 mg 20 mg 20 mg   Next Week Total 20 mg 20 mg 20 mg   Sun 2.5 mg 2.5 mg 2.5 mg   Mon 2.5 mg 2.5 mg 2.5 mg   Tue 2.5 mg 2.5 mg 2.5 mg   Wed 2.5 mg 2.5 mg 2.5 mg   Thu 2.5 mg 2.5 mg 2.5 mg   Fri 2.5 mg 2.5 mg 2.5 mg   Sat 5 mg 5 mg 5 mg   Visit Report - - -   Some recent data might be hidden       Anticoagulation Summary  As of 2022    INR goal:  1.8-2.8   TTR:  71.3 % (3.2 y)   INR used for dosin.90 (2022)   Warfarin maintenance plan:  5 mg every Sat; 2.5 mg all other days   Weekly warfarin total:  20 mg   No change documented:  Mg Weldon RPH   Plan last modified:  Ryder Fuentes, Pharmacy Intern (2022)   Next INR check:  10/11/2022   Priority:  Maintenance   Target end date:  Indefinite    Indications    DVT (deep venous thrombosis) (CMS/[facility]) [I82.409] [I82.409]             Anticoagulation Episode Summary     INR check location:  Anticoagulation Clinic    Preferred lab:      Send INR reminders to:  [facility] CLINICAL POOL    Comments:  Patient rights and responsibilities signed: 14        Anticoagulation Care Providers     Provider Role Specialty Phone number    Camila Lazcano MD Referring Internal Medicine [phone]          Clinic Interview:   Patient Findings     Negatives:  Signs/symptoms of thrombosis, Signs/symptoms of bleeding,   Laboratory test error suspected, Change in health, Change in alcohol use,   Change in activity, Upcoming invasive procedure, Emergency department   visit, Upcoming dental procedure, Missed doses, Extra doses,  Change in   medications, Change in diet/appetite, Hospital admission, Bruising, Other   complaints      Clinical Outcomes     Negatives:  Major bleeding event, Thromboembolic event,   Anticoagulation-related hospital admission, Anticoagulation-related ED   visit, Anticoagulation-related fatality        Current Medications:   Prior to Admission medications    Medication Sig Start Date End Date Taking? Authorizing Provider   atenolol (TENORMIN) 100 MG tablet Take 100 mg by mouth Daily. 11/13/14   Drew Carson MD   fenofibrate 160 MG tablet Take 160 mg by mouth Daily. 2/22/22   Emergency, Nurse Juan RN   levothyroxine (SYNTHROID, LEVOTHROID) 50 MCG tablet TAKE 1 TABLET BY MOUTH ONCE DAILY 9/3/19   Emergency, Nurse Juan RN   lisinopril (PRINIVIL,ZESTRIL) 20 MG tablet Take 20 mg by mouth 2 (Two) Times a Day. 8/28/19   Emergency, Nurse Juan RN   Omega-3 Fatty Acids (FISH-EPA PO) Take 1,400 mg by mouth Daily.    Drew Carson MD   warfarin (COUMADIN) 2.5 MG tablet Take 2.5 mg by mouth. 11/27/18   Drew Carson MD   warfarin (COUMADIN) 5 MG tablet Take 5 mg by mouth 2 (Two) Times a Week. 1/18/15   Drew Carson MD       Medical history:   Past Medical History:   Diagnosis Date   • Disease of thyroid gland    • Hyperlipidemia    • Hypertension        Social history:   Social History     Tobacco Use   • Smoking status: Former Smoker   • Smokeless tobacco: Never Used   • Tobacco comment: quit 35 yrs ago   Substance Use Topics   • Alcohol use: Yes     Comment: rare       Allergies:    Atorvastatin, Atorvastatin calcium, Colesevelam hcl, Colestipol hcl, Ezetimibe, Pitavastatin, Rosuvastatin, Rosuvastatin calcium, Simvastatin, and Statins    Vaccine History:   Immunization History   Administered Date(s) Administered   • COVID-19 (PFIZER) PURPLE CAP 02/03/2021, 02/25/2021, 10/06/2021   • Covid-19 (Pfizer) Gray Cap 07/25/2022   • Fluzone High Dose =>65 Years ([facility] ONLY) 12/16/2020   •  Hepatitis A 05/30/2018, 12/03/2018   • Hepatitis B 12/09/2019, 01/09/2020, 06/11/2020   • Pneumococcal Conjugate 13-Valent (PCV13) 10/13/2015   • Pneumococcal Polysaccharide (PPSV23) 09/28/2008   • Shingrix 06/11/2018, 12/03/2018   • Tdap 11/16/2016       This patient is managed via a collaborative agreement, pursuant to IC 25-26-16. The signed protocol is kept in the [facility] at [address] [place] [address].    Education: Thi Allen has been instructed to call if any changes in medications, doses, concerns, etc. Patient was provided dosing instructions and expressed verbal understanding and has no further questions at this time.    Plan:  1. Anticoagulation   - no change; warfarin 2.5 mg PO daily, except warfarin 5 mg PO on Saturday.   Total weekly dose = 20 mg.   - RTC in 4 week(s)    Mg Weldon RPH  9/13/2022  09:48 EDT

## 2022-09-18 NOTE — PROGRESS NOTES
Encounter Date:09/20/2022      Patient ID: Thi Allen is a 82 y.o. female.    Chief Complaint:      History of Present Illness  82-year-old woman comes to establish cardiac care. she has an extensive cardiovascular history that includes having a permanent pacemaker, hypertension, hyperlipidemia, DVT on chronic anticoagulation, mitral regurgitation, HFpEF, peripheral arterial disease with carotid stenosis less than 50%.  Today she comes in with complaints of fatigue.  She denies any chest pain, shortness of breath, leg edema.  She does report weight loss.  She underwent an echocardiogram a month ago which showed EF of 46%, moderate aortic regurgitation, severe mitral regurgitation, moderate tricuspid regurgitation and RVSP of 35 to 45 mmHg.  She was recommended to undergo a transesophageal echocardiogram and a cardiac surgery appointment however she is very concerned and wants a second opinion on these findings.           The following portions of the patient's history were reviewed and updated as appropriate: allergies, current medications, past family history, past medical history, past social history, past surgical history and problem list.    Review of Systems   Constitutional: Negative for fever and malaise/fatigue.   Cardiovascular: Negative for chest pain, dyspnea on exertion and palpitations.   Respiratory: Negative for cough and shortness of breath.    Skin: Negative for rash.   Gastrointestinal: Negative for abdominal pain, nausea and vomiting.   Neurological: Negative for focal weakness and headaches.   All other systems reviewed and are negative.        Current Outpatient Medications:   •  atenolol (TENORMIN) 100 MG tablet, Take 100 mg by mouth Daily., Disp: , Rfl:   •  fenofibrate 160 MG tablet, Take 160 mg by mouth Daily., Disp: , Rfl:   •  latanoprost (XALATAN) 0.005 % ophthalmic solution, INSTILL 1 DROP INTO EACH EYE ONCE DAILY IN THE EVENING, Disp: , Rfl:   •  latanoprost (XALATAN) 0.005 %  "ophthalmic solution, Apply  to eye(s) as directed by provider., Disp: , Rfl:   •  levothyroxine (SYNTHROID, LEVOTHROID) 50 MCG tablet, TAKE 1 TABLET BY MOUTH ONCE DAILY, Disp: , Rfl:   •  lisinopril (PRINIVIL,ZESTRIL) 20 MG tablet, Take 20 mg by mouth 2 (Two) Times a Day., Disp: , Rfl: 3  •  warfarin (COUMADIN) 2.5 MG tablet, Take 2.5 mg by mouth., Disp: , Rfl:   •  warfarin (COUMADIN) 5 MG tablet, Take 5 mg by mouth 2 (Two) Times a Week., Disp: , Rfl:   •  Omega-3 Fatty Acids (FISH-EPA PO), Take 1,400 mg by mouth Daily., Disp: , Rfl:     Current outpatient and discharge medications have been reconciled for the patient.  Reviewed by: Trace Marcus MD       Allergies   Allergen Reactions   • Atorvastatin Myalgia   • Atorvastatin Calcium Dizziness     Legs ache    • Colesevelam Hcl Other (See Comments)     Made legs weak    • Colestipol Hcl Other (See Comments)     MADE PATIENT FEEL BAD   • Ezetimibe Hallucinations     Loss of energy - worn out - legs weak - feel like they way a \"ton \"    • Pitavastatin Other (See Comments)     Elevated liver function tests    • Rosuvastatin Myalgia   • Rosuvastatin Calcium Other (See Comments)     MADE LEGS WEAK   • Simvastatin Myalgia   • Statins Other (See Comments)     Made legs weak          History reviewed. No pertinent family history.    Past Surgical History:   Procedure Laterality Date   • HYSTERECTOMY     • THYROID SURGERY         Past Medical History:   Diagnosis Date   • Disease of thyroid gland    • Hyperlipidemia    • Hypertension        History reviewed. No pertinent family history.    Social History     Socioeconomic History   • Marital status:    Tobacco Use   • Smoking status: Former Smoker   • Smokeless tobacco: Never Used   • Tobacco comment: quit 35 yrs ago   Vaping Use   • Vaping Use: Never used   Substance and Sexual Activity   • Alcohol use: Yes     Comment: rare   • Drug use: No   • Sexual activity: Defer           ECG 12 Lead    Date/Time: 9/20/2022 " "11:48 AM  Performed by: Trace Marcus MD  Authorized by: Trace Marcus MD   Previous ECG: no previous ECG available  Comments: ECG in the office today shows AV paced rhythm with heart rate of 60 bpm, MI interval 277 ms, QRS duration 155 ms,  ms              Objective:       Physical Exam    /75   Pulse 62   Ht 152.4 cm (60\")   Wt 47.6 kg (105 lb)   SpO2 99%   BMI 20.51 kg/m²   The patient is alert, oriented and in no distress.    Vital signs as noted above.    Head and neck revealed no carotid bruits or jugular venous distension.  No thyromegaly or lymphadenopathy is present.    Lungs clear.  No wheezing.  Breath sounds are normal bilaterally.    Heart normal first and second heart sounds.  No murmur..  No pericardial rub is present.  No gallop is present.    Abdomen soft and nontender.  No organomegaly is present.    Extremities revealed good peripheral pulses without any pedal edema.    Skin warm and dry.    Musculoskeletal system is grossly normal.    CNS grossly normal.           Diagnosis Plan   1. Nonrheumatic mitral valve regurgitation  Adult Transthoracic Echo Complete W/ Cont if Necessary Per Protocol   2. Primary hypertension     3. Hyperlipidemia, unspecified hyperlipidemia type     4. Pacemaker     5. Chronic heart failure with preserved ejection fraction (HCC)     6. Acute deep vein thrombosis (DVT) of lower extremity, unspecified laterality, unspecified vein (HCC)     7. Paroxysmal atrial fibrillation (HCC)     LAB RESULTS (LAST 7 DAYS)    CBC        BMP        CMP         BNP        TROPONIN        CoAg        Creatinine Clearance  CrCl cannot be calculated (Patient's most recent lab result is older than the maximum 30 days allowed.).    ABG        Radiology  No radiology results for the last day        Assessment and Plan       Diagnoses and all orders for this visit:    1. Nonrheumatic mitral valve regurgitation (Primary)  Her recent echocardiogram showed EF of 46%, severe mitral " regurgitation, moderate aortic regurgitation, moderate tricuspid regurgitation and RVSP of 35 to 45 mmHg.  She only reports of fatigue and does not have any shortness of breath or chest pain.  The images of that previous echocardiogram are not available for me to review.  I will obtain a repeat echocardiogram as her physical exam does not correlate the echocardiogram findings.\    2. Primary hypertension  Her blood pressure is elevated with systolic blood pressure more than 176 mmHg she is currently on lisinopril 20 mg p.o. daily.  I have recommended her to maintain a blood pressure log and contact me with her blood pressure so I can make adjustments to her antihypertensives    3. Hyperlipidemia, unspecified hyperlipidemia type  Continue fibrate, she is intolerant to statin    4. Pacemaker  I have interrogated her device in the office today, [employer] with 5.5 years remaining.  Implanted on August 12, 2020, DDDR mode.  No evidence of atrial or ventricular arrhythmia total PACs 21.4 thousand and total PVCs 14.8 thousand    5. Chronic heart failure with preserved ejection fraction (HCC)  Most recent echo showed EF of 46%.  I am repeating another echocardiogram prior to making adjustments and starting/uptitrating GDMT    6. Acute deep vein thrombosis (DVT) of lower extremity, unspecified laterality, unspecified vein (HCC)  Continue full dose warfarin with INR goal of 2-3.    7.  Paroxysmal atrial fibrillation   pacemaker interrogation confirmed short burst of atrial fibrillation in 2021 however no recurrence in the last 1 year  Her [id number] score is 5 points  Stroke risk was 7.2% per year in >90,000 patients (the [place] Atrial Fibrillation Cohort Study) and 10.0% risk of stroke/TIA/systemic embolism.

## 2022-09-19 PROBLEM — Z95.0 PACEMAKER: Status: ACTIVE | Noted: 2021-12-28

## 2022-09-19 RX ORDER — LATANOPROST 50 UG/ML
1 SOLUTION/ DROPS OPHTHALMIC NIGHTLY
COMMUNITY
Start: 2022-08-25

## 2022-09-20 ENCOUNTER — CLINICAL SUPPORT NO REQUIREMENTS (OUTPATIENT)
Dept: CARDIOLOGY | Facility: CLINIC | Age: 82
End: 2022-09-20

## 2022-09-20 ENCOUNTER — OFFICE VISIT (OUTPATIENT)
Dept: CARDIOLOGY | Facility: CLINIC | Age: 82
End: 2022-09-20

## 2022-09-20 VITALS
BODY MASS INDEX: 20.62 KG/M2 | HEART RATE: 62 BPM | SYSTOLIC BLOOD PRESSURE: 175 MMHG | DIASTOLIC BLOOD PRESSURE: 75 MMHG | OXYGEN SATURATION: 99 % | HEIGHT: 60 IN | WEIGHT: 105 LBS

## 2022-09-20 DIAGNOSIS — I50.32 CHRONIC HEART FAILURE WITH PRESERVED EJECTION FRACTION: ICD-10-CM

## 2022-09-20 DIAGNOSIS — E78.5 HYPERLIPIDEMIA, UNSPECIFIED HYPERLIPIDEMIA TYPE: ICD-10-CM

## 2022-09-20 DIAGNOSIS — I34.0 NONRHEUMATIC MITRAL VALVE REGURGITATION: Primary | ICD-10-CM

## 2022-09-20 DIAGNOSIS — I48.0 PAROXYSMAL ATRIAL FIBRILLATION: ICD-10-CM

## 2022-09-20 DIAGNOSIS — Z95.0 PACEMAKER: ICD-10-CM

## 2022-09-20 DIAGNOSIS — I10 PRIMARY HYPERTENSION: ICD-10-CM

## 2022-09-20 DIAGNOSIS — I44.30 AV BLOCK: ICD-10-CM

## 2022-09-20 DIAGNOSIS — I49.5 SSS (SICK SINUS SYNDROME): Primary | ICD-10-CM

## 2022-09-20 DIAGNOSIS — I82.409 ACUTE DEEP VEIN THROMBOSIS (DVT) OF LOWER EXTREMITY, UNSPECIFIED LATERALITY, UNSPECIFIED VEIN: ICD-10-CM

## 2022-09-20 PROCEDURE — 93288 INTERROG EVL PM/LDLS PM IP: CPT | Performed by: INTERNAL MEDICINE

## 2022-09-20 PROCEDURE — 93280 PM DEVICE PROGR EVAL DUAL: CPT | Performed by: INTERNAL MEDICINE

## 2022-09-20 PROCEDURE — 93000 ELECTROCARDIOGRAM COMPLETE: CPT | Performed by: INTERNAL MEDICINE

## 2022-09-20 PROCEDURE — 99204 OFFICE O/P NEW MOD 45 MIN: CPT | Performed by: INTERNAL MEDICINE

## 2022-09-20 RX ORDER — LATANOPROST 50 UG/ML
SOLUTION/ DROPS OPHTHALMIC
COMMUNITY
Start: 2022-09-19 | End: 2022-11-01 | Stop reason: SDUPTHER

## 2022-09-20 NOTE — ACP (ADVANCE CARE PLANNING)
Advance Care Planning   ACP discussion was held with the patient during this visit. Patient has an advance directive in EMR which is still valid.

## 2022-09-21 ENCOUNTER — TELEPHONE (OUTPATIENT)
Dept: PHARMACY | Facility: HOSPITAL | Age: 82
End: 2022-09-21

## 2022-09-21 NOTE — TELEPHONE ENCOUNTER
Patient left voicemail with [facility] to inform that she will no longer be coming to the clinic for her INR. She stated that she is changing cardiologists and will now be seeing Dr. Christie. Patients INR will now be monitored through their office. Canceled patient's next appointment with us.

## 2022-09-28 ENCOUNTER — ANTICOAGULATION VISIT (OUTPATIENT)
Dept: CARDIOLOGY | Facility: CLINIC | Age: 82
End: 2022-09-28

## 2022-09-28 VITALS
DIASTOLIC BLOOD PRESSURE: 80 MMHG | WEIGHT: 106 LBS | SYSTOLIC BLOOD PRESSURE: 152 MMHG | HEART RATE: 67 BPM | BODY MASS INDEX: 20.7 KG/M2

## 2022-09-28 DIAGNOSIS — Z79.01 LONG TERM (CURRENT) USE OF ANTICOAGULANTS: ICD-10-CM

## 2022-09-28 DIAGNOSIS — I82.4Y9 DEEP VEIN THROMBOSIS (DVT) OF PROXIMAL LOWER EXTREMITY, UNSPECIFIED CHRONICITY, UNSPECIFIED LATERALITY: Primary | ICD-10-CM

## 2022-09-28 LAB — INR PPP: 1.7 (ref 0.9–1.1)

## 2022-09-28 PROCEDURE — 36416 COLLJ CAPILLARY BLOOD SPEC: CPT | Performed by: INTERNAL MEDICINE

## 2022-09-28 PROCEDURE — 85610 PROTHROMBIN TIME: CPT | Performed by: INTERNAL MEDICINE

## 2022-09-29 PROBLEM — Z79.01 LONG TERM (CURRENT) USE OF ANTICOAGULANTS: Status: ACTIVE | Noted: 2022-09-29

## 2022-10-03 ENCOUNTER — APPOINTMENT (OUTPATIENT)
Dept: CARDIOLOGY | Facility: HOSPITAL | Age: 82
End: 2022-10-03

## 2022-10-11 ENCOUNTER — APPOINTMENT (OUTPATIENT)
Dept: PHARMACY | Facility: HOSPITAL | Age: 82
End: 2022-10-11

## 2022-10-13 ENCOUNTER — HOSPITAL ENCOUNTER (OUTPATIENT)
Dept: CARDIOLOGY | Facility: HOSPITAL | Age: 82
Discharge: HOME | End: 2022-10-13
Admitting: INTERNAL MEDICINE

## 2022-10-13 VITALS — HEIGHT: 60 IN | WEIGHT: 106 LBS | BODY MASS INDEX: 20.81 KG/M2

## 2022-10-13 LAB
BH CV ECHO MEAS - ACS: 1.63 CM
BH CV ECHO MEAS - AI P1/2T: 620.4 MSEC
BH CV ECHO MEAS - AO MAX PG: 5.3 MMHG
BH CV ECHO MEAS - AO MEAN PG: 2.5 MMHG
BH CV ECHO MEAS - AO ROOT DIAM: 2.7 CM
BH CV ECHO MEAS - AO V2 MAX: 115.3 CM/SEC
BH CV ECHO MEAS - AO V2 VTI: 21.5 CM
BH CV ECHO MEAS - AVA(I,D): 1.44 CM2
BH CV ECHO MEAS - EDV(CUBED): 107.6 ML
BH CV ECHO MEAS - EDV(MOD-SP2): 83.8 ML
BH CV ECHO MEAS - EDV(MOD-SP4): 56.1 ML
BH CV ECHO MEAS - EF(MOD-BP): 35 %
BH CV ECHO MEAS - EF(MOD-SP2): 34.2 %
BH CV ECHO MEAS - EF(MOD-SP4): 37.7 %
BH CV ECHO MEAS - ESV(CUBED): 63.4 ML
BH CV ECHO MEAS - ESV(MOD-SP2): 55.1 ML
BH CV ECHO MEAS - ESV(MOD-SP4): 35 ML
BH CV ECHO MEAS - FS: 16.1 %
BH CV ECHO MEAS - IVS/LVPW: 0.99 CM
BH CV ECHO MEAS - IVSD: 0.98 CM
BH CV ECHO MEAS - LA DIMENSION: 4.3 CM
BH CV ECHO MEAS - LV DIASTOLIC VOL/BSA (35-75): 46 CM2
BH CV ECHO MEAS - LV MASS(C)D: 165 GRAMS
BH CV ECHO MEAS - LV MAX PG: 2.37 MMHG
BH CV ECHO MEAS - LV MEAN PG: 1.24 MMHG
BH CV ECHO MEAS - LV SYSTOLIC VOL/BSA (12-30): 28.7 CM2
BH CV ECHO MEAS - LV V1 MAX: 77 CM/SEC
BH CV ECHO MEAS - LV V1 VTI: 15.8 CM
BH CV ECHO MEAS - LVIDD: 4.8 CM
BH CV ECHO MEAS - LVIDS: 4 CM
BH CV ECHO MEAS - LVOT AREA: 1.96 CM2
BH CV ECHO MEAS - LVOT DIAM: 1.58 CM
BH CV ECHO MEAS - LVPWD: 0.99 CM
BH CV ECHO MEAS - MV A MAX VEL: 117.1 CM/SEC
BH CV ECHO MEAS - MV DEC SLOPE: 761.7 CM/SEC2
BH CV ECHO MEAS - MV DEC TIME: 0.23 MSEC
BH CV ECHO MEAS - MV E MAX VEL: 173.2 CM/SEC
BH CV ECHO MEAS - MV E/A: 1.48
BH CV ECHO MEAS - MV MAX PG: 15 MMHG
BH CV ECHO MEAS - MV MEAN PG: 5 MMHG
BH CV ECHO MEAS - MV V2 VTI: 39.8 CM
BH CV ECHO MEAS - MVA(VTI): 0.78 CM2
BH CV ECHO MEAS - RAP SYSTOLE: 3 MMHG
BH CV ECHO MEAS - RVDD: 1.97 CM
BH CV ECHO MEAS - RVSP: 50.3 MMHG
BH CV ECHO MEAS - SI(MOD-SP2): 23.5 ML/M2
BH CV ECHO MEAS - SI(MOD-SP4): 17.3 ML/M2
BH CV ECHO MEAS - SV(LVOT): 30.9 ML
BH CV ECHO MEAS - SV(MOD-SP2): 28.6 ML
BH CV ECHO MEAS - SV(MOD-SP4): 21.1 ML
BH CV ECHO MEAS - TR MAX PG: 47.3 MMHG
BH CV ECHO MEAS - TR MAX VEL: 342.4 CM/SEC
LV EF 2D ECHO EST: 35 %
MAXIMAL PREDICTED HEART RATE: 140 BPM
STRESS TARGET HR: 119 BPM

## 2022-10-13 PROCEDURE — 93306 TTE W/DOPPLER COMPLETE: CPT | Performed by: INTERNAL MEDICINE

## 2022-10-13 PROCEDURE — 93306 TTE W/DOPPLER COMPLETE: CPT

## 2022-10-26 ENCOUNTER — ANTICOAGULATION VISIT (OUTPATIENT)
Dept: CARDIOLOGY | Facility: CLINIC | Age: 82
End: 2022-10-26

## 2022-10-26 VITALS
DIASTOLIC BLOOD PRESSURE: 56 MMHG | WEIGHT: 105 LBS | HEART RATE: 66 BPM | SYSTOLIC BLOOD PRESSURE: 142 MMHG | BODY MASS INDEX: 20.51 KG/M2

## 2022-10-26 DIAGNOSIS — I82.4Y9 DEEP VEIN THROMBOSIS (DVT) OF PROXIMAL LOWER EXTREMITY, UNSPECIFIED CHRONICITY, UNSPECIFIED LATERALITY: Primary | ICD-10-CM

## 2022-10-26 DIAGNOSIS — Z79.01 LONG TERM (CURRENT) USE OF ANTICOAGULANTS: ICD-10-CM

## 2022-10-26 LAB — INR PPP: 2.1 (ref 0.9–1.1)

## 2022-10-26 PROCEDURE — 36416 COLLJ CAPILLARY BLOOD SPEC: CPT | Performed by: NURSE PRACTITIONER

## 2022-10-26 PROCEDURE — 85610 PROTHROMBIN TIME: CPT | Performed by: NURSE PRACTITIONER

## 2022-11-01 ENCOUNTER — OFFICE VISIT (OUTPATIENT)
Dept: CARDIOLOGY | Facility: CLINIC | Age: 82
End: 2022-11-01

## 2022-11-01 VITALS
HEIGHT: 60 IN | WEIGHT: 106 LBS | BODY MASS INDEX: 20.81 KG/M2 | OXYGEN SATURATION: 99 % | HEART RATE: 71 BPM | DIASTOLIC BLOOD PRESSURE: 55 MMHG | SYSTOLIC BLOOD PRESSURE: 139 MMHG

## 2022-11-01 DIAGNOSIS — I34.0 NONRHEUMATIC MITRAL VALVE REGURGITATION: Primary | ICD-10-CM

## 2022-11-01 DIAGNOSIS — I49.5 SSS (SICK SINUS SYNDROME): ICD-10-CM

## 2022-11-01 DIAGNOSIS — I10 PRIMARY HYPERTENSION: ICD-10-CM

## 2022-11-01 DIAGNOSIS — E78.5 HYPERLIPIDEMIA, UNSPECIFIED HYPERLIPIDEMIA TYPE: ICD-10-CM

## 2022-11-01 PROCEDURE — 99214 OFFICE O/P EST MOD 30 MIN: CPT | Performed by: INTERNAL MEDICINE

## 2022-11-29 ENCOUNTER — ANTICOAGULATION VISIT (OUTPATIENT)
Dept: CARDIOLOGY | Facility: CLINIC | Age: 82
End: 2022-11-29

## 2022-11-29 VITALS
SYSTOLIC BLOOD PRESSURE: 142 MMHG | HEART RATE: 69 BPM | BODY MASS INDEX: 21.09 KG/M2 | WEIGHT: 108 LBS | DIASTOLIC BLOOD PRESSURE: 67 MMHG

## 2022-11-29 DIAGNOSIS — Z79.01 LONG TERM (CURRENT) USE OF ANTICOAGULANTS: ICD-10-CM

## 2022-11-29 DIAGNOSIS — I82.4Y9 DEEP VEIN THROMBOSIS (DVT) OF PROXIMAL LOWER EXTREMITY, UNSPECIFIED CHRONICITY, UNSPECIFIED LATERALITY: Primary | ICD-10-CM

## 2022-11-29 LAB — INR PPP: 2.5 (ref 0.9–1.1)

## 2022-11-29 PROCEDURE — 36416 COLLJ CAPILLARY BLOOD SPEC: CPT | Performed by: NURSE PRACTITIONER

## 2022-11-29 PROCEDURE — 85610 PROTHROMBIN TIME: CPT | Performed by: NURSE PRACTITIONER

## 2023-01-04 ENCOUNTER — ANTICOAGULATION VISIT (OUTPATIENT)
Dept: CARDIOLOGY | Facility: CLINIC | Age: 83
End: 2023-01-04
Payer: MEDICARE

## 2023-01-04 VITALS
WEIGHT: 108 LBS | SYSTOLIC BLOOD PRESSURE: 155 MMHG | DIASTOLIC BLOOD PRESSURE: 52 MMHG | HEART RATE: 63 BPM | BODY MASS INDEX: 21.09 KG/M2

## 2023-01-04 DIAGNOSIS — I82.4Y9 DEEP VEIN THROMBOSIS (DVT) OF PROXIMAL LOWER EXTREMITY, UNSPECIFIED CHRONICITY, UNSPECIFIED LATERALITY: Primary | ICD-10-CM

## 2023-01-04 DIAGNOSIS — Z79.01 LONG TERM (CURRENT) USE OF ANTICOAGULANTS: ICD-10-CM

## 2023-01-04 LAB — INR PPP: 2 (ref 0.9–1.1)

## 2023-01-04 PROCEDURE — 85610 PROTHROMBIN TIME: CPT | Performed by: NURSE PRACTITIONER

## 2023-01-04 PROCEDURE — 36416 COLLJ CAPILLARY BLOOD SPEC: CPT | Performed by: NURSE PRACTITIONER

## 2023-01-22 PROCEDURE — 87186 SC STD MICRODIL/AGAR DIL: CPT | Performed by: NURSE PRACTITIONER

## 2023-01-22 PROCEDURE — 87077 CULTURE AEROBIC IDENTIFY: CPT | Performed by: NURSE PRACTITIONER

## 2023-01-22 PROCEDURE — 87086 URINE CULTURE/COLONY COUNT: CPT | Performed by: NURSE PRACTITIONER

## 2023-02-06 ENCOUNTER — ANTICOAGULATION VISIT (OUTPATIENT)
Dept: CARDIOLOGY | Facility: CLINIC | Age: 83
End: 2023-02-06
Payer: MEDICARE

## 2023-02-06 VITALS
HEART RATE: 71 BPM | BODY MASS INDEX: 28.59 KG/M2 | WEIGHT: 107 LBS | SYSTOLIC BLOOD PRESSURE: 154 MMHG | DIASTOLIC BLOOD PRESSURE: 60 MMHG

## 2023-02-06 DIAGNOSIS — Z79.01 LONG TERM (CURRENT) USE OF ANTICOAGULANTS: ICD-10-CM

## 2023-02-06 DIAGNOSIS — I82.4Y9 DEEP VEIN THROMBOSIS (DVT) OF PROXIMAL LOWER EXTREMITY, UNSPECIFIED CHRONICITY, UNSPECIFIED LATERALITY: Primary | ICD-10-CM

## 2023-02-06 LAB — INR PPP: 3.6 (ref 0.9–1.1)

## 2023-02-06 PROCEDURE — 85610 PROTHROMBIN TIME: CPT | Performed by: NURSE PRACTITIONER

## 2023-02-06 PROCEDURE — 36416 COLLJ CAPILLARY BLOOD SPEC: CPT | Performed by: NURSE PRACTITIONER

## 2023-02-20 ENCOUNTER — ANTICOAGULATION VISIT (OUTPATIENT)
Dept: CARDIOLOGY | Facility: CLINIC | Age: 83
End: 2023-02-20
Payer: MEDICARE

## 2023-02-20 VITALS
HEART RATE: 68 BPM | SYSTOLIC BLOOD PRESSURE: 134 MMHG | BODY MASS INDEX: 28.85 KG/M2 | WEIGHT: 108 LBS | DIASTOLIC BLOOD PRESSURE: 54 MMHG

## 2023-02-20 DIAGNOSIS — I82.4Y9 DEEP VEIN THROMBOSIS (DVT) OF PROXIMAL LOWER EXTREMITY, UNSPECIFIED CHRONICITY, UNSPECIFIED LATERALITY: Primary | ICD-10-CM

## 2023-02-20 DIAGNOSIS — Z79.01 LONG TERM (CURRENT) USE OF ANTICOAGULANTS: ICD-10-CM

## 2023-02-20 LAB — INR PPP: 3.2 (ref 0.9–1.1)

## 2023-02-20 PROCEDURE — 36416 COLLJ CAPILLARY BLOOD SPEC: CPT | Performed by: NURSE PRACTITIONER

## 2023-02-20 PROCEDURE — 85610 PROTHROMBIN TIME: CPT | Performed by: NURSE PRACTITIONER

## 2023-02-24 ENCOUNTER — APPOINTMENT (OUTPATIENT)
Dept: GENERAL RADIOLOGY | Facility: HOSPITAL | Age: 83
DRG: 287 | End: 2023-02-24
Payer: MEDICARE

## 2023-02-24 ENCOUNTER — HOSPITAL ENCOUNTER (INPATIENT)
Facility: HOSPITAL | Age: 83
LOS: 3 days | Discharge: HOME | DRG: 287 | End: 2023-02-27
Attending: EMERGENCY MEDICINE | Admitting: INTERNAL MEDICINE
Payer: MEDICARE

## 2023-02-24 DIAGNOSIS — I50.43 ACUTE ON CHRONIC COMBINED SYSTOLIC AND DIASTOLIC CONGESTIVE HEART FAILURE: ICD-10-CM

## 2023-02-24 DIAGNOSIS — I34.0 NONRHEUMATIC MITRAL VALVE REGURGITATION: Primary | Chronic | ICD-10-CM

## 2023-02-24 DIAGNOSIS — R06.02 SHORTNESS OF BREATH: ICD-10-CM

## 2023-02-24 PROBLEM — I35.1 AORTIC INSUFFICIENCY: Status: ACTIVE | Noted: 2023-02-24

## 2023-02-24 PROBLEM — Z95.0 PACEMAKER: Chronic | Status: ACTIVE | Noted: 2021-12-28

## 2023-02-24 PROBLEM — I07.1 TRICUSPID REGURGITATION: Status: ACTIVE | Noted: 2023-02-24

## 2023-02-24 PROBLEM — I27.20 PULMONARY HYPERTENSION: Status: ACTIVE | Noted: 2023-02-24

## 2023-02-24 PROBLEM — I49.5 SSS (SICK SINUS SYNDROME): Status: RESOLVED | Noted: 2020-06-18 | Resolved: 2023-02-24

## 2023-02-24 PROBLEM — I65.23 CAROTID STENOSIS, BILATERAL: Chronic | Status: ACTIVE | Noted: 2019-06-01

## 2023-02-24 PROBLEM — I82.409 DVT (DEEP VENOUS THROMBOSIS): Chronic | Status: ACTIVE | Noted: 2019-07-03

## 2023-02-24 PROBLEM — R39.15 URINARY URGENCY: Status: RESOLVED | Noted: 2019-02-26 | Resolved: 2023-02-24

## 2023-02-24 PROBLEM — Z79.01 LONG TERM (CURRENT) USE OF ANTICOAGULANTS: Chronic | Status: ACTIVE | Noted: 2022-09-29

## 2023-02-24 PROBLEM — E03.4 HYPOTHYROIDISM DUE TO ACQUIRED ATROPHY OF THYROID: Chronic | Status: ACTIVE | Noted: 2019-10-21

## 2023-02-24 PROBLEM — E07.9 THYROID DISORDER: Status: RESOLVED | Noted: 2019-10-21 | Resolved: 2023-02-24

## 2023-02-24 PROBLEM — I44.30 AV BLOCK: Status: RESOLVED | Noted: 2021-02-22 | Resolved: 2023-02-24

## 2023-02-24 PROBLEM — N30.00 ACUTE CYSTITIS: Status: RESOLVED | Noted: 2018-07-20 | Resolved: 2023-02-24

## 2023-02-24 PROBLEM — E78.1 HYPERTRIGLYCERIDEMIA: Chronic | Status: ACTIVE | Noted: 2019-10-21

## 2023-02-24 PROBLEM — I10 ESSENTIAL HYPERTENSION WITH GOAL BLOOD PRESSURE LESS THAN 130/80: Chronic | Status: ACTIVE | Noted: 2019-10-21

## 2023-02-24 LAB
ALBUMIN SERPL-MCNC: 4.5 G/DL (ref 3.5–5.2)
ALBUMIN/GLOB SERPL: 1.7 G/DL
ALP SERPL-CCNC: 39 U/L (ref 39–117)
ALT SERPL W P-5'-P-CCNC: 14 U/L (ref 1–33)
ANION GAP SERPL CALCULATED.3IONS-SCNC: 13 MMOL/L (ref 5–15)
APTT PPP: 75.4 SECONDS (ref 61–76.5)
AST SERPL-CCNC: 29 U/L (ref 1–32)
BASOPHILS # BLD AUTO: 0.1 10*3/MM3 (ref 0–0.2)
BASOPHILS NFR BLD AUTO: 1.2 % (ref 0–1.5)
BILIRUB SERPL-MCNC: 0.6 MG/DL (ref 0–1.2)
BUN SERPL-MCNC: 20 MG/DL (ref 8–23)
BUN/CREAT SERPL: 23.5 (ref 7–25)
CALCIUM SPEC-SCNC: 10.2 MG/DL (ref 8.6–10.5)
CHLORIDE SERPL-SCNC: 107 MMOL/L (ref 98–107)
CO2 SERPL-SCNC: 22 MMOL/L (ref 22–29)
CREAT SERPL-MCNC: 0.85 MG/DL (ref 0.57–1)
D DIMER PPP FEU-MCNC: <0.19 MG/L (FEU) (ref 0–0.8)
DEPRECATED RDW RBC AUTO: 48.1 FL (ref 37–54)
EGFRCR SERPLBLD CKD-EPI 2021: 69.4 ML/MIN/1.73
EOSINOPHIL # BLD AUTO: 0.1 10*3/MM3 (ref 0–0.4)
EOSINOPHIL NFR BLD AUTO: 1 % (ref 0.3–6.2)
ERYTHROCYTE [DISTWIDTH] IN BLOOD BY AUTOMATED COUNT: 14.9 % (ref 12.3–15.4)
GEN 5 2HR TROPONIN T REFLEX: 30 NG/L
GLOBULIN UR ELPH-MCNC: 2.7 GM/DL
GLUCOSE BLDC GLUCOMTR-MCNC: 106 MG/DL (ref 70–105)
GLUCOSE BLDC GLUCOMTR-MCNC: 112 MG/DL (ref 70–105)
GLUCOSE SERPL-MCNC: 175 MG/DL (ref 65–99)
HBA1C MFR BLD: 5.3 % (ref 3.5–5.6)
HCT VFR BLD AUTO: 40.1 % (ref 34–46.6)
HGB BLD-MCNC: 13 G/DL (ref 12–15.9)
INR PPP: 2.94 (ref 2–3)
LYMPHOCYTES # BLD AUTO: 1.3 10*3/MM3 (ref 0.7–3.1)
LYMPHOCYTES NFR BLD AUTO: 14 % (ref 19.6–45.3)
MCH RBC QN AUTO: 29.9 PG (ref 26.6–33)
MCHC RBC AUTO-ENTMCNC: 32.4 G/DL (ref 31.5–35.7)
MCV RBC AUTO: 92.3 FL (ref 79–97)
MONOCYTES # BLD AUTO: 0.4 10*3/MM3 (ref 0.1–0.9)
MONOCYTES NFR BLD AUTO: 4.5 % (ref 5–12)
NEUTROPHILS NFR BLD AUTO: 7.6 10*3/MM3 (ref 1.7–7)
NEUTROPHILS NFR BLD AUTO: 79.3 % (ref 42.7–76)
NRBC BLD AUTO-RTO: 0.1 /100 WBC (ref 0–0.2)
NT-PROBNP SERPL-MCNC: ABNORMAL PG/ML (ref 0–1800)
PLATELET # BLD AUTO: 270 10*3/MM3 (ref 140–450)
PMV BLD AUTO: 10.1 FL (ref 6–12)
POTASSIUM SERPL-SCNC: 3.8 MMOL/L (ref 3.5–5.2)
PROT SERPL-MCNC: 7.2 G/DL (ref 6–8.5)
PROTHROMBIN TIME: 28.5 SECONDS (ref 19.4–28.5)
RBC # BLD AUTO: 4.35 10*6/MM3 (ref 3.77–5.28)
SODIUM SERPL-SCNC: 142 MMOL/L (ref 136–145)
TROPONIN T DELTA: 4 NG/L
TROPONIN T SERPL HS-MCNC: 26 NG/L
WBC NRBC COR # BLD: 9.5 10*3/MM3 (ref 3.4–10.8)

## 2023-02-24 PROCEDURE — 99285 EMERGENCY DEPT VISIT HI MDM: CPT

## 2023-02-24 PROCEDURE — 71045 X-RAY EXAM CHEST 1 VIEW: CPT

## 2023-02-24 PROCEDURE — 93005 ELECTROCARDIOGRAM TRACING: CPT

## 2023-02-24 PROCEDURE — 82962 GLUCOSE BLOOD TEST: CPT

## 2023-02-24 PROCEDURE — 4A023N8 MEASUREMENT OF CARDIAC SAMPLING AND PRESSURE, BILATERAL, PERCUTANEOUS APPROACH: ICD-10-PCS | Performed by: INTERNAL MEDICINE

## 2023-02-24 PROCEDURE — 99223 1ST HOSP IP/OBS HIGH 75: CPT | Performed by: INTERNAL MEDICINE

## 2023-02-24 PROCEDURE — 85610 PROTHROMBIN TIME: CPT | Performed by: EMERGENCY MEDICINE

## 2023-02-24 PROCEDURE — 36415 COLL VENOUS BLD VENIPUNCTURE: CPT | Performed by: INTERNAL MEDICINE

## 2023-02-24 PROCEDURE — 25010000002 FUROSEMIDE PER 20 MG: Performed by: EMERGENCY MEDICINE

## 2023-02-24 PROCEDURE — 93005 ELECTROCARDIOGRAM TRACING: CPT | Performed by: EMERGENCY MEDICINE

## 2023-02-24 PROCEDURE — 83036 HEMOGLOBIN GLYCOSYLATED A1C: CPT | Performed by: NURSE PRACTITIONER

## 2023-02-24 PROCEDURE — 85379 FIBRIN DEGRADATION QUANT: CPT | Performed by: EMERGENCY MEDICINE

## 2023-02-24 PROCEDURE — 85025 COMPLETE CBC W/AUTO DIFF WBC: CPT | Performed by: EMERGENCY MEDICINE

## 2023-02-24 PROCEDURE — B2111ZZ FLUOROSCOPY OF MULTIPLE CORONARY ARTERIES USING LOW OSMOLAR CONTRAST: ICD-10-PCS | Performed by: INTERNAL MEDICINE

## 2023-02-24 PROCEDURE — 25010000002 FUROSEMIDE PER 20 MG: Performed by: NURSE PRACTITIONER

## 2023-02-24 PROCEDURE — 25010000002 HEPARIN (PORCINE) PER 1000 UNITS: Performed by: NURSE PRACTITIONER

## 2023-02-24 PROCEDURE — 84484 ASSAY OF TROPONIN QUANT: CPT | Performed by: EMERGENCY MEDICINE

## 2023-02-24 PROCEDURE — 85730 THROMBOPLASTIN TIME PARTIAL: CPT | Performed by: INTERNAL MEDICINE

## 2023-02-24 PROCEDURE — 83880 ASSAY OF NATRIURETIC PEPTIDE: CPT | Performed by: EMERGENCY MEDICINE

## 2023-02-24 PROCEDURE — 80053 COMPREHEN METABOLIC PANEL: CPT | Performed by: EMERGENCY MEDICINE

## 2023-02-24 RX ORDER — SODIUM CHLORIDE 0.9 % (FLUSH) 0.9 %
10 SYRINGE (ML) INJECTION AS NEEDED
Status: DISCONTINUED | OUTPATIENT
Start: 2023-02-24 | End: 2023-02-27 | Stop reason: HOSPADM

## 2023-02-24 RX ORDER — HEPARIN SODIUM 10000 [USP'U]/100ML
18 INJECTION, SOLUTION INTRAVENOUS
Status: DISCONTINUED | OUTPATIENT
Start: 2023-02-24 | End: 2023-02-26

## 2023-02-24 RX ORDER — ACETAMINOPHEN 325 MG/1
650 TABLET ORAL EVERY 4 HOURS PRN
Status: DISCONTINUED | OUTPATIENT
Start: 2023-02-24 | End: 2023-02-27 | Stop reason: HOSPADM

## 2023-02-24 RX ORDER — ACETAMINOPHEN 160 MG/5ML
650 SOLUTION ORAL EVERY 4 HOURS PRN
Status: DISCONTINUED | OUTPATIENT
Start: 2023-02-24 | End: 2023-02-27 | Stop reason: HOSPADM

## 2023-02-24 RX ORDER — CHOLECALCIFEROL (VITAMIN D3) 125 MCG
5 CAPSULE ORAL NIGHTLY PRN
Status: DISCONTINUED | OUTPATIENT
Start: 2023-02-24 | End: 2023-02-27 | Stop reason: HOSPADM

## 2023-02-24 RX ORDER — MAGNESIUM SULFATE 1 G/100ML
1 INJECTION INTRAVENOUS AS NEEDED
Status: DISCONTINUED | OUTPATIENT
Start: 2023-02-24 | End: 2023-02-25 | Stop reason: SDUPTHER

## 2023-02-24 RX ORDER — ONDANSETRON 4 MG/1
4 TABLET, FILM COATED ORAL EVERY 6 HOURS PRN
Status: DISCONTINUED | OUTPATIENT
Start: 2023-02-24 | End: 2023-02-27 | Stop reason: HOSPADM

## 2023-02-24 RX ORDER — ACETAMINOPHEN 650 MG/1
650 SUPPOSITORY RECTAL EVERY 4 HOURS PRN
Status: DISCONTINUED | OUTPATIENT
Start: 2023-02-24 | End: 2023-02-27 | Stop reason: HOSPADM

## 2023-02-24 RX ORDER — FUROSEMIDE 10 MG/ML
40 INJECTION INTRAMUSCULAR; INTRAVENOUS EVERY 12 HOURS
Status: DISCONTINUED | OUTPATIENT
Start: 2023-02-24 | End: 2023-02-25

## 2023-02-24 RX ORDER — LATANOPROST 50 UG/ML
1 SOLUTION/ DROPS OPHTHALMIC NIGHTLY
Status: DISCONTINUED | OUTPATIENT
Start: 2023-02-24 | End: 2023-02-27 | Stop reason: HOSPADM

## 2023-02-24 RX ORDER — ONDANSETRON 2 MG/ML
4 INJECTION INTRAMUSCULAR; INTRAVENOUS EVERY 6 HOURS PRN
Status: DISCONTINUED | OUTPATIENT
Start: 2023-02-24 | End: 2023-02-27 | Stop reason: HOSPADM

## 2023-02-24 RX ORDER — SODIUM CHLORIDE 0.9 % (FLUSH) 0.9 %
3 SYRINGE (ML) INJECTION EVERY 12 HOURS SCHEDULED
Status: DISCONTINUED | OUTPATIENT
Start: 2023-02-24 | End: 2023-02-27 | Stop reason: HOSPADM

## 2023-02-24 RX ORDER — POTASSIUM CHLORIDE 1.5 G/1.77G
40 POWDER, FOR SOLUTION ORAL AS NEEDED
Status: DISCONTINUED | OUTPATIENT
Start: 2023-02-24 | End: 2023-02-27 | Stop reason: HOSPADM

## 2023-02-24 RX ORDER — LEVOTHYROXINE SODIUM 0.05 MG/1
50 TABLET ORAL DAILY
Status: DISCONTINUED | OUTPATIENT
Start: 2023-02-25 | End: 2023-02-27 | Stop reason: HOSPADM

## 2023-02-24 RX ORDER — SODIUM CHLORIDE 9 MG/ML
40 INJECTION, SOLUTION INTRAVENOUS AS NEEDED
Status: DISCONTINUED | OUTPATIENT
Start: 2023-02-24 | End: 2023-02-27 | Stop reason: HOSPADM

## 2023-02-24 RX ORDER — MAGNESIUM SULFATE HEPTAHYDRATE 40 MG/ML
2 INJECTION, SOLUTION INTRAVENOUS AS NEEDED
Status: DISCONTINUED | OUTPATIENT
Start: 2023-02-24 | End: 2023-02-25 | Stop reason: SDUPTHER

## 2023-02-24 RX ORDER — OLANZAPINE 10 MG/2ML
1 INJECTION, POWDER, LYOPHILIZED, FOR SOLUTION INTRAMUSCULAR
Status: DISCONTINUED | OUTPATIENT
Start: 2023-02-24 | End: 2023-02-25

## 2023-02-24 RX ORDER — FUROSEMIDE 10 MG/ML
80 INJECTION INTRAMUSCULAR; INTRAVENOUS ONCE
Status: COMPLETED | OUTPATIENT
Start: 2023-02-24 | End: 2023-02-24

## 2023-02-24 RX ORDER — POTASSIUM CHLORIDE 20 MEQ/1
40 TABLET, EXTENDED RELEASE ORAL AS NEEDED
Status: DISCONTINUED | OUTPATIENT
Start: 2023-02-24 | End: 2023-02-27 | Stop reason: HOSPADM

## 2023-02-24 RX ORDER — FAMOTIDINE 20 MG/1
20 TABLET, FILM COATED ORAL 2 TIMES DAILY PRN
COMMUNITY

## 2023-02-24 RX ORDER — FAMOTIDINE 20 MG/1
20 TABLET, FILM COATED ORAL 2 TIMES DAILY PRN
Status: DISCONTINUED | OUTPATIENT
Start: 2023-02-24 | End: 2023-02-27 | Stop reason: HOSPADM

## 2023-02-24 RX ORDER — NICOTINE POLACRILEX 4 MG
15 LOZENGE BUCCAL
Status: DISCONTINUED | OUTPATIENT
Start: 2023-02-24 | End: 2023-02-25

## 2023-02-24 RX ORDER — LATANOPROST 50 UG/ML
1 SOLUTION/ DROPS OPHTHALMIC EVERY 24 HOURS
COMMUNITY
Start: 2022-09-19 | End: 2023-02-24 | Stop reason: SDUPTHER

## 2023-02-24 RX ORDER — SODIUM CHLORIDE 0.9 % (FLUSH) 0.9 %
10 SYRINGE (ML) INJECTION EVERY 12 HOURS SCHEDULED
Status: DISCONTINUED | OUTPATIENT
Start: 2023-02-24 | End: 2023-02-27 | Stop reason: HOSPADM

## 2023-02-24 RX ORDER — ATENOLOL 50 MG/1
100 TABLET ORAL
Status: DISCONTINUED | OUTPATIENT
Start: 2023-02-24 | End: 2023-02-27 | Stop reason: HOSPADM

## 2023-02-24 RX ORDER — SODIUM CHLORIDE 0.9 % (FLUSH) 0.9 %
3-10 SYRINGE (ML) INJECTION AS NEEDED
Status: DISCONTINUED | OUTPATIENT
Start: 2023-02-24 | End: 2023-02-27 | Stop reason: HOSPADM

## 2023-02-24 RX ORDER — DEXTROSE MONOHYDRATE 25 G/50ML
25 INJECTION, SOLUTION INTRAVENOUS
Status: DISCONTINUED | OUTPATIENT
Start: 2023-02-24 | End: 2023-02-25

## 2023-02-24 RX ORDER — HYDRALAZINE HYDROCHLORIDE 20 MG/ML
10 INJECTION INTRAMUSCULAR; INTRAVENOUS EVERY 6 HOURS PRN
Status: DISCONTINUED | OUTPATIENT
Start: 2023-02-24 | End: 2023-02-24

## 2023-02-24 RX ORDER — ALUMINA, MAGNESIA, AND SIMETHICONE 2400; 2400; 240 MG/30ML; MG/30ML; MG/30ML
15 SUSPENSION ORAL EVERY 6 HOURS PRN
Status: DISCONTINUED | OUTPATIENT
Start: 2023-02-24 | End: 2023-02-27 | Stop reason: HOSPADM

## 2023-02-24 RX ORDER — LISINOPRIL 20 MG/1
20 TABLET ORAL DAILY
Status: DISCONTINUED | OUTPATIENT
Start: 2023-02-24 | End: 2023-02-27 | Stop reason: HOSPADM

## 2023-02-24 RX ORDER — INSULIN LISPRO 100 [IU]/ML
2-7 INJECTION, SOLUTION INTRAVENOUS; SUBCUTANEOUS
Status: DISCONTINUED | OUTPATIENT
Start: 2023-02-24 | End: 2023-02-25

## 2023-02-24 RX ADMIN — EMPAGLIFLOZIN 10 MG: 10 TABLET, FILM COATED ORAL at 10:34

## 2023-02-24 RX ADMIN — ATENOLOL 100 MG: 50 TABLET ORAL at 09:09

## 2023-02-24 RX ADMIN — Medication 10 ML: at 08:41

## 2023-02-24 RX ADMIN — Medication 3 ML: at 10:37

## 2023-02-24 RX ADMIN — LISINOPRIL 20 MG: 20 TABLET ORAL at 09:10

## 2023-02-24 RX ADMIN — Medication 3 ML: at 20:05

## 2023-02-24 RX ADMIN — HEPARIN SODIUM 18 UNITS/KG/HR: 10000 INJECTION, SOLUTION INTRAVENOUS at 10:34

## 2023-02-24 RX ADMIN — Medication 10 ML: at 20:05

## 2023-02-24 RX ADMIN — FUROSEMIDE 80 MG: 10 INJECTION, SOLUTION INTRAMUSCULAR; INTRAVENOUS at 06:25

## 2023-02-24 RX ADMIN — FUROSEMIDE 40 MG: 10 INJECTION, SOLUTION INTRAMUSCULAR; INTRAVENOUS at 17:00

## 2023-02-25 LAB
ANION GAP SERPL CALCULATED.3IONS-SCNC: 13 MMOL/L (ref 5–15)
APTT PPP: 104.2 SECONDS (ref 61–76.5)
APTT PPP: 138.8 SECONDS (ref 61–76.5)
APTT PPP: 31.4 SECONDS (ref 61–76.5)
ARTERIAL PATENCY WRIST A: ABNORMAL
ARTERIAL PATENCY WRIST A: ABNORMAL
ATMOSPHERIC PRESS: ABNORMAL MM[HG]
ATMOSPHERIC PRESS: ABNORMAL MM[HG]
BASE EXCESS BLDA CALC-SCNC: 4.8 MMOL/L (ref 0–3)
BASE EXCESS BLDA CALC-SCNC: 5.5 MMOL/L (ref 0–3)
BASOPHILS # BLD AUTO: 0.1 10*3/MM3 (ref 0–0.2)
BASOPHILS NFR BLD AUTO: 1.1 % (ref 0–1.5)
BDY SITE: ABNORMAL
BDY SITE: ABNORMAL
BUN SERPL-MCNC: 23 MG/DL (ref 8–23)
BUN/CREAT SERPL: 23.5 (ref 7–25)
CALCIUM SPEC-SCNC: 10.1 MG/DL (ref 8.6–10.5)
CHLORIDE SERPL-SCNC: 95 MMOL/L (ref 98–107)
CO2 BLDA-SCNC: 31.6 MMOL/L (ref 22–29)
CO2 BLDA-SCNC: 33.1 MMOL/L (ref 22–29)
CO2 SERPL-SCNC: 31 MMOL/L (ref 22–29)
CREAT SERPL-MCNC: 0.98 MG/DL (ref 0.57–1)
DEPRECATED RDW RBC AUTO: 47.3 FL (ref 37–54)
EGFRCR SERPLBLD CKD-EPI 2021: 58.5 ML/MIN/1.73
EOSINOPHIL # BLD AUTO: 0.1 10*3/MM3 (ref 0–0.4)
EOSINOPHIL NFR BLD AUTO: 1.6 % (ref 0.3–6.2)
ERYTHROCYTE [DISTWIDTH] IN BLOOD BY AUTOMATED COUNT: 14.8 % (ref 12.3–15.4)
GLUCOSE BLDC GLUCOMTR-MCNC: 121 MG/DL (ref 70–105)
GLUCOSE BLDC GLUCOMTR-MCNC: 148 MG/DL (ref 70–105)
GLUCOSE BLDC GLUCOMTR-MCNC: 174 MG/DL (ref 70–105)
GLUCOSE SERPL-MCNC: 128 MG/DL (ref 65–99)
HCO3 BLDA-SCNC: 30.2 MMOL/L (ref 21–28)
HCO3 BLDA-SCNC: 31.5 MMOL/L (ref 21–28)
HCT VFR BLD AUTO: 41.7 % (ref 34–46.6)
HEMODILUTION: NO
HEMODILUTION: NO
HGB BLD-MCNC: 13.6 G/DL (ref 12–15.9)
INHALED O2 CONCENTRATION: 28 %
INHALED O2 CONCENTRATION: 28 %
INR PPP: 2.45 (ref 2–3)
LYMPHOCYTES # BLD AUTO: 1.6 10*3/MM3 (ref 0.7–3.1)
LYMPHOCYTES NFR BLD AUTO: 23.4 % (ref 19.6–45.3)
MAGNESIUM SERPL-MCNC: 1.8 MG/DL (ref 1.6–2.4)
MCH RBC QN AUTO: 29.6 PG (ref 26.6–33)
MCHC RBC AUTO-ENTMCNC: 32.5 G/DL (ref 31.5–35.7)
MCV RBC AUTO: 91 FL (ref 79–97)
MODALITY: ABNORMAL
MODALITY: ABNORMAL
MONOCYTES # BLD AUTO: 0.5 10*3/MM3 (ref 0.1–0.9)
MONOCYTES NFR BLD AUTO: 7.6 % (ref 5–12)
NEUTROPHILS NFR BLD AUTO: 4.5 10*3/MM3 (ref 1.7–7)
NEUTROPHILS NFR BLD AUTO: 66.3 % (ref 42.7–76)
NRBC BLD AUTO-RTO: 0.1 /100 WBC (ref 0–0.2)
PCO2 BLDA: 45.7 MM HG (ref 35–48)
PCO2 BLDA: 49.9 MM HG (ref 35–48)
PH BLDA: 7.41 PH UNITS (ref 7.35–7.45)
PH BLDA: 7.43 PH UNITS (ref 7.35–7.45)
PLATELET # BLD AUTO: 281 10*3/MM3 (ref 140–450)
PMV BLD AUTO: 10 FL (ref 6–12)
PO2 BLDA: 40 MM HG (ref 83–108)
PO2 BLDA: 84.9 MM HG (ref 83–108)
POTASSIUM SERPL-SCNC: 3 MMOL/L (ref 3.5–5.2)
POTASSIUM SERPL-SCNC: 4.1 MMOL/L (ref 3.5–5.2)
PROTHROMBIN TIME: 24 SECONDS (ref 19.4–28.5)
RBC # BLD AUTO: 4.59 10*6/MM3 (ref 3.77–5.28)
SAO2 % BLDCOA: 74.4 % (ref 94–98)
SAO2 % BLDCOA: 96.5 % (ref 94–98)
SODIUM SERPL-SCNC: 139 MMOL/L (ref 136–145)
WBC NRBC COR # BLD: 6.7 10*3/MM3 (ref 3.4–10.8)

## 2023-02-25 PROCEDURE — C1894 INTRO/SHEATH, NON-LASER: HCPCS | Performed by: INTERNAL MEDICINE

## 2023-02-25 PROCEDURE — 25010000002 MIDAZOLAM PER 1 MG: Performed by: INTERNAL MEDICINE

## 2023-02-25 PROCEDURE — 99152 MOD SED SAME PHYS/QHP 5/>YRS: CPT | Performed by: INTERNAL MEDICINE

## 2023-02-25 PROCEDURE — C1769 GUIDE WIRE: HCPCS | Performed by: INTERNAL MEDICINE

## 2023-02-25 PROCEDURE — 99233 SBSQ HOSP IP/OBS HIGH 50: CPT | Performed by: INTERNAL MEDICINE

## 2023-02-25 PROCEDURE — C1760 CLOSURE DEV, VASC: HCPCS | Performed by: INTERNAL MEDICINE

## 2023-02-25 PROCEDURE — 85730 THROMBOPLASTIN TIME PARTIAL: CPT | Performed by: INTERNAL MEDICINE

## 2023-02-25 PROCEDURE — 93460 R&L HRT ART/VENTRICLE ANGIO: CPT | Performed by: INTERNAL MEDICINE

## 2023-02-25 PROCEDURE — 84132 ASSAY OF SERUM POTASSIUM: CPT | Performed by: INTERNAL MEDICINE

## 2023-02-25 PROCEDURE — 99153 MOD SED SAME PHYS/QHP EA: CPT | Performed by: INTERNAL MEDICINE

## 2023-02-25 PROCEDURE — 25010000002 FUROSEMIDE PER 20 MG: Performed by: NURSE PRACTITIONER

## 2023-02-25 PROCEDURE — 85025 COMPLETE CBC W/AUTO DIFF WBC: CPT | Performed by: NURSE PRACTITIONER

## 2023-02-25 PROCEDURE — 25510000001 IOPAMIDOL PER 1 ML: Performed by: INTERNAL MEDICINE

## 2023-02-25 PROCEDURE — 82962 GLUCOSE BLOOD TEST: CPT

## 2023-02-25 PROCEDURE — 82803 BLOOD GASES ANY COMBINATION: CPT

## 2023-02-25 PROCEDURE — 25010000002 FENTANYL CITRATE (PF) 100 MCG/2ML SOLUTION: Performed by: INTERNAL MEDICINE

## 2023-02-25 PROCEDURE — 83735 ASSAY OF MAGNESIUM: CPT | Performed by: NURSE PRACTITIONER

## 2023-02-25 PROCEDURE — 36600 WITHDRAWAL OF ARTERIAL BLOOD: CPT

## 2023-02-25 PROCEDURE — 80048 BASIC METABOLIC PNL TOTAL CA: CPT | Performed by: NURSE PRACTITIONER

## 2023-02-25 PROCEDURE — 25010000002 HEPARIN (PORCINE) PER 1000 UNITS: Performed by: INTERNAL MEDICINE

## 2023-02-25 PROCEDURE — 85610 PROTHROMBIN TIME: CPT | Performed by: NURSE PRACTITIONER

## 2023-02-25 PROCEDURE — C1751 CATH, INF, PER/CENT/MIDLINE: HCPCS | Performed by: INTERNAL MEDICINE

## 2023-02-25 RX ORDER — POTASSIUM CHLORIDE 1.5 G/1.77G
40 POWDER, FOR SOLUTION ORAL AS NEEDED
Status: DISCONTINUED | OUTPATIENT
Start: 2023-02-25 | End: 2023-02-25 | Stop reason: SDUPTHER

## 2023-02-25 RX ORDER — DIPHENHYDRAMINE HCL 25 MG
25 CAPSULE ORAL EVERY 6 HOURS PRN
Status: DISCONTINUED | OUTPATIENT
Start: 2023-02-25 | End: 2023-02-27 | Stop reason: HOSPADM

## 2023-02-25 RX ORDER — LIDOCAINE HYDROCHLORIDE 20 MG/ML
INJECTION, SOLUTION INFILTRATION; PERINEURAL
Status: DISCONTINUED | OUTPATIENT
Start: 2023-02-25 | End: 2023-02-25 | Stop reason: HOSPADM

## 2023-02-25 RX ORDER — POTASSIUM CHLORIDE 20 MEQ/1
20 TABLET, EXTENDED RELEASE ORAL DAILY
Status: DISCONTINUED | OUTPATIENT
Start: 2023-02-25 | End: 2023-02-26

## 2023-02-25 RX ORDER — FENTANYL CITRATE 50 UG/ML
INJECTION, SOLUTION INTRAMUSCULAR; INTRAVENOUS
Status: DISCONTINUED | OUTPATIENT
Start: 2023-02-25 | End: 2023-02-25 | Stop reason: HOSPADM

## 2023-02-25 RX ORDER — FUROSEMIDE 40 MG/1
40 TABLET ORAL DAILY
Status: DISCONTINUED | OUTPATIENT
Start: 2023-02-25 | End: 2023-02-25

## 2023-02-25 RX ORDER — MAGNESIUM SULFATE HEPTAHYDRATE 40 MG/ML
4 INJECTION, SOLUTION INTRAVENOUS AS NEEDED
Status: DISCONTINUED | OUTPATIENT
Start: 2023-02-25 | End: 2023-02-27 | Stop reason: HOSPADM

## 2023-02-25 RX ORDER — ONDANSETRON 2 MG/ML
4 INJECTION INTRAMUSCULAR; INTRAVENOUS EVERY 6 HOURS PRN
Status: DISCONTINUED | OUTPATIENT
Start: 2023-02-25 | End: 2023-02-25

## 2023-02-25 RX ORDER — MAGNESIUM SULFATE HEPTAHYDRATE 40 MG/ML
2 INJECTION, SOLUTION INTRAVENOUS AS NEEDED
Status: DISCONTINUED | OUTPATIENT
Start: 2023-02-25 | End: 2023-02-27 | Stop reason: HOSPADM

## 2023-02-25 RX ORDER — FUROSEMIDE 40 MG/1
40 TABLET ORAL DAILY
Status: DISCONTINUED | OUTPATIENT
Start: 2023-02-26 | End: 2023-02-26

## 2023-02-25 RX ORDER — SODIUM CHLORIDE 9 MG/ML
INJECTION, SOLUTION INTRAVENOUS
Status: DISCONTINUED | OUTPATIENT
Start: 2023-02-25 | End: 2023-02-25

## 2023-02-25 RX ORDER — MIDAZOLAM HYDROCHLORIDE 1 MG/ML
INJECTION INTRAMUSCULAR; INTRAVENOUS
Status: DISCONTINUED | OUTPATIENT
Start: 2023-02-25 | End: 2023-02-25 | Stop reason: HOSPADM

## 2023-02-25 RX ORDER — ACETAMINOPHEN 325 MG/1
650 TABLET ORAL EVERY 4 HOURS PRN
Status: DISCONTINUED | OUTPATIENT
Start: 2023-02-25 | End: 2023-02-25 | Stop reason: SDUPTHER

## 2023-02-25 RX ORDER — ONDANSETRON 4 MG/1
4 TABLET, FILM COATED ORAL EVERY 6 HOURS PRN
Status: DISCONTINUED | OUTPATIENT
Start: 2023-02-25 | End: 2023-02-25

## 2023-02-25 RX ORDER — POTASSIUM CHLORIDE 20 MEQ/1
40 TABLET, EXTENDED RELEASE ORAL AS NEEDED
Status: DISCONTINUED | OUTPATIENT
Start: 2023-02-25 | End: 2023-02-25 | Stop reason: SDUPTHER

## 2023-02-25 RX ADMIN — Medication 3 ML: at 20:27

## 2023-02-25 RX ADMIN — POTASSIUM CHLORIDE 40 MEQ: 1500 TABLET, EXTENDED RELEASE ORAL at 03:10

## 2023-02-25 RX ADMIN — FUROSEMIDE 40 MG: 10 INJECTION, SOLUTION INTRAMUSCULAR; INTRAVENOUS at 05:00

## 2023-02-25 RX ADMIN — Medication 3 ML: at 08:29

## 2023-02-25 RX ADMIN — EMPAGLIFLOZIN 10 MG: 10 TABLET, FILM COATED ORAL at 08:29

## 2023-02-25 RX ADMIN — Medication 10 ML: at 20:27

## 2023-02-25 RX ADMIN — LISINOPRIL 20 MG: 20 TABLET ORAL at 08:29

## 2023-02-25 RX ADMIN — HEPARIN SODIUM 20 UNITS/KG/HR: 10000 INJECTION, SOLUTION INTRAVENOUS at 12:13

## 2023-02-25 RX ADMIN — HEPARIN SODIUM 20 UNITS/KG/HR: 10000 INJECTION, SOLUTION INTRAVENOUS at 16:47

## 2023-02-25 RX ADMIN — LEVOTHYROXINE SODIUM 50 MCG: 50 TABLET ORAL at 08:29

## 2023-02-25 RX ADMIN — POTASSIUM CHLORIDE 40 MEQ: 1500 TABLET, EXTENDED RELEASE ORAL at 12:49

## 2023-02-25 RX ADMIN — POTASSIUM CHLORIDE 40 MEQ: 1500 TABLET, EXTENDED RELEASE ORAL at 08:29

## 2023-02-25 RX ADMIN — LATANOPROST 1 DROP: 50 SOLUTION/ DROPS OPHTHALMIC at 20:27

## 2023-02-25 RX ADMIN — ATENOLOL 100 MG: 50 TABLET ORAL at 08:29

## 2023-02-25 RX ADMIN — Medication 10 ML: at 08:28

## 2023-02-26 LAB
ALBUMIN SERPL-MCNC: 3.9 G/DL (ref 3.5–5.2)
ALBUMIN/GLOB SERPL: 1.4 G/DL
ALP SERPL-CCNC: 38 U/L (ref 39–117)
ALT SERPL W P-5'-P-CCNC: 11 U/L (ref 1–33)
ANION GAP SERPL CALCULATED.3IONS-SCNC: 13 MMOL/L (ref 5–15)
APTT PPP: 31.9 SECONDS (ref 61–76.5)
APTT PPP: 69.5 SECONDS (ref 61–76.5)
APTT PPP: 85 SECONDS (ref 61–76.5)
AST SERPL-CCNC: 23 U/L (ref 1–32)
BASOPHILS # BLD AUTO: 0.1 10*3/MM3 (ref 0–0.2)
BASOPHILS NFR BLD AUTO: 0.6 % (ref 0–1.5)
BILIRUB SERPL-MCNC: 0.7 MG/DL (ref 0–1.2)
BUN SERPL-MCNC: 30 MG/DL (ref 8–23)
BUN/CREAT SERPL: 27.8 (ref 7–25)
CALCIUM SPEC-SCNC: 9.7 MG/DL (ref 8.6–10.5)
CHLORIDE SERPL-SCNC: 100 MMOL/L (ref 98–107)
CO2 SERPL-SCNC: 26 MMOL/L (ref 22–29)
CREAT SERPL-MCNC: 1.08 MG/DL (ref 0.57–1)
DEPRECATED RDW RBC AUTO: 47.3 FL (ref 37–54)
EGFRCR SERPLBLD CKD-EPI 2021: 52 ML/MIN/1.73
EOSINOPHIL # BLD AUTO: 0.2 10*3/MM3 (ref 0–0.4)
EOSINOPHIL NFR BLD AUTO: 2.6 % (ref 0.3–6.2)
ERYTHROCYTE [DISTWIDTH] IN BLOOD BY AUTOMATED COUNT: 14.9 % (ref 12.3–15.4)
GLOBULIN UR ELPH-MCNC: 2.8 GM/DL
GLUCOSE SERPL-MCNC: 128 MG/DL (ref 65–99)
HCT VFR BLD AUTO: 39.3 % (ref 34–46.6)
HGB BLD-MCNC: 12.9 G/DL (ref 12–15.9)
INR PPP: 1.95 (ref 2–3)
LYMPHOCYTES # BLD AUTO: 1.8 10*3/MM3 (ref 0.7–3.1)
LYMPHOCYTES NFR BLD AUTO: 21.9 % (ref 19.6–45.3)
MAGNESIUM SERPL-MCNC: 1.9 MG/DL (ref 1.6–2.4)
MCH RBC QN AUTO: 29.9 PG (ref 26.6–33)
MCHC RBC AUTO-ENTMCNC: 32.8 G/DL (ref 31.5–35.7)
MCV RBC AUTO: 91.4 FL (ref 79–97)
MONOCYTES # BLD AUTO: 0.8 10*3/MM3 (ref 0.1–0.9)
MONOCYTES NFR BLD AUTO: 9.7 % (ref 5–12)
NEUTROPHILS NFR BLD AUTO: 5.2 10*3/MM3 (ref 1.7–7)
NEUTROPHILS NFR BLD AUTO: 65.2 % (ref 42.7–76)
NRBC BLD AUTO-RTO: 0.1 /100 WBC (ref 0–0.2)
PHOSPHATE SERPL-MCNC: 4 MG/DL (ref 2.5–4.5)
PLATELET # BLD AUTO: 252 10*3/MM3 (ref 140–450)
PMV BLD AUTO: 10.9 FL (ref 6–12)
POTASSIUM SERPL-SCNC: 4.1 MMOL/L (ref 3.5–5.2)
PROT SERPL-MCNC: 6.7 G/DL (ref 6–8.5)
PROTHROMBIN TIME: 19.3 SECONDS (ref 19.4–28.5)
RBC # BLD AUTO: 4.3 10*6/MM3 (ref 3.77–5.28)
SODIUM SERPL-SCNC: 139 MMOL/L (ref 136–145)
WBC NRBC COR # BLD: 8 10*3/MM3 (ref 3.4–10.8)

## 2023-02-26 PROCEDURE — 85025 COMPLETE CBC W/AUTO DIFF WBC: CPT | Performed by: INTERNAL MEDICINE

## 2023-02-26 PROCEDURE — 84100 ASSAY OF PHOSPHORUS: CPT | Performed by: INTERNAL MEDICINE

## 2023-02-26 PROCEDURE — 85730 THROMBOPLASTIN TIME PARTIAL: CPT | Performed by: INTERNAL MEDICINE

## 2023-02-26 PROCEDURE — 36415 COLL VENOUS BLD VENIPUNCTURE: CPT | Performed by: INTERNAL MEDICINE

## 2023-02-26 PROCEDURE — 99232 SBSQ HOSP IP/OBS MODERATE 35: CPT | Performed by: INTERNAL MEDICINE

## 2023-02-26 PROCEDURE — 85610 PROTHROMBIN TIME: CPT | Performed by: INTERNAL MEDICINE

## 2023-02-26 PROCEDURE — 80053 COMPREHEN METABOLIC PANEL: CPT | Performed by: INTERNAL MEDICINE

## 2023-02-26 PROCEDURE — 83735 ASSAY OF MAGNESIUM: CPT | Performed by: INTERNAL MEDICINE

## 2023-02-26 RX ORDER — WARFARIN SODIUM 2 MG/1
2 TABLET ORAL
Status: DISCONTINUED | OUTPATIENT
Start: 2023-02-26 | End: 2023-02-27 | Stop reason: HOSPADM

## 2023-02-26 RX ADMIN — LEVOTHYROXINE SODIUM 50 MCG: 50 TABLET ORAL at 08:04

## 2023-02-26 RX ADMIN — POTASSIUM CHLORIDE 20 MEQ: 1500 TABLET, EXTENDED RELEASE ORAL at 08:04

## 2023-02-26 RX ADMIN — Medication 3 ML: at 08:06

## 2023-02-26 RX ADMIN — Medication 10 ML: at 21:01

## 2023-02-26 RX ADMIN — LATANOPROST 1 DROP: 50 SOLUTION/ DROPS OPHTHALMIC at 21:01

## 2023-02-26 RX ADMIN — Medication 10 ML: at 08:04

## 2023-02-26 RX ADMIN — WARFARIN SODIUM 2 MG: 2 TABLET ORAL at 17:17

## 2023-02-26 RX ADMIN — Medication 3 ML: at 21:01

## 2023-02-26 RX ADMIN — EMPAGLIFLOZIN 10 MG: 10 TABLET, FILM COATED ORAL at 08:04

## 2023-02-26 RX ADMIN — FUROSEMIDE 40 MG: 40 TABLET ORAL at 08:04

## 2023-02-27 ENCOUNTER — TELEPHONE (OUTPATIENT)
Dept: CARDIOLOGY | Facility: CLINIC | Age: 83
End: 2023-02-27
Payer: MEDICARE

## 2023-02-27 ENCOUNTER — APPOINTMENT (OUTPATIENT)
Dept: CARDIOLOGY | Facility: HOSPITAL | Age: 83
DRG: 287 | End: 2023-02-27
Payer: MEDICARE

## 2023-02-27 ENCOUNTER — ANESTHESIA EVENT (OUTPATIENT)
Dept: CARDIOLOGY | Facility: HOSPITAL | Age: 83
DRG: 287 | End: 2023-02-27
Payer: MEDICARE

## 2023-02-27 ENCOUNTER — READMISSION MANAGEMENT (OUTPATIENT)
Dept: CALL CENTER | Facility: HOSPITAL | Age: 83
End: 2023-02-27
Payer: MEDICARE

## 2023-02-27 ENCOUNTER — ANESTHESIA (OUTPATIENT)
Dept: CARDIOLOGY | Facility: HOSPITAL | Age: 83
DRG: 287 | End: 2023-02-27
Payer: MEDICARE

## 2023-02-27 VITALS
HEART RATE: 77 BPM | TEMPERATURE: 98 F | DIASTOLIC BLOOD PRESSURE: 50 MMHG | RESPIRATION RATE: 16 BRPM | BODY MASS INDEX: 19.87 KG/M2 | SYSTOLIC BLOOD PRESSURE: 126 MMHG | HEIGHT: 59 IN | WEIGHT: 98.55 LBS | OXYGEN SATURATION: 98 %

## 2023-02-27 VITALS — DIASTOLIC BLOOD PRESSURE: 40 MMHG | OXYGEN SATURATION: 100 % | SYSTOLIC BLOOD PRESSURE: 109 MMHG

## 2023-02-27 PROBLEM — I25.10 CORONARY ARTERY DISEASE INVOLVING NATIVE CORONARY ARTERY OF NATIVE HEART WITHOUT ANGINA PECTORIS: Status: ACTIVE | Noted: 2023-02-25

## 2023-02-27 LAB
ALBUMIN SERPL-MCNC: 4.2 G/DL (ref 3.5–5.2)
ALBUMIN/GLOB SERPL: 1.4 G/DL
ALP SERPL-CCNC: 44 U/L (ref 39–117)
ALT SERPL W P-5'-P-CCNC: 9 U/L (ref 1–33)
ANION GAP SERPL CALCULATED.3IONS-SCNC: 12 MMOL/L (ref 5–15)
AST SERPL-CCNC: 20 U/L (ref 1–32)
BASOPHILS # BLD AUTO: 0 10*3/MM3 (ref 0–0.2)
BASOPHILS NFR BLD AUTO: 0.6 % (ref 0–1.5)
BILIRUB SERPL-MCNC: 0.9 MG/DL (ref 0–1.2)
BUN SERPL-MCNC: 33 MG/DL (ref 8–23)
BUN/CREAT SERPL: 35.5 (ref 7–25)
CALCIUM SPEC-SCNC: 9.9 MG/DL (ref 8.6–10.5)
CHLORIDE SERPL-SCNC: 102 MMOL/L (ref 98–107)
CO2 SERPL-SCNC: 25 MMOL/L (ref 22–29)
CREAT SERPL-MCNC: 0.93 MG/DL (ref 0.57–1)
DEPRECATED RDW RBC AUTO: 50.8 FL (ref 37–54)
EGFRCR SERPLBLD CKD-EPI 2021: 62.3 ML/MIN/1.73
EOSINOPHIL # BLD AUTO: 0.2 10*3/MM3 (ref 0–0.4)
EOSINOPHIL NFR BLD AUTO: 2.3 % (ref 0.3–6.2)
ERYTHROCYTE [DISTWIDTH] IN BLOOD BY AUTOMATED COUNT: 15.1 % (ref 12.3–15.4)
GLOBULIN UR ELPH-MCNC: 3 GM/DL
GLUCOSE SERPL-MCNC: 103 MG/DL (ref 65–99)
HCT VFR BLD AUTO: 40 % (ref 34–46.6)
HGB BLD-MCNC: 13.6 G/DL (ref 12–15.9)
INR PPP: 1.5 (ref 2–3)
LYMPHOCYTES # BLD AUTO: 2.3 10*3/MM3 (ref 0.7–3.1)
LYMPHOCYTES NFR BLD AUTO: 29.2 % (ref 19.6–45.3)
MAGNESIUM SERPL-MCNC: 2.1 MG/DL (ref 1.6–2.4)
MAXIMAL PREDICTED HEART RATE: 140 BPM
MCH RBC QN AUTO: 30.8 PG (ref 26.6–33)
MCHC RBC AUTO-ENTMCNC: 34 G/DL (ref 31.5–35.7)
MCV RBC AUTO: 90.5 FL (ref 79–97)
MONOCYTES # BLD AUTO: 0.8 10*3/MM3 (ref 0.1–0.9)
MONOCYTES NFR BLD AUTO: 10.1 % (ref 5–12)
NEUTROPHILS NFR BLD AUTO: 4.5 10*3/MM3 (ref 1.7–7)
NEUTROPHILS NFR BLD AUTO: 57.8 % (ref 42.7–76)
NRBC BLD AUTO-RTO: 0.1 /100 WBC (ref 0–0.2)
PHOSPHATE SERPL-MCNC: 4.2 MG/DL (ref 2.5–4.5)
PLATELET # BLD AUTO: 268 10*3/MM3 (ref 140–450)
PMV BLD AUTO: 9.7 FL (ref 6–12)
POTASSIUM SERPL-SCNC: 4 MMOL/L (ref 3.5–5.2)
PROT SERPL-MCNC: 7.2 G/DL (ref 6–8.5)
PROTHROMBIN TIME: 15.1 SECONDS (ref 19.4–28.5)
QT INTERVAL: 403 MS
RBC # BLD AUTO: 4.42 10*6/MM3 (ref 3.77–5.28)
SODIUM SERPL-SCNC: 139 MMOL/L (ref 136–145)
STRESS TARGET HR: 119 BPM
WBC NRBC COR # BLD: 7.7 10*3/MM3 (ref 3.4–10.8)

## 2023-02-27 PROCEDURE — 93321 DOPPLER ECHO F-UP/LMTD STD: CPT

## 2023-02-27 PROCEDURE — 83735 ASSAY OF MAGNESIUM: CPT | Performed by: INTERNAL MEDICINE

## 2023-02-27 PROCEDURE — 93325 DOPPLER ECHO COLOR FLOW MAPG: CPT | Performed by: INTERNAL MEDICINE

## 2023-02-27 PROCEDURE — 93320 DOPPLER ECHO COMPLETE: CPT

## 2023-02-27 PROCEDURE — 93325 DOPPLER ECHO COLOR FLOW MAPG: CPT

## 2023-02-27 PROCEDURE — 36415 COLL VENOUS BLD VENIPUNCTURE: CPT | Performed by: INTERNAL MEDICINE

## 2023-02-27 PROCEDURE — 99233 SBSQ HOSP IP/OBS HIGH 50: CPT | Performed by: INTERNAL MEDICINE

## 2023-02-27 PROCEDURE — 93312 ECHO TRANSESOPHAGEAL: CPT

## 2023-02-27 PROCEDURE — 84100 ASSAY OF PHOSPHORUS: CPT | Performed by: INTERNAL MEDICINE

## 2023-02-27 PROCEDURE — 93312 ECHO TRANSESOPHAGEAL: CPT | Performed by: INTERNAL MEDICINE

## 2023-02-27 PROCEDURE — 96360 HYDRATION IV INFUSION INIT: CPT

## 2023-02-27 PROCEDURE — 80053 COMPREHEN METABOLIC PANEL: CPT | Performed by: INTERNAL MEDICINE

## 2023-02-27 PROCEDURE — 85025 COMPLETE CBC W/AUTO DIFF WBC: CPT | Performed by: INTERNAL MEDICINE

## 2023-02-27 PROCEDURE — 93320 DOPPLER ECHO COMPLETE: CPT | Performed by: INTERNAL MEDICINE

## 2023-02-27 PROCEDURE — 85610 PROTHROMBIN TIME: CPT | Performed by: INTERNAL MEDICINE

## 2023-02-27 PROCEDURE — 25010000002 PROPOFOL 10 MG/ML EMULSION: Performed by: NURSE ANESTHETIST, CERTIFIED REGISTERED

## 2023-02-27 RX ORDER — FUROSEMIDE 40 MG/1
40 TABLET ORAL EVERY OTHER DAY
Status: DISCONTINUED | OUTPATIENT
Start: 2023-02-27 | End: 2023-02-27 | Stop reason: HOSPADM

## 2023-02-27 RX ORDER — PHENYLEPHRINE HCL IN 0.9% NACL 1 MG/10 ML
SYRINGE (ML) INTRAVENOUS AS NEEDED
Status: DISCONTINUED | OUTPATIENT
Start: 2023-02-27 | End: 2023-02-27 | Stop reason: SURG

## 2023-02-27 RX ORDER — PROPOFOL 10 MG/ML
VIAL (ML) INTRAVENOUS AS NEEDED
Status: DISCONTINUED | OUTPATIENT
Start: 2023-02-27 | End: 2023-02-27 | Stop reason: SURG

## 2023-02-27 RX ORDER — LISINOPRIL 20 MG/1
20 TABLET ORAL DAILY
Qty: 30 TABLET | Refills: 0 | Status: SHIPPED | OUTPATIENT
Start: 2023-02-27

## 2023-02-27 RX ORDER — SODIUM CHLORIDE 9 MG/ML
INJECTION, SOLUTION INTRAVENOUS CONTINUOUS PRN
Status: DISCONTINUED | OUTPATIENT
Start: 2023-02-27 | End: 2023-02-27 | Stop reason: SURG

## 2023-02-27 RX ADMIN — PROPOFOL 60 MG: 10 INJECTION, EMULSION INTRAVENOUS at 11:35

## 2023-02-27 RX ADMIN — Medication 3 ML: at 08:38

## 2023-02-27 RX ADMIN — Medication 100 MCG: at 11:35

## 2023-02-27 RX ADMIN — Medication 10 ML: at 08:38

## 2023-02-27 RX ADMIN — PROPOFOL 30 MG: 10 INJECTION, EMULSION INTRAVENOUS at 11:37

## 2023-02-27 RX ADMIN — PROPOFOL 20 MG: 10 INJECTION, EMULSION INTRAVENOUS at 11:40

## 2023-02-27 RX ADMIN — SODIUM CHLORIDE: 9 INJECTION, SOLUTION INTRAVENOUS at 11:32

## 2023-02-27 RX ADMIN — PROPOFOL 20 MG: 10 INJECTION, EMULSION INTRAVENOUS at 11:46

## 2023-02-27 RX ADMIN — Medication 50 MCG: at 11:46

## 2023-02-27 RX ADMIN — Medication 50 MCG: at 11:43

## 2023-02-27 RX ADMIN — PROPOFOL 20 MG: 10 INJECTION, EMULSION INTRAVENOUS at 11:43

## 2023-02-27 RX ADMIN — Medication 50 MCG: at 11:40

## 2023-02-27 NOTE — TELEPHONE ENCOUNTER
Please schedule this patient an appointment with cardiothoracic surgery, Dr. Orozco, at their [place] office. She is being referred by Dr. Marcus for mitral regurgitation and MitraClip evaluation.

## 2023-02-27 NOTE — ANESTHESIA PREPROCEDURE EVALUATION
Anesthesia Evaluation     Patient summary reviewed and Nursing notes reviewed   no history of anesthetic complications:  NPO Solid Status: > 8 hours  NPO Liquid Status: > 2 hours           Airway   Dental      Pulmonary    (+) a smoker Former, shortness of breath,   Cardiovascular     ECG reviewed  PT is on anticoagulation therapy  Patient on routine beta blocker    (+) pacemaker pacemaker, hypertension, valvular problems/murmurs MR, AI and TI, CAD, dysrhythmias, CHF Systolic <55%, DVT, hyperlipidemia,  carotid artery disease      Neuro/Psych  (+) numbness,    GI/Hepatic/Renal/Endo    (+)   renal disease, thyroid problem hypothyroidism    Musculoskeletal     Abdominal    Substance History      OB/GYN          Other        ROS/Med Hx Other: Additional History:  Pulmonary hypertension, carotid stenosis, AV block, renal cysts, neuropathyu    Echo:  •  Estimated left ventricular EF = 35% Estimated left ventricular EF was in agreement with the calculated left ventricular EF. Left ventricular ejection fraction appears to be 36 - 40%. Left ventricular systolic function is moderately decreased.  •  Left ventricular diastolic dysfunction is noted.  •  Mildly reduced right ventricular systolic function noted.  •  The right atrial cavity is moderate to severely  dilated.  •  Moderate aortic valve regurgitation is present.  •  Severe mitral valve regurgitation is present.  •  Moderate to severe tricuspid valve regurgitation is present.  •  Estimated right ventricular systolic pressure from tricuspid regurgitation is moderately elevated (45-55 mmHg).  •  Moderate pulmonary hypertension is present.    Echocardiogram Findings    Left Ventricle Calculated left ventricular EF = 35% Estimated left ventricular EF = 35% Estimated left ventricular EF was in agreement with the calculated left ventricular EF. Left ventricular ejection fraction appears to be 36 - 40%. Left ventricular systolic function is moderately decreased.     Normal  left ventricular cavity size and wall thickness noted. All left ventricular wall segments contract normally. Left ventricular diastolic dysfunction is noted.  Right Ventricle Normal right ventricular cavity size, wall thickness and septal motion noted. Mildly reduced right ventricular systolic function noted. Electronic lead present in the ventricle.  Left Atrium The left atrial cavity is severely dilated.  Right Atrium The right atrial cavity is moderate to severely dilated.  Aortic Valve The aortic valve is grossly normal in structure. Moderate aortic valve regurgitation is present. No aortic valve stenosis is present.  Mitral Valve There is calcification of the mitral valve. Severe mitral valve regurgitation is present. No significant mitral valve stenosis is present.  Tricuspid Valve Moderate to severe tricuspid valve regurgitation is present. Estimated right ventricular systolic pressure from tricuspid regurgitation is moderately elevated (45-55 mmHg). Moderate pulmonary hypertension is present. No tricuspid valve stenosis is present.  Pulmonic Valve The pulmonic valve is not well visualized. There is trace pulmonic valve regurgitation present.  Greater Vessels No dilation of the aortic root is present.  Pericardium There is a trivial pericardial effusion.      Cath:  FINDINGS  Coronary Angiogram  Left dominant circulation  Left main: Left main is a large caliber vessel which gives rise to the Left Anterior Descending and the Left circumflex.  Left main is angiographically free from any significant disease  Left Anterior Descending Artery: LAD is a medium caliber vessel which gives rise to several septal perforators and several diagonal branches.  LAD has diffuse luminal irregularities with 20 to 30% lesion in the midsegment.  Left Circumflex: Left circumflex artery is a dominant vessel which gives rise to obtuse marginal.  Left circumflex has luminal irregularities only.  Right Coronary Artery: RCA is a small  nondominant vessel.      ESTIMATED BLOOD LOSS:  10 ml     COMPLICATIONS:  None     PROCEDURE DATA:  Contrast Used: 25 cc  Sedation Time: 35 minutes     IMPRESSIONS  Non obstructive CAD  Normal filling pressure, no evidence of pulmonary hypertension.  Normal wedge pressure     RECOMMENDATIONS  -Switch IV to p.o. diuresis.  -Proceed with transesophageal echocardiogram to evaluate mitral and tricuspid regurgitation.  -Uptitrate GDMT for HFrEF           Recommendations    •     Optimize medical therapy.        PSH:  HYSTERECTOMY THYROID SURGERY  PACEMAKER IMPLANTATION                 Anesthesia Plan    ASA 4     MAC     (Patient identified; pre-operative vital signs, all relevant labs/studies, complete medical/surgical/anesthetic history, full medication list, full allergy list, and NPO status obtained/reviewed; physical assessment performed; anesthetic options, side effects, potential complications, risks, and benefits discussed; questions answered; written anesthesia consent obtained; patient cleared for procedure; anesthesia machine and equipment checked and functioning)    Anesthetic plan, risks, benefits, and alternatives have been provided, discussed and informed consent has been obtained with: patient.    Plan discussed with CRNA and CAA.        CODE STATUS:    Level Of Support Discussed With: Patient  Code Status (Patient has no pulse and is not breathing): CPR (Attempt to Resuscitate)  Medical Interventions (Patient has pulse or is breathing): Full Support

## 2023-02-27 NOTE — ANESTHESIA POSTPROCEDURE EVALUATION
Patient: Thi Allen    Procedure Summary     Date: 02/27/23 Room / Location: [facility]    Anesthesia Start: 1132 Anesthesia Stop: 1151    Procedure: ADULT TRANSESOPHAGEAL ECHO (CARMITA) W/ CONT IF NECESSARY PER PROTOCOL Diagnosis:       (Valvular Disease)      (Mitral Valve Assessment)    Scheduled Providers: Garret Sheets MD Provider: Garret Sheets MD    Anesthesia Type: MAC ASA Status: 4          Anesthesia Type: MAC    Vitals  Vitals Value Taken Time   /49 02/27/23 1332   Temp     Pulse 75 02/27/23 1332   Resp 16 02/27/23 1225   SpO2 98 % 02/27/23 1332   Vitals shown include unvalidated device data.        Post Anesthesia Care and Evaluation    Patient location during evaluation: bedside  Patient participation: complete - patient participated  Level of consciousness: awake  Pain scale: See nurse's notes for pain score.  Pain management: adequate    Airway patency: patent  Anesthetic complications: No anesthetic complications  PONV Status: none  Cardiovascular status: acceptable  Respiratory status: acceptable and spontaneous ventilation  Hydration status: acceptable    Comments: Patient seen and examined postoperatively; vital signs stable; SpO2 greater than or equal to 90%; cardiopulmonary status stable; nausea/vomiting adequately controlled; pain adequately controlled; no apparent anesthesia complications; patient discharged from anesthesia care when discharge criteria were met

## 2023-02-28 NOTE — OUTREACH NOTE
Prep Survey    Flowsheet Row Responses   [facility] patient discharged from? [facility]   Is LACE score < 7 ? No   Eligibility Readm Mgmt   Discharge diagnosis Acute on chronic combined systolic and diastolic congestive heart failure   Does the patient have one of the following disease processes/diagnoses(primary or secondary)? CHF   Does the patient have Home health ordered? No   Is there a DME ordered? No   Prep survey completed? Yes          Ramona ANDRES - Registered Nurse

## 2023-03-03 ENCOUNTER — READMISSION MANAGEMENT (OUTPATIENT)
Dept: CALL CENTER | Facility: HOSPITAL | Age: 83
End: 2023-03-03
Payer: MEDICARE

## 2023-03-03 NOTE — OUTREACH NOTE
CHF Week 1 Survey    Flowsheet Row Responses   [facility] patient discharged from? [facility]   Does the patient have one of the following disease processes/diagnoses(primary or secondary)? CHF   CHF Week 1 attempt successful? Yes   Call start time 1032   Call end time 1045   Discharge diagnosis Acute on chronic combined systolic and diastolic congestive heart failure   Meds reviewed with patient/caregiver? Yes   Is the patient having any side effects they believe may be caused by any medication additions or changes? No   Does the patient have all medications ordered at discharge? Yes   Is the patient taking all medications as directed (includes completed medication regime)? Yes   Does the patient have a primary care provider?  Yes   Does the patient have an appointment with their PCP within 7 days of discharge? N/A  [has just seen PCP, was referred to cardiology for now, 3/6/23]   Has the patient kept scheduled appointments due by today? N/A   Has home health visited the patient within 72 hours of discharge? N/A   Pulse Ox monitoring None   Psychosocial issues? No   Did the patient receive a copy of their discharge instructions? Yes   Nursing interventions Reviewed instructions with patient   What is the patient's perception of their health status since discharge? Improving   Nursing interventions Nurse provided patient education   Is the patient able to teach back signs and symptoms of worsening condition? (i.e. weight gain, shortness of air, etc.) Yes   If the patient is a current smoker, are they able to teach back resources for cessation? Not a smoker   Is the patient/caregiver able to teach back the hierarchy of who to call/visit for symptoms/problems? PCP, Specialist, Home health nurse, Urgent Care, ED, 911 Yes   Is the patient able to teach back Heart Failure Zones? Yes   CHF Zone this Call Green Zone   Green Zone Patient reports doing well, No new or worsening shortness of breath, Physical activity level  is normal for you, No new swelling -  feet, ankles and legs look normal for you, Weight check stable, No chest pain   Green Zone Interventions Daily weight check, Meds as directed, Low sodium diet, Follow up visits planned    CHF Week 1 call completed? Yes   Call end time [id number]          Antonia HOANG - Registered Nurse

## 2023-03-06 ENCOUNTER — ANTICOAGULATION VISIT (OUTPATIENT)
Dept: CARDIOLOGY | Facility: CLINIC | Age: 83
End: 2023-03-06
Payer: MEDICARE

## 2023-03-06 ENCOUNTER — OFFICE VISIT (OUTPATIENT)
Dept: CARDIOLOGY | Facility: CLINIC | Age: 83
End: 2023-03-06
Payer: MEDICARE

## 2023-03-06 VITALS
OXYGEN SATURATION: 99 % | WEIGHT: 105 LBS | DIASTOLIC BLOOD PRESSURE: 59 MMHG | SYSTOLIC BLOOD PRESSURE: 138 MMHG | HEART RATE: 66 BPM | HEIGHT: 59 IN | BODY MASS INDEX: 21.17 KG/M2

## 2023-03-06 VITALS
HEART RATE: 65 BPM | SYSTOLIC BLOOD PRESSURE: 170 MMHG | WEIGHT: 105 LBS | DIASTOLIC BLOOD PRESSURE: 54 MMHG | BODY MASS INDEX: 21.21 KG/M2

## 2023-03-06 DIAGNOSIS — Z79.01 LONG TERM (CURRENT) USE OF ANTICOAGULANTS: Chronic | ICD-10-CM

## 2023-03-06 DIAGNOSIS — I82.4Y9 DEEP VEIN THROMBOSIS (DVT) OF PROXIMAL LOWER EXTREMITY, UNSPECIFIED CHRONICITY, UNSPECIFIED LATERALITY: Primary | Chronic | ICD-10-CM

## 2023-03-06 DIAGNOSIS — I49.5 SSS (SICK SINUS SYNDROME): ICD-10-CM

## 2023-03-06 DIAGNOSIS — I50.22 CHRONIC HFREF (HEART FAILURE WITH REDUCED EJECTION FRACTION): ICD-10-CM

## 2023-03-06 DIAGNOSIS — I34.0 NONRHEUMATIC MITRAL VALVE REGURGITATION: Primary | ICD-10-CM

## 2023-03-06 DIAGNOSIS — I82.4Y9 DEEP VEIN THROMBOSIS (DVT) OF PROXIMAL LOWER EXTREMITY, UNSPECIFIED CHRONICITY, UNSPECIFIED LATERALITY: Chronic | ICD-10-CM

## 2023-03-06 DIAGNOSIS — I50.32 CHRONIC HEART FAILURE WITH PRESERVED EJECTION FRACTION: ICD-10-CM

## 2023-03-06 LAB — INR PPP: 1.9 (ref 0.9–1.1)

## 2023-03-06 PROCEDURE — 36416 COLLJ CAPILLARY BLOOD SPEC: CPT | Performed by: NURSE PRACTITIONER

## 2023-03-06 PROCEDURE — 99214 OFFICE O/P EST MOD 30 MIN: CPT | Performed by: INTERNAL MEDICINE

## 2023-03-06 PROCEDURE — 85610 PROTHROMBIN TIME: CPT | Performed by: NURSE PRACTITIONER

## 2023-03-06 RX ORDER — WARFARIN SODIUM 5 MG/1
1 TABLET ORAL DAILY
COMMUNITY
Start: 2022-12-17

## 2023-03-06 NOTE — PROGRESS NOTES
Encounter Date:03/06/2023        Patient ID: Thi Allen is a 83 y.o. female.      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  Echocardiogram in September 2022 showed EF of 35% she was treated with IV diuretics.  She was noted to have severe mitral regurgitation and moderate aortic insufficiency with moderate pulmonary hypertension.  A cardiac catheterization showed nonobstructive coronary disease.  She was recommended to undergo transesophageal echocardiogram to evaluate mitral and tricuspid regurgitation.  CARMITA shows severe mitral regurgitation and EF of 40%.  GDMT was started.  She also has a history of DVT for which warfarin was started. Today she is here for follow-up.  Overall she feels well however she gets short of breath while walking short distances.  She is taking diuretics as needed.  She is also compliant with ACE inhibitor, beta-blocker and warfarin.  She has some episodes of dizziness and lightheadedness    The following portions of the patient's history were reviewed and updated as appropriate: allergies, current medications, past family history, past medical history, past social history, past surgical history and problem list.    Review of Systems   Constitutional: Negative for malaise/fatigue.   Cardiovascular: Negative for chest pain, dyspnea on exertion, leg swelling and palpitations.   Respiratory: Negative for cough and shortness of breath.    Gastrointestinal: Negative for abdominal pain, nausea and vomiting.   Neurological: Positive for dizziness and light-headedness. Negative for focal weakness, headaches and numbness.   All other systems reviewed and are negative.        Current Outpatient Medications:   •  atenolol (TENORMIN) 100 MG tablet, Take 1 tablet by mouth Daily., Disp: , Rfl:   •  famotidine (PEPCID) 20 MG tablet, Take 1 tablet by mouth 2 (Two)  "Times a Day As Needed., Disp: , Rfl:   •  fenofibrate 160 MG tablet, Take 1 tablet by mouth Daily., Disp: , Rfl:   •  latanoprost (XALATAN) 0.005 % ophthalmic solution, Administer 1 drop to both eyes Every Night., Disp: , Rfl:   •  levothyroxine (SYNTHROID, LEVOTHROID) 50 MCG tablet, TAKE 1 TABLET BY MOUTH ONCE DAILY, Disp: , Rfl:   •  lisinopril (PRINIVIL,ZESTRIL) 20 MG tablet, Take 1 tablet by mouth Daily., Disp: 30 tablet, Rfl: 0  •  warfarin (COUMADIN) 2.5 MG tablet, Take 1 tablet by mouth Every Night., Disp: , Rfl:   •  warfarin (COUMADIN) 5 MG tablet, Take 1 tablet by mouth Daily., Disp: , Rfl:     Current outpatient and discharge medications have been reconciled for the patient.  Reviewed by: Trace Marcus MD       Allergies   Allergen Reactions   • Atorvastatin Myalgia   • Colesevelam Hcl Myalgia     Made legs weak    • Colestipol Hcl Other (See Comments)     MADE PATIENT FEEL BAD   • Ezetimibe Hallucinations     Loss of energy - worn out - legs weak - feel like they way a \"ton \"    • Pitavastatin Other (See Comments)     Elevated liver function tests    • Rosuvastatin Myalgia   • Rosuvastatin Calcium Myalgia     MADE LEGS WEAK   • Simvastatin Myalgia   • Statins Myalgia     Made legs weak          Family History   Problem Relation Age of Onset   • No Known Problems Mother    • No Known Problems Father    • No Known Problems Sister    • No Known Problems Brother    • No Known Problems Maternal Aunt    • No Known Problems Maternal Uncle    • No Known Problems Paternal Aunt    • No Known Problems Paternal Uncle    • No Known Problems Maternal Grandmother    • No Known Problems Maternal Grandfather    • No Known Problems Paternal Grandmother    • No Known Problems Paternal Grandfather    • No Known Problems Other    • Anemia Neg Hx    • Arrhythmia Neg Hx    • Asthma Neg Hx    • Clotting disorder Neg Hx    • Fainting Neg Hx    • Heart attack Neg Hx    • Heart disease Neg Hx    • Heart failure Neg Hx    • " Hyperlipidemia Neg Hx    • Hypertension Neg Hx        Past Surgical History:   Procedure Laterality Date   • CARDIAC CATHETERIZATION N/A 2/25/2023    Procedure: Left Heart Cath and coronary angiogram;  Surgeon: Trace Marcus MD;  Location: [facility] CATH INVASIVE LOCATION;  Service: Cardiovascular;  Laterality: N/A;   • CARDIAC CATHETERIZATION Bilateral 2/25/2023    Procedure: Right and Left Heart Cath;  Surgeon: Trace Marcus MD;  Location:  [facility] CATH INVASIVE LOCATION;  Service: Cardiovascular;  Laterality: Bilateral;   • HYSTERECTOMY     • PACEMAKER IMPLANTATION     • THYROID SURGERY         Past Medical History:   Diagnosis Date   • AV block 02/22/2021   • Bilateral renal cysts 01/01/2020   • Carotid stenosis, bilateral 06/01/2019    Overview:  <50% bilateral    • Carpal tunnel syndrome, bilateral 06/15/2018   • Chronic combined systolic (congestive) and diastolic (congestive) heart failure (HCC)    • DVT (deep venous thrombosis) (CMS/HCC) [I82.409]     recurrent   • Hordeolum externum 08/05/2015   • Hyperlipidemia    • Hypertension    • Hypothyroidism due to acquired atrophy of thyroid 10/21/2019   • Long term (current) use of anticoagulants 09/29/2022   • Mitral regurgitation 05/17/2016   • Osteopenia    • Pacemaker 12/28/2021       Family History   Problem Relation Age of Onset   • No Known Problems Mother    • No Known Problems Father    • No Known Problems Sister    • No Known Problems Brother    • No Known Problems Maternal Aunt    • No Known Problems Maternal Uncle    • No Known Problems Paternal Aunt    • No Known Problems Paternal Uncle    • No Known Problems Maternal Grandmother    • No Known Problems Maternal Grandfather    • No Known Problems Paternal Grandmother    • No Known Problems Paternal Grandfather    • No Known Problems Other    • Anemia Neg Hx    • Arrhythmia Neg Hx    • Asthma Neg Hx    • Clotting disorder Neg Hx    • Fainting Neg Hx    • Heart attack Neg Hx    • Heart disease Neg Hx    •  "Heart failure Neg Hx    • Hyperlipidemia Neg Hx    • Hypertension Neg Hx        Social History     Socioeconomic History   • Marital status:    Tobacco Use   • Smoking status: Former     Passive exposure: Past   • Smokeless tobacco: Never   • Tobacco comments:     quit 35 yrs ago   Vaping Use   • Vaping Use: Never used   Substance and Sexual Activity   • Alcohol use: Yes     Comment: rare   • Drug use: No   • Sexual activity: Defer               Objective:       Physical Exam    /59 (BP Location: Right arm, Patient Position: Sitting, Cuff Size: Adult)   Pulse 66   Ht 149.9 cm (59\")   Wt 47.6 kg (105 lb)   SpO2 99%   BMI 21.21 kg/m²   The patient is alert, oriented and in no distress.    Vital signs as noted above.    Head and neck revealed no carotid bruits or jugular venous distension.  No thyromegaly or lymphadenopathy is present.    Lungs clear.  No wheezing.  Breath sounds are normal bilaterally.    Heart normal first and second heart sounds.  No murmur..  No pericardial rub is present.  No gallop is present.    Abdomen soft and nontender.  No organomegaly is present.    Extremities revealed good peripheral pulses without any pedal edema.    Skin warm and dry.    Musculoskeletal system is grossly normal.    CNS grossly normal.           Diagnosis Plan   1. Nonrheumatic mitral valve regurgitation        2. SSS (sick sinus syndrome) ([facility])        3. Deep vein thrombosis (DVT) of proximal lower extremity, unspecified chronicity, unspecified laterality ([facility])        4. Chronic heart failure with preserved ejection fraction ([facility])        5. Chronic HFrEF (heart failure with reduced ejection fraction) ([facility])        LAB RESULTS (LAST 7 DAYS)    CBC        BMP        CMP         BNP        TROPONIN        CoAg        Creatinine Clearance  Estimated Creatinine Clearance: 35.7 mL/min (by C-G formula based on SCr of 0.93 mg/dL).    ABG        Radiology  No radiology results for the last " day    EKG  Procedures    Stress test      Echocardiogram  Results for orders placed during the hospital encounter of 02/24/23    Adult Transesophageal Echo (CARMITA) W/ Cont if Necessary Per Protocol    Interpretation Summary  •  Left ventricular ejection fraction appears to be 41 - 45%.  •  The left ventricular cavity is mildly dilated.  •  Saline test results are negative.  •  Severe mitral valve regurgitation is present.      Cardiac catheterization  Results for orders placed during the hospital encounter of 02/24/23    Cardiac Catheterization/Vascular Study    Narrative  OPERATORS  Trace Marcus M.D. (Attending Cardiologist)      PROCEDURE PERFORMED  Ultrasound guided vascular access  Right heart catheterization  Coronary Angiogram  Left Heart Catheterization [id number]  Moderate Sedation 35 minutes    INDICATIONS FOR PROCEDURE  82-year-old woman with valvular heart disease including mitral and tricuspid regurgitation, pulmonary hypertension presented with heart failure exacerbation.  After IV diuresis, discussion of risk and benefit she was brought into the cardiac Cath Lab for right and left heart cath.    PROCEDURE IN DETAIL  Informed consent was obtained from the patient after explaining the risks, benefits, and alternative options of the procedure. After obtaining informed consent, the patient was brought to the cath lab and was prepped in a sterile fashion. Lidocaine 2% was used for local anesthesia into the right femoral venous access site. Right femoral vein was accessed using the micropuncture needle under ultrasound guidance and micropuncture wire advanced under flouroscopy. A 7 Persian vascular sheath was put into place percutaneously over guide-wire. Guide wires were removed. A 6Fr swan aneta catheter was advanced to wedge position. RA, RV and PA and wedge pressures were recorded.  PA sat and arterial sats recorded.  The patient tolerated the procedure well without any complications.    Lidocaine 2% was used  for local anesthesia into the right femoral arterial access site. The right femoral artery was accessed with a micropuncture needle via modified Seldinger technique under ultrasound guidance. A 6F was inserted successfully.  Afterwards, 6F JR4 and JL4 diagnostic catheters were advanced over a wire into the ascending aorta and were used to engage the ostia of the left main and RCA respectively. JR4 used to cross the AV and obtain LV pressures and gradient across the AV measured via pullback technique. Images of the right and left coronary systems were obtained. All the catheters were exchanged over a wire and subsequently removed. Angiogram of the femoral access site was obtained and did not show complications. The patient tolerated the procedure well without any complications. The pictures were reviewed at the end of the procedure. A Mynx closure device was applied.    HEMODYNAMICS    RHC  RA 5/3, 3 mmHg  RV 26/2, 4 mmHg  PA 27/8, 16 mmHg  PCW 7/7, 6 mmHg  AO Sat 96%  PA Sat 74%    Rob CO 4.73    Rob CI 3.42    LHC  LV: 108/5, 7 mmHg  AO: 116/46, 72 mmHg    No significant gradient across the aortic valve during pullback of JR4 catheter.  LV gram was not performed due to recent echocardiogram.    FINDINGS  Coronary Angiogram  Left dominant circulation  Left main: Left main is a large caliber vessel which gives rise to the Left Anterior Descending and the Left circumflex.  Left main is angiographically free from any significant disease  Left Anterior Descending Artery: LAD is a medium caliber vessel which gives rise to several septal perforators and several diagonal branches.  LAD has diffuse luminal irregularities with 20 to 30% lesion in the midsegment.  Left Circumflex: Left circumflex artery is a dominant vessel which gives rise to obtuse marginal.  Left circumflex has luminal irregularities only.  Right Coronary Artery: RCA is a small nondominant vessel.    ESTIMATED BLOOD LOSS:  10  ml    COMPLICATIONS:  None    PROCEDURE DATA:  Contrast Used: 25 cc  Sedation Time: 35 minutes    IMPRESSIONS  Non obstructive CAD  Normal filling pressure, no evidence of pulmonary hypertension.  Normal wedge pressure    RECOMMENDATIONS  -Switch IV to p.o. diuresis.  -Proceed with transesophageal echocardiogram to evaluate mitral and tricuspid regurgitation.  -Uptitrate GDMT for HFrEF          Assessment and Plan       Diagnoses and all orders for this visit:    1. Nonrheumatic mitral valve regurgitation (Primary)  LV function is 40 to 45%.  She has severe symptomatic mitral regurgitation.  I have explained the natural history of mitral regurgitation to the patient in details.  I used a heart model and even provided a brochure to read a later time.  Patient is eager to undergo transcatheter mitral [id number] repair however she has not seen a surgeon yet.  I would make arrangements to see cardiac surgery soon.  Cardiac catheterization with no significant obstructive coronary disease.  I have discussed the risk and benefit of transcatheter [id number] mitral valve repair.  2. SSS (sick sinus syndrome) ([facility])  Status post permanent pacemaker  3. Deep vein thrombosis (DVT) of proximal lower extremity, unspecified chronicity, unspecified laterality (HCC)  Continue warfarin with INR goal of 2-3  4. Chronic heart failure with preserved ejection fraction (HCC)  Continue ACE inhibitor, beta-blocker.  Unable to uptitrate ACE inhibitor due to low blood pressures.  Added Jardiance  5. Chronic HFrEF (heart failure with reduced ejection fraction) ([facility])  She used to have preserved LV function and now EF is dropped to 40 to 45%.  Continue ACE inhibitor, beta-blocker and Jardiance  Lasix to be taken as needed weight gain and shortness of breath.  6.  Hyperlipidemia  Continue fenofibrate

## 2023-03-06 NOTE — PROGRESS NOTES
"Enter Query Response Below      Query Response:     Heart failure due to valvular heart disease, mitral regurgitation         If applicable, please update the problem list.     Patient: Thi Allen        : 1940  Account: [id number]           Admit Date: 2023        How to Respond to this query:       a. Click New Note     b. Answer query within the yellow box.                c. Update the Problem List, if applicable.      If you have any questions about this query contact me at: [phone]     Dr. Marcus:    82-year-old woman with known cardiomyopathy, valvular heart disease, hypertension and pulmonary hypertension, presents with acute heart failure exacerbation.  Cardiology progress note states \"acute on chronic combined systolic and diastolic congestive heart failure.  Patient underwent cardiac cath with findings of non-obstructive CAD.  CARMITA showed severe mitral regurgitation and only mild tricuspid and aortic insufficiency.  Patient was given furosemide IV during stay.    Can you please clarify etiology of the congestive heart failure as:    - heart failure due to hypertension  - heart failure due to cardiomyopathy  - heart failure due to valvular disease   - other (specify) _____________  - unable to determine    By submitting this query, we are merely seeking further clarification of documentation to accurately reflect all conditions that you are monitoring, evaluating, treating or that extend the hospitalization or utilize additional resources of care. Please utilize your independent clinical judgment when addressing the question(s) above.     This query and your response, once completed, will be entered into the legal medical record.    Sincerely,  Mily Yan RN, MSN  Clinical Documentation Integrity Program   [email or web address]   "

## 2023-03-08 ENCOUNTER — OFFICE VISIT (OUTPATIENT)
Dept: CARDIAC SURGERY | Facility: CLINIC | Age: 83
End: 2023-03-08
Payer: MEDICARE

## 2023-03-08 VITALS
BODY MASS INDEX: 21.17 KG/M2 | HEIGHT: 59 IN | RESPIRATION RATE: 20 BRPM | OXYGEN SATURATION: 100 % | SYSTOLIC BLOOD PRESSURE: 128 MMHG | HEART RATE: 62 BPM | DIASTOLIC BLOOD PRESSURE: 59 MMHG | WEIGHT: 105 LBS | TEMPERATURE: 96.8 F

## 2023-03-08 DIAGNOSIS — I34.0 SEVERE MITRAL VALVE REGURGITATION: Primary | ICD-10-CM

## 2023-03-08 DIAGNOSIS — I35.1 MILD AORTIC VALVE REGURGITATION: ICD-10-CM

## 2023-03-08 DIAGNOSIS — M85.80 OSTEOPENIA, UNSPECIFIED LOCATION: Chronic | ICD-10-CM

## 2023-03-08 DIAGNOSIS — Z95.0 PACEMAKER: Chronic | ICD-10-CM

## 2023-03-08 DIAGNOSIS — I50.42 CHRONIC COMBINED SYSTOLIC AND DIASTOLIC CONGESTIVE HEART FAILURE: ICD-10-CM

## 2023-03-08 DIAGNOSIS — E66.01 MORBID (SEVERE) OBESITY DUE TO EXCESS CALORIES: ICD-10-CM

## 2023-03-08 DIAGNOSIS — N28.1 BILATERAL RENAL CYSTS: ICD-10-CM

## 2023-03-08 DIAGNOSIS — Z79.01 LONG TERM (CURRENT) USE OF ANTICOAGULANTS: Chronic | ICD-10-CM

## 2023-03-08 DIAGNOSIS — I27.20 PULMONARY HYPERTENSION: ICD-10-CM

## 2023-03-08 DIAGNOSIS — E03.4 HYPOTHYROIDISM DUE TO ACQUIRED ATROPHY OF THYROID: Chronic | ICD-10-CM

## 2023-03-08 DIAGNOSIS — I82.4Y9 DEEP VEIN THROMBOSIS (DVT) OF PROXIMAL LOWER EXTREMITY, UNSPECIFIED CHRONICITY, UNSPECIFIED LATERALITY: Chronic | ICD-10-CM

## 2023-03-08 PROCEDURE — 3078F DIAST BP <80 MM HG: CPT | Performed by: THORACIC SURGERY (CARDIOTHORACIC VASCULAR SURGERY)

## 2023-03-08 PROCEDURE — 1160F RVW MEDS BY RX/DR IN RCRD: CPT | Performed by: THORACIC SURGERY (CARDIOTHORACIC VASCULAR SURGERY)

## 2023-03-08 PROCEDURE — 1159F MED LIST DOCD IN RCRD: CPT | Performed by: THORACIC SURGERY (CARDIOTHORACIC VASCULAR SURGERY)

## 2023-03-08 PROCEDURE — 99204 OFFICE O/P NEW MOD 45 MIN: CPT | Performed by: THORACIC SURGERY (CARDIOTHORACIC VASCULAR SURGERY)

## 2023-03-08 PROCEDURE — 3074F SYST BP LT 130 MM HG: CPT | Performed by: THORACIC SURGERY (CARDIOTHORACIC VASCULAR SURGERY)

## 2023-03-09 ENCOUNTER — HOSPITAL ENCOUNTER (OUTPATIENT)
Dept: CT IMAGING | Facility: HOSPITAL | Age: 83
Discharge: HOME | End: 2023-03-09
Admitting: THORACIC SURGERY (CARDIOTHORACIC VASCULAR SURGERY)
Payer: MEDICARE

## 2023-03-09 DIAGNOSIS — I35.1 AORTIC VALVE INSUFFICIENCY, ETIOLOGY OF CARDIAC VALVE DISEASE UNSPECIFIED: ICD-10-CM

## 2023-03-09 DIAGNOSIS — I71.20 THORACIC AORTIC ANEURYSM WITHOUT RUPTURE, UNSPECIFIED PART: ICD-10-CM

## 2023-03-09 DIAGNOSIS — Z01.818 PREOP TESTING: ICD-10-CM

## 2023-03-09 DIAGNOSIS — I35.1 AORTIC VALVE INSUFFICIENCY, ETIOLOGY OF CARDIAC VALVE DISEASE UNSPECIFIED: Primary | ICD-10-CM

## 2023-03-09 PROCEDURE — 71250 CT THORAX DX C-: CPT

## 2023-03-10 ENCOUNTER — TELEPHONE (OUTPATIENT)
Dept: CARDIOLOGY | Facility: HOSPITAL | Age: 83
End: 2023-03-10

## 2023-03-10 NOTE — TELEPHONE ENCOUNTER
Telephoned patient for an introduction to the structural heart team.  Education given on mitral regurgitation and procedures available for treatment.  Educational handouts including a shared decision making pamphlet and contact information mailed to patient's home address.  I will continue to follow and arrange further workup as needed.

## 2023-03-19 NOTE — PROGRESS NOTES
BCS CONSULT    Reason for Consultation: Surgical opinion regarding mitral valve regurgitation and aortic valve regurgitation.    History of Present Illness:     This is a very pleasant 83-year-old  woman who has been suffering from worsening congestive heart failure symptoms over the last 6 to 12 months.  Recently she had a severe congestive heart failure exacerbation and was actually evaluated in hospital.  She denies any syncope or chest pain.  She does admit to occasional lower extremity edema and 1 pillow orthopnea.  She denies any paroxysmal nocturnal dyspnea.  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  She has had 1 generator change of a permanent pacemaker.  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    Right and left heart catheterization on 2/25/2023 showed no significant coronary disease.  The patient has a nondominant right coronary system.  The right ventricular systolic blood pressure was measured at 26 mmHg.  The pacemaker leads are clearly visible on the study.  The pacemaker generator is in the left subclavian position.    A transesophageal echocardiogram on 2/27/2023 shows a slightly reduced left ventricular ejection fraction at 35 to 40%.  Images clearly show severe mitral valve regurgitation with a jet traversing the entire length of the enlarged left atrium.  The patient also has mild to moderate aortic valve insufficiency.  There is also some mild tricuspid valve regurgitation.    Review of Systems   Constitutional: Positive for activity change and fatigue. Negative for appetite change, chills, diaphoresis, fever and unexpected weight change.   HENT: Positive for congestion. Negative for dental problem, drooling, ear discharge, facial swelling, hearing loss, mouth sores, nosebleeds, postnasal drip, rhinorrhea, sneezing, sore throat and voice change.    Eyes: Negative for photophobia, pain, discharge, redness, itching and visual  disturbance.   Respiratory: Positive for shortness of breath. Negative for apnea, cough, choking, chest tightness, wheezing and stridor.    Cardiovascular: Positive for palpitations and leg swelling. Negative for chest pain.   Gastrointestinal: Negative for abdominal distention, abdominal pain, anal bleeding, blood in stool, constipation, diarrhea, nausea, rectal pain and vomiting.   Endocrine: Negative for cold intolerance and heat intolerance.   Genitourinary: Negative for difficulty urinating, dyspareunia, dysuria, flank pain, frequency, hematuria and urgency.   Musculoskeletal: Positive for arthralgias, back pain, myalgias, neck pain and neck stiffness. Negative for gait problem and joint swelling.   Skin: Negative for color change, pallor, rash and wound.   Allergic/Immunologic: Negative for environmental allergies, food allergies and immunocompromised state.   Neurological: Negative for dizziness, tremors, seizures, syncope, facial asymmetry, speech difficulty, weakness, light-headedness, numbness and headaches.   Hematological: Negative for adenopathy. Does not bruise/bleed easily.   Psychiatric/Behavioral: Positive for decreased concentration. Negative for agitation, behavioral problems, confusion, dysphoric mood, hallucinations, self-injury and sleep disturbance. The patient is not nervous/anxious and is not hyperactive.         Past Medical History:   Diagnosis Date   • AV block 02/22/2021   • Bilateral renal cysts 01/01/2020   • Carotid stenosis, bilateral 06/01/2019    Overview:  <50% bilateral    • Carpal tunnel syndrome, bilateral 06/15/2018   • Chronic combined systolic (congestive) and diastolic (congestive) heart failure (HCC)    • DVT (deep venous thrombosis) (CMS/HCC) [I82.409]     recurrent   • Hordeolum externum 08/05/2015   • Hyperlipidemia    • Hypertension    • Hypothyroidism due to acquired atrophy of thyroid 10/21/2019   • Long term (current) use of anticoagulants 09/29/2022   • Mitral  regurgitation 05/17/2016   • Osteopenia    • Pacemaker 12/28/2021     Past Surgical History:   Procedure Laterality Date   • CARDIAC CATHETERIZATION N/A 2/25/2023    Procedure: Left Heart Cath and coronary angiogram;  Surgeon: Trace Marcus MD;  Location: [facility] CATH INVASIVE LOCATION;  Service: Cardiovascular;  Laterality: N/A;   • CARDIAC CATHETERIZATION Bilateral 2/25/2023    Procedure: Right and Left Heart Cath;  Surgeon: Trace Marcus MD;  Location: [facility] CATH INVASIVE LOCATION;  Service: Cardiovascular;  Laterality: Bilateral;   • HYSTERECTOMY     • PACEMAKER IMPLANTATION     • THYROID SURGERY       Family History   Problem Relation Age of Onset   • No Known Problems Mother    • No Known Problems Father    • No Known Problems Sister    • No Known Problems Brother    • No Known Problems Maternal Aunt    • No Known Problems Maternal Uncle    • No Known Problems Paternal Aunt    • No Known Problems Paternal Uncle    • No Known Problems Maternal Grandmother    • No Known Problems Maternal Grandfather    • No Known Problems Paternal Grandmother    • No Known Problems Paternal Grandfather    • No Known Problems Other    • Anemia Neg Hx    • Arrhythmia Neg Hx    • Asthma Neg Hx    • Clotting disorder Neg Hx    • Fainting Neg Hx    • Heart attack Neg Hx    • Heart disease Neg Hx    • Heart failure Neg Hx    • Hyperlipidemia Neg Hx    • Hypertension Neg Hx      Social History     Tobacco Use   • Smoking status: Former     Passive exposure: Past   • Smokeless tobacco: Never   • Tobacco comments:     quit 35 yrs ago   Vaping Use   • Vaping Use: Never used   Substance Use Topics   • Alcohol use: Yes     Comment: rare   • Drug use: No     (Not in a hospital admission)      Current Outpatient Medications:   •  atenolol (TENORMIN) 100 MG tablet, Take 1 tablet by mouth Daily., Disp: , Rfl:   •  famotidine (PEPCID) 20 MG tablet, Take 1 tablet by mouth 2 (Two) Times a Day As Needed., Disp: , Rfl:   •  fenofibrate 160 MG  "tablet, Take 1 tablet by mouth Daily., Disp: , Rfl:   •  latanoprost (XALATAN) 0.005 % ophthalmic solution, Administer 1 drop to both eyes Every Night., Disp: , Rfl:   •  levothyroxine (SYNTHROID, LEVOTHROID) 50 MCG tablet, TAKE 1 TABLET BY MOUTH ONCE DAILY, Disp: , Rfl:   •  lisinopril (PRINIVIL,ZESTRIL) 20 MG tablet, Take 1 tablet by mouth Daily., Disp: 30 tablet, Rfl: 0  •  warfarin (COUMADIN) 2.5 MG tablet, Take 1 tablet by mouth Every Night., Disp: , Rfl:   •  warfarin (COUMADIN) 5 MG tablet, Take 1 tablet by mouth Daily., Disp: , Rfl:     Allergies:  Atorvastatin, Colesevelam hcl, Colestipol hcl, Ezetimibe, Pitavastatin, Rosuvastatin, Rosuvastatin calcium, Simvastatin, and Statins    Objective      Vital Signs       Flowsheet Rows    Flowsheet Row First Filed Value   Admission Height 149.9 cm (59\") Documented at 03/08/2023 1251   Admission Weight 47.6 kg (105 lb) Documented at 03/08/2023 1251        149.9 cm (59\")    Physical Exam  Vitals reviewed.   Constitutional:       General: She is awake. She is not in acute distress.     Appearance: Normal appearance. She is well-developed and well-groomed. She is obese. She is not ill-appearing, toxic-appearing or diaphoretic.      Interventions: She is not intubated.  HENT:      Head: Normocephalic and atraumatic. No right periorbital erythema or left periorbital erythema.      Jaw: There is normal jaw occlusion.      Right Ear: Hearing normal.      Left Ear: Hearing normal.      Nose: Nose normal. No nasal deformity, signs of injury, congestion or rhinorrhea.      Right Nostril: No foreign body or epistaxis.      Left Nostril: No foreign body or epistaxis.      Mouth/Throat:      Mouth: Mucous membranes are moist.      Dentition: Normal dentition. Does not have dentures. No gingival swelling, dental caries, dental abscesses or gum lesions.      Tongue: No lesions.      Palate: No mass.      Pharynx: Oropharynx is clear. No oropharyngeal exudate or posterior " oropharyngeal erythema.   Eyes:      General: Lids are normal. No allergic shiner, visual field deficit or scleral icterus.        Right eye: No foreign body, discharge or hordeolum.         Left eye: No foreign body, discharge or hordeolum.      Extraocular Movements: Extraocular movements intact.      Right eye: Normal extraocular motion and no nystagmus.      Left eye: Normal extraocular motion and no nystagmus.      Conjunctiva/sclera: Conjunctivae normal.      Right eye: Right conjunctiva is not injected. No chemosis, exudate or hemorrhage.     Left eye: Left conjunctiva is not injected. No chemosis, exudate or hemorrhage.     Pupils: Pupils are equal, round, and reactive to light.   Neck:      Thyroid: No thyroid mass, thyromegaly or thyroid tenderness.      Vascular: Normal carotid pulses. No carotid bruit, hepatojugular reflux or JVD.      Trachea: Trachea and phonation normal. No tracheal tenderness, tracheostomy, abnormal tracheal secretions or tracheal deviation.      Meningeal: Brudzinski's sign and Kernig's sign absent.   Cardiovascular:      Rate and Rhythm: Regular rhythm.  No extrasystoles are present.     Chest Wall: PMI is not displaced. No thrill.      Pulses:           Radial pulses are 2+ on the right side and 2+ on the left side.        Femoral pulses are 2+ on the right side and 2+ on the left side.       Dorsalis pedis pulses are 1+ on the right side and 1+ on the left side.      Heart sounds: Heart sounds not distant. Murmur heard.    Systolic murmur is present with a grade of 4/6.   No diastolic murmur is present.    No friction rub. No gallop.   Pulmonary:      Effort: No tachypnea, bradypnea, accessory muscle usage, prolonged expiration, respiratory distress or retractions. She is not intubated.      Breath sounds: No stridor, decreased air movement or transmitted upper airway sounds. Examination of the right-lower field reveals rales. Examination of the left-lower field reveals rales.  Rales present. No decreased breath sounds, wheezing or rhonchi.   Chest:      Chest wall: No mass, lacerations, deformity, swelling, tenderness, crepitus or edema. There is no dullness to percussion.      Comments: No sternal abnormality.  Abdominal:      General: Abdomen is protuberant. Bowel sounds are normal. There is no distension or abdominal bruit. There are no signs of injury.      Palpations: Abdomen is soft. There is no shifting dullness, fluid wave, hepatomegaly, splenomegaly, mass or pulsatile mass.      Tenderness: There is no abdominal tenderness. There is no right CVA tenderness or left CVA tenderness. Negative signs include Cintron's sign, Rovsing's sign and McBurney's sign.      Comments: Obese.   Musculoskeletal:      Cervical back: No edema, erythema, signs of trauma, rigidity, torticollis or crepitus. No pain with movement, spinous process tenderness or muscular tenderness. Decreased range of motion.   Lymphadenopathy:      Cervical: No cervical adenopathy.      Right cervical: No superficial cervical adenopathy.     Left cervical: No superficial cervical adenopathy.      Upper Body:      Right upper body: No supraclavicular adenopathy.      Left upper body: No supraclavicular adenopathy.   Skin:     General: Skin is warm and dry.   Neurological:      General: No focal deficit present.      Mental Status: She is alert.      GCS: GCS eye subscore is 4. GCS verbal subscore is 5. GCS motor subscore is 6.      Cranial Nerves: Cranial nerves 2-12 are intact.      Sensory: Sensation is intact.      Motor: Motor function is intact.      Coordination: Coordination is intact.      Gait: Gait is intact.   Psychiatric:         Attention and Perception: Attention and perception normal.         Mood and Affect: Mood and affect normal.         Speech: Speech normal.         Behavior: Behavior normal. Behavior is cooperative.         Thought Content: Thought content normal. Thought content is not paranoid or  delusional. Thought content does not include homicidal or suicidal ideation. Thought content does not include homicidal or suicidal plan.         Cognition and Memory: Cognition and memory normal.         Judgment: Judgment normal.       Results Review:       Assessment/Plan:     This is a pleasant 83-year-old  woman with history of AV block and permanent pacemaker.  She also has combined systolic/diastolic congestive heart failure, New York Heart Association grade 3.  She has severe mitral valve regurgitation and at least mild aortic valve insufficiency.  Her calculated STS mortality for traditional mitral valve replacement/repair is 4.8%.  Her calculated risk of renal failure is 1.37%, of CVA 3.42%, and the prolonged ventilation 12.8%.  Her overall STS M&M risk calculates out to 16.25%.  As such she is at worst a slightly moderately elevated risk for surgical intervention.      There has been some discussion with the patient about a possible MitraClip procedure.  I will discuss this further with Dr. Marcus, her cardiologist.  I would like to get a CT scan of her chest; should she have significant aortic valve calcification that might preclude aortic crossclamping that we perhaps could justify a MitraClip procedure.  Otherwise, I believe she would be a surgical candidate.  She agrees to undergo a CT scan and will await our call after I have a discussion with Dr. Marcus.    Thank you for this kind consultation.    Veronica Orozco MD  03/19/23  13:10 EDT

## 2023-03-21 PROCEDURE — 93294 REM INTERROG EVL PM/LDLS PM: CPT | Performed by: INTERNAL MEDICINE

## 2023-03-21 PROCEDURE — 93296 REM INTERROG EVL PM/IDS: CPT | Performed by: INTERNAL MEDICINE

## 2023-03-29 ENCOUNTER — CLINICAL SUPPORT NO REQUIREMENTS (OUTPATIENT)
Dept: CARDIOLOGY | Facility: CLINIC | Age: 83
End: 2023-03-29
Payer: MEDICARE

## 2023-03-29 ENCOUNTER — ANTICOAGULATION VISIT (OUTPATIENT)
Dept: CARDIOLOGY | Facility: CLINIC | Age: 83
End: 2023-03-29
Payer: MEDICARE

## 2023-03-29 VITALS
SYSTOLIC BLOOD PRESSURE: 149 MMHG | HEART RATE: 71 BPM | BODY MASS INDEX: 21.41 KG/M2 | DIASTOLIC BLOOD PRESSURE: 48 MMHG | WEIGHT: 106 LBS

## 2023-03-29 DIAGNOSIS — I49.5 SSS (SICK SINUS SYNDROME): ICD-10-CM

## 2023-03-29 DIAGNOSIS — I44.2 AV BLOCK, COMPLETE: ICD-10-CM

## 2023-03-29 DIAGNOSIS — Z79.01 LONG TERM (CURRENT) USE OF ANTICOAGULANTS: Chronic | ICD-10-CM

## 2023-03-29 DIAGNOSIS — Z95.0 PACEMAKER: Primary | Chronic | ICD-10-CM

## 2023-03-29 DIAGNOSIS — I82.4Y9 DEEP VEIN THROMBOSIS (DVT) OF PROXIMAL LOWER EXTREMITY, UNSPECIFIED CHRONICITY, UNSPECIFIED LATERALITY: Primary | Chronic | ICD-10-CM

## 2023-03-29 LAB — INR PPP: 1.2 (ref 0.9–1.1)

## 2023-03-31 ENCOUNTER — TELEPHONE (OUTPATIENT)
Dept: CARDIAC SURGERY | Facility: CLINIC | Age: 83
End: 2023-03-31
Payer: MEDICARE

## 2023-03-31 NOTE — TELEPHONE ENCOUNTER
Notified pt that Dr. Orozco is recommending an open surgery for mitral and aortic valves. Pt agreeable to proceed. Message sent to scheduling.

## 2023-04-12 ENCOUNTER — ANTICOAGULATION VISIT (OUTPATIENT)
Dept: CARDIOLOGY | Facility: CLINIC | Age: 83
End: 2023-04-12
Payer: MEDICARE

## 2023-04-12 VITALS
SYSTOLIC BLOOD PRESSURE: 133 MMHG | HEART RATE: 64 BPM | BODY MASS INDEX: 21.41 KG/M2 | WEIGHT: 106 LBS | DIASTOLIC BLOOD PRESSURE: 61 MMHG

## 2023-04-12 DIAGNOSIS — Z79.01 LONG TERM (CURRENT) USE OF ANTICOAGULANTS: Chronic | ICD-10-CM

## 2023-04-12 DIAGNOSIS — I82.4Y9 DEEP VEIN THROMBOSIS (DVT) OF PROXIMAL LOWER EXTREMITY, UNSPECIFIED CHRONICITY, UNSPECIFIED LATERALITY: Primary | Chronic | ICD-10-CM

## 2023-04-12 LAB — INR PPP: 1.6 (ref 0.9–1.1)

## 2023-04-24 ENCOUNTER — OFFICE VISIT (OUTPATIENT)
Dept: CARDIAC SURGERY | Facility: CLINIC | Age: 83
End: 2023-04-24
Payer: MEDICARE

## 2023-04-24 VITALS
OXYGEN SATURATION: 98 % | HEIGHT: 59 IN | SYSTOLIC BLOOD PRESSURE: 137 MMHG | TEMPERATURE: 97.5 F | HEART RATE: 62 BPM | DIASTOLIC BLOOD PRESSURE: 70 MMHG | RESPIRATION RATE: 20 BRPM | WEIGHT: 106 LBS | BODY MASS INDEX: 21.37 KG/M2

## 2023-04-24 DIAGNOSIS — E03.4 HYPOTHYROIDISM DUE TO ACQUIRED ATROPHY OF THYROID: Chronic | ICD-10-CM

## 2023-04-24 DIAGNOSIS — I34.0 SEVERE MITRAL VALVE REGURGITATION: Primary | ICD-10-CM

## 2023-04-24 DIAGNOSIS — I27.20 PULMONARY HYPERTENSION: ICD-10-CM

## 2023-04-24 DIAGNOSIS — I10 ESSENTIAL HYPERTENSION WITH GOAL BLOOD PRESSURE LESS THAN 130/80: Chronic | ICD-10-CM

## 2023-04-24 DIAGNOSIS — I44.2 PRESENCE OF CARDIAC PACEMAKER FOR COMPLETE AV BLOCK: ICD-10-CM

## 2023-04-24 DIAGNOSIS — M85.80 OSTEOPENIA, UNSPECIFIED LOCATION: Chronic | ICD-10-CM

## 2023-04-24 DIAGNOSIS — I50.42 CHRONIC COMBINED SYSTOLIC AND DIASTOLIC CONGESTIVE HEART FAILURE: ICD-10-CM

## 2023-04-24 DIAGNOSIS — Z95.0 PRESENCE OF CARDIAC PACEMAKER FOR COMPLETE AV BLOCK: ICD-10-CM

## 2023-04-24 DIAGNOSIS — E78.1 HYPERTRIGLYCERIDEMIA: Chronic | ICD-10-CM

## 2023-04-24 DIAGNOSIS — I35.1 MODERATE AORTIC VALVE REGURGITATION: ICD-10-CM

## 2023-04-24 RX ORDER — FUROSEMIDE 40 MG/1
40 TABLET ORAL EVERY OTHER DAY
COMMUNITY
End: 2023-04-24

## 2023-04-24 RX ORDER — HYDROCHLOROTHIAZIDE 25 MG/1
50 TABLET ORAL EVERY OTHER DAY
COMMUNITY

## 2023-04-24 NOTE — LETTER
April 27, 2023     Camila Lazcano MD  [address] [id number]    Patient: hTi Allen   YOB: 1940   Date of Visit: 4/24/2023       Dear Dr. Neno MD:    I recently saw your patient Thi Allen for follow up. Below are the relevant portions of my assessment and plan of care.    If you have questions, please do not hesitate to call me.         Sincerely,        Veronica Orozco MD        CC: MD Pam Meza, Veronica Arroyo MD  04/27/23 1821  Signed  Mrs. Allen returns for planned follow up. She has severe mitral valve regurgitation, moderate aortic valve regurgitation, and chronic systolic/diastolic congestive heart failure, NYHA grade III-IV. She continues to have significant symptoms, with her primary symptoms being dyspnea on even minimal exertion. She also has periods of light headedness, although she denies syncope. She denies chest pain. She denies any lower extremity edema.     A CT scan of the chest on 3/9/2023 shows that she has significant calcification of the aortic arch but not of the ascending aorta; she should be able to undergo cardiopulmonary bypass and aortic cross clamping.    T 97.5 HR 62 /70 RR 18 O2sat 98% on room air    NCAT, no obvious dental infection. Anicteric sclerae.  No thyromegaly, no LAD, mild B/L carotid bruits, no tracheal deviation.  No sternal abnormality. Left subclavian PPM is palpable, incision healed.  RRR, 4/6 systolic murmur. 2/6/ diastolic murmur.  Soft, NT, ND, normal bowel sounds.  Minimal pedal edema. No cyanosis, no clubbing. Palpable B/L radial and DP pulses.  GCS 15, alert and oriented x3.    This is a pleasant 83 year old woman with severe mitral valve regurgitation, moderate aortic valve regurgitation, and chronic systolic/diastolic congestive heart failure, NYHA III-IV. She qualifies for mitral valve and aortic valve intervention. Despite her mild to moderately elevated STS mortality risk I believe surgical  intervention is her best therapeutic option. I have discussed this with Dr. Marcus, who agrees. The patient and her  are in agreement as well.     Plan for mitral valve procedure/aortic valve procedure following dental clearance. The patient plans to contact her 's dentist today, 4/24/2023 for an appointment.

## 2023-04-27 ENCOUNTER — ANTICOAGULATION VISIT (OUTPATIENT)
Dept: CARDIOLOGY | Facility: CLINIC | Age: 83
End: 2023-04-27
Payer: MEDICARE

## 2023-04-27 VITALS
HEART RATE: 63 BPM | DIASTOLIC BLOOD PRESSURE: 40 MMHG | BODY MASS INDEX: 21.41 KG/M2 | SYSTOLIC BLOOD PRESSURE: 145 MMHG | WEIGHT: 106 LBS

## 2023-04-27 DIAGNOSIS — Z79.01 LONG TERM (CURRENT) USE OF ANTICOAGULANTS: Chronic | ICD-10-CM

## 2023-04-27 DIAGNOSIS — I82.4Y9 DEEP VEIN THROMBOSIS (DVT) OF PROXIMAL LOWER EXTREMITY, UNSPECIFIED CHRONICITY, UNSPECIFIED LATERALITY: Primary | Chronic | ICD-10-CM

## 2023-04-27 LAB — INR PPP: 1.8 (ref 1.8–2.8)

## 2023-04-27 NOTE — PROGRESS NOTES
Mrs. Allen returns for planned follow up. She has severe mitral valve regurgitation, moderate aortic valve regurgitation, and chronic systolic/diastolic congestive heart failure, NYHA grade III-IV. She continues to have significant symptoms, with her primary symptoms being dyspnea on even minimal exertion. She also has periods of light headedness, although she denies syncope. She denies chest pain. She denies any lower extremity edema.     A CT scan of the chest on 3/9/2023 shows that she has significant calcification of the aortic arch but not of the ascending aorta; she should be able to undergo cardiopulmonary bypass and aortic cross clamping.    T 97.5 HR 62 /70 RR 18 O2sat 98% on room air    NCAT, no obvious dental infection. Anicteric sclerae.  No thyromegaly, no LAD, mild B/L carotid bruits, no tracheal deviation.  No sternal abnormality. Left subclavian PPM is palpable, incision healed.  RRR, 4/6 systolic murmur. 2/6/ diastolic murmur.  Soft, NT, ND, normal bowel sounds.  Minimal pedal edema. No cyanosis, no clubbing. Palpable B/L radial and DP pulses.  GCS 15, alert and oriented x3.    This is a pleasant 83 year old woman with severe mitral valve regurgitation, moderate aortic valve regurgitation, and chronic systolic/diastolic congestive heart failure, NYHA III-IV. She qualifies for mitral valve and aortic valve intervention. Despite her mild to moderately elevated STS mortality risk I believe surgical intervention is her best therapeutic option. I have discussed this with Dr. Marcus, who agrees. The patient and her  are in agreement as well.     Plan for mitral valve procedure/aortic valve procedure following dental clearance. The patient plans to contact her 's dentist today, 4/24/2023 for an appointment.

## 2023-05-01 ENCOUNTER — TELEPHONE (OUTPATIENT)
Dept: CARDIAC SURGERY | Facility: CLINIC | Age: 83
End: 2023-05-01
Payer: MEDICARE

## 2023-05-01 ENCOUNTER — PREP FOR SURGERY (OUTPATIENT)
Dept: OTHER | Facility: HOSPITAL | Age: 83
End: 2023-05-01
Payer: MEDICARE

## 2023-05-01 DIAGNOSIS — Z01.810 PREOP CARDIOVASCULAR EXAM: ICD-10-CM

## 2023-05-01 DIAGNOSIS — I34.0 MITRAL VALVE INSUFFICIENCY, UNSPECIFIED ETIOLOGY: Primary | ICD-10-CM

## 2023-05-01 DIAGNOSIS — R79.1 ABNORMAL COAGULATION PROFILE: ICD-10-CM

## 2023-05-01 DIAGNOSIS — R93.3 ABNORMAL FINDINGS ON DIAGNOSTIC IMAGING OF OTHER PARTS OF DIGESTIVE TRACT: ICD-10-CM

## 2023-05-01 DIAGNOSIS — R79.9 ABNORMAL FINDING OF BLOOD CHEMISTRY, UNSPECIFIED: ICD-10-CM

## 2023-05-01 RX ORDER — ENOXAPARIN SODIUM 100 MG/ML
50 INJECTION SUBCUTANEOUS EVERY 12 HOURS SCHEDULED
Qty: 2.5 ML | Refills: 0 | Status: SHIPPED | OUTPATIENT
Start: 2023-05-12 | End: 2023-05-15

## 2023-05-01 RX ORDER — SODIUM CHLORIDE 0.9 % (FLUSH) 0.9 %
30 SYRINGE (ML) INJECTION ONCE AS NEEDED
OUTPATIENT
Start: 2023-05-01

## 2023-05-01 RX ORDER — NITROGLYCERIN 0.4 MG/1
0.4 TABLET SUBLINGUAL
OUTPATIENT
Start: 2023-05-01

## 2023-05-01 RX ORDER — DEXTROSE MONOHYDRATE 25 G/50ML
10-50 INJECTION, SOLUTION INTRAVENOUS
OUTPATIENT
Start: 2023-05-01

## 2023-05-01 RX ORDER — SODIUM CHLORIDE 0.9 % (FLUSH) 0.9 %
3 SYRINGE (ML) INJECTION EVERY 12 HOURS SCHEDULED
OUTPATIENT
Start: 2023-05-01

## 2023-05-01 RX ORDER — CHLORHEXIDINE GLUCONATE 500 MG/1
1 CLOTH TOPICAL EVERY 12 HOURS PRN
OUTPATIENT
Start: 2023-05-01

## 2023-05-01 RX ORDER — CHLORHEXIDINE GLUCONATE 0.12 MG/ML
15 RINSE ORAL ONCE
OUTPATIENT
Start: 2023-05-01 | End: 2023-05-01

## 2023-05-01 RX ORDER — CHLORHEXIDINE GLUCONATE 0.12 MG/ML
15 RINSE ORAL EVERY 12 HOURS
OUTPATIENT
Start: 2023-05-01 | End: 2023-05-02

## 2023-05-01 RX ORDER — NICOTINE POLACRILEX 4 MG
15 LOZENGE BUCCAL
OUTPATIENT
Start: 2023-05-01

## 2023-05-01 RX ORDER — ALPRAZOLAM 0.25 MG/1
0.25 TABLET ORAL ONCE
OUTPATIENT
Start: 2023-05-01 | End: 2023-05-01

## 2023-05-01 RX ORDER — SODIUM CHLORIDE 0.9 % (FLUSH) 0.9 %
3-10 SYRINGE (ML) INJECTION AS NEEDED
OUTPATIENT
Start: 2023-05-01

## 2023-05-01 RX ORDER — SODIUM CHLORIDE 9 MG/ML
40 INJECTION, SOLUTION INTRAVENOUS AS NEEDED
OUTPATIENT
Start: 2023-05-01

## 2023-05-01 RX ORDER — ACETAMINOPHEN 325 MG/1
650 TABLET ORAL EVERY 4 HOURS PRN
OUTPATIENT
Start: 2023-05-01

## 2023-05-01 RX ORDER — SODIUM CHLORIDE 9 MG/ML
30 INJECTION, SOLUTION INTRAVENOUS CONTINUOUS PRN
OUTPATIENT
Start: 2023-05-01

## 2023-05-01 RX ORDER — IBUPROFEN 600 MG/1
1 TABLET ORAL
OUTPATIENT
Start: 2023-05-01

## 2023-05-01 NOTE — TELEPHONE ENCOUNTER
D/w Dr. Orozco regarding pancreatic findings on CT chest.  He recommended getting cardiac surgery and doing f/u abd studies postop.

## 2023-05-01 NOTE — TELEPHONE ENCOUNTER
Pt has been set up for cardiac surgery on 5/15/2023 with Dr. Orozco--MVr/ poss AVR.  She has been on warfarin for DVT.  Will need bridge for Lovenox.  Last dose of warfarin is 5/10.  Start Lovenox 50 mg bid on 5/12/2023 and last dose 5/14/2023 morning.  RX escribed today.

## 2023-05-03 ENCOUNTER — TELEPHONE (OUTPATIENT)
Dept: CARDIAC SURGERY | Facility: CLINIC | Age: 83
End: 2023-05-03
Payer: MEDICARE

## 2023-05-03 NOTE — TELEPHONE ENCOUNTER
Spoke with . Discussed surgery date/time, advised [facility] would be calling her to schedule date/time for PAT, discussed Warfarin and Lovenox instructions. Discussed Lovenox administration instructions.  Verified Pharmacy, advised should she have any questions to call the office. She verbalized understanding and this was agreeable.     Surgery at [facility] on 5/15/23@0700 patient to arrive at 0530.    Warfarin Last dose on 5/10/2023    Start Lovenox injections on morning of 5/12/23 every 12 hours with last injection on the morning of 5/14/23 for total of 5 Lovenox instructions.

## 2023-05-03 NOTE — TELEPHONE ENCOUNTER
----- Message from HARIS Ambrocio sent at 5/1/2023  2:51 PM EDT -----  Regarding: RE: Request/Orders/Coumadin  Last dose of warfarin 5/10.. she will need Lovenox 50 mg bid to start on 5/12 thru morning of 5/14.  #5 doses.    ----- Message -----  From: Jason Brandt  Sent: 5/1/2023   2:05 PM EDT  To: HARIS Ambrocio, #  Subject: Request/Orders/Coumadin                          Surgery 5- at [facility],  Patient is on COUMADIN, instructions please.    Jason  ----- Message -----  From: Kenzie Godfrey RN  Sent: 5/1/2023   1:51 PM EDT  To: Jason Brandt  Subject: surgery at [facility]                                 Dental clearance scanned in chart. Patient needs scheduled for Mvr possible avr. Per  schedule in the first half of May 2023.  Thanks,  Kenzie

## 2023-05-11 ENCOUNTER — HOSPITAL ENCOUNTER (OUTPATIENT)
Dept: RESPIRATORY THERAPY | Facility: HOSPITAL | Age: 83
Discharge: HOME | End: 2023-05-11
Payer: MEDICARE

## 2023-05-11 ENCOUNTER — LAB (OUTPATIENT)
Dept: LAB | Facility: HOSPITAL | Age: 83
End: 2023-05-11
Payer: MEDICARE

## 2023-05-11 ENCOUNTER — HOSPITAL ENCOUNTER (OUTPATIENT)
Dept: GENERAL RADIOLOGY | Facility: HOSPITAL | Age: 83
Discharge: HOME | End: 2023-05-11
Payer: MEDICARE

## 2023-05-11 ENCOUNTER — HOSPITAL ENCOUNTER (OUTPATIENT)
Dept: CARDIOLOGY | Facility: HOSPITAL | Age: 83
Discharge: HOME | End: 2023-05-11
Payer: MEDICARE

## 2023-05-11 ENCOUNTER — PRE-ADMISSION TESTING (OUTPATIENT)
Dept: PREADMISSION TESTING | Facility: HOSPITAL | Age: 83
End: 2023-05-11
Payer: MEDICARE

## 2023-05-11 VITALS
HEART RATE: 81 BPM | WEIGHT: 107 LBS | OXYGEN SATURATION: 99 % | DIASTOLIC BLOOD PRESSURE: 40 MMHG | RESPIRATION RATE: 20 BRPM | HEIGHT: 59 IN | BODY MASS INDEX: 21.57 KG/M2 | SYSTOLIC BLOOD PRESSURE: 136 MMHG | TEMPERATURE: 97.9 F

## 2023-05-11 DIAGNOSIS — I34.0 MITRAL VALVE INSUFFICIENCY, UNSPECIFIED ETIOLOGY: ICD-10-CM

## 2023-05-11 LAB
ABO GROUP BLD: NORMAL
ALBUMIN SERPL-MCNC: 4.5 G/DL (ref 3.5–5.2)
ALBUMIN/GLOB SERPL: 1.9 G/DL
ALP SERPL-CCNC: 38 U/L (ref 39–117)
ALT SERPL W P-5'-P-CCNC: 15 U/L (ref 1–33)
ANION GAP SERPL CALCULATED.3IONS-SCNC: 9 MMOL/L (ref 5–15)
APTT PPP: 34.2 SECONDS (ref 24–31)
ARTERIAL PATENCY WRIST A: POSITIVE
AST SERPL-CCNC: 24 U/L (ref 1–32)
ATMOSPHERIC PRESS: ABNORMAL MM[HG]
BASE EXCESS BLDA CALC-SCNC: 2.9 MMOL/L (ref 0–3)
BASOPHILS # BLD AUTO: 0 10*3/MM3 (ref 0–0.2)
BASOPHILS NFR BLD AUTO: 0.8 % (ref 0–1.5)
BDY SITE: ABNORMAL
BH CV XLRA MEAS LEFT DIST CCA EDV: -18 CM/SEC
BH CV XLRA MEAS LEFT DIST CCA PSV: -87 CM/SEC
BH CV XLRA MEAS LEFT DIST ICA EDV: -28.1 CM/SEC
BH CV XLRA MEAS LEFT DIST ICA PSV: -110 CM/SEC
BH CV XLRA MEAS LEFT ICA/CCA RATIO: 1.53
BH CV XLRA MEAS LEFT PROX CCA EDV: 16.2 CM/SEC
BH CV XLRA MEAS LEFT PROX CCA PSV: 75.2 CM/SEC
BH CV XLRA MEAS LEFT PROX ECA PSV: -69 CM/SEC
BH CV XLRA MEAS LEFT PROX ICA EDV: -34.9 CM/SEC
BH CV XLRA MEAS LEFT PROX ICA PSV: -133 CM/SEC
BH CV XLRA MEAS LEFT PROX SCLA PSV: 102 CM/SEC
BH CV XLRA MEAS LEFT VERTEBRAL A PSV: -45.4 CM/SEC
BH CV XLRA MEAS RIGHT DIST CCA EDV: 19.3 CM/SEC
BH CV XLRA MEAS RIGHT DIST CCA PSV: 75.8 CM/SEC
BH CV XLRA MEAS RIGHT DIST ICA EDV: 15.5 CM/SEC
BH CV XLRA MEAS RIGHT DIST ICA PSV: 55.3 CM/SEC
BH CV XLRA MEAS RIGHT ICA/CCA RATIO: -1.54
BH CV XLRA MEAS RIGHT MID ICA EDV: -29.8 CM/SEC
BH CV XLRA MEAS RIGHT MID ICA PSV: -117 CM/SEC
BH CV XLRA MEAS RIGHT PROX CCA EDV: 13.7 CM/SEC
BH CV XLRA MEAS RIGHT PROX CCA PSV: 68.3 CM/SEC
BH CV XLRA MEAS RIGHT PROX ECA PSV: -67.1 CM/SEC
BH CV XLRA MEAS RIGHT PROX ICA EDV: -24.2 CM/SEC
BH CV XLRA MEAS RIGHT PROX ICA PSV: -106 CM/SEC
BH CV XLRA MEAS RIGHT PROX SCLA PSV: 104 CM/SEC
BH CV XLRA MEAS RIGHT VERTEBRAL A PSV: -67.1 CM/SEC
BILIRUB SERPL-MCNC: 0.3 MG/DL (ref 0–1.2)
BILIRUB UR QL STRIP: NEGATIVE
BLD GP AB SCN SERPL QL: NEGATIVE
BUN SERPL-MCNC: 21 MG/DL (ref 8–23)
BUN/CREAT SERPL: 26.3 (ref 7–25)
CALCIUM SPEC-SCNC: 9.8 MG/DL (ref 8.6–10.5)
CHLORIDE SERPL-SCNC: 103 MMOL/L (ref 98–107)
CHOLEST SERPL-MCNC: 190 MG/DL (ref 0–200)
CLARITY UR: CLEAR
CLOSE TME COLL+ADP + EPINEP PNL BLD: 94 % (ref 86–100)
CO2 BLDA-SCNC: 27.7 MMOL/L (ref 22–29)
CO2 SERPL-SCNC: 30 MMOL/L (ref 22–29)
COLOR UR: YELLOW
CREAT SERPL-MCNC: 0.8 MG/DL (ref 0.57–1)
DEPRECATED RDW RBC AUTO: 47.7 FL (ref 37–54)
EGFRCR SERPLBLD CKD-EPI 2021: 74.1 ML/MIN/1.73
EOSINOPHIL # BLD AUTO: 0.1 10*3/MM3 (ref 0–0.4)
EOSINOPHIL NFR BLD AUTO: 2.4 % (ref 0.3–6.2)
ERYTHROCYTE [DISTWIDTH] IN BLOOD BY AUTOMATED COUNT: 14.3 % (ref 12.3–15.4)
GLOBULIN UR ELPH-MCNC: 2.4 GM/DL
GLUCOSE SERPL-MCNC: 119 MG/DL (ref 65–99)
GLUCOSE UR STRIP-MCNC: NEGATIVE MG/DL
HBA1C MFR BLD: 5.8 % (ref 4.8–5.6)
HCO3 BLDA-SCNC: 26.6 MMOL/L (ref 21–28)
HCT VFR BLD AUTO: 37.7 % (ref 34–46.6)
HDLC SERPL-MCNC: 44 MG/DL (ref 40–60)
HEMODILUTION: NO
HGB BLD-MCNC: 12.8 G/DL (ref 12–15.9)
HGB UR QL STRIP.AUTO: NEGATIVE
INHALED O2 CONCENTRATION: 21 %
INR PPP: 2.66 (ref 0.93–1.1)
KETONES UR QL STRIP: NEGATIVE
LDLC SERPL CALC-MCNC: 124 MG/DL (ref 0–100)
LDLC/HDLC SERPL: 2.76 {RATIO}
LEFT ARM BP: NORMAL MMHG
LEUKOCYTE ESTERASE UR QL STRIP.AUTO: NEGATIVE
LYMPHOCYTES # BLD AUTO: 1.5 10*3/MM3 (ref 0.7–3.1)
LYMPHOCYTES NFR BLD AUTO: 29.9 % (ref 19.6–45.3)
MAXIMAL PREDICTED HEART RATE: 139 BPM
MCH RBC QN AUTO: 30.6 PG (ref 26.6–33)
MCHC RBC AUTO-ENTMCNC: 33.9 G/DL (ref 31.5–35.7)
MCV RBC AUTO: 90.1 FL (ref 79–97)
MODALITY: ABNORMAL
MONOCYTES # BLD AUTO: 0.4 10*3/MM3 (ref 0.1–0.9)
MONOCYTES NFR BLD AUTO: 8.5 % (ref 5–12)
MRSA DNA SPEC QL NAA+PROBE: NORMAL
NEUTROPHILS NFR BLD AUTO: 2.9 10*3/MM3 (ref 1.7–7)
NEUTROPHILS NFR BLD AUTO: 58.4 % (ref 42.7–76)
NITRITE UR QL STRIP: NEGATIVE
NRBC BLD AUTO-RTO: 0.1 /100 WBC (ref 0–0.2)
PCO2 BLDA: 36.9 MM HG (ref 35–48)
PH BLDA: 7.47 PH UNITS (ref 7.35–7.45)
PH UR STRIP.AUTO: 8 [PH] (ref 5–8)
PLATELET # BLD AUTO: 240 10*3/MM3 (ref 140–450)
PMV BLD AUTO: 9.6 FL (ref 6–12)
PO2 BLDA: 115.7 MM HG (ref 83–108)
POTASSIUM SERPL-SCNC: 4.2 MMOL/L (ref 3.5–5.2)
PROT SERPL-MCNC: 6.9 G/DL (ref 6–8.5)
PROT UR QL STRIP: NEGATIVE
PROTHROMBIN TIME: 26.9 SECONDS (ref 9.6–11.7)
QT INTERVAL: 449 MS
RBC # BLD AUTO: 4.18 10*6/MM3 (ref 3.77–5.28)
RH BLD: POSITIVE
RIGHT ARM BP: NORMAL MMHG
SAO2 % BLDCOA: 98.8 % (ref 94–98)
SARS-COV-2 RNA RESP QL NAA+PROBE: NOT DETECTED
SODIUM SERPL-SCNC: 142 MMOL/L (ref 136–145)
SP GR UR STRIP: 1.01 (ref 1–1.03)
STRESS TARGET HR: 118 BPM
T&S EXPIRATION DATE: NORMAL
TRIGL SERPL-MCNC: 123 MG/DL (ref 0–150)
UROBILINOGEN UR QL STRIP: NORMAL
VLDLC SERPL-MCNC: 22 MG/DL (ref 5–40)
WBC NRBC COR # BLD: 4.9 10*3/MM3 (ref 3.4–10.8)

## 2023-05-11 PROCEDURE — U0005 INFEC AGEN DETEC AMPLI PROBE: HCPCS

## 2023-05-11 PROCEDURE — 86900 BLOOD TYPING SEROLOGIC ABO: CPT

## 2023-05-11 PROCEDURE — 36415 COLL VENOUS BLD VENIPUNCTURE: CPT

## 2023-05-11 PROCEDURE — 36600 WITHDRAWAL OF ARTERIAL BLOOD: CPT

## 2023-05-11 PROCEDURE — 80061 LIPID PANEL: CPT

## 2023-05-11 PROCEDURE — 85730 THROMBOPLASTIN TIME PARTIAL: CPT

## 2023-05-11 PROCEDURE — 85025 COMPLETE CBC W/AUTO DIFF WBC: CPT

## 2023-05-11 PROCEDURE — 93880 EXTRACRANIAL BILAT STUDY: CPT

## 2023-05-11 PROCEDURE — 81003 URINALYSIS AUTO W/O SCOPE: CPT

## 2023-05-11 PROCEDURE — 86923 COMPATIBILITY TEST ELECTRIC: CPT

## 2023-05-11 PROCEDURE — 80053 COMPREHEN METABOLIC PANEL: CPT

## 2023-05-11 PROCEDURE — U0003 INFECTIOUS AGENT DETECTION BY NUCLEIC ACID (DNA OR RNA); SEVERE ACUTE RESPIRATORY SYNDROME CORONAVIRUS 2 (SARS-COV-2) (CORONAVIRUS DISEASE [COVID-19]), AMPLIFIED PROBE TECHNIQUE, MAKING USE OF HIGH THROUGHPUT TECHNOLOGIES AS DESCRIBED BY CMS-2020-01-R: HCPCS

## 2023-05-11 PROCEDURE — 85576 BLOOD PLATELET AGGREGATION: CPT

## 2023-05-11 PROCEDURE — 83036 HEMOGLOBIN GLYCOSYLATED A1C: CPT

## 2023-05-11 PROCEDURE — 86901 BLOOD TYPING SEROLOGIC RH(D): CPT

## 2023-05-11 PROCEDURE — 86850 RBC ANTIBODY SCREEN: CPT

## 2023-05-11 PROCEDURE — 82803 BLOOD GASES ANY COMBINATION: CPT

## 2023-05-11 PROCEDURE — 93005 ELECTROCARDIOGRAM TRACING: CPT | Performed by: NURSE PRACTITIONER

## 2023-05-11 PROCEDURE — 85610 PROTHROMBIN TIME: CPT

## 2023-05-11 PROCEDURE — 87641 MR-STAPH DNA AMP PROBE: CPT

## 2023-05-11 PROCEDURE — 71046 X-RAY EXAM CHEST 2 VIEWS: CPT

## 2023-05-11 RX ORDER — CHLORHEXIDINE GLUCONATE 500 MG/1
1 CLOTH TOPICAL EVERY 12 HOURS PRN
Status: ACTIVE | OUTPATIENT
Start: 2023-05-11

## 2023-05-11 RX ORDER — CHLORHEXIDINE GLUCONATE 0.12 MG/ML
15 RINSE ORAL EVERY 12 HOURS
Status: ACTIVE | OUTPATIENT
Start: 2023-05-11 | End: 2023-05-12

## 2023-05-11 NOTE — PAT
Pre op conditions relevant to recovery:  1.  Has a pacemaker  2.  On coumadin for history of blood clots

## 2023-05-12 NOTE — H&P
Patient Care Team:  Camila Lazcano MD as PCP - General  Trace Marcus MD as Cardiologist (Cardiology)  Referring Provider: Dr. Marcus      Chief complaint: Preadmission testing and teaching    Subjective     History of Present Illness:  83-year-old  woman who has been suffering from worsening congestive heart failure symptoms over the last 6 to 12 months. Recently she had a severe congestive heart failure exacerbation and was actually evaluated in hospital.  She denies any syncope or chest pain.  She does admit to occasional lower extremity edema and 1 pillow orthopnea.  She denies any paroxysmal nocturnal dyspnea.  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  She has had 1 generator change of a permanent pacemaker.  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     Right and left heart catheterization on 2/25/2023 showed no significant coronary disease.  The patient has a nondominant right coronary system.  The right ventricular systolic blood pressure was measured at 26 mmHg.  The pacemaker leads are clearly visible on the study.  The pacemaker generator is in the left subclavian position.     A transesophageal echocardiogram on 2/27/2023 shows a slightly reduced left ventricular ejection fraction at 35 to 40%.  Images clearly show severe mitral valve regurgitation with a jet traversing the entire length of the enlarged left atrium.  The patient also has mild to moderate aortic valve insufficiency.  There is also some mild tricuspid valve regurgitation.    She was recently seen by Dr. Orozco in late April for follow-up.  She continues to have significant symptoms, with her primary symptom being dyspnea on minimal exertion.  She also has periods of lightheadedness but denies bill syncope.  She denies any chest pain.  She has class III-IV chronic systolic/diastolic heart failure.  CT chest 3/9/2023 showed that she had a significant calcification of the  aortic arch but not of the ascending aorta.  He is here today for preadmission testing and teaching.     Review of Systems   Constitutional: Positive for activity change and fatigue. Negative for appetite change, chills, diaphoresis, fever and unexpected weight change.   HENT: Positive for congestion. Negative for dental problem, drooling, ear discharge, facial swelling, hearing loss, mouth sores, nosebleeds, postnasal drip, rhinorrhea, sneezing, sore throat and voice change.    Eyes: Negative for photophobia, pain, discharge, redness, itching and visual disturbance.   Respiratory: Positive for shortness of breath. Negative for apnea, cough, choking, chest tightness, wheezing and stridor.    Cardiovascular: Positive for palpitations and leg swelling. Negative for chest pain.   Gastrointestinal: Negative for abdominal distention, abdominal pain, anal bleeding, blood in stool, constipation, diarrhea, nausea, rectal pain and vomiting.   Endocrine: Negative for cold intolerance and heat intolerance.   Genitourinary: Negative for difficulty urinating, dyspareunia, dysuria, flank pain, frequency, hematuria and urgency.   Musculoskeletal: Positive for arthralgias, back pain, myalgias, neck pain and neck stiffness. Negative for gait problem and joint swelling.   Skin: Negative for color change, pallor, rash and wound.   Allergic/Immunologic: Negative for environmental allergies, food allergies and immunocompromised state.   Neurological: Negative for dizziness, tremors, seizures, syncope, facial asymmetry, speech difficulty, weakness, light-headedness, numbness and headaches.   Hematological: Negative for adenopathy. Does not bruise/bleed easily.   Psychiatric/Behavioral: Positive for decreased concentration. Negative for agitation, behavioral problems, confusion, dysphoric mood, hallucinations, self-injury and sleep disturbance. The patient is not nervous/anxious and is not hyperactive.      Past Medical History:    Diagnosis Date   • AV block 02/22/2021   • Bilateral renal cysts 01/01/2020   • Carotid stenosis, bilateral 06/01/2019    Overview:  <50% bilateral    • Carpal tunnel syndrome, bilateral 06/15/2018   • Chronic combined systolic (congestive) and diastolic (congestive) heart failure    • DVT (deep venous thrombosis) (CMS/[facility]) [I82.409]     recurrent   • Hordeolum externum 08/05/2015   • Hyperlipidemia    • Hypertension    • Hypothyroidism due to acquired atrophy of thyroid 10/21/2019   • Long term (current) use of anticoagulants 09/29/2022   • Mitral regurgitation 05/17/2016   • Osteopenia    • Pacemaker 12/28/2021     Past Surgical History:   Procedure Laterality Date   • CARDIAC CATHETERIZATION N/A 2/25/2023    Procedure: Left Heart Cath and coronary angiogram;  Surgeon: Trace Marcus MD;  Location: [facility] CATH INVASIVE LOCATION;  Service: Cardiovascular;  Laterality: N/A;   • CARDIAC CATHETERIZATION Bilateral 2/25/2023    Procedure: Right and Left Heart Cath;  Surgeon: Trace Marcus MD;  Location: [facility] CATH INVASIVE LOCATION;  Service: Cardiovascular;  Laterality: Bilateral;   • HYSTERECTOMY     • PACEMAKER IMPLANTATION     • THYROID SURGERY       Family History   Problem Relation Age of Onset   • No Known Problems Mother    • No Known Problems Father    • No Known Problems Sister    • No Known Problems Brother    • No Known Problems Maternal Aunt    • No Known Problems Maternal Uncle    • No Known Problems Paternal Aunt    • No Known Problems Paternal Uncle    • No Known Problems Maternal Grandmother    • No Known Problems Maternal Grandfather    • No Known Problems Paternal Grandmother    • No Known Problems Paternal Grandfather    • No Known Problems Other    • Anemia Neg Hx    • Arrhythmia Neg Hx    • Asthma Neg Hx    • Clotting disorder Neg Hx    • Fainting Neg Hx    • Heart attack Neg Hx    • Heart disease Neg Hx    • Heart failure Neg Hx    • Hyperlipidemia Neg Hx    • Hypertension Neg Hx      Social  History     Tobacco Use   • Smoking status: Former     Passive exposure: Past   • Smokeless tobacco: Never   • Tobacco comments:     quit 35 yrs ago   Vaping Use   • Vaping Use: Never used   Substance Use Topics   • Alcohol use: Not Currently     Comment: rare   • Drug use: No     No medications prior to admission.       Allergies:  Atorvastatin, Colesevelam hcl, Colestipol hcl, Ezetimibe, Pitavastatin, Rosuvastatin, Rosuvastatin calcium, Simvastatin, Statins, and Keflex [cephalexin]    Objective      Vital Signs              Physical Exam  Vitals and nursing note reviewed.   Constitutional:       General: She is awake. She is not in acute distress.     Appearance: Normal appearance. She is well-developed and well-groomed. She is not ill-appearing, toxic-appearing or diaphoretic.      Interventions: She is not intubated.     Comments: Seen in preadmission testing with family   HENT:      Head: Normocephalic and atraumatic. No right periorbital erythema or left periorbital erythema.      Right Ear: Hearing normal.      Left Ear: Hearing normal.      Nose: Nose normal. No nasal deformity or signs of injury.      Right Nostril: No foreign body or epistaxis.      Left Nostril: No foreign body or epistaxis.      Mouth/Throat:      Lips: Pink.      Mouth: Mucous membranes are moist. No injury, lacerations, oral lesions or angioedema.      Dentition: Normal dentition. Does not have dentures. No dental tenderness, gingival swelling, dental caries, dental abscesses or gum lesions.      Tongue: No lesions. Tongue does not deviate from midline.      Palate: No mass and lesions.      Pharynx: Oropharynx is clear. Uvula midline. No oropharyngeal exudate or posterior oropharyngeal erythema.      Tonsils: No tonsillar exudate or tonsillar abscesses.   Eyes:      General: Lids are normal. No allergic shiner, visual field deficit or scleral icterus.        Right eye: No foreign body, discharge or hordeolum.         Left eye: No  foreign body, discharge or hordeolum.      Extraocular Movements: Extraocular movements intact.      Right eye: Normal extraocular motion and no nystagmus.      Left eye: Normal extraocular motion and no nystagmus.      Conjunctiva/sclera: Conjunctivae normal.      Right eye: Right conjunctiva is not injected. No chemosis, exudate or hemorrhage.     Left eye: Left conjunctiva is not injected. No chemosis, exudate or hemorrhage.     Pupils: Pupils are equal, round, and reactive to light.   Neck:      Thyroid: No thyroid mass or thyromegaly.      Vascular: Normal carotid pulses. No carotid bruit, hepatojugular reflux or JVD.      Trachea: Trachea normal. No tracheal tenderness, tracheostomy, abnormal tracheal secretions or tracheal deviation.      Meningeal: Brudzinski's sign and Kernig's sign absent.   Cardiovascular:      Rate and Rhythm: Normal rate and regular rhythm.  No extrasystoles are present.     Chest Wall: PMI is not displaced. No thrill.      Pulses: No decreased pulses.           Carotid pulses are 2+ on the right side and 2+ on the left side.       Radial pulses are 2+ on the right side and 2+ on the left side.        Femoral pulses are 2+ on the right side and 2+ on the left side.       Popliteal pulses are 2+ on the right side and 2+ on the left side.        Dorsalis pedis pulses are 2+ on the right side and 2+ on the left side.        Posterior tibial pulses are 2+ on the right side and 2+ on the left side.      Heart sounds: Murmur heard.    Systolic murmur is present with a grade of 4/6.   Diastolic murmur is present with a grade of 2/4.    No friction rub. No gallop.   Pulmonary:      Effort: Pulmonary effort is normal. No tachypnea, bradypnea, accessory muscle usage, prolonged expiration, respiratory distress or retractions. She is not intubated.      Breath sounds: Normal breath sounds. No stridor, decreased air movement or transmitted upper airway sounds. No decreased breath sounds, wheezing,  rhonchi or rales.   Chest:      Chest wall: No mass, lacerations, deformity, swelling, tenderness, crepitus or edema. There is no dullness to percussion.      Comments: No sternal abnormality.  Abdominal:      General: Abdomen is protuberant. Bowel sounds are normal. There is no distension or abdominal bruit. There are no signs of injury.      Palpations: Abdomen is soft. There is no shifting dullness, fluid wave, hepatomegaly, splenomegaly, mass or pulsatile mass.      Tenderness: There is no abdominal tenderness. There is no right CVA tenderness, left CVA tenderness, guarding or rebound. Negative signs include Cintron's sign, Rovsing's sign, McBurney's sign, psoas sign and obturator sign.   Musculoskeletal:         General: No swelling, tenderness, deformity or signs of injury. Normal range of motion.      Cervical back: Full passive range of motion without pain, normal range of motion and neck supple. No edema, erythema, signs of trauma, rigidity, torticollis or crepitus. No pain with movement, spinous process tenderness or muscular tenderness. Normal range of motion.      Right lower leg: No edema.      Left lower leg: No edema.      Comments: Gait steady and strong without use of assistive devices   Lymphadenopathy:      Cervical: No cervical adenopathy.      Right cervical: No superficial, deep or posterior cervical adenopathy.     Left cervical: No superficial or deep cervical adenopathy.      Upper Body:      Right upper body: No supraclavicular adenopathy.      Left upper body: No supraclavicular adenopathy.   Skin:     General: Skin is warm and dry.      Capillary Refill: Capillary refill takes less than 2 seconds.      Coloration: Skin is not ashen, cyanotic, jaundiced, mottled, pale or sallow.      Findings: No abrasion, abscess, acne, bruising, burn, ecchymosis, erythema, signs of injury, laceration, lesion, petechiae, rash or wound.      Nails: There is no clubbing.   Neurological:      Mental Status:  She is alert and oriented to person, place, and time.      GCS: GCS eye subscore is 4. GCS verbal subscore is 5. GCS motor subscore is 6.      Cranial Nerves: Cranial nerves 2-12 are intact. No cranial nerve deficit.      Sensory: Sensation is intact. No sensory deficit.      Motor: Motor function is intact. No weakness.      Coordination: Coordination is intact. Coordination normal.      Gait: Gait is intact. Gait normal.      Deep Tendon Reflexes: Reflexes normal.   Psychiatric:         Attention and Perception: Attention and perception normal.         Mood and Affect: Mood and affect normal.         Speech: Speech normal.         Behavior: Behavior normal. Behavior is cooperative.         Thought Content: Thought content normal. Thought content is not paranoid or delusional. Thought content does not include homicidal or suicidal ideation. Thought content does not include homicidal or suicidal plan.         Cognition and Memory: Cognition and memory normal.         Judgment: Judgment normal.         Results Review:   Lab Results (last 24 hours)     ** No results found for the last 24 hours. **              Assessment & Plan       Mitral regurgitation      Assessment & Plan     -Severe mitral regurgitation, moderate aortic valve regurgitation, moderate to severe tricuspid regurgitation  -Moderate pulmonary hypertension--RVSP 45-55  -Chronic combined HFrEF, NYHA class III-IV  -Dilated, NICM--EF 40 to 45% (CARMITA)  -HTN  -Hypothyroidism--levothyroxine  -Hx DVT--on warfarin, bridged with Lovenox prior to surgery  -PAF  -Hx PPM--[employer] DDDR mode    Dr. Orozco has recommended cardiac surgery--mitral valve procedure/aortic valve procedure.  Biologic valves to be used.  Thank you for allowing us to participate in the care of this patient.  Preop studies were reviewed.  Her INR was noted to be 2.66 however she had stopped her Coumadin the day prior.    Per Dr. Orozco's note on 4/24/2023  This is a pleasant 83 year old  woman with severe mitral valve regurgitation, moderate aortic valve regurgitation, and chronic systolic/diastolic congestive heart failure, NYHA III-IV. She qualifies for mitral valve and aortic valve intervention. Despite her mild to moderately elevated STS mortality risk I believe surgical intervention is her best therapeutic option. I have discussed this with Dr. Marcus, who agrees. The patient and her  are in agreement as well.     Yulissa Herrera, APRN  05/12/23  17:54 EDT    This is a pleasant 83 year old woman with nonischemic cardiomyopathy and severe mitral valve regurgitation. She is here for a mitral valve repair vs replacement. We will also assess her aortic valve; possible repair/replacement as well.

## 2023-05-14 ENCOUNTER — ANESTHESIA EVENT (OUTPATIENT)
Dept: PERIOP | Facility: HOSPITAL | Age: 83
DRG: 219 | End: 2023-05-14
Payer: MEDICARE

## 2023-05-14 NOTE — ANESTHESIA PREPROCEDURE EVALUATION
Anesthesia Evaluation     Patient summary reviewed and Nursing notes reviewed   NPO Solid Status: > 8 hours  NPO Liquid Status: > 8 hours           Airway   Mallampati: I  TM distance: >3 FB  Neck ROM: full  No difficulty expected  Dental - normal exam     Pulmonary - normal exam   (+) shortness of breath,   Cardiovascular - normal exam    (+) pacemaker pacemaker, hypertension, valvular problems/murmurs MR, AI and TI, dysrhythmias Paroxysmal Atrial Fib, CHF ,     ROS comment: Echocardiogram Findings    Left Ventricle Left ventricular ejection fraction appears to be 41 - 45%.     The left ventricular cavity is mildly dilated.  Left Atrium No evidence of a patent foramen ovale. No evidence of an atrial septal defect present.  Saline test results are negative.  Aortic Valve The aortic valve is structurally normal with no stenosis present. Mild to moderate aortic valve regurgitation is present.  Mitral Valve The mitral valve is structurally normal with no significant stenosis present. Severe mitral valve regurgitation is present. Pulmonary vein flow reversal is not present.  Tricuspid Valve The tricuspid valve is structurally normal with no significant stenosis present. Trace tricuspid valve regurgitation is present. Insufficient TR velocity profile to estimate the right ventricular systolic pressure.  Pulmonic Valve The pulmonic valve is structurally normal. There is trace pulmonic valve regurgitation present.  Greater Vessels There is evidence of descending aorta sclerosis/calcification present.  Pericardium The pericardium is normal. There is no evidence of pericardial effusio        Neuro/Psych  (+) numbness,    GI/Hepatic/Renal/Endo    (+)   renal disease CRI, thyroid problem hypothyroidism    Musculoskeletal (-) negative ROS    Abdominal  - normal exam    Bowel sounds: normal.   Substance History - negative use     OB/GYN negative ob/gyn ROS         Other          Other Comment: Medical History  Current as of  05/14/23 1600  History Comments History Comments  Hyperlipidemia  Hypertension   Pacemaker  Mitral regurgitation   Long term (current) use of anticoagulants  Hypothyroidism due to acquired atrophy of thyroid   DVT (deep venous thrombosis) (CMS/[facility]) (I82.409) recurrent Carotid stenosis, bilateral Overview:  <50% bilateral   Osteopenia  Hordeolum externum   AV block  Bilateral renal cysts   Carpal tunnel syndrome, bilateral  Chronic combined systolic (congestive) and diastolic (congestive) heart failure     Surgical History  Current as of 05/14/23 1600  HYSTERECTOMY THYROID SURGERY  PACEMAKER IMPLANTATION CARDIAC CATHETERIZATION                    Anesthesia Plan    ASA 4     general, Germaine, CVL and PAC     intravenous induction   Postoperative Plan: Expected vent after surgery        CODE STATUS:

## 2023-05-15 ENCOUNTER — APPOINTMENT (OUTPATIENT)
Dept: GENERAL RADIOLOGY | Facility: HOSPITAL | Age: 83
DRG: 219 | End: 2023-05-15
Payer: MEDICARE

## 2023-05-15 ENCOUNTER — OFFICE VISIT (OUTPATIENT)
Dept: PERIOP | Facility: HOSPITAL | Age: 83
DRG: 219 | End: 2023-05-15
Payer: MEDICARE

## 2023-05-15 ENCOUNTER — HOSPITAL ENCOUNTER (INPATIENT)
Facility: HOSPITAL | Age: 83
LOS: 5 days | Discharge: HOME HEALTH | DRG: 219 | End: 2023-05-20
Attending: THORACIC SURGERY (CARDIOTHORACIC VASCULAR SURGERY) | Admitting: THORACIC SURGERY (CARDIOTHORACIC VASCULAR SURGERY)
Payer: MEDICARE

## 2023-05-15 ENCOUNTER — ANESTHESIA (OUTPATIENT)
Dept: PERIOP | Facility: HOSPITAL | Age: 83
DRG: 219 | End: 2023-05-15
Payer: MEDICARE

## 2023-05-15 DIAGNOSIS — I34.0 MITRAL VALVE INSUFFICIENCY, UNSPECIFIED ETIOLOGY: ICD-10-CM

## 2023-05-15 DIAGNOSIS — J93.9 PNEUMOTHORAX, UNSPECIFIED TYPE: Primary | ICD-10-CM

## 2023-05-15 DIAGNOSIS — Z98.890 S/P MVR (MITRAL VALVE REPAIR): ICD-10-CM

## 2023-05-15 PROBLEM — I25.10 CORONARY ARTERY DISEASE INVOLVING NATIVE CORONARY ARTERY OF NATIVE HEART WITHOUT ANGINA PECTORIS: Chronic | Status: ACTIVE | Noted: 2023-02-25

## 2023-05-15 PROBLEM — E87.6 HYPOKALEMIA: Status: ACTIVE | Noted: 2023-05-15

## 2023-05-15 PROBLEM — I50.42 CHRONIC COMBINED SYSTOLIC AND DIASTOLIC CONGESTIVE HEART FAILURE: Chronic | Status: ACTIVE | Noted: 2023-02-24

## 2023-05-15 PROBLEM — Z86.718 HISTORY OF DVT (DEEP VEIN THROMBOSIS): Status: ACTIVE | Noted: 2019-07-03

## 2023-05-15 LAB
ACT BLD: 113 SECONDS (ref 89–137)
ACT BLD: 137 SECONDS (ref 89–137)
ACT BLD: 347 SECONDS (ref 89–137)
ACT BLD: 407 SECONDS (ref 89–137)
ACT BLD: 486 SECONDS (ref 89–137)
ALBUMIN SERPL-MCNC: 4.6 G/DL (ref 3.5–5.2)
ANION GAP SERPL CALCULATED.3IONS-SCNC: 14 MMOL/L (ref 5–15)
APTT PPP: 33.4 SECONDS (ref 24–31)
APTT PPP: 35.1 SECONDS (ref 24–31)
ARTERIAL PATENCY WRIST A: ABNORMAL
ATMOSPHERIC PRESS: ABNORMAL MM[HG]
BASE DEFICIT: ABNORMAL
BASE EXCESS BLDA CALC-SCNC: -2.1 MMOL/L (ref 0–3)
BASE EXCESS BLDA CALC-SCNC: -2.4 MMOL/L (ref 0–3)
BASE EXCESS BLDA CALC-SCNC: -3.4 MMOL/L (ref 0–3)
BASE EXCESS BLDA CALC-SCNC: -4 MMOL/L (ref 0–3)
BASE EXCESS BLDA CALC-SCNC: -5.3 MMOL/L (ref 0–3)
BASE EXCESS BLDA CALC-SCNC: 1 MMOL/L (ref 0–3)
BASE EXCESS BLDA CALC-SCNC: 1.5 MMOL/L (ref 0–3)
BASE EXCESS BLDA CALC-SCNC: 3 MMOL/L (ref 0–3)
BASE EXCESS BLDA CALC-SCNC: 6 MMOL/L (ref 0–3)
BASE EXCESS BLDA CALC-SCNC: 7 MMOL/L (ref 0–3)
BASE EXCESS BLDA CALC-SCNC: <0 MMOL/L (ref 0–3)
BASOPHILS # BLD AUTO: 0 10*3/MM3 (ref 0–0.2)
BASOPHILS # BLD AUTO: 0 10*3/MM3 (ref 0–0.2)
BASOPHILS NFR BLD AUTO: 0.3 % (ref 0–1.5)
BASOPHILS NFR BLD AUTO: 0.8 % (ref 0–1.5)
BDY SITE: ABNORMAL
BH BB BLOOD EXPIRATION DATE: NORMAL
BH BB BLOOD EXPIRATION DATE: NORMAL
BH BB BLOOD TYPE BARCODE: 5100
BH BB BLOOD TYPE BARCODE: 5100
BH BB DISPENSE STATUS: NORMAL
BH BB DISPENSE STATUS: NORMAL
BH BB PRODUCT CODE: NORMAL
BH BB PRODUCT CODE: NORMAL
BH BB UNIT NUMBER: NORMAL
BH BB UNIT NUMBER: NORMAL
BUN SERPL-MCNC: 18 MG/DL (ref 8–23)
BUN/CREAT SERPL: 24 (ref 7–25)
CA-I BLDA-SCNC: 0.94 MMOL/L (ref 1.12–1.32)
CA-I BLDA-SCNC: 0.97 MMOL/L (ref 1.12–1.32)
CA-I BLDA-SCNC: 1 MMOL/L (ref 1.12–1.32)
CA-I BLDA-SCNC: 1.19 MMOL/L (ref 1.12–1.32)
CA-I BLDA-SCNC: 1.24 MMOL/L (ref 1.15–1.33)
CA-I BLDA-SCNC: 1.24 MMOL/L (ref 1.15–1.33)
CA-I BLDA-SCNC: 1.25 MMOL/L (ref 1.15–1.33)
CA-I BLDA-SCNC: 1.29 MMOL/L (ref 1.15–1.33)
CA-I BLDA-SCNC: 1.3 MMOL/L (ref 1.15–1.33)
CA-I BLDA-SCNC: 1.3 MMOL/L (ref 1.15–1.33)
CA-I BLDA-SCNC: 1.58 MMOL/L (ref 1.12–1.32)
CA-I SERPL ISE-MCNC: 1.35 MMOL/L (ref 1.2–1.3)
CALCIUM SPEC-SCNC: 10.3 MG/DL (ref 8.6–10.5)
CHLORIDE SERPL-SCNC: 103 MMOL/L (ref 98–107)
CLOSE TME COLL+ADP + EPINEP PNL BLD: 97 % (ref 86–100)
CO2 BLDA-SCNC: 21.8 MMOL/L (ref 22–29)
CO2 BLDA-SCNC: 22.7 MMOL/L (ref 22–29)
CO2 BLDA-SCNC: 24 MMOL/L (ref 23–27)
CO2 BLDA-SCNC: 29 MMOL/L (ref 23–27)
CO2 BLDA-SCNC: 29 MMOL/L (ref 23–27)
CO2 BLDA-SCNC: 31 MMOL/L (ref 23–27)
CO2 BLDA-SCNC: 32 MMOL/L (ref 23–27)
CO2 SERPL-SCNC: 22 MMOL/L (ref 22–29)
CREAT SERPL-MCNC: 0.75 MG/DL (ref 0.57–1)
CROSSMATCH INTERPRETATION: NORMAL
CROSSMATCH INTERPRETATION: NORMAL
D-LACTATE SERPL-SCNC: 2.2 MMOL/L (ref 0.2–2)
DEPRECATED RDW RBC AUTO: 44.2 FL (ref 37–54)
DEPRECATED RDW RBC AUTO: 45.5 FL (ref 37–54)
DEPRECATED RDW RBC AUTO: 46.8 FL (ref 37–54)
EGFRCR SERPLBLD CKD-EPI 2021: 80.1 ML/MIN/1.73
EOSINOPHIL # BLD AUTO: 0.1 10*3/MM3 (ref 0–0.4)
EOSINOPHIL # BLD AUTO: 0.1 10*3/MM3 (ref 0–0.4)
EOSINOPHIL NFR BLD AUTO: 0.6 % (ref 0.3–6.2)
EOSINOPHIL NFR BLD AUTO: 2 % (ref 0.3–6.2)
ERYTHROCYTE [DISTWIDTH] IN BLOOD BY AUTOMATED COUNT: 14 % (ref 12.3–15.4)
ERYTHROCYTE [DISTWIDTH] IN BLOOD BY AUTOMATED COUNT: 14.1 % (ref 12.3–15.4)
ERYTHROCYTE [DISTWIDTH] IN BLOOD BY AUTOMATED COUNT: 14.3 % (ref 12.3–15.4)
FIBRINOGEN PPP-MCNC: 289 MG/DL (ref 210–450)
GLUCOSE BLDC GLUCOMTR-MCNC: 114 MG/DL (ref 70–105)
GLUCOSE BLDC GLUCOMTR-MCNC: 118 MG/DL (ref 70–105)
GLUCOSE BLDC GLUCOMTR-MCNC: 123 MG/DL (ref 70–105)
GLUCOSE BLDC GLUCOMTR-MCNC: 135 MG/DL (ref 70–105)
GLUCOSE BLDC GLUCOMTR-MCNC: 146 MG/DL (ref 70–105)
GLUCOSE BLDC GLUCOMTR-MCNC: 151 MG/DL (ref 70–105)
GLUCOSE BLDC GLUCOMTR-MCNC: 151 MG/DL (ref 70–105)
GLUCOSE BLDC GLUCOMTR-MCNC: 154 MG/DL (ref 70–105)
GLUCOSE BLDC GLUCOMTR-MCNC: 162 MG/DL (ref 74–100)
GLUCOSE BLDC GLUCOMTR-MCNC: 162 MG/DL (ref 74–100)
GLUCOSE BLDC GLUCOMTR-MCNC: 164 MG/DL (ref 74–100)
GLUCOSE BLDC GLUCOMTR-MCNC: 164 MG/DL (ref 74–100)
GLUCOSE BLDC GLUCOMTR-MCNC: 168 MG/DL (ref 70–105)
GLUCOSE BLDC GLUCOMTR-MCNC: 179 MG/DL (ref 74–100)
GLUCOSE BLDC GLUCOMTR-MCNC: 179 MG/DL (ref 74–100)
GLUCOSE BLDC GLUCOMTR-MCNC: 183 MG/DL (ref 70–105)
GLUCOSE BLDC GLUCOMTR-MCNC: 185 MG/DL (ref 70–105)
GLUCOSE BLDC GLUCOMTR-MCNC: 211 MG/DL (ref 70–105)
GLUCOSE BLDC GLUCOMTR-MCNC: 85 MG/DL (ref 74–100)
GLUCOSE BLDC GLUCOMTR-MCNC: 85 MG/DL (ref 74–100)
GLUCOSE BLDC GLUCOMTR-MCNC: 92 MG/DL (ref 74–100)
GLUCOSE BLDC GLUCOMTR-MCNC: 92 MG/DL (ref 74–100)
GLUCOSE BLDC GLUCOMTR-MCNC: NORMAL MG/DL
GLUCOSE SERPL-MCNC: 108 MG/DL (ref 65–99)
HCO3 BLDA-SCNC: 20.7 MMOL/L (ref 21–28)
HCO3 BLDA-SCNC: 20.8 MMOL/L (ref 21–28)
HCO3 BLDA-SCNC: 21.6 MMOL/L (ref 21–28)
HCO3 BLDA-SCNC: 22.6 MMOL/L (ref 21–28)
HCO3 BLDA-SCNC: 23 MMOL/L (ref 22–26)
HCO3 BLDA-SCNC: 23.6 MMOL/L (ref 21–28)
HCO3 BLDA-SCNC: 27 MMOL/L (ref 21–28)
HCO3 BLDA-SCNC: 27.2 MMOL/L (ref 22–26)
HCO3 BLDA-SCNC: 27.3 MMOL/L (ref 22–26)
HCO3 BLDA-SCNC: 29.5 MMOL/L (ref 22–26)
HCO3 BLDA-SCNC: 30.8 MMOL/L (ref 22–26)
HCT VFR BLD AUTO: 34.4 % (ref 34–46.6)
HCT VFR BLD AUTO: 35.2 % (ref 34–46.6)
HCT VFR BLD AUTO: 40.5 % (ref 34–46.6)
HCT VFR BLDA CALC: 20 % (ref 38–51)
HCT VFR BLDA CALC: 25 % (ref 38–51)
HCT VFR BLDA CALC: 30 % (ref 38–51)
HCT VFR BLDA CALC: 31 % (ref 38–51)
HCT VFR BLDA CALC: 32 % (ref 38–51)
HCT VFR BLDA CALC: 33 % (ref 38–51)
HCT VFR BLDA CALC: 36 % (ref 38–51)
HCT VFR BLDA CALC: 36 % (ref 38–51)
HCT VFR BLDA CALC: 37 % (ref 38–51)
HEMODILUTION: NO
HEMODILUTION: YES
HGB BLD-MCNC: 11.7 G/DL (ref 12–15.9)
HGB BLD-MCNC: 11.8 G/DL (ref 12–15.9)
HGB BLD-MCNC: 13.7 G/DL (ref 12–15.9)
HGB BLDA-MCNC: 10.2 G/DL (ref 12–17)
HGB BLDA-MCNC: 10.2 G/DL (ref 12–17)
HGB BLDA-MCNC: 10.3 G/DL (ref 12–17)
HGB BLDA-MCNC: 10.6 G/DL (ref 12–17)
HGB BLDA-MCNC: 10.9 G/DL (ref 12–17)
HGB BLDA-MCNC: 11.2 G/DL (ref 12–17)
HGB BLDA-MCNC: 12.1 G/DL (ref 12–17)
HGB BLDA-MCNC: 12.2 G/DL (ref 12–17)
HGB BLDA-MCNC: 12.5 G/DL (ref 12–17)
HGB BLDA-MCNC: 6.8 G/DL (ref 12–17)
HGB BLDA-MCNC: 8.5 G/DL (ref 12–17)
INHALED O2 CONCENTRATION: 40 %
INHALED O2 CONCENTRATION: 70 %
INR PPP: 1.07 (ref 0.93–1.1)
INR PPP: 1.46 (ref 0.93–1.1)
LYMPHOCYTES # BLD AUTO: 1.5 10*3/MM3 (ref 0.7–3.1)
LYMPHOCYTES # BLD AUTO: 2.2 10*3/MM3 (ref 0.7–3.1)
LYMPHOCYTES NFR BLD AUTO: 22.4 % (ref 19.6–45.3)
LYMPHOCYTES NFR BLD AUTO: 31.2 % (ref 19.6–45.3)
MCH RBC QN AUTO: 30.6 PG (ref 26.6–33)
MCH RBC QN AUTO: 30.9 PG (ref 26.6–33)
MCH RBC QN AUTO: 30.9 PG (ref 26.6–33)
MCHC RBC AUTO-ENTMCNC: 33.5 G/DL (ref 31.5–35.7)
MCHC RBC AUTO-ENTMCNC: 33.9 G/DL (ref 31.5–35.7)
MCHC RBC AUTO-ENTMCNC: 34 G/DL (ref 31.5–35.7)
MCV RBC AUTO: 89.9 FL (ref 79–97)
MCV RBC AUTO: 91.3 FL (ref 79–97)
MCV RBC AUTO: 92 FL (ref 79–97)
MODALITY: ABNORMAL
MONOCYTES # BLD AUTO: 0.2 10*3/MM3 (ref 0.1–0.9)
MONOCYTES # BLD AUTO: 0.4 10*3/MM3 (ref 0.1–0.9)
MONOCYTES NFR BLD AUTO: 2.1 % (ref 5–12)
MONOCYTES NFR BLD AUTO: 8.3 % (ref 5–12)
NEUTROPHILS NFR BLD AUTO: 2.8 10*3/MM3 (ref 1.7–7)
NEUTROPHILS NFR BLD AUTO: 57.7 % (ref 42.7–76)
NEUTROPHILS NFR BLD AUTO: 7.5 10*3/MM3 (ref 1.7–7)
NEUTROPHILS NFR BLD AUTO: 74.6 % (ref 42.7–76)
NRBC BLD AUTO-RTO: 0 /100 WBC (ref 0–0.2)
NRBC BLD AUTO-RTO: 0 /100 WBC (ref 0–0.2)
PCO2 BLDA: 35.6 MM HG (ref 35–48)
PCO2 BLDA: 37.4 MM HG (ref 35–45)
PCO2 BLDA: 37.6 MM HG (ref 35–48)
PCO2 BLDA: 38.7 MM HG (ref 35–48)
PCO2 BLDA: 42.4 MM HG (ref 35–48)
PCO2 BLDA: 42.5 MM HG (ref 35–45)
PCO2 BLDA: 43.2 MM HG (ref 35–48)
PCO2 BLDA: 45 MM HG (ref 35–45)
PCO2 BLDA: 45.5 MM HG (ref 35–48)
PCO2 BLDA: 48 MM HG (ref 35–45)
PCO2 BLDA: 55.9 MM HG (ref 35–45)
PEEP RESPIRATORY: 5 CM[H2O]
PEEP RESPIRATORY: 8 CM[H2O]
PEEP RESPIRATORY: 8 CM[H2O]
PH BLDA: 7.29 PH UNITS (ref 7.35–7.45)
PH BLDA: 7.3 PH UNITS (ref 7.35–7.45)
PH BLDA: 7.3 PH UNITS (ref 7.35–7.45)
PH BLDA: 7.34 PH UNITS (ref 7.35–7.45)
PH BLDA: 7.37 PH UNITS (ref 7.35–7.45)
PH BLDA: 7.38 PH UNITS (ref 7.35–7.45)
PH BLDA: 7.42 PH UNITS (ref 7.35–7.45)
PH BLDA: 7.44 PH UNITS (ref 7.35–7.45)
PH BLDA: 7.51 PH UNITS (ref 7.35–7.45)
PHOSPHATE SERPL-MCNC: 2 MG/DL (ref 2.5–4.5)
PLATELET # BLD AUTO: 101 10*3/MM3 (ref 140–450)
PLATELET # BLD AUTO: 198 10*3/MM3 (ref 140–450)
PLATELET # BLD AUTO: 203 10*3/MM3 (ref 140–450)
PMV BLD AUTO: 10.1 FL (ref 6–12)
PMV BLD AUTO: 9.2 FL (ref 6–12)
PMV BLD AUTO: 9.6 FL (ref 6–12)
PO2 BLDA: 152.9 MM HG (ref 83–108)
PO2 BLDA: 158.1 MM HG (ref 83–108)
PO2 BLDA: 160.6 MM HG (ref 83–108)
PO2 BLDA: 174 MM HG (ref 83–108)
PO2 BLDA: 302 MM HG (ref 83–108)
PO2 BLDA: 416 MM HG (ref 80–105)
PO2 BLDA: 438 MM HG (ref 80–105)
PO2 BLDA: 48 MM HG (ref 80–105)
PO2 BLDA: 485.2 MM HG (ref 83–108)
PO2 BLDA: 528 MM HG (ref 80–105)
PO2 BLDA: 544 MM HG (ref 80–105)
POTASSIUM BLDA-SCNC: 2.4 MMOL/L (ref 3.5–4.5)
POTASSIUM BLDA-SCNC: 2.8 MMOL/L (ref 3.5–4.5)
POTASSIUM BLDA-SCNC: 3 MMOL/L (ref 3.5–4.5)
POTASSIUM BLDA-SCNC: 3 MMOL/L (ref 3.5–4.5)
POTASSIUM BLDA-SCNC: 3 MMOL/L (ref 3.5–4.9)
POTASSIUM BLDA-SCNC: 3.3 MMOL/L (ref 3.5–4.5)
POTASSIUM BLDA-SCNC: 3.3 MMOL/L (ref 3.5–4.9)
POTASSIUM BLDA-SCNC: 3.4 MMOL/L (ref 3.5–4.5)
POTASSIUM BLDA-SCNC: 3.7 MMOL/L (ref 3.5–4.9)
POTASSIUM BLDA-SCNC: 3.9 MMOL/L (ref 3.5–4.9)
POTASSIUM BLDA-SCNC: 4.2 MMOL/L (ref 3.5–4.9)
POTASSIUM SERPL-SCNC: 2.5 MMOL/L (ref 3.5–5.2)
POTASSIUM SERPL-SCNC: 3.2 MMOL/L (ref 3.5–5.2)
PROTHROMBIN TIME: 11.4 SECONDS (ref 9.6–11.7)
PROTHROMBIN TIME: 15.3 SECONDS (ref 9.6–11.7)
PSV: 8 CMH2O
RBC # BLD AUTO: 3.83 10*6/MM3 (ref 3.77–5.28)
RBC # BLD AUTO: 3.83 10*6/MM3 (ref 3.77–5.28)
RBC # BLD AUTO: 4.44 10*6/MM3 (ref 3.77–5.28)
RESPIRATORY RATE: 12
RESPIRATORY RATE: 14
SAO2 % BLDCOA: 100 % (ref 94–98)
SAO2 % BLDCOA: 100 % (ref 95–98)
SAO2 % BLDCOA: 78 % (ref 95–98)
SAO2 % BLDCOA: 99.3 % (ref 94–98)
SAO2 % BLDCOA: 99.4 % (ref 94–98)
SAO2 % BLDCOA: 99.9 % (ref 94–98)
SODIUM BLD-SCNC: 132 MMOL/L (ref 138–146)
SODIUM BLD-SCNC: 133 MMOL/L (ref 138–146)
SODIUM BLD-SCNC: 134 MMOL/L (ref 138–146)
SODIUM BLD-SCNC: 137 MMOL/L (ref 138–146)
SODIUM BLD-SCNC: 138 MMOL/L (ref 138–146)
SODIUM BLD-SCNC: 143 MMOL/L (ref 138–146)
SODIUM BLD-SCNC: 144 MMOL/L (ref 138–146)
SODIUM BLD-SCNC: 148 MMOL/L (ref 138–146)
SODIUM BLD-SCNC: 148 MMOL/L (ref 138–146)
SODIUM SERPL-SCNC: 139 MMOL/L (ref 136–145)
UNIT  ABO: NORMAL
UNIT  ABO: NORMAL
UNIT  RH: NORMAL
UNIT  RH: NORMAL
VENTILATOR MODE: ABNORMAL
VT ON VENT VENT: 500 ML
WBC NRBC COR # BLD: 10 10*3/MM3 (ref 3.4–10.8)
WBC NRBC COR # BLD: 4.8 10*3/MM3 (ref 3.4–10.8)
WBC NRBC COR # BLD: 4.8 10*3/MM3 (ref 3.4–10.8)

## 2023-05-15 PROCEDURE — 99223 1ST HOSP IP/OBS HIGH 75: CPT | Performed by: INTERNAL MEDICINE

## 2023-05-15 PROCEDURE — C1751 CATH, INF, PER/CENT/MIDLINE: HCPCS | Performed by: THORACIC SURGERY (CARDIOTHORACIC VASCULAR SURGERY)

## 2023-05-15 PROCEDURE — 0 POTASSIUM CHLORIDE 10 MEQ/100ML SOLUTION: Performed by: NURSE PRACTITIONER

## 2023-05-15 PROCEDURE — P9041 ALBUMIN (HUMAN),5%, 50ML: HCPCS | Performed by: NURSE PRACTITIONER

## 2023-05-15 PROCEDURE — C1887 CATHETER, GUIDING: HCPCS | Performed by: THORACIC SURGERY (CARDIOTHORACIC VASCULAR SURGERY)

## 2023-05-15 PROCEDURE — 5A1221Z PERFORMANCE OF CARDIAC OUTPUT, CONTINUOUS: ICD-10-PCS | Performed by: THORACIC SURGERY (CARDIOTHORACIC VASCULAR SURGERY)

## 2023-05-15 PROCEDURE — 71045 X-RAY EXAM CHEST 1 VIEW: CPT

## 2023-05-15 PROCEDURE — 93318 ECHO TRANSESOPHAGEAL INTRAOP: CPT | Performed by: ANESTHESIOLOGY

## 2023-05-15 PROCEDURE — 85018 HEMOGLOBIN: CPT

## 2023-05-15 PROCEDURE — C1729 CATH, DRAINAGE: HCPCS | Performed by: THORACIC SURGERY (CARDIOTHORACIC VASCULAR SURGERY)

## 2023-05-15 PROCEDURE — 85025 COMPLETE CBC W/AUTO DIFF WBC: CPT | Performed by: THORACIC SURGERY (CARDIOTHORACIC VASCULAR SURGERY)

## 2023-05-15 PROCEDURE — 25010000002 MAGNESIUM SULFATE IN D5W 1G/100ML (PREMIX) 1-5 GM/100ML-% SOLUTION: Performed by: NURSE PRACTITIONER

## 2023-05-15 PROCEDURE — 83605 ASSAY OF LACTIC ACID: CPT

## 2023-05-15 PROCEDURE — P9041 ALBUMIN (HUMAN),5%, 50ML: HCPCS | Performed by: THORACIC SURGERY (CARDIOTHORACIC VASCULAR SURGERY)

## 2023-05-15 PROCEDURE — 85610 PROTHROMBIN TIME: CPT | Performed by: THORACIC SURGERY (CARDIOTHORACIC VASCULAR SURGERY)

## 2023-05-15 PROCEDURE — 86900 BLOOD TYPING SEROLOGIC ABO: CPT

## 2023-05-15 PROCEDURE — P9041 ALBUMIN (HUMAN),5%, 50ML: HCPCS | Performed by: ANESTHESIOLOGY

## 2023-05-15 PROCEDURE — 80051 ELECTROLYTE PANEL: CPT

## 2023-05-15 PROCEDURE — 85610 PROTHROMBIN TIME: CPT | Performed by: NURSE PRACTITIONER

## 2023-05-15 PROCEDURE — 94002 VENT MGMT INPAT INIT DAY: CPT

## 2023-05-15 PROCEDURE — 25010000002 PROTAMINE SULFATE PER 10 MG: Performed by: ANESTHESIOLOGY

## 2023-05-15 PROCEDURE — 25010000002 VANCOMYCIN 750 MG RECONSTITUTED SOLUTION 1 EACH VIAL: Performed by: ANESTHESIOLOGY

## 2023-05-15 PROCEDURE — 94799 UNLISTED PULMONARY SVC/PX: CPT

## 2023-05-15 PROCEDURE — C1751 CATH, INF, PER/CENT/MIDLINE: HCPCS | Performed by: ANESTHESIOLOGY

## 2023-05-15 PROCEDURE — 84132 ASSAY OF SERUM POTASSIUM: CPT

## 2023-05-15 PROCEDURE — 84295 ASSAY OF SERUM SODIUM: CPT

## 2023-05-15 PROCEDURE — 25010000002 MORPHINE PER 10 MG: Performed by: NURSE PRACTITIONER

## 2023-05-15 PROCEDURE — 82803 BLOOD GASES ANY COMBINATION: CPT

## 2023-05-15 PROCEDURE — 80069 RENAL FUNCTION PANEL: CPT | Performed by: NURSE PRACTITIONER

## 2023-05-15 PROCEDURE — 25010000002 ALBUMIN HUMAN 5% PER 50 ML: Performed by: THORACIC SURGERY (CARDIOTHORACIC VASCULAR SURGERY)

## 2023-05-15 PROCEDURE — 0 MILRINONE LACTATE IN DEXTROSE 20-5 MG/100ML-% SOLUTION: Performed by: ANESTHESIOLOGY

## 2023-05-15 PROCEDURE — 82948 REAGENT STRIP/BLOOD GLUCOSE: CPT

## 2023-05-15 PROCEDURE — 85730 THROMBOPLASTIN TIME PARTIAL: CPT | Performed by: NURSE PRACTITIONER

## 2023-05-15 PROCEDURE — 25010000002 FENTANYL CITRATE (PF) 250 MCG/5ML SOLUTION: Performed by: ANESTHESIOLOGY

## 2023-05-15 PROCEDURE — 25010000002 HEPARIN (PORCINE) PER 1000 UNITS: Performed by: ANESTHESIOLOGY

## 2023-05-15 PROCEDURE — 85576 BLOOD PLATELET AGGREGATION: CPT | Performed by: THORACIC SURGERY (CARDIOTHORACIC VASCULAR SURGERY)

## 2023-05-15 PROCEDURE — 84132 ASSAY OF SERUM POTASSIUM: CPT | Performed by: THORACIC SURGERY (CARDIOTHORACIC VASCULAR SURGERY)

## 2023-05-15 PROCEDURE — 85384 FIBRINOGEN ACTIVITY: CPT | Performed by: THORACIC SURGERY (CARDIOTHORACIC VASCULAR SURGERY)

## 2023-05-15 PROCEDURE — 25010000002 ALBUMIN HUMAN 5% PER 50 ML: Performed by: NURSE PRACTITIONER

## 2023-05-15 PROCEDURE — 85014 HEMATOCRIT: CPT

## 2023-05-15 PROCEDURE — 36430 TRANSFUSION BLD/BLD COMPNT: CPT

## 2023-05-15 PROCEDURE — 25010000002 ONDANSETRON PER 1 MG: Performed by: ANESTHESIOLOGY

## 2023-05-15 PROCEDURE — 85730 THROMBOPLASTIN TIME PARTIAL: CPT | Performed by: THORACIC SURGERY (CARDIOTHORACIC VASCULAR SURGERY)

## 2023-05-15 PROCEDURE — 74018 RADEX ABDOMEN 1 VIEW: CPT

## 2023-05-15 PROCEDURE — P9016 RBC LEUKOCYTES REDUCED: HCPCS

## 2023-05-15 PROCEDURE — 25010000002 MIDAZOLAM PER 1 MG: Performed by: ANESTHESIOLOGY

## 2023-05-15 PROCEDURE — 85347 COAGULATION TIME ACTIVATED: CPT

## 2023-05-15 PROCEDURE — 85025 COMPLETE CBC W/AUTO DIFF WBC: CPT | Performed by: NURSE PRACTITIONER

## 2023-05-15 PROCEDURE — 02UG0JZ SUPPLEMENT MITRAL VALVE WITH SYNTHETIC SUBSTITUTE, OPEN APPROACH: ICD-10-PCS | Performed by: THORACIC SURGERY (CARDIOTHORACIC VASCULAR SURGERY)

## 2023-05-15 PROCEDURE — 25010000002 EPINEPHRINE 1 MG/ML SOLUTION 30 ML VIAL: Performed by: ANESTHESIOLOGY

## 2023-05-15 PROCEDURE — B24BZZ4 ULTRASONOGRAPHY OF HEART WITH AORTA, TRANSESOPHAGEAL: ICD-10-PCS | Performed by: THORACIC SURGERY (CARDIOTHORACIC VASCULAR SURGERY)

## 2023-05-15 PROCEDURE — 25010000002 CALCIUM GLUCONATE PER 10 ML: Performed by: ANESTHESIOLOGY

## 2023-05-15 PROCEDURE — 82947 ASSAY GLUCOSE BLOOD QUANT: CPT

## 2023-05-15 PROCEDURE — 82330 ASSAY OF CALCIUM: CPT

## 2023-05-15 PROCEDURE — 63710000001 INSULIN REGULAR HUMAN PER 5 UNITS: Performed by: ANESTHESIOLOGY

## 2023-05-15 PROCEDURE — C1713 ANCHOR/SCREW BN/BN,TIS/BN: HCPCS | Performed by: THORACIC SURGERY (CARDIOTHORACIC VASCULAR SURGERY)

## 2023-05-15 PROCEDURE — 25010000002 HEPARIN (PORCINE) PER 1000 UNITS: Performed by: THORACIC SURGERY (CARDIOTHORACIC VASCULAR SURGERY)

## 2023-05-15 PROCEDURE — 25010000002 ALBUMIN HUMAN 5% PER 50 ML: Performed by: ANESTHESIOLOGY

## 2023-05-15 PROCEDURE — 82330 ASSAY OF CALCIUM: CPT | Performed by: NURSE PRACTITIONER

## 2023-05-15 PROCEDURE — 25010000002 CEFAZOLIN PER 500 MG: Performed by: NURSE PRACTITIONER

## 2023-05-15 DEVICE — RNG MITRAL PHYSIOII MDL 5200 26MM: Type: IMPLANTABLE DEVICE | Site: HEART | Status: FUNCTIONAL

## 2023-05-15 DEVICE — COR-KNOT MINI® COMBO KITBASE PACKAGE TYPE - KITEACH STERILE PACKAGE KIT CONTAINS (2) SINGLE PATIENT USE COR-KNOT MINI® DEVICES AND (12) COR-KNOT® QUICK LOADS®.
Type: IMPLANTABLE DEVICE | Site: HEART | Status: FUNCTIONAL
Brand: COR-KNOT MINI®

## 2023-05-15 DEVICE — ABSORBABLE HEMOSTAT (OXIDIZED REGENERATED CELLULOSE, U.S.P.)
Type: IMPLANTABLE DEVICE | Site: CHEST | Status: FUNCTIONAL
Brand: SURGICEL

## 2023-05-15 DEVICE — SS SUTURE, 6 PER SLEEVE
Type: IMPLANTABLE DEVICE | Site: STERNUM | Status: FUNCTIONAL
Brand: MYO/WIRE II

## 2023-05-15 DEVICE — DEV CONTRL TISS STRATAFIX SPIRAL MNCRYL UD 3/0 PLS 30CM: Type: IMPLANTABLE DEVICE | Site: CHEST | Status: FUNCTIONAL

## 2023-05-15 DEVICE — INCISIONLINE PLEDGET TFLN SFT LG: Type: IMPLANTABLE DEVICE | Site: CHEST | Status: FUNCTIONAL

## 2023-05-15 RX ORDER — ACETAMINOPHEN 160 MG/5ML
650 SOLUTION ORAL EVERY 6 HOURS
Status: DISCONTINUED | OUTPATIENT
Start: 2023-05-15 | End: 2023-05-15

## 2023-05-15 RX ORDER — ALPRAZOLAM 0.25 MG/1
0.25 TABLET ORAL ONCE
Status: COMPLETED | OUTPATIENT
Start: 2023-05-15 | End: 2023-05-15

## 2023-05-15 RX ORDER — IBUPROFEN 600 MG/1
1 TABLET ORAL
Status: DISCONTINUED | OUTPATIENT
Start: 2023-05-15 | End: 2023-05-15 | Stop reason: HOSPADM

## 2023-05-15 RX ORDER — ALBUMIN, HUMAN INJ 5% 5 %
12.5 SOLUTION INTRAVENOUS AS NEEDED
Status: COMPLETED | OUTPATIENT
Start: 2023-05-15 | End: 2023-05-15

## 2023-05-15 RX ORDER — ACETAMINOPHEN 325 MG/1
650 TABLET ORAL EVERY 6 HOURS
Status: DISCONTINUED | OUTPATIENT
Start: 2023-05-15 | End: 2023-05-16

## 2023-05-15 RX ORDER — IBUPROFEN 600 MG/1
1 TABLET ORAL
Status: DISCONTINUED | OUTPATIENT
Start: 2023-05-15 | End: 2023-05-20 | Stop reason: HOSPADM

## 2023-05-15 RX ORDER — SODIUM CHLORIDE 9 MG/ML
30 INJECTION, SOLUTION INTRAVENOUS CONTINUOUS PRN
Status: DISCONTINUED | OUTPATIENT
Start: 2023-05-15 | End: 2023-05-15

## 2023-05-15 RX ORDER — DEXTROSE MONOHYDRATE 25 G/50ML
10-50 INJECTION, SOLUTION INTRAVENOUS
Status: DISCONTINUED | OUTPATIENT
Start: 2023-05-15 | End: 2023-05-20 | Stop reason: HOSPADM

## 2023-05-15 RX ORDER — NICOTINE POLACRILEX 4 MG
15 LOZENGE BUCCAL
Status: DISCONTINUED | OUTPATIENT
Start: 2023-05-15 | End: 2023-05-20 | Stop reason: HOSPADM

## 2023-05-15 RX ORDER — ACETAMINOPHEN 10 MG/ML
INJECTION, SOLUTION INTRAVENOUS AS NEEDED
Status: DISCONTINUED | OUTPATIENT
Start: 2023-05-15 | End: 2023-05-15 | Stop reason: SURG

## 2023-05-15 RX ORDER — AMINOCAPROIC ACID 250 MG/ML
INJECTION, SOLUTION INTRAVENOUS AS NEEDED
Status: DISCONTINUED | OUTPATIENT
Start: 2023-05-15 | End: 2023-05-15 | Stop reason: SURG

## 2023-05-15 RX ORDER — NITROGLYCERIN 0.4 MG/1
0.4 TABLET SUBLINGUAL
Status: DISCONTINUED | OUTPATIENT
Start: 2023-05-15 | End: 2023-05-15 | Stop reason: HOSPADM

## 2023-05-15 RX ORDER — ETOMIDATE 2 MG/ML
INJECTION INTRAVENOUS AS NEEDED
Status: DISCONTINUED | OUTPATIENT
Start: 2023-05-15 | End: 2023-05-15 | Stop reason: SURG

## 2023-05-15 RX ORDER — MEPERIDINE HYDROCHLORIDE 25 MG/ML
25 INJECTION INTRAMUSCULAR; INTRAVENOUS; SUBCUTANEOUS ONCE AS NEEDED
Status: DISCONTINUED | OUTPATIENT
Start: 2023-05-15 | End: 2023-05-16

## 2023-05-15 RX ORDER — CHLORHEXIDINE GLUCONATE 500 MG/1
1 CLOTH TOPICAL EVERY 12 HOURS PRN
Status: DISCONTINUED | OUTPATIENT
Start: 2023-05-15 | End: 2023-05-15 | Stop reason: HOSPADM

## 2023-05-15 RX ORDER — FENTANYL/ROPIVACAINE/NS/PF 2-625MCG/1
15 PLASTIC BAG, INJECTION (ML) EPIDURAL ONCE
Status: COMPLETED | OUTPATIENT
Start: 2023-05-15 | End: 2023-05-15

## 2023-05-15 RX ORDER — HYDROCODONE BITARTRATE AND ACETAMINOPHEN 5; 325 MG/1; MG/1
1 TABLET ORAL EVERY 4 HOURS PRN
Status: DISCONTINUED | OUTPATIENT
Start: 2023-05-15 | End: 2023-05-20 | Stop reason: HOSPADM

## 2023-05-15 RX ORDER — CHLORHEXIDINE GLUCONATE 0.12 MG/ML
15 RINSE ORAL ONCE
Status: COMPLETED | OUTPATIENT
Start: 2023-05-15 | End: 2023-05-15

## 2023-05-15 RX ORDER — ONDANSETRON 2 MG/ML
4 INJECTION INTRAMUSCULAR; INTRAVENOUS EVERY 6 HOURS PRN
Status: DISCONTINUED | OUTPATIENT
Start: 2023-05-15 | End: 2023-05-20 | Stop reason: HOSPADM

## 2023-05-15 RX ORDER — AMOXICILLIN 250 MG
2 CAPSULE ORAL 2 TIMES DAILY
Status: DISCONTINUED | OUTPATIENT
Start: 2023-05-15 | End: 2023-05-17

## 2023-05-15 RX ORDER — ALBUMIN, HUMAN INJ 5% 5 %
SOLUTION INTRAVENOUS CONTINUOUS PRN
Status: DISCONTINUED | OUTPATIENT
Start: 2023-05-15 | End: 2023-05-15 | Stop reason: SURG

## 2023-05-15 RX ORDER — LEVOTHYROXINE SODIUM 0.05 MG/1
50 TABLET ORAL DAILY
Status: DISCONTINUED | OUTPATIENT
Start: 2023-05-16 | End: 2023-05-20 | Stop reason: HOSPADM

## 2023-05-15 RX ORDER — DEXTROSE MONOHYDRATE 25 G/50ML
10-50 INJECTION, SOLUTION INTRAVENOUS
Status: DISCONTINUED | OUTPATIENT
Start: 2023-05-15 | End: 2023-05-15 | Stop reason: HOSPADM

## 2023-05-15 RX ORDER — NITROGLYCERIN 20 MG/100ML
5-50 INJECTION INTRAVENOUS CONTINUOUS PRN
Status: DISCONTINUED | OUTPATIENT
Start: 2023-05-15 | End: 2023-05-16

## 2023-05-15 RX ORDER — ACETAMINOPHEN 650 MG/1
650 SUPPOSITORY RECTAL EVERY 6 HOURS
Status: DISCONTINUED | OUTPATIENT
Start: 2023-05-15 | End: 2023-05-15

## 2023-05-15 RX ORDER — ACETAMINOPHEN 160 MG/5ML
650 SOLUTION ORAL EVERY 6 HOURS
Status: DISCONTINUED | OUTPATIENT
Start: 2023-05-15 | End: 2023-05-16

## 2023-05-15 RX ORDER — ASPIRIN 325 MG
325 TABLET ORAL ONCE
Status: COMPLETED | OUTPATIENT
Start: 2023-05-15 | End: 2023-05-15

## 2023-05-15 RX ORDER — SODIUM CHLORIDE 0.9 % (FLUSH) 0.9 %
3-10 SYRINGE (ML) INJECTION AS NEEDED
Status: DISCONTINUED | OUTPATIENT
Start: 2023-05-15 | End: 2023-05-15 | Stop reason: HOSPADM

## 2023-05-15 RX ORDER — MILRINONE LACTATE 0.2 MG/ML
INJECTION, SOLUTION INTRAVENOUS CONTINUOUS PRN
Status: DISCONTINUED | OUTPATIENT
Start: 2023-05-15 | End: 2023-05-15 | Stop reason: SURG

## 2023-05-15 RX ORDER — ACETAMINOPHEN 650 MG/1
650 SUPPOSITORY RECTAL EVERY 4 HOURS PRN
Status: DISCONTINUED | OUTPATIENT
Start: 2023-05-16 | End: 2023-05-19

## 2023-05-15 RX ORDER — ACETAMINOPHEN 325 MG/1
650 TABLET ORAL EVERY 4 HOURS PRN
Status: DISCONTINUED | OUTPATIENT
Start: 2023-05-16 | End: 2023-05-19

## 2023-05-15 RX ORDER — MAGNESIUM SULFATE 1 G/100ML
1 INJECTION INTRAVENOUS EVERY 8 HOURS
Status: COMPLETED | OUTPATIENT
Start: 2023-05-15 | End: 2023-05-16

## 2023-05-15 RX ORDER — MAGNESIUM HYDROXIDE 1200 MG/15ML
LIQUID ORAL AS NEEDED
Status: DISCONTINUED | OUTPATIENT
Start: 2023-05-15 | End: 2023-05-15 | Stop reason: HOSPADM

## 2023-05-15 RX ORDER — SODIUM CHLORIDE 9 MG/ML
INJECTION, SOLUTION INTRAVENOUS CONTINUOUS PRN
Status: DISCONTINUED | OUTPATIENT
Start: 2023-05-15 | End: 2023-05-15 | Stop reason: SURG

## 2023-05-15 RX ORDER — SODIUM CHLORIDE 9 MG/ML
INJECTION, SOLUTION INTRAVENOUS AS NEEDED
Status: DISCONTINUED | OUTPATIENT
Start: 2023-05-15 | End: 2023-05-15 | Stop reason: HOSPADM

## 2023-05-15 RX ORDER — DEXMEDETOMIDINE HYDROCHLORIDE 4 UG/ML
.2-1.5 INJECTION, SOLUTION INTRAVENOUS
Status: DISCONTINUED | OUTPATIENT
Start: 2023-05-15 | End: 2023-05-16

## 2023-05-15 RX ORDER — POTASSIUM CHLORIDE 1.5 G/1.77G
40 POWDER, FOR SOLUTION ORAL AS NEEDED
Status: DISCONTINUED | OUTPATIENT
Start: 2023-05-15 | End: 2023-05-17

## 2023-05-15 RX ORDER — PANTOPRAZOLE SODIUM 40 MG/10ML
40 INJECTION, POWDER, LYOPHILIZED, FOR SOLUTION INTRAVENOUS ONCE
Status: COMPLETED | OUTPATIENT
Start: 2023-05-15 | End: 2023-05-15

## 2023-05-15 RX ORDER — ENOXAPARIN SODIUM 100 MG/ML
40 INJECTION SUBCUTANEOUS EVERY 24 HOURS
Status: DISCONTINUED | OUTPATIENT
Start: 2023-05-16 | End: 2023-05-17

## 2023-05-15 RX ORDER — KETAMINE HCL IN NACL, ISO-OSM 100MG/10ML
SYRINGE (ML) INJECTION AS NEEDED
Status: DISCONTINUED | OUTPATIENT
Start: 2023-05-15 | End: 2023-05-15 | Stop reason: SURG

## 2023-05-15 RX ORDER — POTASSIUM CHLORIDE 7.45 MG/ML
20 INJECTION INTRAVENOUS
Status: DISCONTINUED | OUTPATIENT
Start: 2023-05-15 | End: 2023-05-16

## 2023-05-15 RX ORDER — POTASSIUM CHLORIDE 20 MEQ/1
40 TABLET, EXTENDED RELEASE ORAL AS NEEDED
Status: DISCONTINUED | OUTPATIENT
Start: 2023-05-15 | End: 2023-05-17

## 2023-05-15 RX ORDER — ALBUMIN, HUMAN INJ 5% 5 %
500 SOLUTION INTRAVENOUS ONCE
Status: COMPLETED | OUTPATIENT
Start: 2023-05-15 | End: 2023-05-15

## 2023-05-15 RX ORDER — NICOTINE POLACRILEX 4 MG
15 LOZENGE BUCCAL
Status: DISCONTINUED | OUTPATIENT
Start: 2023-05-15 | End: 2023-05-15 | Stop reason: HOSPADM

## 2023-05-15 RX ORDER — VECURONIUM BROMIDE 1 MG/ML
INJECTION, POWDER, LYOPHILIZED, FOR SOLUTION INTRAVENOUS AS NEEDED
Status: DISCONTINUED | OUTPATIENT
Start: 2023-05-15 | End: 2023-05-15 | Stop reason: SURG

## 2023-05-15 RX ORDER — SODIUM CHLORIDE 0.9 % (FLUSH) 0.9 %
3 SYRINGE (ML) INJECTION EVERY 12 HOURS SCHEDULED
Status: DISCONTINUED | OUTPATIENT
Start: 2023-05-15 | End: 2023-05-15 | Stop reason: HOSPADM

## 2023-05-15 RX ORDER — ONDANSETRON 2 MG/ML
INJECTION INTRAMUSCULAR; INTRAVENOUS AS NEEDED
Status: DISCONTINUED | OUTPATIENT
Start: 2023-05-15 | End: 2023-05-15

## 2023-05-15 RX ORDER — NALOXONE HCL 0.4 MG/ML
0.4 VIAL (ML) INJECTION
Status: DISCONTINUED | OUTPATIENT
Start: 2023-05-15 | End: 2023-05-20 | Stop reason: HOSPADM

## 2023-05-15 RX ORDER — ACETAMINOPHEN 160 MG/5ML
650 SOLUTION ORAL EVERY 4 HOURS PRN
Status: DISCONTINUED | OUTPATIENT
Start: 2023-05-16 | End: 2023-05-19

## 2023-05-15 RX ORDER — MILRINONE LACTATE 0.2 MG/ML
0.12 INJECTION, SOLUTION INTRAVENOUS
Status: DISCONTINUED | OUTPATIENT
Start: 2023-05-15 | End: 2023-05-17

## 2023-05-15 RX ORDER — HEPARIN SODIUM 1000 [USP'U]/ML
INJECTION, SOLUTION INTRAVENOUS; SUBCUTANEOUS AS NEEDED
Status: DISCONTINUED | OUTPATIENT
Start: 2023-05-15 | End: 2023-05-15 | Stop reason: SURG

## 2023-05-15 RX ORDER — ACETAMINOPHEN 325 MG/1
650 TABLET ORAL EVERY 4 HOURS PRN
Status: DISCONTINUED | OUTPATIENT
Start: 2023-05-15 | End: 2023-05-15 | Stop reason: HOSPADM

## 2023-05-15 RX ORDER — NOREPINEPHRINE BITARTRATE 0.03 MG/ML
INJECTION, SOLUTION INTRAVENOUS CONTINUOUS PRN
Status: DISCONTINUED | OUTPATIENT
Start: 2023-05-15 | End: 2023-05-15

## 2023-05-15 RX ORDER — SODIUM CHLORIDE 9 MG/ML
30 INJECTION, SOLUTION INTRAVENOUS CONTINUOUS
Status: DISCONTINUED | OUTPATIENT
Start: 2023-05-15 | End: 2023-05-18

## 2023-05-15 RX ORDER — ACETAMINOPHEN 650 MG/1
650 SUPPOSITORY RECTAL EVERY 6 HOURS
Status: DISCONTINUED | OUTPATIENT
Start: 2023-05-15 | End: 2023-05-16

## 2023-05-15 RX ORDER — ACETAMINOPHEN 325 MG/1
650 TABLET ORAL EVERY 6 HOURS
Status: DISCONTINUED | OUTPATIENT
Start: 2023-05-15 | End: 2023-05-15

## 2023-05-15 RX ORDER — SODIUM CHLORIDE 0.9 % (FLUSH) 0.9 %
30 SYRINGE (ML) INJECTION ONCE AS NEEDED
Status: DISCONTINUED | OUTPATIENT
Start: 2023-05-15 | End: 2023-05-15 | Stop reason: HOSPADM

## 2023-05-15 RX ORDER — SODIUM CHLORIDE 9 MG/ML
40 INJECTION, SOLUTION INTRAVENOUS AS NEEDED
Status: DISCONTINUED | OUTPATIENT
Start: 2023-05-15 | End: 2023-05-15 | Stop reason: HOSPADM

## 2023-05-15 RX ORDER — CHLORHEXIDINE GLUCONATE 0.12 MG/ML
15 RINSE ORAL EVERY 12 HOURS
Status: DISCONTINUED | OUTPATIENT
Start: 2023-05-15 | End: 2023-05-20 | Stop reason: HOSPADM

## 2023-05-15 RX ORDER — PANTOPRAZOLE SODIUM 40 MG/1
40 TABLET, DELAYED RELEASE ORAL
Status: DISCONTINUED | OUTPATIENT
Start: 2023-05-16 | End: 2023-05-20 | Stop reason: HOSPADM

## 2023-05-15 RX ORDER — CALCIUM GLUCONATE 94 MG/ML
INJECTION, SOLUTION INTRAVENOUS AS NEEDED
Status: DISCONTINUED | OUTPATIENT
Start: 2023-05-15 | End: 2023-05-15 | Stop reason: SURG

## 2023-05-15 RX ORDER — NOREPINEPHRINE BITARTRATE 0.03 MG/ML
.02-.06 INJECTION, SOLUTION INTRAVENOUS CONTINUOUS PRN
Status: DISCONTINUED | OUTPATIENT
Start: 2023-05-15 | End: 2023-05-16

## 2023-05-15 RX ORDER — MIDAZOLAM HYDROCHLORIDE 1 MG/ML
INJECTION INTRAMUSCULAR; INTRAVENOUS AS NEEDED
Status: DISCONTINUED | OUTPATIENT
Start: 2023-05-15 | End: 2023-05-15 | Stop reason: SURG

## 2023-05-15 RX ORDER — ALBUMIN (HUMAN) 12.5 G/50ML
25 SOLUTION INTRAVENOUS ONCE
Status: DISCONTINUED | OUTPATIENT
Start: 2023-05-15 | End: 2023-05-15

## 2023-05-15 RX ORDER — FENTANYL CITRATE 50 UG/ML
INJECTION, SOLUTION INTRAMUSCULAR; INTRAVENOUS AS NEEDED
Status: DISCONTINUED | OUTPATIENT
Start: 2023-05-15 | End: 2023-05-15 | Stop reason: SURG

## 2023-05-15 RX ORDER — POLYETHYLENE GLYCOL 3350 17 G/17G
17 POWDER, FOR SOLUTION ORAL 2 TIMES DAILY
Status: DISCONTINUED | OUTPATIENT
Start: 2023-05-15 | End: 2023-05-17

## 2023-05-15 RX ORDER — MORPHINE SULFATE 2 MG/ML
2 INJECTION, SOLUTION INTRAMUSCULAR; INTRAVENOUS
Status: DISPENSED | OUTPATIENT
Start: 2023-05-15 | End: 2023-05-18

## 2023-05-15 RX ADMIN — ACETAMINOPHEN 650 MG: 650 SOLUTION ORAL at 21:00

## 2023-05-15 RX ADMIN — SODIUM CHLORIDE 750 MG: 9 INJECTION, SOLUTION INTRAVENOUS at 07:24

## 2023-05-15 RX ADMIN — AMINOCAPROIC ACID 10 G: 250 INJECTION, SOLUTION INTRAVENOUS at 07:32

## 2023-05-15 RX ADMIN — SENNOSIDES AND DOCUSATE SODIUM 2 TABLET: 50; 8.6 TABLET ORAL at 21:00

## 2023-05-15 RX ADMIN — MAGNESIUM SULFATE IN DEXTROSE 1 G: 10 INJECTION, SOLUTION INTRAVENOUS at 17:00

## 2023-05-15 RX ADMIN — MIDAZOLAM 1 MG: 1 INJECTION INTRAMUSCULAR; INTRAVENOUS at 08:30

## 2023-05-15 RX ADMIN — EPINEPHRINE 0.04 MCG/KG/MIN: 1 INJECTION INTRAMUSCULAR; INTRAVENOUS; SUBCUTANEOUS at 09:20

## 2023-05-15 RX ADMIN — AMINOCAPROIC ACID 10 G: 250 INJECTION, SOLUTION INTRAVENOUS at 10:26

## 2023-05-15 RX ADMIN — ALBUMIN (HUMAN) 500 ML: 12.5 INJECTION, SOLUTION INTRAVENOUS at 17:10

## 2023-05-15 RX ADMIN — Medication 0.15 MCG/KG/MIN: at 08:32

## 2023-05-15 RX ADMIN — ONDANSETRON 4 MG: 2 INJECTION INTRAMUSCULAR; INTRAVENOUS at 10:31

## 2023-05-15 RX ADMIN — VECURONIUM BROMIDE 4 MG: 10 INJECTION, POWDER, LYOPHILIZED, FOR SOLUTION INTRAVENOUS at 08:07

## 2023-05-15 RX ADMIN — MORPHINE SULFATE 2 MG: 2 INJECTION, SOLUTION INTRAMUSCULAR; INTRAVENOUS at 15:57

## 2023-05-15 RX ADMIN — SODIUM CHLORIDE 30 ML/HR: 9 INJECTION, SOLUTION INTRAVENOUS at 11:30

## 2023-05-15 RX ADMIN — ALBUMIN (HUMAN) 12.5 G: 12.5 INJECTION, SOLUTION INTRAVENOUS at 11:01

## 2023-05-15 RX ADMIN — ALBUMIN (HUMAN) 12.5 G: 12.5 INJECTION, SOLUTION INTRAVENOUS at 11:26

## 2023-05-15 RX ADMIN — POTASSIUM PHOSPHATE, MONOBASIC AND POTASSIUM PHOSPHATE, DIBASIC 15 MMOL: 224; 236 INJECTION, SOLUTION, CONCENTRATE INTRAVENOUS at 15:01

## 2023-05-15 RX ADMIN — MIDAZOLAM 2 MG: 1 INJECTION INTRAMUSCULAR; INTRAVENOUS at 07:00

## 2023-05-15 RX ADMIN — PANTOPRAZOLE SODIUM 40 MG: 40 INJECTION, POWDER, LYOPHILIZED, FOR SOLUTION INTRAVENOUS at 17:01

## 2023-05-15 RX ADMIN — INSULIN HUMAN 2 UNITS/HR: 1 INJECTION, SOLUTION INTRAVENOUS at 08:40

## 2023-05-15 RX ADMIN — MILRINONE LACTATE 0.12 MCG/KG/MIN: 0.2 INJECTION, SOLUTION INTRAVENOUS at 09:45

## 2023-05-15 RX ADMIN — POLYETHYLENE GLYCOL 3350 17 G: 17 POWDER, FOR SOLUTION ORAL at 21:00

## 2023-05-15 RX ADMIN — MIDAZOLAM 2 MG: 1 INJECTION INTRAMUSCULAR; INTRAVENOUS at 09:09

## 2023-05-15 RX ADMIN — Medication 20 MG: at 07:00

## 2023-05-15 RX ADMIN — INSULIN HUMAN 1.8 UNITS/HR: 1 INJECTION, SOLUTION INTRAVENOUS at 13:51

## 2023-05-15 RX ADMIN — CHLORHEXIDINE GLUCONATE 15 ML: 1.2 RINSE ORAL at 21:02

## 2023-05-15 RX ADMIN — VECURONIUM BROMIDE 6 MG: 10 INJECTION, POWDER, LYOPHILIZED, FOR SOLUTION INTRAVENOUS at 07:08

## 2023-05-15 RX ADMIN — NITROGLYCERIN 10 MCG/MIN: 20 INJECTION INTRAVENOUS at 11:10

## 2023-05-15 RX ADMIN — VECURONIUM BROMIDE 2 MG: 10 INJECTION, POWDER, LYOPHILIZED, FOR SOLUTION INTRAVENOUS at 09:00

## 2023-05-15 RX ADMIN — ASPIRIN 325 MG ORAL TABLET 325 MG: 325 PILL ORAL at 17:00

## 2023-05-15 RX ADMIN — SODIUM BICARBONATE 100 MEQ: 84 INJECTION, SOLUTION INTRAVENOUS at 19:20

## 2023-05-15 RX ADMIN — ALBUMIN (HUMAN) 12.5 G: 12.5 INJECTION, SOLUTION INTRAVENOUS at 14:29

## 2023-05-15 RX ADMIN — INSULIN HUMAN 4 UNITS: 100 INJECTION, SOLUTION PARENTERAL at 08:50

## 2023-05-15 RX ADMIN — POTASSIUM CHLORIDE 20 MEQ: 7.46 INJECTION, SOLUTION INTRAVENOUS at 11:31

## 2023-05-15 RX ADMIN — MIDAZOLAM 2 MG: 1 INJECTION INTRAMUSCULAR; INTRAVENOUS at 07:08

## 2023-05-15 RX ADMIN — CHLORHEXIDINE GLUCONATE 15 ML: 1.2 SOLUTION ORAL at 05:56

## 2023-05-15 RX ADMIN — MORPHINE SULFATE 2 MG: 2 INJECTION, SOLUTION INTRAMUSCULAR; INTRAVENOUS at 11:15

## 2023-05-15 RX ADMIN — ETOMIDATE 15 MG: 20 INJECTION, SOLUTION INTRAVENOUS at 07:08

## 2023-05-15 RX ADMIN — FENTANYL CITRATE 250 MCG: 0.05 INJECTION, SOLUTION INTRAMUSCULAR; INTRAVENOUS at 07:39

## 2023-05-15 RX ADMIN — PROTAMINE SULFATE 200 MG: 10 INJECTION, SOLUTION INTRAVENOUS at 09:44

## 2023-05-15 RX ADMIN — NICARDIPINE HYDROCHLORIDE 2.5 MG/HR: 25 INJECTION, SOLUTION INTRAVENOUS at 11:02

## 2023-05-15 RX ADMIN — FENTANYL CITRATE 250 MCG: 0.05 INJECTION, SOLUTION INTRAMUSCULAR; INTRAVENOUS at 09:09

## 2023-05-15 RX ADMIN — ALBUMIN (HUMAN) 12.5 G: 12.5 INJECTION, SOLUTION INTRAVENOUS at 12:44

## 2023-05-15 RX ADMIN — VECURONIUM BROMIDE 2 MG: 10 INJECTION, POWDER, LYOPHILIZED, FOR SOLUTION INTRAVENOUS at 10:02

## 2023-05-15 RX ADMIN — FENTANYL CITRATE 250 MCG: 0.05 INJECTION, SOLUTION INTRAMUSCULAR; INTRAVENOUS at 07:08

## 2023-05-15 RX ADMIN — ACETAMINOPHEN 650 MG: 325 TABLET, FILM COATED ORAL at 17:00

## 2023-05-15 RX ADMIN — MORPHINE SULFATE 2 MG: 2 INJECTION, SOLUTION INTRAMUSCULAR; INTRAVENOUS at 18:45

## 2023-05-15 RX ADMIN — ALPRAZOLAM 0.25 MG: 0.25 TABLET ORAL at 05:55

## 2023-05-15 RX ADMIN — METOPROLOL TARTRATE 12.5 MG: 25 TABLET, FILM COATED ORAL at 05:55

## 2023-05-15 RX ADMIN — CEFAZOLIN 2 G: 2 INJECTION, POWDER, FOR SOLUTION INTRAMUSCULAR; INTRAVENOUS at 14:43

## 2023-05-15 RX ADMIN — POTASSIUM CHLORIDE 20 MEQ: 7.46 INJECTION, SOLUTION INTRAVENOUS at 18:30

## 2023-05-15 RX ADMIN — DEXMEDETOMIDINE HYDROCHLORIDE 0.5 MCG/KG/HR: 100 INJECTION, SOLUTION INTRAVENOUS at 07:30

## 2023-05-15 RX ADMIN — CEFAZOLIN 2 G: 2 INJECTION, POWDER, FOR SOLUTION INTRAMUSCULAR; INTRAVENOUS at 22:05

## 2023-05-15 RX ADMIN — HEPARIN SODIUM 15000 UNITS: 1000 INJECTION INTRAVENOUS; SUBCUTANEOUS at 08:13

## 2023-05-15 RX ADMIN — MORPHINE SULFATE 2 MG: 2 INJECTION, SOLUTION INTRAMUSCULAR; INTRAVENOUS at 22:05

## 2023-05-15 RX ADMIN — MUPIROCIN 1 APPLICATION: 20 OINTMENT TOPICAL at 05:56

## 2023-05-15 RX ADMIN — ACETAMINOPHEN 1000 MG: 10 INJECTION, SOLUTION INTRAVENOUS at 10:32

## 2023-05-15 RX ADMIN — SODIUM CHLORIDE: 0.9 INJECTION, SOLUTION INTRAVENOUS at 06:55

## 2023-05-15 RX ADMIN — ALBUMIN HUMAN: 0.05 INJECTION, SOLUTION INTRAVENOUS at 09:33

## 2023-05-15 RX ADMIN — CALCIUM GLUCONATE 0.5 G: 98 INJECTION, SOLUTION INTRAVENOUS at 09:45

## 2023-05-15 RX ADMIN — POTASSIUM CHLORIDE 20 MEQ: 7.46 INJECTION, SOLUTION INTRAVENOUS at 15:57

## 2023-05-15 RX ADMIN — MUPIROCIN 1 APPLICATION: 20 OINTMENT TOPICAL at 21:00

## 2023-05-15 NOTE — OP NOTE
BCS OPERATIVE NOTE    Date of procedure:5/15/2023    Pre-op Diagnosis: Severe mitral valve regurgitation, nonischemic cardiomyopathy, AV Block with PPM, hypothyroidism.    Post-op Diagnosis: Same.    Procedure: Mitral valve repair using 25 mm Physio II ([employer]) and plication of excess P2 tissue. PRP application to sternum, Intraoperative transesophageal echocardiogram.    Surgeon: Veronica Orozco MD    Assistant: PITO Costa (Assistant provided specialized assistance including suctioning, cutting of sutures, and aiding in exposure; this was necessary for the success of this surgical procedure).    Cardiologist: LACY Marcus MD    Anesthesia: GET    Findings: CARMITA showed LVEF 30%, severe MR, mild AI, mild TR. Following repair, no MR.    Aortic cross-clamp time: 37 min    Cardiopulmonary bypass time: 74 min    Cellsaver/EBL: 500 ml    Antegrade and retrograde cardioplegia.    Arterial cannula: 18 Armenian    Venous cannula: 24 Armenian to SVC, 28 Armenian to IVC    Specimen: None.    History of present illness: The STS morbidity and mortality risks were calculated and discussed prior to surgery.  The dangers of tobacco abuse and illicit drug use were discussed in detail with the patient; this included the benefits of tobacco and illicit drug use cessation.    Details:    Veronica Orozco MD  5/15/2023  10:14 EDT   mitral valve regurgitation.  The patient's left ventricular ejection fraction is also markedly reduced, 30% at best.  Her tricuspid valve regurgitation was noted to be very mild, probably not requiring repair.    A midline sternotomy was performed.  The sternum was spread.  The peritoneum was heparinized.  The pericardium was opened and a well was created.  The aorta was palpated and felt to be free of any worrisome atherosclerotic plaque.  The ascending aorta, superior vena cava, inferior vena cava were cannulated.  Antegrade and retrograde cardioplegia lines were inserted.  Cardiopulmonary bypass instituted after ACT was confirmed and the patient was cooled to 34 °C.  Sondergaard's groove was developed with electrocautery.  An aortic cross-clamp was applied.  The patient received both antegrade and retrograde cardioplegia, resulting in diastolic arrest.  At regular intervals additional retrograde cardioplegia doses were given.  CO2 was also used to flood the operative field.    A left atriotomy was created.  The atrium was retracted and the mitral valve was exposed.  There was an excess of leaflet tissue in the P2 position.  The subvalvular apparatus was relatively spared.  Annular sutures were placed.  These were nonpledgeted 2-0 Ethibond sutures.  The mitral valve was then tested with a Lindo catheter.  There was significant regurgitation secondary to the excess P2 leaflet tissue.  2 horizontal mattress fiber Prolene sutures were used to plicate this tissue, effectively reducing the available surface area of the posterior leaflet.  The valve was tested again and noted to be entirely competent.  Using the anterior leaflet as a guide a 28 mm physio 2 ring was chosen.  The answers were placed through the annuloplasty ring.  The ring was then lowered onto the mitral valve annulus.  The sutures were secured with a core knot device.  The valve was once again tested and still noted to be competent.  The left  atriotomy was then closed with a double over 4 Prolene suture.    Following a hotshot antegrade cardioplegia dose aortic cross-clamp was removed.  The patient required 1 defibrillatory shock after which she remained in sinus rhythm.  Pacing wires were applied to the right atrium and right ventricle.  After sufficient de-airing the antegrade cardioplegia line/vent was removed.  Transesophageal cardiogram confirmed that the mitral valve was functioning well without any regurgitation.  The patient was weaned off cardiopulmonary bypass.    The venous lines were removed.  Following a protamine dose the arterial line was removed.  Once again transesophageal echocardiogram was performed, showing good function of the mitral valve without any regurgitation.  Mediastinal chest was replaced.  The pericardium was approximated loosely.  Steel wires were used to close the sternum.  Sponge counts, instrument counts, needle counts were correct.  The fascia layer, subdermal layer, and subcuticular were closed.  A sterile bandage was applied.  Patient was transferred to the CVCU.    Veronica Orozco MD  5/15/2023  10:14 EDT

## 2023-05-15 NOTE — ANESTHESIA PROCEDURE NOTES
Arterial Line      Patient location during procedure: OR  Start time: 5/15/2023 7:00 AM  Stop Time:5/15/2023 7:06 AM       Line placed for hemodynamic monitoring, ABGs/Labs/ISTAT and MD/Surgeon request.  Performed By   Anesthesiologist: Ronnie Craft MD   Preanesthetic Checklist  Completed: patient identified, IV checked, site marked, risks and benefits discussed, surgical consent, monitors and equipment checked, pre-op evaluation and timeout performed  Arterial Line Prep    Sterile Tech: cap, gloves, sterile barriers and mask  Prep: ChloraPrep  Patient monitoring: EKG, continuous pulse oximetry and blood pressure monitoring  Arterial Line Procedure   Laterality:right  Location:  radial artery  Catheter size: 20 G   Guidance: landmark technique  Number of attempts: 2  Successful placement: yes   Post Assessment   Dressing Type: wrist guard applied, secured with tape and occlusive dressing applied.   Complications no  Circ/Move/Sens Assessment: normal and unchanged.   Patient Tolerance: patient tolerated the procedure well with no apparent complications  Additional Notes  L RADIAL ATTEMPT RNMD ASSIST US GUIDANCE X1 UNSUCCESSFUL. R RADIAL ART LINE VIA ASEPTIC SELDINGER TECH BY MD. GOOD SARA BILAT

## 2023-05-15 NOTE — CASE MANAGEMENT/SOCIAL WORK
Continued Stay Note  SHILPI Crandall     Patient Name: Thi Allen  MRN: [id number]  Today's Date: 5/15/2023    Admit Date: 5/15/2023    Plan: DC Plan: Pending Clinical Course and PT/OT evaluations. From home with family. MVR 5/15/23.   Discharge Plan     Row Name 05/15/23 1606       Plan    Plan DC Plan: Pending Clinical Course and PT/OT evaluations. From home with family. MVR 5/15/23.    Provided Post Acute Provider List? N/A    Provided Post Acute Provider Quality & Resource List? N/A    Plan Comments Still needs full CM assessment. No family in waiting room. Patient in OR. CM will follow up with patient tomorrow 5/16/23 post recovery from anesthesia. CM will continue to follow for any further needs and adjust discharge plan accordingly. DC Barriers: POD 0 OHS.           Expected Discharge Date and Time     Expected Discharge Date Expected Discharge Time    May 19, 2023             Dee Alicia RN     Office Phone: [phone]  Office Cell:     [phone]

## 2023-05-15 NOTE — ANESTHESIA PROCEDURE NOTES
Central Line      Patient location during procedure: OR  Start time: 5/15/2023 7:22 AM  Stop Time:5/15/2023 7:24 AM  Indications: central pressure monitoring, vascular access and MD/Surgeon request  Staff  Anesthesiologist: Ronnie Craft MD  Preanesthetic Checklist  Completed: patient identified, IV checked, site marked, risks and benefits discussed, surgical consent, monitors and equipment checked, pre-op evaluation and timeout performed  Central Line Prep  Sterile Tech:cap, gloves, gown, mask and sterile barriers  Patient monitoring: blood pressure monitoring, continuous pulse oximetry and EKG  Central Line Procedure  Laterality:right  Location:internal jugular  Catheter Type:[place]-Enrike  Assessment  Complications:no  Patient Tolerance:patient tolerated the procedure well with no apparent complications  Additional Notes  THRU PREVIOUSLY PLACED MAC INTRODUCER. NEW GLOVES/DRAPE. TO PA X1 ATTEMPT 49CM NO WEDGE ATTEMPTED

## 2023-05-15 NOTE — ANESTHESIA PROCEDURE NOTES
Central Line      Patient location during procedure: OR  Start time: 5/15/2023 7:11 AM  Stop Time:5/15/2023 7:21 AM  Indications: central pressure monitoring, vascular access and MD/Surgeon request  Staff  Anesthesiologist: Ronnie Craft MD  Preanesthetic Checklist  Completed: patient identified, IV checked, site marked, risks and benefits discussed, surgical consent, monitors and equipment checked, pre-op evaluation and timeout performed  Central Line Prep  Sterile Tech:gloves, cap, gown, mask and sterile barriers  Prep: chloraprep  Patient monitoring: blood pressure monitoring, continuous pulse oximetry and EKG  Central Line Procedure  Laterality:right  Location:internal jugular  Catheter Type:double lumen and MAC  Catheter Size:9 Fr  Guidance:ultrasound guided  PROCEDURE NOTE/ULTRASOUND INTERPRETATION.  Using ultrasound guidance the potential vascular sites for insertion of the catheter were visualized to determine the patency of the vessel to be used for vascular access.  After selecting the appropriate site for insertion, the needle was visualized under ultrasound being inserted into the internal jugular vein, followed by ultrasound confirmation of wire and catheter placement. There were no abnormalities seen on ultrasound; an image was taken; and the patient tolerated the procedure with no complications. Images: still images obtained, printed/placed on chart  Assessment  Post procedure:biopatch applied, line sutured and occlusive dressing applied  Assessement:blood return through all ports, free fluid flow, chest x-ray ordered and Choco Test  Complications:no  Patient Tolerance:patient tolerated the procedure well with no apparent complications  Additional Notes  R IJ MAC VIA ASEPTIC SELDINGER TECH THRU ANTERIOR APPROACH US GUIDANCE X1

## 2023-05-15 NOTE — ANESTHESIA PROCEDURE NOTES
Airway  Urgency: elective    Date/Time: 5/15/2023 7:10 AM  End Time:5/15/2023 7:10 AM  Airway not difficult    General Information and Staff    Patient location during procedure: OR  Anesthesiologist: Ronnie Craft MD    Indications and Patient Condition  Indications for airway management: airway protection    Preoxygenated: yes  MILS maintained throughout  Mask difficulty assessment: 1 - vent by mask    Final Airway Details  Final airway type: endotracheal airway      Successful airway: ETT  Cuffed: yes   Successful intubation technique: direct laryngoscopy  Endotracheal tube insertion site: oral  Blade: Mallory  Blade size: 3  ETT size (mm): 7.0  Cormack-Lehane Classification: grade I - full view of glottis  Placement verified by: chest auscultation and capnometry   Measured from: teeth  ETT/EBT  to teeth (cm): 22  Number of attempts at approach: 1  Assessment: lips, teeth, and gum same as pre-op and atraumatic intubation    Additional Comments  X1 UDVC. ATRAUMATIC.  TEETH IN PREOP CONDITION.  CUFF TO MINIMUM OCCLUSIVE CUFF PRESSURE. POS ETCO2. BS=BS. ENDO BITE BLOCK

## 2023-05-15 NOTE — ANESTHESIA PROCEDURE NOTES
Intra-Op Anesthesia CARMITA    Procedure Performed: Intra-Op Anesthesia CARMITA       Start Time:  5/15/2023 7:25 AM       End Time:   5/15/2023 7:35 AM    Preanesthesia Checklist:  Patient identified, IV assessed, risks and benefits discussed, monitors and equipment assessed, procedure being performed at surgeon's request and anesthesia consent obtained.    General Procedure Information  CARMITA Placed for monitoring purposes only -- This is not a diagnostic CARMITA  Diagnostic Indications for Echo:  assessment of ascending aorta, assessment of surgical repair, defect repair evaluation and hemodynamic monitoring  Location performed:  OR  Intubated  Bite block placed  Probe Insertion:  Easy  Probe Type:  Multiplane  Modalities:  Color flow mapping, continuous wave Doppler and pulse wave Doppler    Echocardiographic and Doppler Measurements    Ventricles    Right Ventricle:  Cavity size normal.  Hypertrophy not present.  Thrombus not present.  Global function normal.    Left Ventricle:  Cavity size dilated.  Thrombus not present.  Global Function moderately impaired.  Ejection Fraction 30%.          Valves    Aortic Valve:  Annulus normal.  Stenosis not present.  Regurgitation mild.  Leaflets normal.  Leaflet motions normal.      Mitral Valve:  Annulus dilated.  Stenosis not present.  Regurgitation severe.  Leaflets normal.  Leaflet motions normal.      Tricuspid Valve:  Annulus normal.  Stenosis not present.  Regurgitation mild.  Leaflets normal.  Leaflet motions normal.        Aorta    Ascending Aorta:  Size normal.  Dissection not present.  Plaque thickness less than 3 mm.  Mobile plaque not present.    Aortic Arch:  Size normal.  Dissection not present.  Plaque thickness less than 3 mm.  Mobile plaque not present.    Descending Aorta:  Size normal.  Dissection not present.  Plaque thickness less than 3 mm.  Mobile plaque not present.        Atria    Right Atrium:  Size dilated.  Spontaneous echo contrast not present.  Thrombus  not present.  Tumor not present.  Device present.    Left Atrium:  Size dilated.  Spontaneous echo contrast present.  Thrombus not present.  Tumor not present.  Device not present.    Left atrial appendage normal.      Septa        Ventricular Septum:  Intra-ventricular septum morphology normal.          Other Findings  Pericardium:  normal  Pleural Effusion:  none  Pulmonary Arteries:  normal  Pulmonary Venous Flow:  normal    Anesthesia Information  Anesthesiologist:  Ronnie Craft MD      Echocardiogram Comments:       MILD AI; SEVERE MR DILATE LA/MV ANNULUS, CENTRAL JET  TV: MILD TI  EF 30

## 2023-05-15 NOTE — ANESTHESIA POSTPROCEDURE EVALUATION
Patient: Thi Allen    Procedure Summary     Date: 05/15/23 Room / Location: [facility] CVOR 02 / [facility] CVOR    Anesthesia Start: 0655 Anesthesia Stop: 1041    Procedures:       MITRAL VALVE REPAIR/REPLACEMENT (Chest)      TRANSESOPHAGEAL ECHOCARDIOGRAM WITH ANESTHESIA (Chest) Diagnosis:       Mitral valve insufficiency, unspecified etiology      (Mitral valve insufficiency, unspecified etiology [I34.0])    Surgeons: Veronica Orozco MD Provider: Ronnie Craft MD    Anesthesia Type: general, [place], CVL, PAC ASA Status: 4          Anesthesia Type: general, [place], CVL, PAC    Vitals  Vitals Value Taken Time   BP     Temp 92.84 °F (33.8 °C) 05/15/23 1100   Pulse 95 05/15/23 1100   Resp     SpO2 100 % 05/15/23 1100   Vitals shown include unvalidated device data.        Post Anesthesia Care and Evaluation    Patient location during evaluation: ICU  Patient participation: complete - patient cannot participate  Level of consciousness: obtunded/minimal responses  Pain scale: See nurse's notes for pain score.  Pain management: adequate    Airway patency: patent  Anesthetic complications: No anesthetic complications  PONV Status: none  Cardiovascular status: acceptable  Respiratory status: acceptable, ventilator and ETT  Hydration status: acceptable    Comments: Patient seen and examined postoperatively; vital signs stable; SpO2 greater than or equal to 90%; cardiopulmonary status stable; nausea/vomiting adequately controlled; pain adequately controlled; no apparent anesthesia complications; patient discharged from anesthesia care when discharge criteria were met. Sedated on vent. Hemodynamically stable; epi .03/milrinone .125

## 2023-05-15 NOTE — CONSULTS
Referring Provider: Veronica Orozco MD    Reason for Consultation: Status post mitral valve repair      Patient Care Team:  Camila Lazcano MD as PCP - General  Trace Marcus MD as Cardiologist (Cardiology)      SUBJECTIVE     Chief Complaint: Severe mitral valve regurgitation    History of present illness:  Thi Allen is a 83 y.o. 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  Cardiology has been consulted for perioperative cardiovascular care.  She is currently intubated and sedated.  She is on Primacor and epinephrine.  Chest tubes are in place      Review of systems:  Unable to assess due to sedation      Personal History:      Past Medical History:   Diagnosis Date   • AV block 02/22/2021   • Bilateral renal cysts 01/01/2020   • Carotid stenosis, bilateral 06/01/2019    Overview:  <50% bilateral    • Carpal tunnel syndrome, bilateral 06/15/2018   • Chronic combined systolic (congestive) and diastolic (congestive) heart failure    • DVT (deep venous thrombosis) (CMS/[facility]) [I82.409]     recurrent   • Hordeolum externum 08/05/2015   • Hyperlipidemia    • Hypertension    • Hypothyroidism due to acquired atrophy of thyroid 10/21/2019   • Long term (current) use of anticoagulants 09/29/2022   • Mitral regurgitation 05/17/2016   • Osteopenia    • Pacemaker 12/28/2021       Past Surgical History:   Procedure Laterality Date   • CARDIAC CATHETERIZATION N/A 2/25/2023    Procedure: Left Heart Cath and coronary angiogram;  Surgeon: Trace Marcus MD;  Location: [facility] CATH INVASIVE LOCATION;   Service: Cardiovascular;  Laterality: N/A;   • CARDIAC CATHETERIZATION Bilateral 2/25/2023    Procedure: Right and Left Heart Cath;  Surgeon: Trace Marcus MD;  Location: [facility] CATH INVASIVE LOCATION;  Service: Cardiovascular;  Laterality: Bilateral;   • HYSTERECTOMY     • PACEMAKER IMPLANTATION     • THYROID SURGERY         Family History   Problem Relation Age of Onset   • No Known Problems Mother    • No Known Problems Father    • No Known Problems Sister    • No Known Problems Brother    • No Known Problems Maternal Aunt    • No Known Problems Maternal Uncle    • No Known Problems Paternal Aunt    • No Known Problems Paternal Uncle    • No Known Problems Maternal Grandmother    • No Known Problems Maternal Grandfather    • No Known Problems Paternal Grandmother    • No Known Problems Paternal Grandfather    • No Known Problems Other    • Anemia Neg Hx    • Arrhythmia Neg Hx    • Asthma Neg Hx    • Clotting disorder Neg Hx    • Fainting Neg Hx    • Heart attack Neg Hx    • Heart disease Neg Hx    • Heart failure Neg Hx    • Hyperlipidemia Neg Hx    • Hypertension Neg Hx        Social History     Tobacco Use   • Smoking status: Former     Passive exposure: Past   • Smokeless tobacco: Never   • Tobacco comments:     quit 35 yrs ago   Vaping Use   • Vaping Use: Never used   Substance Use Topics   • Alcohol use: Not Currently     Comment: rare   • Drug use: No        Medications Prior to Admission   Medication Sig Dispense Refill Last Dose   • atenolol (TENORMIN) 100 MG tablet Take 1 tablet by mouth Daily.   5/14/2023   • Enoxaparin Sodium (LOVENOX) 60 MG/0.6ML solution prefilled syringe syringe Inject 0.5 mL under the skin into the appropriate area as directed Every 12 (Twelve) Hours for 5 doses. Warfarin being stopped for cardiac surgery.  Lovenox to start 5/12 BID and last dose 5/14 morning. 2.5 mL 0 5/15/2023 at 0400   • famotidine (PEPCID) 20 MG tablet Take 1 tablet by mouth 2 (Two) Times a Day As Needed.    Past Week   • fenofibrate 160 MG tablet Take 1 tablet by mouth Daily.   5/14/2023   • hydroCHLOROthiazide (HYDRODIURIL) 25 MG tablet Take 2 tablets by mouth Every Other Day.   5/14/2023   • latanoprost (XALATAN) 0.005 % ophthalmic solution Administer 1 drop to both eyes Every Night.   5/14/2023   • levothyroxine (SYNTHROID, LEVOTHROID) 50 MCG tablet TAKE 1 TABLET BY MOUTH ONCE DAILY   5/14/2023   • lisinopril (PRINIVIL,ZESTRIL) 20 MG tablet Take 1 tablet by mouth Daily. 30 tablet 0 5/12/2023   • mupirocin (BACTROBAN) 2 % ointment Apply to nares (inside each nostril) twice daily as directed for procedure 22 g 0 5/15/2023   • warfarin (COUMADIN) 2.5 MG tablet Take 1 tablet by mouth Every Night. T,Th,Sat,Sun   5/9/2023   • warfarin (COUMADIN) 5 MG tablet Take 1 tablet by mouth Daily. M,W,F   5/10/2023        Allergies:     Atorvastatin, Colesevelam hcl, Colestipol hcl, Ezetimibe, Pitavastatin, Rosuvastatin, Rosuvastatin calcium, Simvastatin, Statins, and Keflex [cephalexin]    Scheduled Meds:acetaminophen, 650 mg, Oral, Q6H   Or  acetaminophen, 650 mg, Oral, Q6H   Or  acetaminophen, 650 mg, Rectal, Q6H  [START ON 5/16/2023] aspirin, 81 mg, Oral, Daily  aspirin, 325 mg, Oral, Once  ceFAZolin, 2 g, Intravenous, Q8H  chlorhexidine, 15 mL, Mouth/Throat, Q12H  [START ON 5/16/2023] enoxaparin, 40 mg, Subcutaneous, Q24H  [START ON 5/16/2023] levothyroxine, 50 mcg, Oral, Daily  magnesium sulfate, 1 g, Intravenous, Q8H  mupirocin, 1 application, Each Nare, BID  [START ON 5/16/2023] pantoprazole, 40 mg, Oral, Q AM  pantoprazole, 40 mg, Intravenous, Once  polyethylene glycol, 17 g, Oral, BID  senna-docusate sodium, 2 tablet, Oral, BID      Continuous Infusions:dexmedetomidine, 0.2-1.5 mcg/kg/hr  EPINEPHrine, 0.02-0.05 mcg/kg/min  insulin, 0-100 Units/hr  milrinone, 0.125 mcg/kg/min  niCARdipine, 5-15 mg/hr, Last Rate: 2.5 mg/hr (05/15/23 1102)  nitroglycerin, 5-50 mcg/min  norepinephrine, 0.02-0.06 mcg/kg/min  sodium chloride, 30  "mL/hr      PRN Meds:•  [START ON 5/16/2023] acetaminophen **OR** [START ON 5/16/2023] acetaminophen **OR** [START ON 5/16/2023] acetaminophen  •  albumin human  •  dextrose  •  dextrose  •  glucagon (human recombinant)  •  HYDROcodone-acetaminophen  •  meperidine  •  Morphine **AND** naloxone  •  niCARdipine  •  nitroglycerin  •  norepinephrine  •  ondansetron  •  potassium chloride **OR** potassium chloride  •  potassium chloride **OR** potassium chloride  •  vasopressin      OBJECTIVE    Vital Signs  Vitals:    05/15/23 1141 05/15/23 1142 05/15/23 1143 05/15/23 1200   BP:    126/55   BP Location:    Other (Comment)   Patient Position:    Lying   Pulse: 95 94 92 91   Resp:    12   Temp: 92.5 °F (33.6 °C) 92.5 °F (33.6 °C) 92.5 °F (33.6 °C) 92.7 °F (33.7 °C)   TempSrc:       SpO2: 100% 100% 100% 100%   Weight:       Height:           Flowsheet Rows    Flowsheet Row First Filed Value   Admission Height 149.9 cm (59\") Documented at 05/15/2023 0516   Admission Weight 48 kg (105 lb 12.8 oz) Documented at 05/15/2023 0549            Intake/Output Summary (Last 24 hours) at 5/15/2023 1211  Last data filed at 5/15/2023 1200  Gross per 24 hour   Intake 2450 ml   Output 3000 ml   Net -550 ml        Telemetry: Paced rhythm    Physical Exam:  The patient is intubated and sedated  Vital signs as noted above.  Head and neck revealed no carotid bruits or jugular venous distention.  No thyromegaly or lymph adenopathy is present  On mechanical ventilator with 40% FiO2.  Chest tubes in place.  Lung sounds are diminished  Heart normal first and second heart sounds.No murmur.  No precordial rub is present.  No gallop is present.  Abdomen soft and nontender.  No organomegaly is present.  Extremities with good peripheral pulses without any pedal edema.  Skin warm and dry.  Sternal surgical incision covered with dressing  Musculoskeletal system is grossly normal  CNS exam deferred while intubated and sedated      Results Review:  I have " personally reviewed the results from the time of this admission to 5/15/2023 12:11 EDT and agree with these findings:  [x]  Laboratory  []  Microbiology  [x]  Radiology  [x]  EKG/Telemetry   [x]  Cardiology/Vascular   []  Pathology  [x]  Old records  []  Other:    Most notable findings include:     Lab Results (last 24 hours)     Procedure Component Value Units Date/Time    CBC & Differential [[id number]] Collected: 05/15/23 1202    Specimen: Blood Updated: 05/15/23 1204    Narrative:      The following orders were created for panel order CBC & Differential.  Procedure                               Abnormality         Status                     ---------                               -----------         ------                     CBC Auto Differential[[id number]]                            In process                   Please view results for these tests on the individual orders.    CBC Auto Differential [[id number]] Collected: 05/15/23 1202    Specimen: Blood Updated: 05/15/23 1204    Renal Function Panel [[id number]]  (Abnormal) Collected: 05/15/23 1109    Specimen: Blood Updated: 05/15/23 1152     Glucose 108 mg/dL      BUN 18 mg/dL      Creatinine 0.75 mg/dL      Sodium 139 mmol/L      Potassium 2.5 mmol/L      Comment: Slight hemolysis detected by analyzer. Results may be affected.        Chloride 103 mmol/L      CO2 22.0 mmol/L      Calcium 10.3 mg/dL      Albumin 4.6 g/dL      Phosphorus 2.0 mg/dL      Anion Gap 14.0 mmol/L      BUN/Creatinine Ratio 24.0     eGFR 80.1 mL/min/1.73     Narrative:      GFR Normal >60  Chronic Kidney Disease <60  Kidney Failure <15    The GFR formula is only valid for adults with stable renal function between ages 18 and 70.    Calcium, Ionized [[id number]]  (Abnormal) Collected: 05/15/23 1109    Specimen: Blood Updated: 05/15/23 1146     Ionized Calcium 1.35 mmol/L     POC Glucose Once [[id number]]  (Normal) Collected: 05/15/23 1124    Specimen: Blood Updated: 05/15/23 1129     Glucose  85 mg/dL      Comment: Serial Number: [id number]:  [id number]       Blood Gas, Arterial - [[id number]]  (Abnormal) Collected: 05/15/23 1124    Specimen: Arterial Blood Updated: 05/15/23 1129     Site Arterial Line     Rosalio's Test N/A     pH, Arterial 7.373 pH units      pCO2, Arterial 35.6 mm Hg      pO2, Arterial 302.0 mm Hg      HCO3, Arterial 20.7 mmol/L      Base Excess, Arterial -4.0 mmol/L      Comment: Serial Number: [id number]:  [id number]        O2 Saturation, Arterial 99.9 %      CO2 Content 21.8 mmol/L      Barometric Pressure for Blood Gas --     Comment: N/A        Modality Adult Vent     FIO2 70 %      Ventilator Mode ;AC     Set Tidal Volume 500     PEEP 8     Hemodilution Yes     Respiratory Rate 14    aPTT [[id number]]  (Abnormal) Collected: 05/15/23 1109    Specimen: Blood Updated: 05/15/23 1129     PTT 33.4 seconds     Protime-INR [[id number]]  (Abnormal) Collected: 05/15/23 1109    Specimen: Blood Updated: 05/15/23 1129     Protime 15.3 Seconds      INR 1.46    POCT Electrolytes +HGB +HCT [[id number]] Collected: 05/15/23 1124    Specimen: Blood Updated: 05/15/23 1129    CBC & Differential [[id number]]  (Abnormal) Collected: 05/15/23 0701    Specimen: Blood, Arterial Line Updated: 05/15/23 1106    Narrative:      The following orders were created for panel order CBC & Differential.  Procedure                               Abnormality         Status                     ---------                               -----------         ------                     CBC Auto Differential[[id number]]        Abnormal            Final result                 Please view results for these tests on the individual orders.    CBC Auto Differential [[id number]]  (Abnormal) Collected: 05/15/23 0701    Specimen: Blood, Arterial Line Updated: 05/15/23 1106     WBC 4.80 10*3/mm3      RBC 3.83 10*6/mm3      Hemoglobin 11.8 g/dL      Hematocrit 35.2 %      MCV 92.0 fL      MCH 30.9 pg      MCHC 33.5 g/dL      RDW 14.3 %      RDW-SD  45.5 fl      MPV 10.1 fL      Platelets 203 10*3/mm3      Neutrophil % 57.7 %      Lymphocyte % 31.2 %      Monocyte % 8.3 %      Eosinophil % 2.0 %      Basophil % 0.8 %      Neutrophils, Absolute 2.80 10*3/mm3      Lymphocytes, Absolute 1.50 10*3/mm3      Monocytes, Absolute 0.40 10*3/mm3      Eosinophils, Absolute 0.10 10*3/mm3      Basophils, Absolute 0.00 10*3/mm3      nRBC 0.0 /100 WBC     POC Chem 8, arterial (ISTAT) [[id number]]  (Abnormal) Collected: 05/15/23 0957    Specimen: Arterial Blood Updated: 05/15/23 1106     Ionized Calcium 1.58 mmol/L      pH, Arterial 7.440 pH units      pCO2, Arterial 45.0 mm Hg      pO2, Arterial 416.0 mm Hg      HCO3, Arterial 30.8 mmol/L      Base Excess, Arterial 7.0 mmol/L      Base Deficit --     O2 Saturation, Arterial 100.0 %      Glucose 185 mg/dL      Comment: Serial Number: [id number]:  [id number]        Sodium 138 mmol/L      POC Potassium 3.3 mmol/L      Hematocrit 30 %      Hemoglobin 10.2 g/dL      CO2 Content 32 mmol/L     POC Chem 8, arterial (ISTAT) [[id number]]  (Abnormal) Collected: 05/15/23 0911    Specimen: Arterial Blood Updated: 05/15/23 1106     Ionized Calcium 1.00 mmol/L      pH, Arterial 7.300 pH units      pCO2, Arterial 55.9 mm Hg      pO2, Arterial 438.0 mm Hg      HCO3, Arterial 27.2 mmol/L      Base Excess, Arterial 1.0 mmol/L      Base Deficit --     O2 Saturation, Arterial 100.0 %      Glucose 211 mg/dL      Comment: Serial Number: [id number]:  [id number]        Sodium 134 mmol/L      POC Potassium 4.2 mmol/L      Hematocrit 30 %      Hemoglobin 10.2 g/dL      CO2 Content 29 mmol/L     POC Chem 8, arterial (ISTAT) [[id number]]  (Abnormal) Collected: 05/15/23 0855    Specimen: Arterial Blood Updated: 05/15/23 1105     Ionized Calcium 0.97 mmol/L      pH, Arterial 7.290 pH units      pCO2, Arterial 48.0 mm Hg      pO2, Arterial 48.0 mm Hg      HCO3, Arterial 23.0 mmol/L      Base Excess, Arterial <0.0 mmol/L      Base Deficit --     O2 Saturation,  Arterial 78.0 %      Glucose 183 mg/dL      Comment: Serial Number: [id number]:  [id number]        Sodium 133 mmol/L      POC Potassium 3.9 mmol/L      Hematocrit 25 %      Hemoglobin 8.5 g/dL      CO2 Content 24 mmol/L     POC Chem 8, arterial (ISTAT) [[id number]]  (Abnormal) Collected: 05/15/23 0846    Specimen: Arterial Blood Updated: 05/15/23 1105     Ionized Calcium 0.94 mmol/L      pH, Arterial 7.420 pH units      pCO2, Arterial 42.5 mm Hg      pO2, Arterial 544.0 mm Hg      HCO3, Arterial 27.3 mmol/L      Base Excess, Arterial 3.0 mmol/L      Base Deficit --     O2 Saturation, Arterial 100.0 %      Glucose 135 mg/dL      Comment: Serial Number: [id number]:  [id number]        Sodium 132 mmol/L      POC Potassium 3.7 mmol/L      Hematocrit 20 %      Hemoglobin 6.8 g/dL      CO2 Content 29 mmol/L     POC Chem 8, arterial (ISTAT) [[id number]]  (Abnormal) Collected: 05/15/23 0805    Specimen: Arterial Blood Updated: 05/15/23 1105     Ionized Calcium 1.19 mmol/L      pH, Arterial 7.510 pH units      pCO2, Arterial 37.4 mm Hg      pO2, Arterial 528.0 mm Hg      HCO3, Arterial 29.5 mmol/L      Base Excess, Arterial 6.0 mmol/L      Base Deficit --     O2 Saturation, Arterial 100.0 %      Glucose 114 mg/dL      Comment: Serial Number: [id number]:  [id number]        Sodium 137 mmol/L      POC Potassium 3.0 mmol/L      Hematocrit 33 %      Hemoglobin 11.2 g/dL      CO2 Content 31 mmol/L     POC Activated Clotting Time [[id number]]  (Normal) Collected: 05/15/23 0956    Specimen: Arterial Blood Updated: 05/15/23 1102     Activated Clotting Time  113 Seconds      Comment: Serial Number: [id number]:  [id number]       POC Activated Clotting Time [[id number]]  (Abnormal) Collected: 05/15/23 0910    Specimen: Arterial Blood Updated: 05/15/23 1102     Activated Clotting Time  347 Seconds      Comment: Serial Number: [id number]:  [id number]       POC Activated Clotting Time [[id number]]  (Abnormal) Collected: 05/15/23 0845    Specimen:  Arterial Blood Updated: 05/15/23 1102     Activated Clotting Time  407 Seconds      Comment: Serial Number: [id number]:  [id number]       POC Activated Clotting Time [[id number]]  (Abnormal) Collected: 05/15/23 0819    Specimen: Arterial Blood Updated: 05/15/23 1102     Activated Clotting Time  486 Seconds      Comment: Serial Number: [id number]:  [id number]       POC Activated Clotting Time [[id number]]  (Normal) Collected: 05/15/23 0804    Specimen: Arterial Blood Updated: 05/15/23 1102     Activated Clotting Time  137 Seconds      Comment: Serial Number: [id number]:  [id number]       Fibrinogen [[id number]]  (Normal) Collected: 05/15/23 0701    Specimen: Blood, Arterial Line Updated: 05/15/23 0732     Fibrinogen 289 mg/dL     Protime-INR [[id number]]  (Normal) Collected: 05/15/23 0701    Specimen: Blood, Arterial Line Updated: 05/15/23 0732     Protime 11.4 Seconds      INR 1.07    aPTT [[id number]]  (Abnormal) Collected: 05/15/23 0701    Specimen: Blood, Arterial Line Updated: 05/15/23 0732     PTT 35.1 seconds     Platelet Function ADP [[id number]]  (Normal) Collected: 05/15/23 0701    Specimen: Blood, Arterial Line Updated: 05/15/23 0727     ADP Aggregation, % Platelet 97 %     CBC (No Diff) [[id number]]  (Abnormal) Collected: 05/15/23 0701    Specimen: Blood, Arterial Line Updated: 05/15/23 0722     WBC 4.80 10*3/mm3      RBC 3.83 10*6/mm3      Hemoglobin 11.7 g/dL      Hematocrit 34.4 %      MCV 89.9 fL      MCH 30.6 pg      MCHC 34.0 g/dL      RDW 14.0 %      RDW-SD 46.8 fl      MPV 9.2 fL      Platelets 198 10*3/mm3     POC Glucose Once [[id number]]  (Abnormal) Collected: 05/15/23 0609    Specimen: Blood Updated: 05/15/23 0634     Glucose 123 mg/dL      Comment: Serial Number: [id number]:  [id number]             Imaging Results (Last 24 Hours)     Procedure Component Value Units Date/Time    XR Abdomen KUB [[id number]] Collected: 05/15/23 1142     Updated: 05/15/23 1148    Narrative:      XR ABDOMEN KUB    Date  of Exam: 5/15/2023 11:05 AM EDT    Indication: OG placement    Comparison: None available.    Findings:  NG tube projects over the expected position of the stomach in good position. Nonobstructing bowel gas pattern noted      Impression:      Impression:  NG tube projects in expected position.      Electronically Signed: Lonnie Kessler    5/15/2023 11:46 AM EDT    Workstation ID: [id number]    XR Chest 1 View [[id number]] Collected: 05/15/23 1138     Updated: 05/15/23 1144    Narrative:      XR CHEST 1 VW    Date of Exam: 5/15/2023 11:00 AM EDT    Indication: Post-Op Check Line & Tube Placement    Comparison: 5/11/2023 and prior    Findings:  Study is limited by overlying support and monitoring apparatus. Percutaneous pacer pad overlies the right upper chest.    There has been interval median sternotomy and prosthetic valve placement. The patient is intubated with the ET tube in good position approximately 3.7 cm above the sg. Right sided IJ sheath is in place with a [place]-Enrike catheter extending to the   expected position of the distal main pulmonary artery or proximal right main pulmonary artery. NG tube extends below the field-of-view of the study below the diaphragm. Left-sided chest tube and mediastinal drain are in place. Epicardial pacing wires are   in place. A left-sided subclavian [place] pacemaker again noted.    The heart size is stable in appearance and mildly enlarged. Pulm vascularity is mildly congested.    There is a trace amount of air at the left apex compatible with a trace pneumothorax. No significant pleural effusion noted. The lungs appear grossly clear. Linear opacity, interface at the right lung base may represent a artifact versus a very small   pneumothorax. Osseous structures demonstrate no unexpected acute abnormality.      Impression:      Impression:    1. Lines and tubes as above in expected position    2. Trace pneumothorax at the left apex and possible right-sided pneumothorax  peripherally versus artifact.      Electronically Signed: Lonnie Kessler    5/15/2023 11:42 AM EDT    Workstation ID: [id number]          LAB RESULTS (LAST 7 DAYS)    CBC  Results from last 7 days   Lab Units 05/15/23  0957 05/15/23  0911 05/15/23  0855 05/15/23  0846 05/15/23  0805 05/15/23  0701 05/11/23  0821   WBC 10*3/mm3  --   --   --   --   --  4.80  4.80 4.90   RBC 10*6/mm3  --   --   --   --   --  3.83  3.83 4.18   HEMOGLOBIN g/dL  --   --   --   --   --  11.8*  11.7* 12.8   HEMOGLOBIN, POC g/dL 10.2* 10.2* 8.5* 6.8* 11.2*  --   --    HEMATOCRIT %  --   --   --   --   --  35.2  34.4 37.7   HEMATOCRIT POC % 30* 30* 25* 20* 33*  --   --    MCV fL  --   --   --   --   --  92.0  89.9 90.1   PLATELETS 10*3/mm3  --   --   --   --   --  203  198 240       BMP  Results from last 7 days   Lab Units 05/15/23  1109 05/11/23  0821   SODIUM mmol/L 139 142   POTASSIUM mmol/L 2.5* 4.2   CHLORIDE mmol/L 103 103   CO2 mmol/L 22.0 30.0*   BUN mg/dL 18 21   CREATININE mg/dL 0.75 0.80   GLUCOSE mg/dL 108* 119*   PHOSPHORUS mg/dL 2.0*  --        CMP   Results from last 7 days   Lab Units 05/15/23  1109 05/11/23  0821   SODIUM mmol/L 139 142   POTASSIUM mmol/L 2.5* 4.2   CHLORIDE mmol/L 103 103   CO2 mmol/L 22.0 30.0*   BUN mg/dL 18 21   CREATININE mg/dL 0.75 0.80   GLUCOSE mg/dL 108* 119*   ALBUMIN g/dL 4.6 4.5   BILIRUBIN mg/dL  --  0.3   ALK PHOS U/L  --  38*   AST (SGOT) U/L  --  24   ALT (SGPT) U/L  --  15       BNP        TROPONIN        CoAg  Results from last 7 days   Lab Units 05/15/23  1109 05/15/23  0701 05/11/23  0821   INR  1.46* 1.07 2.66*   APTT seconds 33.4* 35.1* 34.2*       Creatinine Clearance  Estimated Creatinine Clearance: 44.6 mL/min (by C-G formula based on SCr of 0.75 mg/dL).    ABG  Results from last 7 days   Lab Units 05/15/23  1124 05/15/23  0957 05/15/23  0911 05/15/23  0855 05/15/23  0846 05/15/23  0805 05/11/23  0834   PH, ARTERIAL pH units 7.373 7.440 7.300* 7.290* 7.420 7.510* 7.466*   PCO2,  ARTERIAL mm Hg 35.6 45.0 55.9* 48.0* 42.5 37.4 36.9   PO2 ART mm Hg 302.0* 416.0* 438.0* 48.0* 544.0* 528.0* 115.7*   O2 SATURATION ART % 99.9* 100.0* 100.0* 78.0* 100.0* 100.0* 98.8*   BASE EXCESS ART mmol/L -4.0* 7.0* 1.0 <0.0* 3.0 6.0* 2.9         Radiology  XR Chest 1 View    Result Date: 5/15/2023  Impression: 1. Lines and tubes as above in expected position 2. Trace pneumothorax at the left apex and possible right-sided pneumothorax peripherally versus artifact. Electronically Signed: Lonnie Kessler  5/15/2023 11:42 AM EDT  Workstation ID: [id number]    XR Abdomen KUB    Result Date: 5/15/2023  Impression: NG tube projects in expected position. Electronically Signed: Lonnie Kessler  5/15/2023 11:46 AM EDT  Workstation ID: [id number]        EKG  I personally viewed and interpreted the patient's EKG/Telemetry data:  ECG 12 Lead    (Results Pending)         Echocardiogram:    Results for orders placed during the hospital encounter of 02/24/23    Adult Transesophageal Echo (CARMITA) W/ Cont if Necessary Per Protocol    Interpretation Summary  •  Left ventricular ejection fraction appears to be 41 - 45%.  •  The left ventricular cavity is mildly dilated.  •  Saline test results are negative.  •  Severe mitral valve regurgitation is present.        Stress Test:        Cardiac Catheterization:  Results for orders placed during the hospital encounter of 02/24/23    Cardiac Catheterization/Vascular Study    Narrative  OPERATORS  Trace Marcus M.D. (Attending Cardiologist)      PROCEDURE PERFORMED  Ultrasound guided vascular access  Right heart catheterization  Coronary Angiogram  Left Heart Catheterization [id number]  Moderate Sedation 35 minutes    INDICATIONS FOR PROCEDURE  82-year-old woman with valvular heart disease including mitral and tricuspid regurgitation, pulmonary hypertension presented with heart failure exacerbation.  After IV diuresis, discussion of risk and benefit she was brought into the cardiac Cath Lab for  right and left heart cath.    PROCEDURE IN DETAIL  Informed consent was obtained from the patient after explaining the risks, benefits, and alternative options of the procedure. After obtaining informed consent, the patient was brought to the cath lab and was prepped in a sterile fashion. Lidocaine 2% was used for local anesthesia into the right femoral venous access site. Right femoral vein was accessed using the micropuncture needle under ultrasound guidance and micropuncture wire advanced under flouroscopy. A 7 Vincentian vascular sheath was put into place percutaneously over guide-wire. Guide wires were removed. A 6Fr swan aneta catheter was advanced to wedge position. RA, RV and PA and wedge pressures were recorded.  PA sat and arterial sats recorded.  The patient tolerated the procedure well without any complications.    Lidocaine 2% was used for local anesthesia into the right femoral arterial access site. The right femoral artery was accessed with a micropuncture needle via modified Seldinger technique under ultrasound guidance. A 6F was inserted successfully.  Afterwards, 6F JR4 and JL4 diagnostic catheters were advanced over a wire into the ascending aorta and were used to engage the ostia of the left main and RCA respectively. JR4 used to cross the AV and obtain LV pressures and gradient across the AV measured via pullback technique. Images of the right and left coronary systems were obtained. All the catheters were exchanged over a wire and subsequently removed. Angiogram of the femoral access site was obtained and did not show complications. The patient tolerated the procedure well without any complications. The pictures were reviewed at the end of the procedure. A Mynx closure device was applied.    HEMODYNAMICS    RHC  RA 5/3, 3 mmHg  RV 26/2, 4 mmHg  PA 27/8, 16 mmHg  PCW 7/7, 6 mmHg  AO Sat 96%  PA Sat 74%    Rob CO 4.73    Rob CI 3.42    C  LV: 108/5, 7 mmHg  AO: 116/46, 72 mmHg    No significant  gradient across the aortic valve during pullback of JR4 catheter.  LV gram was not performed due to recent echocardiogram.    FINDINGS  Coronary Angiogram  Left dominant circulation  Left main: Left main is a large caliber vessel which gives rise to the Left Anterior Descending and the Left circumflex.  Left main is angiographically free from any significant disease  Left Anterior Descending Artery: LAD is a medium caliber vessel which gives rise to several septal perforators and several diagonal branches.  LAD has diffuse luminal irregularities with 20 to 30% lesion in the midsegment.  Left Circumflex: Left circumflex artery is a dominant vessel which gives rise to obtuse marginal.  Left circumflex has luminal irregularities only.  Right Coronary Artery: RCA is a small nondominant vessel.    ESTIMATED BLOOD LOSS:  10 ml    COMPLICATIONS:  None    PROCEDURE DATA:  Contrast Used: 25 cc  Sedation Time: 35 minutes    IMPRESSIONS  Non obstructive CAD  Normal filling pressure, no evidence of pulmonary hypertension.  Normal wedge pressure    RECOMMENDATIONS  -Switch IV to p.o. diuresis.  -Proceed with transesophageal echocardiogram to evaluate mitral and tricuspid regurgitation.  -Uptitrate GDMT for HFrEF        Other:      ASSESSMENT & PLAN:    Principal Problem:    Severe mitral regurgitation  Active Problems:    History of DVT (deep vein thrombosis)    Essential hypertension with goal blood pressure less than 130/80    Hypertriglyceridemia    Hypothyroidism due to acquired atrophy of thyroid    Pacemaker    Chronic combined systolic and diastolic congestive heart failure    Coronary artery disease involving native coronary artery of native heart without angina pectoris    S/P MVR (mitral valve repair)    Hypokalemia      Severe Mitral regurgitation, S/P MVR (mitral valve repair)  Status post repair with 25 mm physio 2 ring on 5/15/2023.  Currently intubated and sedated and on ventilator.  Currently on epinephrine and  Primacor drips.  Chest tube removal and pacer wire removal per cardiac surgery.  Wean off pressor and inotrope as tolerated    Hypokalemia  Significant hypokalemia with potassium of 2.2.  Replace to keep potassium more than 4    Anemia  Significant anemia of blood loss with hemoglobin down to 6.  Blood transfusion as needed to keep hemoglobin more than 8    Chronic combined systolic and diastolic congestive heart failure  Currently euvolemic.  Wean off epinephrine and Primacor drips as needed.  We will resume beta-blocker and ACE inhibitor when blood pressure is able to tolerate  Will need repeat echocardiogram prior to discharge to evaluate LV function and tricuspid valve    Coronary artery disease involving native coronary artery of native heart without angina pectoris  Nonobstructive coronary disease per coronary angiogram.  Continue aspirin.  She is intolerant to statins    Essential hypertension with goal blood pressure less than 130/80  Antihypertensives are currently on hold due to hypotension  Wean off epinephrine drip as tolerated      Hypertriglyceridemia  We will resume fenofibrate at the time of discharge    Pacemaker, h/o SSS  [employer] permanent pacemaker is in place  We will interrogate device due to arrhythmia noted on bedside telemetry    History of DVT (deep venous thrombosis)  We will resume warfarin prior to discharge    Hypothyroidism due to acquired atrophy of thyroid  Continue Synthroid

## 2023-05-16 ENCOUNTER — APPOINTMENT (OUTPATIENT)
Dept: GENERAL RADIOLOGY | Facility: HOSPITAL | Age: 83
DRG: 219 | End: 2023-05-16
Payer: MEDICARE

## 2023-05-16 LAB
ALBUMIN SERPL-MCNC: 4.7 G/DL (ref 3.5–5.2)
ANION GAP SERPL CALCULATED.3IONS-SCNC: 12 MMOL/L (ref 5–15)
ANION GAP SERPL CALCULATED.3IONS-SCNC: 15 MMOL/L (ref 5–15)
ARTERIAL PATENCY WRIST A: ABNORMAL
ARTERIAL PATENCY WRIST A: POSITIVE
ATMOSPHERIC PRESS: ABNORMAL MM[HG]
BASE EXCESS BLDA CALC-SCNC: -1.4 MMOL/L (ref 0–3)
BASE EXCESS BLDA CALC-SCNC: -2 MMOL/L (ref 0–3)
BASE EXCESS BLDA CALC-SCNC: -3.9 MMOL/L (ref 0–3)
BASE EXCESS BLDA CALC-SCNC: 0.6 MMOL/L (ref 0–3)
BASE EXCESS BLDA CALC-SCNC: 2.1 MMOL/L (ref 0–3)
BASE EXCESS BLDA CALC-SCNC: 2.4 MMOL/L (ref 0–3)
BASE EXCESS BLDA CALC-SCNC: 3.3 MMOL/L (ref 0–3)
BASOPHILS # BLD AUTO: 0 10*3/MM3 (ref 0–0.2)
BASOPHILS NFR BLD AUTO: 0.1 % (ref 0–1.5)
BDY SITE: ABNORMAL
BH BB BLOOD EXPIRATION DATE: NORMAL
BH BB BLOOD EXPIRATION DATE: NORMAL
BH BB BLOOD TYPE BARCODE: 5100
BH BB BLOOD TYPE BARCODE: 5100
BH BB DISPENSE STATUS: NORMAL
BH BB DISPENSE STATUS: NORMAL
BH BB PRODUCT CODE: NORMAL
BH BB PRODUCT CODE: NORMAL
BH BB UNIT NUMBER: NORMAL
BH BB UNIT NUMBER: NORMAL
BUN SERPL-MCNC: 19 MG/DL (ref 8–23)
BUN SERPL-MCNC: 19 MG/DL (ref 8–23)
BUN/CREAT SERPL: 20.4 (ref 7–25)
BUN/CREAT SERPL: 23.5 (ref 7–25)
CA-I BLDA-SCNC: 1.22 MMOL/L (ref 1.15–1.33)
CA-I BLDA-SCNC: 1.25 MMOL/L (ref 1.15–1.33)
CA-I SERPL ISE-MCNC: 1.24 MMOL/L (ref 1.2–1.3)
CALCIUM SPEC-SCNC: 8.9 MG/DL (ref 8.6–10.5)
CALCIUM SPEC-SCNC: 9 MG/DL (ref 8.6–10.5)
CHLORIDE SERPL-SCNC: 108 MMOL/L (ref 98–107)
CHLORIDE SERPL-SCNC: 109 MMOL/L (ref 98–107)
CO2 BLDA-SCNC: 25.9 MMOL/L (ref 22–29)
CO2 BLDA-SCNC: 26.7 MMOL/L (ref 22–29)
CO2 BLDA-SCNC: 29.3 MMOL/L (ref 22–29)
CO2 BLDA-SCNC: 29.8 MMOL/L (ref 22–29)
CO2 BLDA-SCNC: 30.8 MMOL/L (ref 22–29)
CO2 SERPL-SCNC: 22 MMOL/L (ref 22–29)
CO2 SERPL-SCNC: 26 MMOL/L (ref 22–29)
CREAT SERPL-MCNC: 0.81 MG/DL (ref 0.57–1)
CREAT SERPL-MCNC: 0.93 MG/DL (ref 0.57–1)
CROSSMATCH INTERPRETATION: NORMAL
CROSSMATCH INTERPRETATION: NORMAL
DEPRECATED RDW RBC AUTO: 47.7 FL (ref 37–54)
EGFRCR SERPLBLD CKD-EPI 2021: 61.9 ML/MIN/1.73
EGFRCR SERPLBLD CKD-EPI 2021: 73 ML/MIN/1.73
EOSINOPHIL # BLD AUTO: 0 10*3/MM3 (ref 0–0.4)
EOSINOPHIL NFR BLD AUTO: 0.2 % (ref 0.3–6.2)
ERYTHROCYTE [DISTWIDTH] IN BLOOD BY AUTOMATED COUNT: 14.5 % (ref 12.3–15.4)
GLUCOSE BLDC GLUCOMTR-MCNC: 104 MG/DL (ref 70–105)
GLUCOSE BLDC GLUCOMTR-MCNC: 114 MG/DL (ref 70–105)
GLUCOSE BLDC GLUCOMTR-MCNC: 131 MG/DL (ref 70–105)
GLUCOSE BLDC GLUCOMTR-MCNC: 132 MG/DL (ref 70–105)
GLUCOSE BLDC GLUCOMTR-MCNC: 133 MG/DL (ref 70–105)
GLUCOSE BLDC GLUCOMTR-MCNC: 140 MG/DL (ref 70–105)
GLUCOSE BLDC GLUCOMTR-MCNC: 141 MG/DL (ref 70–105)
GLUCOSE BLDC GLUCOMTR-MCNC: 144 MG/DL (ref 70–105)
GLUCOSE BLDC GLUCOMTR-MCNC: 147 MG/DL (ref 74–100)
GLUCOSE BLDC GLUCOMTR-MCNC: 169 MG/DL (ref 74–100)
GLUCOSE BLDC GLUCOMTR-MCNC: 169 MG/DL (ref 74–100)
GLUCOSE BLDC GLUCOMTR-MCNC: 170 MG/DL (ref 70–105)
GLUCOSE BLDC GLUCOMTR-MCNC: 177 MG/DL (ref 74–100)
GLUCOSE BLDC GLUCOMTR-MCNC: 191 MG/DL (ref 70–105)
GLUCOSE BLDC GLUCOMTR-MCNC: 208 MG/DL (ref 70–105)
GLUCOSE BLDC GLUCOMTR-MCNC: 225 MG/DL (ref 74–100)
GLUCOSE BLDC GLUCOMTR-MCNC: 225 MG/DL (ref 74–100)
GLUCOSE SERPL-MCNC: 164 MG/DL (ref 65–99)
GLUCOSE SERPL-MCNC: 219 MG/DL (ref 65–99)
HCO3 BLDA-SCNC: 22.8 MMOL/L (ref 21–28)
HCO3 BLDA-SCNC: 24.5 MMOL/L (ref 21–28)
HCO3 BLDA-SCNC: 24.6 MMOL/L (ref 21–28)
HCO3 BLDA-SCNC: 25.4 MMOL/L (ref 21–28)
HCO3 BLDA-SCNC: 28 MMOL/L (ref 21–28)
HCO3 BLDA-SCNC: 28.3 MMOL/L (ref 21–28)
HCO3 BLDA-SCNC: 29.1 MMOL/L (ref 21–28)
HCT VFR BLD AUTO: 35 % (ref 34–46.6)
HCT VFR BLDA CALC: 31 % (ref 38–51)
HCT VFR BLDA CALC: 34 % (ref 38–51)
HEMODILUTION: NO
HGB BLD-MCNC: 11.8 G/DL (ref 12–15.9)
HGB BLDA-MCNC: 10.7 G/DL (ref 12–17)
HGB BLDA-MCNC: 11.7 G/DL (ref 12–17)
INHALED O2 CONCENTRATION: 40 %
INHALED O2 CONCENTRATION: 75 %
INHALED O2 CONCENTRATION: 80 %
LYMPHOCYTES # BLD AUTO: 0.5 10*3/MM3 (ref 0.7–3.1)
LYMPHOCYTES NFR BLD AUTO: 4.4 % (ref 19.6–45.3)
MAGNESIUM SERPL-MCNC: 2.4 MG/DL (ref 1.6–2.4)
MCH RBC QN AUTO: 30.4 PG (ref 26.6–33)
MCHC RBC AUTO-ENTMCNC: 33.8 G/DL (ref 31.5–35.7)
MCV RBC AUTO: 89.8 FL (ref 79–97)
MODALITY: ABNORMAL
MONOCYTES # BLD AUTO: 0.7 10*3/MM3 (ref 0.1–0.9)
MONOCYTES NFR BLD AUTO: 5.7 % (ref 5–12)
NEUTROPHILS NFR BLD AUTO: 11.1 10*3/MM3 (ref 1.7–7)
NEUTROPHILS NFR BLD AUTO: 89.6 % (ref 42.7–76)
NRBC BLD AUTO-RTO: 0.1 /100 WBC (ref 0–0.2)
PCO2 BLDA: 40.6 MM HG (ref 35–48)
PCO2 BLDA: 42.4 MM HG (ref 35–48)
PCO2 BLDA: 45.2 MM HG (ref 35–48)
PCO2 BLDA: 47.6 MM HG (ref 35–48)
PCO2 BLDA: 48.7 MM HG (ref 35–48)
PCO2 BLDA: 50.1 MM HG (ref 35–48)
PCO2 BLDA: 54.1 MM HG (ref 35–48)
PEEP RESPIRATORY: 5 CM[H2O]
PH BLDA: 7.29 PH UNITS (ref 7.35–7.45)
PH BLDA: 7.31 PH UNITS (ref 7.35–7.45)
PH BLDA: 7.34 PH UNITS (ref 7.35–7.45)
PH BLDA: 7.34 PH UNITS (ref 7.35–7.45)
PH BLDA: 7.36 PH UNITS (ref 7.35–7.45)
PH BLDA: 7.41 PH UNITS (ref 7.35–7.45)
PH BLDA: 7.43 PH UNITS (ref 7.35–7.45)
PHOSPHATE SERPL-MCNC: 4.1 MG/DL (ref 2.5–4.5)
PLATELET # BLD AUTO: 109 10*3/MM3 (ref 140–450)
PMV BLD AUTO: 9.7 FL (ref 6–12)
PO2 BLDA: 108.8 MM HG (ref 83–108)
PO2 BLDA: 126.1 MM HG (ref 83–108)
PO2 BLDA: 143.3 MM HG (ref 83–108)
PO2 BLDA: 146.5 MM HG (ref 83–108)
PO2 BLDA: 149.8 MM HG (ref 83–108)
PO2 BLDA: 77.1 MM HG (ref 83–108)
PO2 BLDA: 97 MM HG (ref 83–108)
POTASSIUM BLDA-SCNC: 3.2 MMOL/L (ref 3.5–4.5)
POTASSIUM BLDA-SCNC: 3.3 MMOL/L (ref 3.5–4.5)
POTASSIUM SERPL-SCNC: 3.4 MMOL/L (ref 3.5–5.2)
POTASSIUM SERPL-SCNC: 3.7 MMOL/L (ref 3.5–5.2)
PSV: 10 CMH2O
PSV: 14 CMH2O
RBC # BLD AUTO: 3.9 10*6/MM3 (ref 3.77–5.28)
RESPIRATORY RATE: 10
RESPIRATORY RATE: 12
RESPIRATORY RATE: 18
RESPIRATORY RATE: 20
SAO2 % BLDCOA: 95.3 % (ref 94–98)
SAO2 % BLDCOA: 97.1 % (ref 94–98)
SAO2 % BLDCOA: 97.8 % (ref 94–98)
SAO2 % BLDCOA: 98.9 % (ref 94–98)
SAO2 % BLDCOA: 99 % (ref 94–98)
SAO2 % BLDCOA: 99.1 % (ref 94–98)
SAO2 % BLDCOA: 99.1 % (ref 94–98)
SODIUM BLD-SCNC: 145 MMOL/L (ref 138–146)
SODIUM BLD-SCNC: 147 MMOL/L (ref 138–146)
SODIUM SERPL-SCNC: 145 MMOL/L (ref 136–145)
SODIUM SERPL-SCNC: 147 MMOL/L (ref 136–145)
UNIT  ABO: NORMAL
UNIT  ABO: NORMAL
UNIT  RH: NORMAL
UNIT  RH: NORMAL
VENTILATOR MODE: ABNORMAL
VT ON VENT VENT: 550 ML
WBC NRBC COR # BLD: 12.4 10*3/MM3 (ref 3.4–10.8)

## 2023-05-16 PROCEDURE — 93010 ELECTROCARDIOGRAM REPORT: CPT | Performed by: INTERNAL MEDICINE

## 2023-05-16 PROCEDURE — 93005 ELECTROCARDIOGRAM TRACING: CPT | Performed by: NURSE PRACTITIONER

## 2023-05-16 PROCEDURE — 82330 ASSAY OF CALCIUM: CPT | Performed by: NURSE PRACTITIONER

## 2023-05-16 PROCEDURE — 97166 OT EVAL MOD COMPLEX 45 MIN: CPT

## 2023-05-16 PROCEDURE — 82803 BLOOD GASES ANY COMBINATION: CPT

## 2023-05-16 PROCEDURE — 71045 X-RAY EXAM CHEST 1 VIEW: CPT

## 2023-05-16 PROCEDURE — 25010000002 MORPHINE PER 10 MG: Performed by: NURSE PRACTITIONER

## 2023-05-16 PROCEDURE — 25010000002 MAGNESIUM SULFATE IN D5W 1G/100ML (PREMIX) 1-5 GM/100ML-% SOLUTION

## 2023-05-16 PROCEDURE — 83735 ASSAY OF MAGNESIUM: CPT | Performed by: NURSE PRACTITIONER

## 2023-05-16 PROCEDURE — 80048 BASIC METABOLIC PNL TOTAL CA: CPT | Performed by: NURSE PRACTITIONER

## 2023-05-16 PROCEDURE — 99233 SBSQ HOSP IP/OBS HIGH 50: CPT | Performed by: INTERNAL MEDICINE

## 2023-05-16 PROCEDURE — 80051 ELECTROLYTE PANEL: CPT

## 2023-05-16 PROCEDURE — 0 POTASSIUM CHLORIDE 10 MEQ/100ML SOLUTION: Performed by: NURSE PRACTITIONER

## 2023-05-16 PROCEDURE — 82330 ASSAY OF CALCIUM: CPT

## 2023-05-16 PROCEDURE — 80069 RENAL FUNCTION PANEL: CPT | Performed by: NURSE PRACTITIONER

## 2023-05-16 PROCEDURE — 25010000002 METOCLOPRAMIDE PER 10 MG: Performed by: THORACIC SURGERY (CARDIOTHORACIC VASCULAR SURGERY)

## 2023-05-16 PROCEDURE — 25010000002 CEFAZOLIN PER 500 MG: Performed by: NURSE PRACTITIONER

## 2023-05-16 PROCEDURE — 94660 CPAP INITIATION&MGMT: CPT

## 2023-05-16 PROCEDURE — 82948 REAGENT STRIP/BLOOD GLUCOSE: CPT

## 2023-05-16 PROCEDURE — 94799 UNLISTED PULMONARY SVC/PX: CPT

## 2023-05-16 PROCEDURE — 25010000002 ENOXAPARIN PER 10 MG: Performed by: NURSE PRACTITIONER

## 2023-05-16 PROCEDURE — 85025 COMPLETE CBC W/AUTO DIFF WBC: CPT | Performed by: NURSE PRACTITIONER

## 2023-05-16 PROCEDURE — 25010000002 FUROSEMIDE PER 20 MG: Performed by: NURSE PRACTITIONER

## 2023-05-16 PROCEDURE — 25010000002 MAGNESIUM SULFATE IN D5W 1G/100ML (PREMIX) 1-5 GM/100ML-% SOLUTION: Performed by: NURSE PRACTITIONER

## 2023-05-16 PROCEDURE — 94761 N-INVAS EAR/PLS OXIMETRY MLT: CPT

## 2023-05-16 PROCEDURE — 85018 HEMOGLOBIN: CPT

## 2023-05-16 PROCEDURE — 0 ACETAMINOPHEN 1000 MG/100ML SOLUTION: Performed by: NURSE PRACTITIONER

## 2023-05-16 PROCEDURE — 94002 VENT MGMT INPAT INIT DAY: CPT

## 2023-05-16 PROCEDURE — 0W9930Z DRAINAGE OF RIGHT PLEURAL CAVITY WITH DRAINAGE DEVICE, PERCUTANEOUS APPROACH: ICD-10-PCS

## 2023-05-16 PROCEDURE — 97162 PT EVAL MOD COMPLEX 30 MIN: CPT

## 2023-05-16 RX ORDER — METOCLOPRAMIDE HYDROCHLORIDE 5 MG/ML
5 INJECTION INTRAMUSCULAR; INTRAVENOUS EVERY 6 HOURS PRN
Status: DISCONTINUED | OUTPATIENT
Start: 2023-05-16 | End: 2023-05-20 | Stop reason: HOSPADM

## 2023-05-16 RX ORDER — MAGNESIUM SULFATE 1 G/100ML
INJECTION INTRAVENOUS
Status: COMPLETED
Start: 2023-05-16 | End: 2023-05-16

## 2023-05-16 RX ORDER — ACETAMINOPHEN 10 MG/ML
1000 INJECTION, SOLUTION INTRAVENOUS EVERY 8 HOURS
Status: COMPLETED | OUTPATIENT
Start: 2023-05-16 | End: 2023-05-17

## 2023-05-16 RX ORDER — PANTOPRAZOLE SODIUM 40 MG/10ML
40 INJECTION, POWDER, LYOPHILIZED, FOR SOLUTION INTRAVENOUS ONCE
Status: COMPLETED | OUTPATIENT
Start: 2023-05-16 | End: 2023-05-16

## 2023-05-16 RX ORDER — POTASSIUM CHLORIDE 20 MEQ/1
20 TABLET, EXTENDED RELEASE ORAL DAILY
Status: DISCONTINUED | OUTPATIENT
Start: 2023-05-16 | End: 2023-05-20

## 2023-05-16 RX ORDER — FUROSEMIDE 10 MG/ML
20 INJECTION INTRAMUSCULAR; INTRAVENOUS ONCE
Status: COMPLETED | OUTPATIENT
Start: 2023-05-16 | End: 2023-05-16

## 2023-05-16 RX ADMIN — POTASSIUM CHLORIDE 10 MEQ: 7.46 INJECTION, SOLUTION INTRAVENOUS at 09:00

## 2023-05-16 RX ADMIN — MUPIROCIN 1 APPLICATION: 20 OINTMENT TOPICAL at 20:11

## 2023-05-16 RX ADMIN — MUPIROCIN 1 APPLICATION: 20 OINTMENT TOPICAL at 10:33

## 2023-05-16 RX ADMIN — POTASSIUM CHLORIDE 20 MEQ: 1500 TABLET, EXTENDED RELEASE ORAL at 17:54

## 2023-05-16 RX ADMIN — ACETAMINOPHEN 650 MG: 325 TABLET, FILM COATED ORAL at 10:34

## 2023-05-16 RX ADMIN — FUROSEMIDE 20 MG: 10 INJECTION, SOLUTION INTRAMUSCULAR; INTRAVENOUS at 10:33

## 2023-05-16 RX ADMIN — SODIUM BICARBONATE 50 MEQ: 84 INJECTION, SOLUTION INTRAVENOUS at 05:20

## 2023-05-16 RX ADMIN — CHLORHEXIDINE GLUCONATE 15 ML: 1.2 RINSE ORAL at 20:11

## 2023-05-16 RX ADMIN — POLYETHYLENE GLYCOL 3350 17 G: 17 POWDER, FOR SOLUTION ORAL at 20:11

## 2023-05-16 RX ADMIN — CEFAZOLIN 2 G: 2 INJECTION, POWDER, FOR SOLUTION INTRAMUSCULAR; INTRAVENOUS at 15:43

## 2023-05-16 RX ADMIN — ENOXAPARIN SODIUM 40 MG: 100 INJECTION SUBCUTANEOUS at 15:43

## 2023-05-16 RX ADMIN — METOCLOPRAMIDE 5 MG: 5 INJECTION, SOLUTION INTRAMUSCULAR; INTRAVENOUS at 16:50

## 2023-05-16 RX ADMIN — POLYETHYLENE GLYCOL 3350 17 G: 17 POWDER, FOR SOLUTION ORAL at 10:34

## 2023-05-16 RX ADMIN — ACETAMINOPHEN 1000 MG: 10 INJECTION INTRAVENOUS at 20:11

## 2023-05-16 RX ADMIN — LEVOTHYROXINE SODIUM 50 MCG: 0.05 TABLET ORAL at 10:32

## 2023-05-16 RX ADMIN — HYDROCODONE BITARTRATE AND ACETAMINOPHEN 1 TABLET: 5; 325 TABLET ORAL at 17:54

## 2023-05-16 RX ADMIN — MORPHINE SULFATE 2 MG: 2 INJECTION, SOLUTION INTRAMUSCULAR; INTRAVENOUS at 05:43

## 2023-05-16 RX ADMIN — NICARDIPINE HYDROCHLORIDE 3 MG/HR: 25 INJECTION, SOLUTION INTRAVENOUS at 23:02

## 2023-05-16 RX ADMIN — ASPIRIN 81 MG: 81 TABLET, COATED ORAL at 10:32

## 2023-05-16 RX ADMIN — MORPHINE SULFATE 2 MG: 2 INJECTION, SOLUTION INTRAMUSCULAR; INTRAVENOUS at 01:45

## 2023-05-16 RX ADMIN — CEFAZOLIN 2 G: 2 INJECTION, POWDER, FOR SOLUTION INTRAMUSCULAR; INTRAVENOUS at 06:28

## 2023-05-16 RX ADMIN — SENNOSIDES AND DOCUSATE SODIUM 2 TABLET: 50; 8.6 TABLET ORAL at 10:33

## 2023-05-16 RX ADMIN — MAGNESIUM SULFATE IN DEXTROSE 1 G: 10 INJECTION, SOLUTION INTRAVENOUS at 11:33

## 2023-05-16 RX ADMIN — ACETAMINOPHEN 1000 MG: 10 INJECTION INTRAVENOUS at 11:10

## 2023-05-16 RX ADMIN — CHLORHEXIDINE GLUCONATE 15 ML: 1.2 RINSE ORAL at 10:33

## 2023-05-16 RX ADMIN — NICARDIPINE HYDROCHLORIDE 3 MG/HR: 25 INJECTION, SOLUTION INTRAVENOUS at 11:46

## 2023-05-16 RX ADMIN — PANTOPRAZOLE SODIUM 40 MG: 40 INJECTION, POWDER, LYOPHILIZED, FOR SOLUTION INTRAVENOUS at 06:28

## 2023-05-16 RX ADMIN — POTASSIUM CHLORIDE 10 MEQ: 7.46 INJECTION, SOLUTION INTRAVENOUS at 05:55

## 2023-05-16 RX ADMIN — POTASSIUM CHLORIDE 10 MEQ: 7.46 INJECTION, SOLUTION INTRAVENOUS at 10:15

## 2023-05-16 RX ADMIN — SENNOSIDES AND DOCUSATE SODIUM 2 TABLET: 50; 8.6 TABLET ORAL at 20:11

## 2023-05-16 RX ADMIN — MAGNESIUM SULFATE IN DEXTROSE 1 G: 10 INJECTION, SOLUTION INTRAVENOUS at 00:21

## 2023-05-16 RX ADMIN — CEFAZOLIN 2 G: 2 INJECTION, POWDER, FOR SOLUTION INTRAMUSCULAR; INTRAVENOUS at 23:02

## 2023-05-16 RX ADMIN — POTASSIUM CHLORIDE 10 MEQ: 7.46 INJECTION, SOLUTION INTRAVENOUS at 07:07

## 2023-05-16 NOTE — PLAN OF CARE
Goal Outcome Evaluation:  Plan of Care Reviewed With: patient           Outcome Evaluation: Pt is an 84 YO F POD 1 MVR. Pt currently on Airvo 60/80, with Festus, A-Line and chest tubes. Pt reports living at home with spouse, is typically independent with all ADLs, ambulation wihtout AD and no recent falls. Pt states she only has 1 step into home. Pt educated on sternal precautions, but had mild confusion this date, and will require further instruction. Pt likely to progress well and anticipate safe d/c home with family. PT to continue to follow to address needs as pt progresses.

## 2023-05-16 NOTE — PROGRESS NOTES
Referring Provider: Veronica Orozco MD    Reason for follow-up: Mitral regurgitation status post mitral valve repair     Patient Care Team:  Camila Lazcano MD as PCP - General  Trace Marcus MD as Cardiologist (Cardiology)      SUBJECTIVE  She just got extubated this morning.  Currently on BiPAP.  Responds to commands.     ROS  Review of all systems negative except as indicated.    Since I have last seen, the patient has been without any chest discomfort, shortness of breath, palpitations, dizziness or syncope.  Denies having any headache, abdominal pain, nausea, vomiting, diarrhea, constipation, loss of weight or loss of appetite.  Denies having any excessive bruising, hematuria or blood in the stool.  ROS      Personal History:    Past Medical History:   Diagnosis Date   • AV block 02/22/2021   • Bilateral renal cysts 01/01/2020   • Carotid stenosis, bilateral 06/01/2019    Overview:  <50% bilateral    • Carpal tunnel syndrome, bilateral 06/15/2018   • Chronic combined systolic (congestive) and diastolic (congestive) heart failure    • DVT (deep venous thrombosis) (CMS/[facility]) [I82.409]     recurrent   • Hordeolum externum 08/05/2015   • Hyperlipidemia    • Hypertension    • Hypothyroidism due to acquired atrophy of thyroid 10/21/2019   • Long term (current) use of anticoagulants 09/29/2022   • Mitral regurgitation 05/17/2016   • Osteopenia    • Pacemaker 12/28/2021       Past Surgical History:   Procedure Laterality Date   • CARDIAC CATHETERIZATION N/A 2/25/2023    Procedure: Left Heart Cath and coronary angiogram;  Surgeon: Trace Marcus MD;  Location: [facility] CATH INVASIVE LOCATION;  Service: Cardiovascular;  Laterality: N/A;   • CARDIAC CATHETERIZATION Bilateral 2/25/2023    Procedure: Right and Left Heart Cath;  Surgeon: Trace Marcus MD;  Location: [facility] CATH INVASIVE LOCATION;  Service: Cardiovascular;  Laterality: Bilateral;   • HYSTERECTOMY     • PACEMAKER IMPLANTATION     • THYROID SURGERY          Family History   Problem Relation Age of Onset   • No Known Problems Mother    • No Known Problems Father    • No Known Problems Sister    • No Known Problems Brother    • No Known Problems Maternal Aunt    • No Known Problems Maternal Uncle    • No Known Problems Paternal Aunt    • No Known Problems Paternal Uncle    • No Known Problems Maternal Grandmother    • No Known Problems Maternal Grandfather    • No Known Problems Paternal Grandmother    • No Known Problems Paternal Grandfather    • No Known Problems Other    • Anemia Neg Hx    • Arrhythmia Neg Hx    • Asthma Neg Hx    • Clotting disorder Neg Hx    • Fainting Neg Hx    • Heart attack Neg Hx    • Heart disease Neg Hx    • Heart failure Neg Hx    • Hyperlipidemia Neg Hx    • Hypertension Neg Hx        Social History     Tobacco Use   • Smoking status: Former     Passive exposure: Past   • Smokeless tobacco: Never   • Tobacco comments:     quit 35 yrs ago   Vaping Use   • Vaping Use: Never used   Substance Use Topics   • Alcohol use: Not Currently     Comment: rare   • Drug use: No        Medications Prior to Admission   Medication Sig Dispense Refill Last Dose   • atenolol (TENORMIN) 100 MG tablet Take 1 tablet by mouth Daily.   2023   • [] Enoxaparin Sodium (LOVENOX) 60 MG/0.6ML solution prefilled syringe syringe Inject 0.5 mL under the skin into the appropriate area as directed Every 12 (Twelve) Hours for 5 doses. Warfarin being stopped for cardiac surgery.  Lovenox to start  BID and last dose  morning. 2.5 mL 0 5/15/2023 at 0400   • famotidine (PEPCID) 20 MG tablet Take 1 tablet by mouth 2 (Two) Times a Day As Needed.   Past Week   • fenofibrate 160 MG tablet Take 1 tablet by mouth Daily.   2023   • hydroCHLOROthiazide (HYDRODIURIL) 25 MG tablet Take 2 tablets by mouth Every Other Day.   2023   • latanoprost (XALATAN) 0.005 % ophthalmic solution Administer 1 drop to both eyes Every Night.   2023   • levothyroxine  (SYNTHROID, LEVOTHROID) 50 MCG tablet TAKE 1 TABLET BY MOUTH ONCE DAILY   5/14/2023   • lisinopril (PRINIVIL,ZESTRIL) 20 MG tablet Take 1 tablet by mouth Daily. 30 tablet 0 5/12/2023   • mupirocin (BACTROBAN) 2 % ointment Apply to nares (inside each nostril) twice daily as directed for procedure 22 g 0 5/15/2023   • warfarin (COUMADIN) 2.5 MG tablet Take 1 tablet by mouth Every Night. T,Th,Sat,Sun   5/9/2023   • warfarin (COUMADIN) 5 MG tablet Take 1 tablet by mouth Daily. M,W,F   5/10/2023       Allergies:  Atorvastatin, Colesevelam hcl, Colestipol hcl, Ezetimibe, Pitavastatin, Rosuvastatin, Rosuvastatin calcium, Simvastatin, Statins, and Keflex [cephalexin]    Scheduled Meds:acetaminophen, 650 mg, Oral, Q6H   Or  acetaminophen, 650 mg, Oral, Q6H   Or  acetaminophen, 650 mg, Rectal, Q6H  aspirin, 81 mg, Oral, Daily  ceFAZolin, 2 g, Intravenous, Q8H  chlorhexidine, 15 mL, Mouth/Throat, Q12H  enoxaparin, 40 mg, Subcutaneous, Q24H  levothyroxine, 50 mcg, Oral, Daily  magnesium sulfate, 1 g, Intravenous, Q8H  mupirocin, 1 application, Each Nare, BID  pantoprazole, 40 mg, Oral, Q AM  polyethylene glycol, 17 g, Oral, BID  senna-docusate sodium, 2 tablet, Oral, BID      Continuous Infusions:dexmedetomidine, 0.2-1.5 mcg/kg/hr, Last Rate: Stopped (05/15/23 1430)  EPINEPHrine, 0.02-0.05 mcg/kg/min, Last Rate: 0.03 mcg/kg/min (05/15/23 1118)  insulin, 0-100 Units/hr, Last Rate: 2 Units/hr (05/16/23 0625)  milrinone, 0.125 mcg/kg/min, Last Rate: 0.125 mcg/kg/min (05/15/23 1100)  niCARdipine, 5-15 mg/hr, Last Rate: Stopped (05/15/23 1852)  nitroglycerin, 5-50 mcg/min, Last Rate: Stopped (05/15/23 1117)  norepinephrine, 0.02-0.06 mcg/kg/min, Last Rate: Stopped (05/15/23 1128)  sodium chloride, 30 mL/hr, Last Rate: 30 mL/hr (05/15/23 1130)      PRN Meds:.•  acetaminophen **OR** acetaminophen **OR** acetaminophen  •  dextrose  •  dextrose  •  glucagon (human recombinant)  •  HYDROcodone-acetaminophen  •  meperidine  •  Morphine  "**AND** naloxone  •  niCARdipine  •  nitroglycerin  •  norepinephrine  •  ondansetron  •  potassium chloride **OR** potassium chloride  •  potassium chloride **OR** potassium chloride  •  vasopressin      OBJECTIVE    Vital Signs  Vitals:    05/16/23 0500 05/16/23 0600 05/16/23 0630 05/16/23 0700   BP: 121/55  120/51 116/40   BP Location:       Patient Position:       Pulse: 80  80 92   Resp:       Temp: 96.4 °F (35.8 °C)  96.8 °F (36 °C) 96.3 °F (35.7 °C)   TempSrc:       SpO2: 100%  100% 99%   Weight:  54.1 kg (119 lb 4.3 oz)     Height:           Flowsheet Rows    Flowsheet Row First Filed Value   Admission Height 149.9 cm (59\") Documented at 05/15/2023 0516   Admission Weight 48 kg (105 lb 12.8 oz) Documented at 05/15/2023 0549            Intake/Output Summary (Last 24 hours) at 5/16/2023 0740  Last data filed at 5/16/2023 0700  Gross per 24 hour   Intake 4978 ml   Output 4510 ml   Net 468 ml          Telemetry: Paced rhythm    Physical Exam:  The patient is alert, oriented and in no distress.  Vital signs as noted above.  Head and neck revealed no carotid bruits or jugular venous distention.  No thyromegaly or lymphadenopathy is present  Lungs clear.  No wheezing.  Breath sounds are normal bilaterally.  She is on BiPAP  Heart normal first and second heart sounds.  No murmur. No precordial rub is present.  No gallop is present.  Abdomen soft and nontender.  No organomegaly is present.  Extremities with good peripheral pulses without any pedal edema.  Skin warm and dry.  Musculoskeletal system is grossly normal.  CNS grossly normal.       Results Review:  I have personally reviewed the results from the time of this admission to 5/16/2023 07:40 EDT and agree with these findings:  []  Laboratory  []  Microbiology  []  Radiology  []  EKG/Telemetry   []  Cardiology/Vascular   []  Pathology  []  Old records  []  Other:    Most notable findings include:    Lab Results (last 24 hours)     Procedure Component Value Units " Date/Time    POC Glucose Once [[id number]]  (Abnormal) Collected: 05/16/23 0720    Specimen: Blood Updated: 05/16/23 0721     Glucose 140 mg/dL      Comment: Serial Number: [id number]:  [id number]       POC Glucose Once [[id number]]  (Abnormal) Collected: 05/16/23 0602    Specimen: Blood Updated: 05/16/23 0606     Glucose 147 mg/dL      Comment: Serial Number: [id number]:  [id number]       Blood Gas, Arterial - [[id number]]  (Abnormal) Collected: 05/16/23 0602    Specimen: Arterial Blood Updated: 05/16/23 0606     Site Arterial Line     Rosalio's Test N/A     pH, Arterial 7.339 pH units      pCO2, Arterial 54.1 mm Hg      pO2, Arterial 108.8 mm Hg      HCO3, Arterial 29.1 mmol/L      Base Excess, Arterial 2.4 mmol/L      Comment: Serial Number: [id number]:  [id number]        O2 Saturation, Arterial 97.8 %      CO2 Content 30.8 mmol/L      Barometric Pressure for Blood Gas --     Comment: N/A        Modality BiPap     FIO2 40 %      Ventilator Mode ;BiLevel     PEEP 5     PSV 10 cmH2O      Hemodilution No     Respiratory Rate 18    POC Glucose Once [[id number]]  (Abnormal) Collected: 05/16/23 0542    Specimen: Blood Updated: 05/16/23 0544     Glucose 141 mg/dL      Comment: Serial Number: [id number]:  [id number]       POC Glucose Once [[id number]]  (Abnormal) Collected: 05/16/23 0434    Specimen: Blood Updated: 05/16/23 0438     Glucose 169 mg/dL      Comment: Serial Number: [id number]:  [id number]       POCT Electrolytes +HGB +HCT [[id number]]  (Abnormal) Collected: 05/16/23 0434    Specimen: Blood Updated: 05/16/23 0438     Sodium 147 mmol/L      POC Potassium 3.3 mmol/L      Ionized Calcium 1.25 mmol/L      Comment: Serial Number: [id number]:  [id number]        Glucose 169 mg/dL      Hematocrit 34 %      Hemoglobin 11.7 g/dL     Blood Gas, Arterial - [[id number]]  (Abnormal) Collected: 05/16/23 0434    Specimen: Arterial Blood Updated: 05/16/23 0438     Site Arterial Line     Rosalio's Test N/A     pH, Arterial 7.312  pH units      pCO2, Arterial 48.7 mm Hg      pO2, Arterial 149.8 mm Hg      HCO3, Arterial 24.6 mmol/L      Base Excess, Arterial -2.0 mmol/L      Comment: Serial Number: [id number]:  [id number]        O2 Saturation, Arterial 99.1 %      Barometric Pressure for Blood Gas --     Comment: N/A        Modality Adult Vent     FIO2 40 %      Ventilator Mode ;CPAP/PS     PEEP 5     PSV 10 cmH2O      Hemodilution No    POC Glucose Once [[id number]]  (Abnormal) Collected: 05/16/23 0357    Specimen: Blood Updated: 05/16/23 0403     Glucose 177 mg/dL      Comment: Serial Number: [id number]:  [id number]       Blood Gas, Arterial - [[id number]]  (Abnormal) Collected: 05/16/23 0357    Specimen: Arterial Blood Updated: 05/16/23 0403     Site Arterial Line     Rosalio's Test N/A     pH, Arterial 7.342 pH units      pCO2, Arterial 45.2 mm Hg      pO2, Arterial 143.3 mm Hg      HCO3, Arterial 24.5 mmol/L      Base Excess, Arterial -1.4 mmol/L      Comment: Serial Number: [id number]:  [id number]        O2 Saturation, Arterial 99.1 %      CO2 Content 25.9 mmol/L      Barometric Pressure for Blood Gas --     Comment: N/A        Modality Adult Vent     FIO2 40 %      Ventilator Mode ;AC     PEEP 5     Hemodilution No     Respiratory Rate 12    Renal Function Panel [[id number]]  (Abnormal) Collected: 05/16/23 0248    Specimen: Blood Updated: 05/16/23 0353     Glucose 219 mg/dL      BUN 19 mg/dL      Creatinine 0.93 mg/dL      Sodium 145 mmol/L      Potassium 3.4 mmol/L      Chloride 108 mmol/L      CO2 22.0 mmol/L      Calcium 9.0 mg/dL      Albumin 4.7 g/dL      Phosphorus 4.1 mg/dL      Anion Gap 15.0 mmol/L      BUN/Creatinine Ratio 20.4     eGFR 61.9 mL/min/1.73     Narrative:      GFR Normal >60  Chronic Kidney Disease <60  Kidney Failure <15    The GFR formula is only valid for adults with stable renal function between ages 18 and 70.    Magnesium [[id number]]  (Normal) Collected: 05/16/23 0248    Specimen: Blood Updated: 05/16/23 0346      Magnesium 2.4 mg/dL     Calcium, Ionized [[id number]]  (Normal) Collected: 05/16/23 0248    Specimen: Blood Updated: 05/16/23 0333     Ionized Calcium 1.24 mmol/L     CBC & Differential [[id number]]  (Abnormal) Collected: 05/16/23 0248    Specimen: Blood Updated: 05/16/23 0301    Narrative:      The following orders were created for panel order CBC & Differential.  Procedure                               Abnormality         Status                     ---------                               -----------         ------                     CBC Auto Differential[[id number]]        Abnormal            Final result                 Please view results for these tests on the individual orders.    CBC Auto Differential [[id number]]  (Abnormal) Collected: 05/16/23 0248    Specimen: Blood Updated: 05/16/23 0301     WBC 12.40 10*3/mm3      RBC 3.90 10*6/mm3      Hemoglobin 11.8 g/dL      Hematocrit 35.0 %      MCV 89.8 fL      MCH 30.4 pg      MCHC 33.8 g/dL      RDW 14.5 %      RDW-SD 47.7 fl      MPV 9.7 fL      Platelets 109 10*3/mm3      Neutrophil % 89.6 %      Lymphocyte % 4.4 %      Monocyte % 5.7 %      Eosinophil % 0.2 %      Basophil % 0.1 %      Neutrophils, Absolute 11.10 10*3/mm3      Lymphocytes, Absolute 0.50 10*3/mm3      Monocytes, Absolute 0.70 10*3/mm3      Eosinophils, Absolute 0.00 10*3/mm3      Basophils, Absolute 0.00 10*3/mm3      nRBC 0.1 /100 WBC     POC Glucose Once [[id number]]  (Abnormal) Collected: 05/16/23 0254    Specimen: Blood Updated: 05/16/23 0258     Glucose 191 mg/dL      Comment: Serial Number: [id number]:  [id number]       POC Glucose Once [[id number]]  (Abnormal) Collected: 05/16/23 0225    Specimen: Blood Updated: 05/16/23 0228     Glucose 225 mg/dL      Comment: Serial Number: [id number]:  [id number]       POCT Electrolytes +HGB +HCT [[id number]]  (Abnormal) Collected: 05/16/23 0225    Specimen: Blood Updated: 05/16/23 0228     Sodium 145 mmol/L      POC Potassium 3.2 mmol/L      Ionized  Calcium 1.22 mmol/L      Comment: Serial Number: [id number]:  [id number]        Glucose 225 mg/dL      Hematocrit 31 %      Hemoglobin 10.7 g/dL     Blood Gas, Arterial - [[id number]]  (Abnormal) Collected: 05/16/23 0225    Specimen: Arterial Blood Updated: 05/16/23 0228     Site Arterial Line     Rosalio's Test Positive     pH, Arterial 7.288 pH units      pCO2, Arterial 47.6 mm Hg      pO2, Arterial 146.5 mm Hg      HCO3, Arterial 22.8 mmol/L      Base Excess, Arterial -3.9 mmol/L      Comment: Serial Number: [id number]:  [id number]        O2 Saturation, Arterial 99.0 %      Barometric Pressure for Blood Gas --     Comment: N/A        Modality Adult Vent     FIO2 40 %      Ventilator Mode ;CPAP/PS     Set Tidal Volume 550     PEEP 5     PSV 10 cmH2O      Hemodilution No     Respiratory Rate 10    POC Glucose Once [[id number]]  (Abnormal) Collected: 05/16/23 0203    Specimen: Blood Updated: 05/16/23 0204     Glucose 208 mg/dL      Comment: Serial Number: [id number]:  [id number]       POC Glucose Once [[id number]]  (Abnormal) Collected: 05/15/23 2317    Specimen: Blood Updated: 05/15/23 2319     Glucose 162 mg/dL      Comment: Serial Number: [id number]:  [id number]       POCT Electrolytes +HGB +HCT [[id number]]  (Abnormal) Collected: 05/15/23 2317    Specimen: Blood Updated: 05/15/23 2319     Sodium 148 mmol/L      POC Potassium 3.4 mmol/L      Ionized Calcium 1.25 mmol/L      Comment: Serial Number: [id number]:  [id number]        Glucose 162 mg/dL      Hematocrit 32 %      Hemoglobin 10.9 g/dL     Blood Gas, Arterial - [[id number]]  (Abnormal) Collected: 05/15/23 2317    Specimen: Arterial Blood Updated: 05/15/23 2319     Site Arterial Line     Rosalio's Test N/A     pH, Arterial 7.344 pH units      pCO2, Arterial 43.2 mm Hg      pO2, Arterial 485.2 mm Hg      HCO3, Arterial 23.6 mmol/L      Base Excess, Arterial -2.1 mmol/L      Comment: Serial Number: [id number]:  [id number]        O2 Saturation, Arterial 100.0 %       Barometric Pressure for Blood Gas --     Comment: N/A        Modality Adult Vent     FIO2 40 %      Ventilator Mode ;AC     Set Tidal Volume 500     PEEP 5     Hemodilution No     Respiratory Rate 12    POC Glucose Once [[id number]]  (Abnormal) Collected: 05/15/23 2125    Specimen: Blood Updated: 05/15/23 2126     Glucose 168 mg/dL      Comment: Serial Number: [id number]:  [id number]       POC Lactate [[id number]]  (Abnormal) Collected: 05/15/23 1829    Specimen: Blood Updated: 05/15/23 2018     Lactate 2.2 mmol/L      Comment: Serial Number: [id number]:  [id number]       POC Glucose Once [[id number]]  (Abnormal) Collected: 05/15/23 2006    Specimen: Blood Updated: 05/15/23 2010     Glucose 164 mg/dL      Comment: Serial Number: [id number]:  [id number]       POCT Electrolytes +HGB +HCT [[id number]]  (Abnormal) Collected: 05/15/23 2006    Specimen: Blood Updated: 05/15/23 2010     Sodium 148 mmol/L      POC Potassium 3.0 mmol/L      Ionized Calcium 1.24 mmol/L      Comment: Serial Number: [id number]:  [id number]        Glucose 164 mg/dL      Hematocrit 30 %      Hemoglobin 10.3 g/dL     Blood Gas, Arterial - [[id number]]  (Abnormal) Collected: 05/15/23 2006    Specimen: Arterial Blood Updated: 05/15/23 2010     Site Arterial Line     Rosalio's Test N/A     pH, Arterial 7.381 pH units      pCO2, Arterial 45.5 mm Hg      pO2, Arterial 152.9 mm Hg      HCO3, Arterial 27.0 mmol/L      Base Excess, Arterial 1.5 mmol/L      Comment: Serial Number: [id number]:  [id number]        O2 Saturation, Arterial 99.3 %      Barometric Pressure for Blood Gas --     Comment: N/A        Modality Adult Vent     FIO2 40 %      Ventilator Mode ;AC     Set Tidal Volume 500     PEEP 5     Hemodilution No     Respiratory Rate 12    POC Glucose Once [[id number]]  (Abnormal) Collected: 05/15/23 1829    Specimen: Blood Updated: 05/15/23 1834     Glucose 179 mg/dL      Comment: Serial Number: [id number]:  [id number]       POCT Electrolytes +HGB +HCT  [[id number]]  (Abnormal) Collected: 05/15/23 1829    Specimen: Blood Updated: 05/15/23 1834     Sodium 144 mmol/L      POC Potassium 3.0 mmol/L      Ionized Calcium 1.30 mmol/L      Comment: Serial Number: [id number]:  [id number]        Glucose 179 mg/dL      Hematocrit 31 %      Hemoglobin 10.6 g/dL     Blood Gas, Arterial - [[id number]]  (Abnormal) Collected: 05/15/23 1829    Specimen: Arterial Blood Updated: 05/15/23 1834     Site Arterial Line     Rosalio's Test N/A     pH, Arterial 7.299 pH units      pCO2, Arterial 42.4 mm Hg      pO2, Arterial 174.0 mm Hg      HCO3, Arterial 20.8 mmol/L      Base Excess, Arterial -5.3 mmol/L      Comment: Serial Number: [id number]:  [id number]        O2 Saturation, Arterial 99.4 %      Barometric Pressure for Blood Gas --     Comment: N/A        Modality Adult Vent     FIO2 40 %      Ventilator Mode ;AC     Set Tidal Volume 500     PEEP 5     Hemodilution No     Respiratory Rate 12    Potassium [[id number]]  (Abnormal) Collected: 05/15/23 1808    Specimen: Blood Updated: 05/15/23 1829     Potassium 3.2 mmol/L      Comment: Result checked         POC Glucose Once [[id number]]  (Abnormal) Collected: 05/15/23 1811    Specimen: Blood Updated: 05/15/23 1813     Glucose 154 mg/dL      Comment: Serial Number: [id number]:  [id number]       POC Glucose Once [[id number]]  (Abnormal) Collected: 05/15/23 1653    Specimen: Blood Updated: 05/15/23 1654     Glucose 118 mg/dL      Comment: Serial Number: [id number]:  [id number]       Blood Gas, Arterial - [[id number]]  (Abnormal) Collected: 05/15/23 1214    Specimen: Arterial Blood Updated: 05/15/23 1627     Site Arterial Line     Rosalio's Test N/A     pH, Arterial 7.374 pH units      pCO2, Arterial 38.7 mm Hg      pO2, Arterial 158.1 mm Hg      HCO3, Arterial 22.6 mmol/L      Base Excess, Arterial -2.4 mmol/L      Comment: Serial Number: [id number]:  [id number]        O2 Saturation, Arterial 99.4 %      Barometric Pressure for Blood Gas --      Comment: N/A        Modality Adult Vent     FIO2 40 %      Ventilator Mode ;AC     Set Tidal Volume 500     PEEP 8     Hemodilution Yes     Respiratory Rate 12    POCT Electrolytes +HGB +HCT [[id number]]  (Abnormal) Collected: 05/15/23 1124    Specimen: Blood Updated: 05/15/23 1625     Sodium 143 mmol/L      POC Potassium 2.4 mmol/L      Ionized Calcium 1.29 mmol/L      Comment: Serial Number: [id number]:  [id number]        Glucose 85 mg/dL      Hematocrit 37 %      Hemoglobin 12.5 g/dL     POC Glucose Once [[id number]]  (Abnormal) Collected: 05/15/23 1538    Specimen: Blood Updated: 05/15/23 1541     Glucose 146 mg/dL      Comment: Serial Number: [id number]:  [id number]       POC Glucose Once [[id number]]  (Abnormal) Collected: 05/15/23 1500    Specimen: Blood Updated: 05/15/23 1501     Glucose 151 mg/dL      Comment: Serial Number: [id number]:  [id number]       POC Glucose Once [[id number]] Collected: 05/15/23 1328    Specimen: Blood Updated: 05/15/23 1419     Glucose --     Comment: Serial Number: [id number]:  [id number]       Narrative:      Internal QC error. See POC glucose for results. Test credited.    POC Glucose Once [[id number]]  (Abnormal) Collected: 05/15/23 1336    Specimen: Blood Updated: 05/15/23 1337     Glucose 151 mg/dL      Comment: Serial Number: [id number]:  [id number]       POCT Electrolytes +HGB +HCT [[id number]]  (Abnormal) Collected: 05/15/23 1328    Specimen: Blood Updated: 05/15/23 1332     Sodium 143 mmol/L      POC Potassium 3.3 mmol/L      Ionized Calcium 1.30 mmol/L      Comment: Serial Number: [id number]:  [id number]        Hematocrit 36 %      Hemoglobin 12.2 g/dL     Blood Gas, Arterial - [[id number]]  (Abnormal) Collected: 05/15/23 1328    Specimen: Arterial Blood Updated: 05/15/23 1332     Site Arterial Line     Rosalio's Test N/A     pH, Arterial 7.367 pH units      pCO2, Arterial 37.6 mm Hg      pO2, Arterial 160.6 mm Hg      HCO3, Arterial 21.6 mmol/L      Base  Excess, Arterial -3.4 mmol/L      Comment: Serial Number: [id number]:  [id number]        O2 Saturation, Arterial 99.4 %      CO2 Content 22.7 mmol/L      Barometric Pressure for Blood Gas --     Comment: N/A        Modality Adult Vent     FIO2 40 %      Ventilator Mode ;AC     Set Tidal Volume 500     PSV 8 cmH2O      Hemodilution Yes     Respiratory Rate 12    CBC & Differential [[id number]]  (Abnormal) Collected: 05/15/23 1202    Specimen: Blood Updated: 05/15/23 1219    Narrative:      The following orders were created for panel order CBC & Differential.  Procedure                               Abnormality         Status                     ---------                               -----------         ------                     CBC Auto Differential[[id number]]        Abnormal            Final result               Scan Slide[[id number]]                                                                    Please view results for these tests on the individual orders.    CBC Auto Differential [[id number]]  (Abnormal) Collected: 05/15/23 1202    Specimen: Blood Updated: 05/15/23 1219     WBC 10.00 10*3/mm3      RBC 4.44 10*6/mm3      Hemoglobin 13.7 g/dL      Hematocrit 40.5 %      MCV 91.3 fL      MCH 30.9 pg      MCHC 33.9 g/dL      RDW 14.1 %      RDW-SD 44.2 fl      MPV 9.6 fL      Platelets 101 10*3/mm3      Comment: Result checked          Neutrophil % 74.6 %      Lymphocyte % 22.4 %      Monocyte % 2.1 %      Eosinophil % 0.6 %      Basophil % 0.3 %      Neutrophils, Absolute 7.50 10*3/mm3      Lymphocytes, Absolute 2.20 10*3/mm3      Monocytes, Absolute 0.20 10*3/mm3      Eosinophils, Absolute 0.10 10*3/mm3      Basophils, Absolute 0.00 10*3/mm3      nRBC 0.0 /100 WBC     POC Glucose Once [[id number]]  (Normal) Collected: 05/15/23 1214    Specimen: Blood Updated: 05/15/23 1217     Glucose 92 mg/dL      Comment: Serial Number: [id number]:  [id number]       POCT Electrolytes +HGB +HCT [[id number]]  (Abnormal) Collected:  05/15/23 1214    Specimen: Blood Updated: 05/15/23 1217     Sodium 143 mmol/L      POC Potassium 2.8 mmol/L      Ionized Calcium 1.24 mmol/L      Comment: Serial Number: [id number]:  [id number]        Glucose 92 mg/dL      Hematocrit 36 %      Hemoglobin 12.1 g/dL     Renal Function Panel [[id number]]  (Abnormal) Collected: 05/15/23 1109    Specimen: Blood Updated: 05/15/23 1152     Glucose 108 mg/dL      BUN 18 mg/dL      Creatinine 0.75 mg/dL      Sodium 139 mmol/L      Potassium 2.5 mmol/L      Comment: Slight hemolysis detected by analyzer. Results may be affected.        Chloride 103 mmol/L      CO2 22.0 mmol/L      Calcium 10.3 mg/dL      Albumin 4.6 g/dL      Phosphorus 2.0 mg/dL      Anion Gap 14.0 mmol/L      BUN/Creatinine Ratio 24.0     eGFR 80.1 mL/min/1.73     Narrative:      GFR Normal >60  Chronic Kidney Disease <60  Kidney Failure <15    The GFR formula is only valid for adults with stable renal function between ages 18 and 70.    Calcium, Ionized [[id number]]  (Abnormal) Collected: 05/15/23 1109    Specimen: Blood Updated: 05/15/23 1146     Ionized Calcium 1.35 mmol/L     POC Glucose Once [[id number]]  (Normal) Collected: 05/15/23 1124    Specimen: Blood Updated: 05/15/23 1129     Glucose 85 mg/dL      Comment: Serial Number: [id number]:  [id number]       Blood Gas, Arterial - [[id number]]  (Abnormal) Collected: 05/15/23 1124    Specimen: Arterial Blood Updated: 05/15/23 1129     Site Arterial Line     Rosalio's Test N/A     pH, Arterial 7.373 pH units      pCO2, Arterial 35.6 mm Hg      pO2, Arterial 302.0 mm Hg      HCO3, Arterial 20.7 mmol/L      Base Excess, Arterial -4.0 mmol/L      Comment: Serial Number: [id number]:  [id number]        O2 Saturation, Arterial 99.9 %      CO2 Content 21.8 mmol/L      Barometric Pressure for Blood Gas --     Comment: N/A        Modality Adult Vent     FIO2 70 %      Ventilator Mode ;AC     Set Tidal Volume 500     PEEP 8     Hemodilution Yes     Respiratory Rate 14     aPTT [[id number]]  (Abnormal) Collected: 05/15/23 1109    Specimen: Blood Updated: 05/15/23 1129     PTT 33.4 seconds     Protime-INR [[id number]]  (Abnormal) Collected: 05/15/23 1109    Specimen: Blood Updated: 05/15/23 1129     Protime 15.3 Seconds      INR 1.46    CBC & Differential [[id number]]  (Abnormal) Collected: 05/15/23 0701    Specimen: Blood, Arterial Line Updated: 05/15/23 1106    Narrative:      The following orders were created for panel order CBC & Differential.  Procedure                               Abnormality         Status                     ---------                               -----------         ------                     CBC Auto Differential[[id number]]        Abnormal            Final result                 Please view results for these tests on the individual orders.    CBC Auto Differential [[id number]]  (Abnormal) Collected: 05/15/23 0701    Specimen: Blood, Arterial Line Updated: 05/15/23 1106     WBC 4.80 10*3/mm3      RBC 3.83 10*6/mm3      Hemoglobin 11.8 g/dL      Hematocrit 35.2 %      MCV 92.0 fL      MCH 30.9 pg      MCHC 33.5 g/dL      RDW 14.3 %      RDW-SD 45.5 fl      MPV 10.1 fL      Platelets 203 10*3/mm3      Neutrophil % 57.7 %      Lymphocyte % 31.2 %      Monocyte % 8.3 %      Eosinophil % 2.0 %      Basophil % 0.8 %      Neutrophils, Absolute 2.80 10*3/mm3      Lymphocytes, Absolute 1.50 10*3/mm3      Monocytes, Absolute 0.40 10*3/mm3      Eosinophils, Absolute 0.10 10*3/mm3      Basophils, Absolute 0.00 10*3/mm3      nRBC 0.0 /100 WBC     POC Chem 8, arterial (ISTAT) [[id number]]  (Abnormal) Collected: 05/15/23 0957    Specimen: Arterial Blood Updated: 05/15/23 1106     Ionized Calcium 1.58 mmol/L      pH, Arterial 7.440 pH units      pCO2, Arterial 45.0 mm Hg      pO2, Arterial 416.0 mm Hg      HCO3, Arterial 30.8 mmol/L      Base Excess, Arterial 7.0 mmol/L      Base Deficit --     O2 Saturation, Arterial 100.0 %      Glucose 185 mg/dL      Comment: Serial Number:  [id number]:  [id number]        Sodium 138 mmol/L      POC Potassium 3.3 mmol/L      Hematocrit 30 %      Hemoglobin 10.2 g/dL      CO2 Content 32 mmol/L     POC Chem 8, arterial (ISTAT) [[id number]]  (Abnormal) Collected: 05/15/23 0911    Specimen: Arterial Blood Updated: 05/15/23 1106     Ionized Calcium 1.00 mmol/L      pH, Arterial 7.300 pH units      pCO2, Arterial 55.9 mm Hg      pO2, Arterial 438.0 mm Hg      HCO3, Arterial 27.2 mmol/L      Base Excess, Arterial 1.0 mmol/L      Base Deficit --     O2 Saturation, Arterial 100.0 %      Glucose 211 mg/dL      Comment: Serial Number: [id number]:  [id number]        Sodium 134 mmol/L      POC Potassium 4.2 mmol/L      Hematocrit 30 %      Hemoglobin 10.2 g/dL      CO2 Content 29 mmol/L     POC Chem 8, arterial (ISTAT) [[id number]]  (Abnormal) Collected: 05/15/23 0855    Specimen: Arterial Blood Updated: 05/15/23 1105     Ionized Calcium 0.97 mmol/L      pH, Arterial 7.290 pH units      pCO2, Arterial 48.0 mm Hg      pO2, Arterial 48.0 mm Hg      HCO3, Arterial 23.0 mmol/L      Base Excess, Arterial <0.0 mmol/L      Base Deficit --     O2 Saturation, Arterial 78.0 %      Glucose 183 mg/dL      Comment: Serial Number: [id number]:  [id number]        Sodium 133 mmol/L      POC Potassium 3.9 mmol/L      Hematocrit 25 %      Hemoglobin 8.5 g/dL      CO2 Content 24 mmol/L     POC Chem 8, arterial (ISTAT) [[id number]]  (Abnormal) Collected: 05/15/23 0846    Specimen: Arterial Blood Updated: 05/15/23 1105     Ionized Calcium 0.94 mmol/L      pH, Arterial 7.420 pH units      pCO2, Arterial 42.5 mm Hg      pO2, Arterial 544.0 mm Hg      HCO3, Arterial 27.3 mmol/L      Base Excess, Arterial 3.0 mmol/L      Base Deficit --     O2 Saturation, Arterial 100.0 %      Glucose 135 mg/dL      Comment: Serial Number: [id number]:  [id number]        Sodium 132 mmol/L      POC Potassium 3.7 mmol/L      Hematocrit 20 %      Hemoglobin 6.8 g/dL      CO2 Content 29 mmol/L     POC Chem 8, arterial  (ISTAT) [[id number]]  (Abnormal) Collected: 05/15/23 0805    Specimen: Arterial Blood Updated: 05/15/23 1105     Ionized Calcium 1.19 mmol/L      pH, Arterial 7.510 pH units      pCO2, Arterial 37.4 mm Hg      pO2, Arterial 528.0 mm Hg      HCO3, Arterial 29.5 mmol/L      Base Excess, Arterial 6.0 mmol/L      Base Deficit --     O2 Saturation, Arterial 100.0 %      Glucose 114 mg/dL      Comment: Serial Number: [id number]:  [id number]        Sodium 137 mmol/L      POC Potassium 3.0 mmol/L      Hematocrit 33 %      Hemoglobin 11.2 g/dL      CO2 Content 31 mmol/L     POC Activated Clotting Time [[id number]]  (Normal) Collected: 05/15/23 0956    Specimen: Arterial Blood Updated: 05/15/23 1102     Activated Clotting Time  113 Seconds      Comment: Serial Number: [id number]:  [id number]       POC Activated Clotting Time [[id number]]  (Abnormal) Collected: 05/15/23 0910    Specimen: Arterial Blood Updated: 05/15/23 1102     Activated Clotting Time  347 Seconds      Comment: Serial Number: [id number]:  [id number]       POC Activated Clotting Time [[id number]]  (Abnormal) Collected: 05/15/23 0845    Specimen: Arterial Blood Updated: 05/15/23 1102     Activated Clotting Time  407 Seconds      Comment: Serial Number: [id number]:  [id number]       POC Activated Clotting Time [[id number]]  (Abnormal) Collected: 05/15/23 0819    Specimen: Arterial Blood Updated: 05/15/23 1102     Activated Clotting Time  486 Seconds      Comment: Serial Number: [id number]:  [id number]       POC Activated Clotting Time [[id number]]  (Normal) Collected: 05/15/23 0804    Specimen: Arterial Blood Updated: 05/15/23 1102     Activated Clotting Time  137 Seconds      Comment: Serial Number: [id number]:  [id number]             Imaging Results (Last 24 Hours)     Procedure Component Value Units Date/Time    XR Chest 1 View [[id number]] Resulted: 05/16/23 0618     Updated: 05/16/23 0619    XR Chest 1 View [[id number]] Collected: 05/15/23 1537     Updated: 05/15/23 1543     Narrative:      XR CHEST 1 VW    Date of Exam: 5/15/2023 2:45 PM EDT    Indication: monitor  pneumo    Comparison: 5/15/2023    Findings:  Small left apical pneumothorax appears similar to prior. Previously seen right pneumothorax is not definitely identified. Exam is limited by overlying leads and appliances. Sternotomy wires are again noted. Prosthetic heart valve again noted. ET tube and   esophagogastric tube again noted. Right IJ introducer sheath and Pilger-Enrike catheter again noted, similar to prior. No pleural fluid is identified. There is slight haziness in the perihilar regions which may indicate early pulmonary edema.      Impression:      Impression:    1. No significant interval change in probable small left apical pneumothorax. Right-sided pneumothorax is not definitely identified on the current study, but exam is limited by overlying appliances.  2. Increasing haziness in the perihilar regions may be due to early pulmonary edema.  3. Tubes and lines as above.      Electronically Signed: Buck Wright    5/15/2023 3:41 PM EDT    Workstation ID: [id number]    XR Abdomen KUB [[id number]] Collected: 05/15/23 1142     Updated: 05/15/23 1148    Narrative:      XR ABDOMEN KUB    Date of Exam: 5/15/2023 11:05 AM EDT    Indication: OG placement    Comparison: None available.    Findings:  NG tube projects over the expected position of the stomach in good position. Nonobstructing bowel gas pattern noted      Impression:      Impression:  NG tube projects in expected position.      Electronically Signed: Lonnie Kessler    5/15/2023 11:46 AM EDT    Workstation ID: [id number]    XR Chest 1 View [[id number]] Collected: 05/15/23 1138     Updated: 05/15/23 1144    Narrative:      XR CHEST 1 VW    Date of Exam: 5/15/2023 11:00 AM EDT    Indication: Post-Op Check Line & Tube Placement    Comparison: 5/11/2023 and prior    Findings:  Study is limited by overlying support and monitoring apparatus. Percutaneous pacer pad  overlies the right upper chest.    There has been interval median sternotomy and prosthetic valve placement. The patient is intubated with the ET tube in good position approximately 3.7 cm above the sg. Right sided IJ sheath is in place with a Hindman-Enrike catheter extending to the   expected position of the distal main pulmonary artery or proximal right main pulmonary artery. NG tube extends below the field-of-view of the study below the diaphragm. Left-sided chest tube and mediastinal drain are in place. Epicardial pacing wires are   in place. A left-sided subclavian Estonian pacemaker again noted.    The heart size is stable in appearance and mildly enlarged. Pulm vascularity is mildly congested.    There is a trace amount of air at the left apex compatible with a trace pneumothorax. No significant pleural effusion noted. The lungs appear grossly clear. Linear opacity, interface at the right lung base may represent a artifact versus a very small   pneumothorax. Osseous structures demonstrate no unexpected acute abnormality.      Impression:      Impression:    1. Lines and tubes as above in expected position    2. Trace pneumothorax at the left apex and possible right-sided pneumothorax peripherally versus artifact.      Electronically Signed: Lonnie Kessler    5/15/2023 11:42 AM EDT    Workstation ID: [id number]          LAB RESULTS (LAST 7 DAYS)    CBC  Results from last 7 days   Lab Units 05/16/23  0434 05/16/23  0248 05/16/23  0225 05/15/23  2317 05/15/23  2006 05/15/23  1829 05/15/23  1328 05/15/23  1214 05/15/23  1202 05/15/23  0805 05/15/23  0701 05/11/23  0821   WBC 10*3/mm3  --  12.40*  --   --   --   --   --   --  10.00  --  4.80  4.80 4.90   RBC 10*6/mm3  --  3.90  --   --   --   --   --   --  4.44  --  3.83  3.83 4.18   HEMOGLOBIN g/dL  --  11.8*  --   --   --   --   --   --  13.7  --  11.8*  11.7* 12.8   HEMOGLOBIN, POC g/dL 11.7*  --  10.7* 10.9* 10.3* 10.6* 12.2   < >  --    < >  --   --     HEMATOCRIT %  --  35.0  --   --   --   --   --   --  40.5  --  35.2  34.4 37.7   HEMATOCRIT POC % 34*  --  31* 32* 30* 31* 36*   < >  --    < >  --   --    MCV fL  --  89.8  --   --   --   --   --   --  91.3  --  92.0  89.9 90.1   PLATELETS 10*3/mm3  --  109*  --   --   --   --   --   --  101*  --  203  198 240    < > = values in this interval not displayed.       BMP  Results from last 7 days   Lab Units 05/16/23  0248 05/15/23  1808 05/15/23  1109 05/11/23  0821   SODIUM mmol/L 145  --  139 142   POTASSIUM mmol/L 3.4* 3.2* 2.5* 4.2   CHLORIDE mmol/L 108*  --  103 103   CO2 mmol/L 22.0  --  22.0 30.0*   BUN mg/dL 19  --  18 21   CREATININE mg/dL 0.93  --  0.75 0.80   GLUCOSE mg/dL 219*  --  108* 119*   MAGNESIUM mg/dL 2.4  --   --   --    PHOSPHORUS mg/dL 4.1  --  2.0*  --        CMP   Results from last 7 days   Lab Units 05/16/23  0248 05/15/23  1808 05/15/23  1109 05/11/23  0821   SODIUM mmol/L 145  --  139 142   POTASSIUM mmol/L 3.4* 3.2* 2.5* 4.2   CHLORIDE mmol/L 108*  --  103 103   CO2 mmol/L 22.0  --  22.0 30.0*   BUN mg/dL 19  --  18 21   CREATININE mg/dL 0.93  --  0.75 0.80   GLUCOSE mg/dL 219*  --  108* 119*   ALBUMIN g/dL 4.7  --  4.6 4.5   BILIRUBIN mg/dL  --   --   --  0.3   ALK PHOS U/L  --   --   --  38*   AST (SGOT) U/L  --   --   --  24   ALT (SGPT) U/L  --   --   --  15       BNP        TROPONIN        CoAg  Results from last 7 days   Lab Units 05/15/23  1109 05/15/23  0701 05/11/23  0821   INR  1.46* 1.07 2.66*   APTT seconds 33.4* 35.1* 34.2*       Creatinine Clearance  Estimated Creatinine Clearance: 40.5 mL/min (by C-G formula based on SCr of 0.93 mg/dL).    ABG  Results from last 7 days   Lab Units 05/16/23  0602 05/16/23  0434 05/16/23  0357 05/16/23  0225 05/15/23  2317 05/15/23  2006 05/15/23  1829   PH, ARTERIAL pH units 7.339* 7.312* 7.342* 7.288* 7.344* 7.381 7.299*   PCO2, ARTERIAL mm Hg 54.1* 48.7* 45.2 47.6 43.2 45.5 42.4   PO2 ART mm Hg 108.8* 149.8* 143.3* 146.5* 485.2* 152.9*  174.0*   O2 SATURATION ART % 97.8 99.1* 99.1* 99.0* 100.0* 99.3* 99.4*   BASE EXCESS ART mmol/L 2.4 -2.0* -1.4* -3.9* -2.1* 1.5 -5.3*       Radiology  XR Chest 1 View    Result Date: 5/15/2023  Impression: 1. No significant interval change in probable small left apical pneumothorax. Right-sided pneumothorax is not definitely identified on the current study, but exam is limited by overlying appliances. 2. Increasing haziness in the perihilar regions may be due to early pulmonary edema. 3. Tubes and lines as above. Electronically Signed: Buckita Wright  5/15/2023 3:41 PM EDT  Workstation ID: [id number]    XR Chest 1 View    Result Date: 5/15/2023  Impression: 1. Lines and tubes as above in expected position 2. Trace pneumothorax at the left apex and possible right-sided pneumothorax peripherally versus artifact. Electronically Signed: Lonnie Kessler  5/15/2023 11:42 AM EDT  Workstation ID: [id number]    XR Abdomen KUB    Result Date: 5/15/2023  Impression: NG tube projects in expected position. Electronically Signed: Lonnie Kessler  5/15/2023 11:46 AM EDT  Workstation ID: [id number]        EKG  I personally viewed and interpreted the patient's EKG/Telemetry data:  ECG 12 Lead   Preliminary Result   HEART RATE= 80  bpm   RR Interval= 752  ms   WV Interval= 253  ms   P Horizontal Axis= 199  deg   P Front Axis= 108  deg   QRSD Interval= 158  ms   QT Interval= 465  ms   QRS Axis= -71  deg   T Wave Axis= 103  deg   - ABNORMAL ECG -   Atrial-ventricular dual-paced rhythm   Electronically Signed By:    Date and Time of Study: 2023-05-16 03:49:28      ECG 12 Lead    (Results Pending)         Echocardiogram:    Results for orders placed in visit on 05/15/23    Intra-Op Anesthesia CARMITA    Narrative  Intra-Op Anesthesia CARMITA    Procedure Performed: Intra-Op Anesthesia CARMITA  Start Time:  5/15/2023 7:25 AM  End Time:   5/15/2023 7:35 AM    Preanesthesia Checklist:  Patient identified, IV assessed, risks and benefits discussed, monitors and  equipment assessed, procedure being performed at surgeon's request and anesthesia consent obtained.    General Procedure Information  CARMITA Placed for monitoring purposes only -- This is not a diagnostic CARMITA  Diagnostic Indications for Echo:  assessment of ascending aorta, assessment of surgical repair, defect repair evaluation and hemodynamic monitoring  Location performed:  OR  Intubated  Bite block placed  Probe Insertion:  Easy  Probe Type:  Multiplane  Modalities:  Color flow mapping, continuous wave Doppler and pulse wave Doppler    Echocardiographic and Doppler Measurements    Ventricles    Right Ventricle:  Cavity size normal.  Hypertrophy not present.  Thrombus not present.  Global function normal.  Left Ventricle:  Cavity size dilated.  Thrombus not present.  Global Function moderately impaired.  Ejection Fraction 30%.        Valves    Aortic Valve:  Annulus normal.  Stenosis not present.  Regurgitation mild.  Leaflets normal.  Leaflet motions normal.    Mitral Valve:  Annulus dilated.  Stenosis not present.  Regurgitation severe.  Leaflets normal.  Leaflet motions normal.    Tricuspid Valve:  Annulus normal.  Stenosis not present.  Regurgitation mild.  Leaflets normal.  Leaflet motions normal.      Aorta    Ascending Aorta:  Size normal.  Dissection not present.  Plaque thickness less than 3 mm.  Mobile plaque not present.  Aortic Arch:  Size normal.  Dissection not present.  Plaque thickness less than 3 mm.  Mobile plaque not present.  Descending Aorta:  Size normal.  Dissection not present.  Plaque thickness less than 3 mm.  Mobile plaque not present.      Atria    Right Atrium:  Size dilated.  Spontaneous echo contrast not present.  Thrombus not present.  Tumor not present.  Device present.    Left Atrium:  Size dilated.  Spontaneous echo contrast present.  Thrombus not present.  Tumor not present.  Device not present.  Left atrial appendage normal.      Septa        Ventricular  Septum:  Intra-ventricular septum morphology normal.        Other Findings  Pericardium:  normal  Pleural Effusion:  none  Pulmonary Arteries:  normal  Pulmonary Venous Flow:  normal    Anesthesia Information  Anesthesiologist:  Ronnie Craft MD      Echocardiogram Comments:  MILD AI; SEVERE MR DILATE LA/MV ANNULUS, CENTRAL JET  TV: MILD TI  EF 30        Stress Test:         Cardiac Catheterization:  Results for orders placed during the hospital encounter of 02/24/23    Cardiac Catheterization/Vascular Study    Narrative  OPERATORS  Trace Marcus M.D. (Attending Cardiologist)      PROCEDURE PERFORMED  Ultrasound guided vascular access  Right heart catheterization  Coronary Angiogram  Left Heart Catheterization [id number]  Moderate Sedation 35 minutes    INDICATIONS FOR PROCEDURE  82-year-old woman with valvular heart disease including mitral and tricuspid regurgitation, pulmonary hypertension presented with heart failure exacerbation.  After IV diuresis, discussion of risk and benefit she was brought into the cardiac Cath Lab for right and left heart cath.    PROCEDURE IN DETAIL  Informed consent was obtained from the patient after explaining the risks, benefits, and alternative options of the procedure. After obtaining informed consent, the patient was brought to the cath lab and was prepped in a sterile fashion. Lidocaine 2% was used for local anesthesia into the right femoral venous access site. Right femoral vein was accessed using the micropuncture needle under ultrasound guidance and micropuncture wire advanced under flouroscopy. A 7 Armenian vascular sheath was put into place percutaneously over guide-wire. Guide wires were removed. A 6Fr swan aneta catheter was advanced to wedge position. RA, RV and PA and wedge pressures were recorded.  PA sat and arterial sats recorded.  The patient tolerated the procedure well without any complications.    Lidocaine 2% was used for local anesthesia into the right femoral  arterial access site. The right femoral artery was accessed with a micropuncture needle via modified Seldinger technique under ultrasound guidance. A 6F was inserted successfully.  Afterwards, 6F JR4 and JL4 diagnostic catheters were advanced over a wire into the ascending aorta and were used to engage the ostia of the left main and RCA respectively. JR4 used to cross the AV and obtain LV pressures and gradient across the AV measured via pullback technique. Images of the right and left coronary systems were obtained. All the catheters were exchanged over a wire and subsequently removed. Angiogram of the femoral access site was obtained and did not show complications. The patient tolerated the procedure well without any complications. The pictures were reviewed at the end of the procedure. A Mynx closure device was applied.    HEMODYNAMICS    RHC  RA 5/3, 3 mmHg  RV 26/2, 4 mmHg  PA 27/8, 16 mmHg  PCW 7/7, 6 mmHg  AO Sat 96%  PA Sat 74%    Rob CO 4.73    Rob CI 3.42    LHC  LV: 108/5, 7 mmHg  AO: 116/46, 72 mmHg    No significant gradient across the aortic valve during pullback of JR4 catheter.  LV gram was not performed due to recent echocardiogram.    FINDINGS  Coronary Angiogram  Left dominant circulation  Left main: Left main is a large caliber vessel which gives rise to the Left Anterior Descending and the Left circumflex.  Left main is angiographically free from any significant disease  Left Anterior Descending Artery: LAD is a medium caliber vessel which gives rise to several septal perforators and several diagonal branches.  LAD has diffuse luminal irregularities with 20 to 30% lesion in the midsegment.  Left Circumflex: Left circumflex artery is a dominant vessel which gives rise to obtuse marginal.  Left circumflex has luminal irregularities only.  Right Coronary Artery: RCA is a small nondominant vessel.    ESTIMATED BLOOD LOSS:  10 ml    COMPLICATIONS:  None    PROCEDURE DATA:  Contrast Used: 25  cc  Sedation Time: 35 minutes    IMPRESSIONS  Non obstructive CAD  Normal filling pressure, no evidence of pulmonary hypertension.  Normal wedge pressure    RECOMMENDATIONS  -Switch IV to p.o. diuresis.  -Proceed with transesophageal echocardiogram to evaluate mitral and tricuspid regurgitation.  -Uptitrate GDMT for HFrEF         Other:         ASSESSMENT & PLAN:    Principal Problem:    Severe mitral regurgitation  Active Problems:    History of DVT (deep vein thrombosis)    Essential hypertension with goal blood pressure less than 130/80    Hypertriglyceridemia    Hypothyroidism due to acquired atrophy of thyroid    Pacemaker    Chronic combined systolic and diastolic congestive heart failure    Coronary artery disease involving native coronary artery of native heart without angina pectoris    S/P MVR (mitral valve repair)    Hypokalemia    Severe Mitral regurgitation, S/P MVR (mitral valve repair)  Status post repair with 25 mm physio 2 ring on 5/15/2023.  Successfully extubated and now on BiPAP  Currently on epinephrine 0.03 and milrinone 0.125  She is also on Cardene drip  Epinephrine to be turned off due to tachycardia  Right-sided pneumothorax: May require chest tube     Hypokalemia  Significant hypokalemia with potassium of 2.2.  Replace to keep potassium more than 4     Anemia  Significant anemia of blood loss with hemoglobin down to 6.  Blood transfusion as needed to keep hemoglobin more than 8     Chronic combined systolic and diastolic congestive heart failure  Currently euvolemic.  Wean off epinephrine and Primacor drips as needed.  We will resume beta-blocker and ACE inhibitor when blood pressure is able to tolerate  Will need repeat echocardiogram prior to discharge to evaluate LV function and tricuspid valve     Coronary artery disease involving native coronary artery of native heart without angina pectoris  Nonobstructive coronary disease per coronary angiogram.  Continue aspirin.  She is intolerant  to statins     Essential hypertension with goal blood pressure less than 130/80  Antihypertensives are currently on hold due to hypotension  Wean off epinephrine drip as tolerated        Hypertriglyceridemia  We will resume fenofibrate at the time of discharge     Pacemaker, h/o SSS  [employer] permanent pacemaker is in place  Interrogate the device today     History of DVT (deep venous thrombosis)  We will resume warfarin prior to discharge     Hypothyroidism due to acquired atrophy of thyroid  Continue Synthroid      Trace Marcus MD  05/16/23  07:40 EDT

## 2023-05-16 NOTE — THERAPY EVALUATION
Patient Name: Thi Allen  : 1940    MRN: [id number]                              Today's Date: 2023       Admit Date: 5/15/2023    Visit Dx:     ICD-10-CM ICD-9-CM   1. Pneumothorax, unspecified type  J93.9 512.89   2. Mitral valve insufficiency, unspecified etiology  I34.0 424.0     Patient Active Problem List   Diagnosis   • History of DVT (deep vein thrombosis)   • Carotid stenosis, bilateral   • Carpal tunnel syndrome, bilateral   • Essential hypertension with goal blood pressure less than 130/80   • Hypertriglyceridemia   • Hypothyroidism due to acquired atrophy of thyroid   • AV block, complete   • Bilateral renal cysts   • Cough   • Frequency of micturition   • Median neuropathy   • Mitral regurgitation   • Numbness and tingling in both hands   • Osteopenia   • SSS (sick sinus syndrome)   • Hordeolum externum   • Pacemaker   • Long term (current) use of anticoagulants   • Chronic combined systolic and diastolic congestive heart failure   • Shortness of breath   • Aortic insufficiency   • Tricuspid regurgitation   • Pulmonary hypertension   • Coronary artery disease involving native coronary artery of native heart without angina pectoris   • Severe mitral regurgitation   • S/P MVR (mitral valve repair)   • Hypokalemia     Past Medical History:   Diagnosis Date   • AV block 2021   • Bilateral renal cysts 2020   • Carotid stenosis, bilateral 2019    Overview:  <50% bilateral    • Carpal tunnel syndrome, bilateral 06/15/2018   • Chronic combined systolic (congestive) and diastolic (congestive) heart failure    • DVT (deep venous thrombosis) (CMS/[facility]) [I82.409]     recurrent   • Hordeolum externum 2015   • Hyperlipidemia    • Hypertension    • Hypothyroidism due to acquired atrophy of thyroid 10/21/2019   • Long term (current) use of anticoagulants 2022   • Mitral regurgitation 2016   • Osteopenia    • Pacemaker 2021     Past Surgical History:    Procedure Laterality Date   • CARDIAC CATHETERIZATION N/A 2/25/2023    Procedure: Left Heart Cath and coronary angiogram;  Surgeon: Trace Marcus MD;  Location:  [facility];  Service: Cardiovascular;  Laterality: N/A;   • CARDIAC CATHETERIZATION Bilateral 2/25/2023    Procedure: Right and Left Heart Cath;  Surgeon: Trace Marcus MD;  Location:  [facility];  Service: Cardiovascular;  Laterality: Bilateral;   • HYSTERECTOMY     • PACEMAKER IMPLANTATION     • THYROID SURGERY        General Information     Row Name 05/16/23 1549          Physical Therapy Time and Intention    Document Type evaluation  -EL     Mode of Treatment individual therapy;physical therapy  -     Row Name 05/16/23 1549          General Information    Patient Profile Reviewed yes  -EL     Prior Level of Function independent:;all household mobility;ADL's  -     Row Name 05/16/23 1549          Living Environment    People in Home spouse  -     Row Name 05/16/23 1549          Home Main Entrance    Number of Stairs, Main Entrance one  -     Row Name 05/16/23 1549          Cognition    Orientation Status (Cognition) oriented x 3;verbal cues/prompts needed for orientation;other (see comments)  Pt with difficulty recalling place, reoriented and pt understood  -     Row Name 05/16/23 1549          Safety Issues, Functional Mobility    Impairments Affecting Function (Mobility) balance;endurance/activity tolerance;strength;shortness of breath;pain  -EL           User Key  (r) = Recorded By, (t) = Taken By, (c) = Cosigned By    Initials Name Provider Type    EL Xiang Silva PT Physical Therapist               Mobility     Row Name 05/16/23 7759          Bed Mobility    Bed Mobility supine-sit  -EL     Supine-Sit [place] (Bed Mobility) maximum assist (25% patient effort);2 person assist  -EL     Assistive Device (Bed Mobility) draw sheet  -     Row Name 05/16/23 4672          Bed-Chair Transfer    Bed-Chair  [place] (Transfers) minimum assist (75% patient effort)  -EL     Row Name 05/16/23 1550          Sit-Stand Transfer    Sit-Stand [place] (Transfers) minimum assist (75% patient effort);2 person assist  -EL     Comment, (Sit-Stand Transfer) Provided cueing for sternal precautions, but pt did not attempt to push up with UE  -EL           User Key  (r) = Recorded By, (t) = Taken By, (c) = Cosigned By    Initials Name Provider Type    Xiang Mariscal PT Physical Therapist               Obj/Interventions     Row Name 05/16/23 1551          Range of Motion Comprehensive    General Range of Motion bilateral lower extremity ROM WFL  -[facility] Name 05/16/23 1551          Strength Comprehensive (MMT)    General Manual Muscle Testing (MMT) Assessment lower extremity strength deficits identified  -EL     Comment, General Manual Muscle Testing (MMT) Assessment Funcitonally 3+/5  -EL     [facility] Name 05/16/23 1551          Balance    Balance Assessment sitting static balance;sit to stand dynamic balance;standing static balance;standing dynamic balance  -EL     Static Sitting Balance independent  -EL     Sit to Stand Dynamic Balance minimal assist  -EL     Static Standing Balance minimal assist  -EL     Dynamic Standing Balance minimal assist  -EL           User Key  (r) = Recorded By, (t) = Taken By, (c) = Cosigned By    Initials Name Provider Type    Xiang Mariscal PT Physical Therapist               Goals/Plan     Row Name 05/16/23 1557          Bed Mobility Goal 1 (PT)    Activity/Assistive Device (Bed Mobility Goal 1, PT) bed mobility activities, all  -EL     [place] Level/Cues Needed (Bed Mobility Goal 1, PT) modified independence  -EL     Time Frame (Bed Mobility Goal 1, PT) long term goal (LTG);2 weeks  -[facility] Name 05/16/23 1557          Transfer Goal 1 (PT)    Activity/Assistive Device (Transfer Goal 1, PT) transfers, all;walker, rolling  -EL     [place] Level/Cues Needed (Transfer Goal 1, PT)  modified independence  -EL     Time Frame (Transfer Goal 1, PT) long term goal (LTG);2 weeks  -EL     Row Name 05/16/23 3537          Gait Training Goal 1 (PT)    Activity/Assistive Device (Gait Training Goal 1, PT) gait (walking locomotion);walker, rolling  -EL     [place] Level (Gait Training Goal 1, PT) modified independence  -EL     Distance (Gait Training Goal 1, PT) 250  -EL     Time Frame (Gait Training Goal 1, PT) long term goal (LTG);2 weeks  -EL     Row Name 05/16/23 2087          Therapy Assessment/Plan (PT)    Planned Therapy Interventions (PT) neuromuscular re-education;balance training;bed mobility training;transfer training;gait training;patient/family education;strengthening  -EL           User Key  (r) = Recorded By, (t) = Taken By, (c) = Cosigned By    Initials Name Provider Type    Xiang Mariscal, PT Physical Therapist               Clinical Impression     Row Name 05/16/23 6872          Pain    Pretreatment Pain Rating 0/10 - no pain  -EL     Posttreatment Pain Rating 0/10 - no pain  -EL     Row Name 05/16/23 6543          Plan of Care Review    Plan of Care Reviewed With patient  -EL     Outcome Evaluation Pt is an 84 YO F POD 1 MVR. Pt currently on Airvo 60/80, with [place], A-Line and chest tubes. Pt reports living at home with spouse, is typically independent with all ADLs, ambulation wihtout AD and no recent falls. Pt states she only has 1 step into home. Pt educated on sternal precautions, but had mild confusion this date, and will require further instruction. Pt likely to progress well and anticipate safe d/c home with family. PT to continue to follow to address needs as pt progresses.  -EL     Row Name 05/16/23 5653          Therapy Assessment/Plan (PT)    Rehab Potential (PT) good, to achieve stated therapy goals  -EL     Therapy Frequency (PT) 5 times/wk  -EL     Predicted Duration of Therapy Intervention (PT) until d/c  -EL     Row Name 05/16/23 9763          Vital Signs    O2  Delivery Pre Treatment supplemental O2  AIRVO  -EL     O2 Delivery Intra Treatment supplemental O2  -EL     O2 Delivery Post Treatment supplemental O2  -EL     Pre Patient Position Supine  -EL     Intra Patient Position Standing  -EL     Post Patient Position Sitting  -EL     Row Name 05/16/23 1553          Positioning and Restraints    Pre-Treatment Position in bed  -EL     Post Treatment Position chair  -EL     In Chair notified nsg;with nsg;sitting  -EL           User Key  (r) = Recorded By, (t) = Taken By, (c) = Cosigned By    Initials Name Provider Type    Xiang Mariscal, PT Physical Therapist               Outcome Measures     Row Name 05/16/23 1558          How much help from another person do you currently need...    Turning from your back to your side while in flat bed without using bedrails? 2  -EL     Moving from lying on back to sitting on the side of a flat bed without bedrails? 2  -EL     Moving to and from a bed to a chair (including a wheelchair)? 3  -EL     Standing up from a chair using your arms (e.g., wheelchair, bedside chair)? 3  -EL     Climbing 3-5 steps with a railing? 1  -EL     To walk in hospital room? 1  -EL     AM-PAC 6 Clicks Score (PT) 12  -EL     Highest level of mobility 4 --> Transferred to chair/commode  -EL     Row Name 05/16/23 1558 05/16/23 1541       Functional Assessment    Outcome Measure Options AM-PAC 6 Clicks Basic Mobility (PT)  -EL AM-PAC 6 Clicks Daily Activity (OT)  -SR          User Key  (r) = Recorded By, (t) = Taken By, (c) = Cosigned By    Initials Name Provider Type    Юлия Mai OT Occupational Therapist    Xiang Mariscal, PT Physical Therapist                             Physical Therapy Education     Title: PT OT SLP Therapies (In Progress)     Topic: Physical Therapy (Done)     Point: Mobility training (Done)     Learning Progress Summary           Patient Acceptance, E,TB, VU by FADI at 5/16/2023 0523                   Point: Precautions (Done)      Learning Progress Summary           Patient Acceptance, E,TB, VU by  at 5/16/2023 1559                               User Key     Initials Effective Dates Name Provider Type Discipline     06/23/20 -  Xiang Silva PT Physical Therapist PT              PT Recommendation and Plan  Planned Therapy Interventions (PT): neuromuscular re-education, balance training, bed mobility training, transfer training, gait training, patient/family education, strengthening  Plan of Care Reviewed With: patient  Outcome Evaluation: Pt is an 84 YO F POD 1 MVR. Pt currently on Airvo 60/80, with [place], A-Line and chest tubes. Pt reports living at home with spouse, is typically independent with all ADLs, ambulation wihtout AD and no recent falls. Pt states she only has 1 step into home. Pt educated on sternal precautions, but had mild confusion this date, and will require further instruction. Pt likely to progress well and anticipate safe d/c home with family. PT to continue to follow to address needs as pt progresses.     Time Calculation:    PT Charges     Row Name 05/16/23 1600             Time Calculation    Start Time 1445  -EL      Stop Time 1500  -      Time Calculation (min) 15 min  -EL      PT Received On 05/16/23  -      PT - Next Appointment 05/17/23  -      PT Goal Re-Cert Due Date 05/30/23  -            User Key  (r) = Recorded By, (t) = Taken By, (c) = Cosigned By    Initials Name Provider Type    Xiang Mariscal PT Physical Therapist              Therapy Charges for Today     Code Description Service Date Service Provider Modifiers Qty    [id number] HC PT EVAL MOD COMPLEXITY 3 5/16/2023 Xiang Silva PT GP 1          PT G-Codes  Outcome Measure Options: AM-PAC 6 Clicks Basic Mobility (PT)  AM-PAC 6 Clicks Score (PT): 12  AM-PAC 6 Clicks Score (OT): 7  PT Discharge Summary  Anticipated Discharge Disposition (PT): home with assist    Xiang Silva PT  5/16/2023

## 2023-05-16 NOTE — PLAN OF CARE
Goal Outcome Evaluation:  Plan of Care Reviewed With: patient           Outcome Evaluation: Pt underwnent mitral valve repair on 5/15.  Pt currently on Airvo and with chest tubes, swan, faisal, etc.  She lives at home with her  and is typically independent.  Pt required assist to trasnfer to chair, though tolerated well.  She was educated on sternal precautions, though will require further education due to some confusion this date.  Anticipagte pt will progress to return home with family at discharge.  Will continue to follow while in hospital.

## 2023-05-16 NOTE — PROCEDURES
"Chest Tube Insertion    Date/Time: 5/16/2023 10:02 AM  Performed by: Юлия Galicia APRN  Authorized by: Юлия Galicia APRN   Consent: Written consent obtained.  Risks and benefits: risks, benefits and alternatives were discussed  Consent given by: spouse  Patient understanding: patient states understanding of the procedure being performed  Patient consent: the patient's understanding of the procedure matches consent given  Procedure consent: procedure consent matches procedure scheduled  Required items: required blood products, implants, devices, and special equipment available  Patient identity confirmed: arm band, hospital-assigned identification number and anonymous protocol, patient vented/unresponsive  Time out: Immediately prior to procedure a \"time out\" was called to verify the correct patient, procedure, equipment, support staff and site/side marked as required.  Indications: pneumothorax    Sedation:  Patient sedated: no    Anesthesia: local infiltration    Anesthesia:  Local Anesthetic: lidocaine 1% without epinephrine  Anesthetic total: 5 mL  Preparation: skin prepped with ChloraPrep  Placement location: right lateral  Tube size: minicatheter  Tension pneumothorax heard: no  Tube connected to: suction  Drainage amount: 0 ml  Suture material: 0 silk  Dressing: 4x4 sterile gauze  Post-insertion x-ray findings: tube in good position  Patient tolerance: patient tolerated the procedure well with no immediate complications      Electronically signed by HARIS Siu, 05/16/23, 11:32 AM EDT.    "

## 2023-05-16 NOTE — CONSULTS
Critical Care Consult Note   Thi Allen : 1940 MRN:[id number] LOS:1 ROOM: [facility]5/1     Reason for admission: Severe mitral regurgitation     Assessment / Plan     Acute respiratory failure with hypercapnia   -Recommend pain control, pain medication per CTS  -Patient changed to Airvo and tolerating  -BiPap as PRN as tolerated   -Pulmonary toileting  -Encourage out of bed, up to chair and ambulating    Moderate right side pneumothorax  -Pigtail chest tube placed with follow-up chest x-ray showing evacuation of right pneumothorax    Severe mitral regurgitation  -Patient s/p MVr 5/15/2023  -CT following and managing      Code Status (Patient has no pulse and is not breathing): CPR (Attempt to Resuscitate)  Medical Interventions (Patient has pulse or is breathing): Full       Nutrition: NPO Diet NPO Type: Sips with Meds Patient isn't on Tube Feeding     DVT prophylaxis:  Medical and mechanical DVT prophylaxis orders are present.     History of Present illness     A 83 y.o. old female patient with PMH of CAD, CHF,hypothyroidism, hypertriglyceridemia, HTN, presents to the hospital with complaints of worsening congestive heart failure symptoms over last 6 to 12 months. Patient had a right and left heart catheterization on 2023 which showed no significant coronary disease. CARMITA on 2023 showed LV EF 35-40%, severe mitral regurgitation, moderate aortic valve regurgitation, moderate to severe tricuspid regurgitation.     On 05/15/23, the patient underwent an MV repair.The patient was extubated early this AM.  Critical care was consulted for hypercapnia as well as chest tube placement for moderate right pneumothorax.    ACP: CPR full intervention.  Patient's spouse is listed as her healthcare surrogate.  Patient does have advanced directive on file.    Patient was seen and examined on 23 at 08:36 EDT .    Subjective / Review of systems     Review of Systems   Constitutional: Positive for fatigue.  Negative for chills.   Respiratory: Negative for cough, chest tightness and shortness of breath.         Patient states she feels like she cannot take in a big deep breath due to pain   Cardiovascular: Positive for chest pain (Incisional). Negative for leg swelling.   Gastrointestinal: Positive for vomiting. Negative for abdominal pain and nausea.   Neurological: Negative for weakness, light-headedness and headaches.        Past Medical/Surgical/Social/Family History & Allergies     Past Medical History:   Diagnosis Date   • AV block 02/22/2021   • Bilateral renal cysts 01/01/2020   • Carotid stenosis, bilateral 06/01/2019    Overview:  <50% bilateral    • Carpal tunnel syndrome, bilateral 06/15/2018   • Chronic combined systolic (congestive) and diastolic (congestive) heart failure    • DVT (deep venous thrombosis) (CMS/[facility]) [I82.409]     recurrent   • Hordeolum externum 08/05/2015   • Hyperlipidemia    • Hypertension    • Hypothyroidism due to acquired atrophy of thyroid 10/21/2019   • Long term (current) use of anticoagulants 09/29/2022   • Mitral regurgitation 05/17/2016   • Osteopenia    • Pacemaker 12/28/2021      Past Surgical History:   Procedure Laterality Date   • CARDIAC CATHETERIZATION N/A 2/25/2023    Procedure: Left Heart Cath and coronary angiogram;  Surgeon: Trace Marcus MD;  Location: [facility] CATH INVASIVE LOCATION;  Service: Cardiovascular;  Laterality: N/A;   • CARDIAC CATHETERIZATION Bilateral 2/25/2023    Procedure: Right and Left Heart Cath;  Surgeon: Trace Marcus MD;  Location: [facility] CATH INVASIVE LOCATION;  Service: Cardiovascular;  Laterality: Bilateral;   • HYSTERECTOMY     • PACEMAKER IMPLANTATION     • THYROID SURGERY        Social History     Socioeconomic History   • Marital status:    Tobacco Use   • Smoking status: Former     Passive exposure: Past   • Smokeless tobacco: Never   • Tobacco comments:     quit 35 yrs ago   Vaping Use   • Vaping Use: Never used   Substance and  "Sexual Activity   • Alcohol use: Not Currently     Comment: rare   • Drug use: No   • Sexual activity: Defer      Family History   Problem Relation Age of Onset   • No Known Problems Mother    • No Known Problems Father    • No Known Problems Sister    • No Known Problems Brother    • No Known Problems Maternal Aunt    • No Known Problems Maternal Uncle    • No Known Problems Paternal Aunt    • No Known Problems Paternal Uncle    • No Known Problems Maternal Grandmother    • No Known Problems Maternal Grandfather    • No Known Problems Paternal Grandmother    • No Known Problems Paternal Grandfather    • No Known Problems Other    • Anemia Neg Hx    • Arrhythmia Neg Hx    • Asthma Neg Hx    • Clotting disorder Neg Hx    • Fainting Neg Hx    • Heart attack Neg Hx    • Heart disease Neg Hx    • Heart failure Neg Hx    • Hyperlipidemia Neg Hx    • Hypertension Neg Hx       Allergies   Allergen Reactions   • Atorvastatin Myalgia   • Colesevelam Hcl Myalgia     Made legs weak    • Colestipol Hcl Other (See Comments)     MADE PATIENT FEEL BAD   • Ezetimibe Hallucinations     Loss of energy - worn out - legs weak - feel like they way a \"ton \"    • Pitavastatin Other (See Comments)     Elevated liver function tests    • Rosuvastatin Myalgia   • Rosuvastatin Calcium Myalgia     MADE LEGS WEAK   • Simvastatin Myalgia   • Statins Myalgia     Made legs weak      • Keflex [Cephalexin] Hives        Home Medications     Prior to Admission medications    Medication Sig Start Date End Date Taking? Authorizing Provider   atenolol (TENORMIN) 100 MG tablet Take 1 tablet by mouth Daily. 11/13/14  Yes Provider, MD Drew   Enoxaparin Sodium (LOVENOX) 60 MG/0.6ML solution prefilled syringe syringe Inject 0.5 mL under the skin into the appropriate area as directed Every 12 (Twelve) Hours for 5 doses. Warfarin being stopped for cardiac surgery.  Lovenox to start 5/12 BID and last dose 5/14 morning. 5/12/23 5/15/23 Yes Javier, " HARIS Gonzalez   famotidine (PEPCID) 20 MG tablet Take 1 tablet by mouth 2 (Two) Times a Day As Needed.   Yes Drew Carson MD   fenofibrate 160 MG tablet Take 1 tablet by mouth Daily. 2/22/22  Yes Emergency, Nurse CRISTIANO Martinez   hydroCHLOROthiazide (HYDRODIURIL) 25 MG tablet Take 2 tablets by mouth Every Other Day.   Yes Drew Carson MD   latanoprost (XALATAN) 0.005 % ophthalmic solution Administer 1 drop to both eyes Every Night. 8/25/22  Yes Drew Carson MD   levothyroxine (SYNTHROID, LEVOTHROID) 50 MCG tablet TAKE 1 TABLET BY MOUTH ONCE DAILY 9/3/19  Yes Emergency, Nurse CRISTIANO Martinez   lisinopril (PRINIVIL,ZESTRIL) 20 MG tablet Take 1 tablet by mouth Daily. 2/27/23  Yes Quincy Aguilera MD   mupirocin (BACTROBAN) 2 % ointment Apply to nares (inside each nostril) twice daily as directed for procedure 5/10/23  Yes Veronica Orozco MD   warfarin (COUMADIN) 2.5 MG tablet Take 1 tablet by mouth Every Night. T,Th,Sat,Sun 1/18/15  Yes Drew Carson MD   warfarin (COUMADIN) 5 MG tablet Take 1 tablet by mouth Daily. M,W,F 12/17/22  Yes Drew Carson MD        Objective / Physical Exam     Vital signs:  Temp: 96.3 °F (35.7 °C)  BP: 116/40  Heart Rate: 92  Resp: 12  SpO2: 99 %  Weight: 54.1 kg (119 lb 4.3 oz)    Admission Weight: Weight: 48 kg (105 lb 12.8 oz)    Physical Exam  Vitals and nursing note reviewed.   Constitutional:       General: She is not in acute distress.     Appearance: She is not ill-appearing or toxic-appearing.   HENT:      Head: Normocephalic.      Mouth/Throat:      Mouth: Mucous membranes are moist.      Pharynx: Oropharynx is clear.   Eyes:      Extraocular Movements: Extraocular movements intact.      Conjunctiva/sclera: Conjunctivae normal.      Pupils: Pupils are equal, round, and reactive to light.   Cardiovascular:      Pulses: Normal pulses.      Heart sounds: Normal heart sounds.      Comments: Patient AV paced  Pulmonary:      Effort: No accessory muscle  usage or respiratory distress.      Breath sounds: Examination of the right-lower field reveals decreased breath sounds. Examination of the left-lower field reveals decreased breath sounds. Decreased breath sounds present.      Comments: Shallow respirations secondary to pain per patient  Abdominal:      General: Bowel sounds are decreased.      Palpations: Abdomen is soft.   Musculoskeletal:      Right lower leg: No edema.      Left lower leg: No edema.   Skin:     General: Skin is warm and dry.      Comments: Sternal incision clean dry and intact   Neurological:      Mental Status: She is alert and oriented to person, place, and time.   Psychiatric:         Mood and Affect: Mood normal.         Behavior: Behavior normal.          Labs     Results from last 7 days   Lab Units 05/16/23  0434 05/16/23  0248 05/16/23  0225 05/15/23  2317 05/15/23  2006 05/15/23  1214 05/15/23  1202 05/15/23  0805 05/15/23  0701 05/11/23  0821   WBC 10*3/mm3  --  12.40*  --   --   --   --  10.00  --  4.80  4.80 4.90   HEMATOCRIT %  --  35.0  --   --   --   --  40.5  --  35.2  34.4 37.7   HEMATOCRIT POC % 34*  --  31* 32* 30*   < >  --    < >  --   --    PLATELETS 10*3/mm3  --  109*  --   --   --   --  101*  --  203  198 240    < > = values in this interval not displayed.      Results from last 7 days   Lab Units 05/16/23  0248 05/15/23  1808 05/15/23  1109 05/11/23  0821   SODIUM mmol/L 145  --  139 142   POTASSIUM mmol/L 3.4* 3.2* 2.5* 4.2   CHLORIDE mmol/L 108*  --  103 103   CO2 mmol/L 22.0  --  22.0 30.0*   BUN mg/dL 19  --  18 21   CREATININE mg/dL 0.93  --  0.75 0.80        Imaging     XR Chest 1 View    Result Date: 5/16/2023  Impression: 1. Interval development of moderate right-sided pneumothorax. 2. Small left apical pneumothorax appears stable. 3. Lines and tubes as above Findings were communicated to the patient's nurse in the ICU, Gita Guzman RN. Electronically Signed: Lonnie Kessler  5/16/2023 8:19 AM EDT   Workstation ID: [id number]    XR Chest 1 View    Result Date: 5/15/2023  Impression: 1. No significant interval change in probable small left apical pneumothorax. Right-sided pneumothorax is not definitely identified on the current study, but exam is limited by overlying appliances. 2. Increasing haziness in the perihilar regions may be due to early pulmonary edema. 3. Tubes and lines as above. Electronically Signed: Buck Wright  5/15/2023 3:41 PM EDT  Workstation ID: [id number]    XR Chest 1 View    Result Date: 5/15/2023  Impression: 1. Lines and tubes as above in expected position 2. Trace pneumothorax at the left apex and possible right-sided pneumothorax peripherally versus artifact. Electronically Signed: Lonnie Kessler  5/15/2023 11:42 AM EDT  Workstation ID: [id number]    XR Abdomen KUB    Result Date: 5/15/2023  Impression: NG tube projects in expected position. Electronically Signed: Lonnie Kessler  5/15/2023 11:46 AM EDT  Workstation ID: [id number]       Chest X ray: My independent assessment showed moderate right pneumothorax    Telemetry: My independent evaluation showed patient currently paced    Current Medications     Scheduled Meds:  acetaminophen, 650 mg, Oral, Q6H   Or  acetaminophen, 650 mg, Oral, Q6H   Or  acetaminophen, 650 mg, Rectal, Q6H  aspirin, 81 mg, Oral, Daily  ceFAZolin, 2 g, Intravenous, Q8H  chlorhexidine, 15 mL, Mouth/Throat, Q12H  enoxaparin, 40 mg, Subcutaneous, Q24H  levothyroxine, 50 mcg, Oral, Daily  magnesium sulfate, 1 g, Intravenous, Q8H  mupirocin, 1 application, Each Nare, BID  pantoprazole, 40 mg, Oral, Q AM  polyethylene glycol, 17 g, Oral, BID  senna-docusate sodium, 2 tablet, Oral, BID         Continuous Infusions:  dexmedetomidine, 0.2-1.5 mcg/kg/hr, Last Rate: Stopped (05/15/23 1430)  EPINEPHrine, 0.02-0.05 mcg/kg/min, Last Rate: 0.03 mcg/kg/min (05/15/23 1118)  insulin, 0-100 Units/hr, Last Rate: 2 Units/hr (05/16/23 0625)  milrinone, 0.125 mcg/kg/min, Last Rate: 0.125  mcg/kg/min (05/15/23 1100)  niCARdipine, 5-15 mg/hr, Last Rate: Stopped (05/15/23 1852)  nitroglycerin, 5-50 mcg/min, Last Rate: Stopped (05/15/23 1117)  norepinephrine, 0.02-0.06 mcg/kg/min, Last Rate: Stopped (05/15/23 1128)  sodium chloride, 30 mL/hr, Last Rate: 30 mL/hr (05/15/23 1130)         HARIS Siu   Critical Care  05/16/23   08:36 EDT

## 2023-05-16 NOTE — PROGRESS NOTES
S/P POD# 1 MV repair--Pam  EF 40-45% (CARMITA)    Subjective:  Very drowsy    Pt was extubated after orders received per Dr. Orozco, placed on BiPAP  CXR with moderate right ptx--needs CT placed  UF -1.9 L  Drips:  Epi .03, milr .125, insulin  CI 2.6, CVP 11, SVR 1466  Wt is up 6 kgs from preop      Intake/Output Summary (Last 24 hours) at 5/16/2023 1138  Last data filed at 5/16/2023 0800  Gross per 24 hour   Intake 2278 ml   Output 1190 ml   Net 1088 ml     Temp:  [91.9 °F (33.3 °C)-97.2 °F (36.2 °C)] 96.8 °F (36 °C)  Heart Rate:  [] 80  Resp:  [12-15] 15  BP: ()/(39-57) 110/49  Arterial Line BP: (98)/(36) 98/36  FiO2 (%):  [40 %] 40 %  /8    Results from last 7 days   Lab Units 05/16/23  0434 05/16/23  0248 05/15/23  1214 05/15/23  1202 05/15/23  1124 05/15/23  1109 05/15/23  0805 05/15/23  0701 05/11/23  0821   WBC 10*3/mm3  --  12.40*  --  10.00  --   --   --  4.80  4.80 4.90   HEMOGLOBIN g/dL  --  11.8*  --  13.7  --   --   --  11.8*  11.7* 12.8   HEMOGLOBIN, POC g/dL 11.7*  --    < >  --    < >  --    < >  --   --    HEMATOCRIT %  --  35.0  --  40.5  --   --   --  35.2  34.4 37.7   HEMATOCRIT POC % 34*  --    < >  --    < >  --    < >  --   --    PLATELETS 10*3/mm3  --  109*  --  101*  --   --   --  203  198 240   INR   --   --   --   --   --  1.46*  --  1.07 2.66*    < > = values in this interval not displayed.     Results from last 7 days   Lab Units 05/16/23  0248   CREATININE mg/dL 0.93   POTASSIUM mmol/L 3.4*   SODIUM mmol/L 145   MAGNESIUM mg/dL 2.4   PHOSPHORUS mg/dL 4.1     ica 1.24    Physical Exam:  Neuro intact, nad, sleeping and moaning  Tele:  AV paced 90s  Diminished bases, 95% (60%/+5/BiPAP)  dsg c/d/i, no drainage  Benign abd, no BM  No edema    Assessment/Plan:  Principal Problem:    Severe mitral regurgitation  Active Problems:    History of DVT (deep vein thrombosis)    Essential hypertension with goal blood pressure less than 130/80    Hypertriglyceridemia    Hypothyroidism  due to acquired atrophy of thyroid    Pacemaker    Chronic combined systolic and diastolic congestive heart failure    Coronary artery disease involving native coronary artery of native heart without angina pectoris    S/P MVR (mitral valve repair)    Hypokalemia    -Severe mitral regurgitation, moderate AI, mod to severe TR, EF 40-45% (CARMITA)--s/p MV repair (Pagni)  -Moderate pulmonary hypertension--RVSP 45-55  -Chronic combined HFrEF, NYHA class III-IV  -Dilated, NICM--EF 40 to 45% (CARMITA)  -HTN  -Hypothyroidism--levothyroxine  -Hx DVT--on warfarin, bridged with Lovenox prior to surgery  -PAF  -Hx PPM--[employer] DDDR mode  -Postop right ptx--s/p CT insertion 5/16  -Postop acute resp insufficiency with hypercapnia--BiPap, transition to AirVo, critical care intensivist following  -Postop leukocytosis, likely r/t atel--watch closely    POD# 1.  Moderate right ptx--intensivist consulted for chest tube placement and BiPap mmgt.  Pt very drowsy, avoid narcotics.  IV Tylenol ordered.  Gentle diuresis.    Addendum:  Noted tachycardia--ask device rep to interrogate device today.  Off epi d/t tachycardia.  CI > 2.5 off epi.    Routine care--as above  De-escal  D/w pt/, nsg, Dr. Pam BENTON plan TBD    Yulissa Herrera, APRN  5/16/2023  11:38 EDT

## 2023-05-16 NOTE — THERAPY EVALUATION
Patient Name: Thi Allen  : 1940    MRN: [id number]                              Today's Date: 2023       Admit Date: 5/15/2023    Visit Dx:     ICD-10-CM ICD-9-CM   1. Pneumothorax, unspecified type  J93.9 512.89   2. Mitral valve insufficiency, unspecified etiology  I34.0 424.0     Patient Active Problem List   Diagnosis   • History of DVT (deep vein thrombosis)   • Carotid stenosis, bilateral   • Carpal tunnel syndrome, bilateral   • Essential hypertension with goal blood pressure less than 130/80   • Hypertriglyceridemia   • Hypothyroidism due to acquired atrophy of thyroid   • AV block, complete   • Bilateral renal cysts   • Cough   • Frequency of micturition   • Median neuropathy   • Mitral regurgitation   • Numbness and tingling in both hands   • Osteopenia   • SSS (sick sinus syndrome)   • Hordeolum externum   • Pacemaker   • Long term (current) use of anticoagulants   • Chronic combined systolic and diastolic congestive heart failure   • Shortness of breath   • Aortic insufficiency   • Tricuspid regurgitation   • Pulmonary hypertension   • Coronary artery disease involving native coronary artery of native heart without angina pectoris   • Severe mitral regurgitation   • S/P MVR (mitral valve repair)   • Hypokalemia     Past Medical History:   Diagnosis Date   • AV block 2021   • Bilateral renal cysts 2020   • Carotid stenosis, bilateral 2019    Overview:  <50% bilateral    • Carpal tunnel syndrome, bilateral 06/15/2018   • Chronic combined systolic (congestive) and diastolic (congestive) heart failure    • DVT (deep venous thrombosis) (CMS/Formerly [facility]) [I82.409]     recurrent   • Hordeolum externum 2015   • Hyperlipidemia    • Hypertension    • Hypothyroidism due to acquired atrophy of thyroid 10/21/2019   • Long term (current) use of anticoagulants 2022   • Mitral regurgitation 2016   • Osteopenia    • Pacemaker 2021     Past Surgical History:    Procedure Laterality Date   • CARDIAC CATHETERIZATION N/A 2/25/2023    Procedure: Left Heart Cath and coronary angiogram;  Surgeon: Trace Marcus MD;  Location:  [facility];  Service: Cardiovascular;  Laterality: N/A;   • CARDIAC CATHETERIZATION Bilateral 2/25/2023    Procedure: Right and Left Heart Cath;  Surgeon: Trace Marcus MD;  Location:  [facility];  Service: Cardiovascular;  Laterality: Bilateral;   • HYSTERECTOMY     • PACEMAKER IMPLANTATION     • THYROID SURGERY        General Information     Row Name 05/16/23 1515          OT Time and Intention    Document Type evaluation  -SR     Row Name 05/16/23 1515          Occupational Profile    Reason for Services/Referral (Occupational Profile) Pt underwnent mitral valve repair on 5/15.  Pt currently on Airvo and with chest tubes, swan, faisal, etc.  She lives at home with her  and is typically independent.  -SR     Row Name 05/16/23 1515          Living Environment    People in Home spouse  -SR     Row Name 05/16/23 1515          Cognition    Orientation Status (Cognition) oriented x 3  -SR           User Key  (r) = Recorded By, (t) = Taken By, (c) = Cosigned By    Initials Name Provider Type    SR Юлия Bird, OT Occupational Therapist                 Mobility/ADL's     Row Name 05/16/23 1521          Transfers    Transfers bed-chair transfer  -SR     Row Name 05/16/23 1521          Bed-Chair Transfer    Bed-Chair [place] (Transfers) minimum assist (75% patient effort)  -SR           User Key  (r) = Recorded By, (t) = Taken By, (c) = Cosigned By    Initials Name Provider Type    SR Юлия Bird OT Occupational Therapist               Obj/Interventions     Row Name 05/16/23 1524          Range of Motion Comprehensive    Comment, General Range of Motion Within restricted limits  -SR     Row Name 05/16/23 1524          Strength Comprehensive (MMT)    Comment, General Manual Muscle Testing (MMT)  Assessment Unable to test due to sternal precautions  -SR     Row Name 05/16/23 1524          Balance    Balance Interventions sitting;standing;sit to stand;supported;static;dynamic;minimal challenge  -SR           User Key  (r) = Recorded By, (t) = Taken By, (c) = Cosigned By    Initials Name Provider Type    SR Юлия Bird, OT Occupational Therapist               Goals/Plan     Row Name 05/16/23 1537          Bathing Goal 1 (OT)    Activity/Device (Bathing Goal 1, OT) bathing skills, all  -SR     [place] Level/Cues Needed (Bathing Goal 1, OT) supervision required  -SR     Time Frame (Bathing Goal 1, OT) 2 weeks  -SR     Row Name 05/16/23 1537          Toileting Goal 1 (OT)    Activity/Device (Toileting Goal 1, OT) toileting skills, all  -SR     [place] Level/Cues Needed (Toileting Goal 1, OT) supervision required  -SR     Time Frame (Toileting Goal 1, OT) 2 weeks  -SR     Row Name 05/16/23 1537          Therapy Assessment/Plan (OT)    Planned Therapy Interventions (OT) activity tolerance training;BADL retraining;functional balance retraining;IADL retraining;occupation/activity based interventions;ROM/therapeutic exercise;strengthening exercise;transfer/mobility retraining  -SR           User Key  (r) = Recorded By, (t) = Taken By, (c) = Cosigned By    Initials Name Provider Type    SR Юлия Bird OT Occupational Therapist               Clinical Impression     Row Name 05/16/23 1529          Plan of Care Review    Plan of Care Reviewed With patient  -SR     Outcome Evaluation Pt underwnent mitral valve repair on 5/15.  Pt currently on Airvo and with chest tubes, swan, faisal, etc.  She lives at home with her  and is typically independent.  Pt required assist to trasnfer to chair, though tolerated well.  She was educated on sternal precautions, though will require further education due to some confusion this date.  Anticipagte pt will progress to return home with family at  discharge.  Will continue to follow while in hospital.  -SR     Row Name 05/16/23 1529          Therapy Assessment/Plan (OT)    Rehab Potential (OT) good, to achieve stated therapy goals  -SR     Criteria for Skilled Therapeutic Interventions Met (OT) yes  -SR     Therapy Frequency (OT) 5 times/wk  -SR     Predicted Duration of Therapy Intervention (OT) Until discharge.  -SR     Row Name 05/16/23 1529          Therapy Plan Review/Discharge Plan (OT)    Anticipated Discharge Disposition (OT) home with 24/7 care  -SR     Row Name 05/16/23 1529          Positioning and Restraints    Pre-Treatment Position in bed  -SR     Post Treatment Position chair  -SR     In Chair call light within reach;encouraged to call for assist;exit alarm on  -SR           User Key  (r) = Recorded By, (t) = Taken By, (c) = Cosigned By    Initials Name Provider Type    Юлия Mai OT Occupational Therapist               Outcome Measures     Row Name 05/16/23 1541          How much help from another is currently needed...    Putting on and taking off regular lower body clothing? 1  -SR     Bathing (including washing, rinsing, and drying) 1  -SR     Toileting (which includes using toilet bed pan or urinal) 1  -SR     Putting on and taking off regular upper body clothing 1  -SR     Taking care of personal grooming (such as brushing teeth) 1  -SR     Eating meals 2  -SR     AM-PAC 6 Clicks Score (OT) 7  -SR     Row Name 05/16/23 1541          Functional Assessment    Outcome Measure Options AM-PAC 6 Clicks Daily Activity (OT)  -SR           User Key  (r) = Recorded By, (t) = Taken By, (c) = Cosigned By    Initials Name Provider Type    Юлия Mai OT Occupational Therapist                Occupational Therapy Education     Title: PT OT SLP Therapies (In Progress)     Topic: Occupational Therapy (In Progress)     Point: ADL training (In Progress)     Description:   Instruct learner(s) on proper safety adaptation and  remediation techniques during self care or transfers.   Instruct in proper use of assistive devices.              Learning Progress Summary           Patient Acceptance, E,TB, NR by SR at 5/16/2023 1544                   Point: Home exercise program (Not Started)     Description:   Instruct learner(s) on appropriate technique for monitoring, assisting and/or progressing therapeutic exercises/activities.              Learner Progress:  Not documented in this visit.          Point: Precautions (Not Started)     Description:   Instruct learner(s) on prescribed precautions during self-care and functional transfers.              Learner Progress:  Not documented in this visit.          Point: Body mechanics (In Progress)     Description:   Instruct learner(s) on proper positioning and spine alignment during self-care, functional mobility activities and/or exercises.              Learning Progress Summary           Patient Acceptance, E,TB, NR by SR at 5/16/2023 1544                               User Key     Initials Effective Dates Name Provider Type Discipline     06/16/21 -  Юлия Bird OT Occupational Therapist OT              OT Recommendation and Plan  Planned Therapy Interventions (OT): activity tolerance training, BADL retraining, functional balance retraining, IADL retraining, occupation/activity based interventions, ROM/therapeutic exercise, strengthening exercise, transfer/mobility retraining  Therapy Frequency (OT): 5 times/wk  Plan of Care Review  Plan of Care Reviewed With: patient  Outcome Evaluation: Pt underwnent mitral valve repair on 5/15.  Pt currently on Airvo and with chest tubes, swan, faisal, etc.  She lives at home with her  and is typically independent.  Pt required assist to trasnfer to chair, though tolerated well.  She was educated on sternal precautions, though will require further education due to some confusion this date.  Anticipagte pt will progress to return home  with family at discharge.  Will continue to follow while in hospital.     Time Calculation:    Time Calculation- OT     Row Name 05/16/23 1546             Time Calculation- OT    OT Start Time 1445  -SR      OT Stop Time 1500  -SR      OT Time Calculation (min) 15 min  -SR      Total Timed Code Minutes- OT 0 minute(s)  -SR      OT Received On 05/16/23  -SR      OT - Next Appointment 05/17/23  -SR            User Key  (r) = Recorded By, (t) = Taken By, (c) = Cosigned By    Initials Name Provider Type    SR Юлия Bird, OT Occupational Therapist              Therapy Charges for Today     Code Description Service Date Service Provider Modifiers Qty    [id number] HC OT EVAL MOD COMPLEXITY 4 5/16/2023 Юлия Bird OT GO 1               Юлия Bird OT  5/16/2023

## 2023-05-17 ENCOUNTER — APPOINTMENT (OUTPATIENT)
Dept: GENERAL RADIOLOGY | Facility: HOSPITAL | Age: 83
DRG: 219 | End: 2023-05-17
Payer: MEDICARE

## 2023-05-17 LAB
ANION GAP SERPL CALCULATED.3IONS-SCNC: 15 MMOL/L (ref 5–15)
ARTERIAL PATENCY WRIST A: ABNORMAL
ATMOSPHERIC PRESS: ABNORMAL MM[HG]
BASE EXCESS BLDA CALC-SCNC: -0.4 MMOL/L (ref 0–3)
BDY SITE: ABNORMAL
BUN SERPL-MCNC: 22 MG/DL (ref 8–23)
BUN/CREAT SERPL: 27.5 (ref 7–25)
CALCIUM SPEC-SCNC: 8.6 MG/DL (ref 8.6–10.5)
CHLORIDE SERPL-SCNC: 107 MMOL/L (ref 98–107)
CO2 BLDA-SCNC: 23.8 MMOL/L (ref 22–29)
CO2 SERPL-SCNC: 23 MMOL/L (ref 22–29)
CREAT SERPL-MCNC: 0.8 MG/DL (ref 0.57–1)
DEPRECATED RDW RBC AUTO: 49.4 FL (ref 37–54)
EGFRCR SERPLBLD CKD-EPI 2021: 74.1 ML/MIN/1.73
ERYTHROCYTE [DISTWIDTH] IN BLOOD BY AUTOMATED COUNT: 15 % (ref 12.3–15.4)
GLUCOSE BLDC GLUCOMTR-MCNC: 116 MG/DL (ref 70–105)
GLUCOSE BLDC GLUCOMTR-MCNC: 136 MG/DL (ref 70–105)
GLUCOSE BLDC GLUCOMTR-MCNC: 140 MG/DL (ref 70–105)
GLUCOSE BLDC GLUCOMTR-MCNC: 141 MG/DL (ref 70–105)
GLUCOSE BLDC GLUCOMTR-MCNC: 141 MG/DL (ref 70–105)
GLUCOSE BLDC GLUCOMTR-MCNC: 145 MG/DL (ref 70–105)
GLUCOSE BLDC GLUCOMTR-MCNC: 155 MG/DL (ref 70–105)
GLUCOSE BLDC GLUCOMTR-MCNC: 201 MG/DL (ref 70–105)
GLUCOSE BLDC GLUCOMTR-MCNC: 236 MG/DL (ref 70–105)
GLUCOSE BLDC GLUCOMTR-MCNC: 76 MG/DL (ref 70–105)
GLUCOSE BLDC GLUCOMTR-MCNC: 78 MG/DL (ref 70–105)
GLUCOSE BLDC GLUCOMTR-MCNC: 86 MG/DL (ref 70–105)
GLUCOSE SERPL-MCNC: 147 MG/DL (ref 65–99)
HCO3 BLDA-SCNC: 22.9 MMOL/L (ref 21–28)
HCT VFR BLD AUTO: 31.2 % (ref 34–46.6)
HEMODILUTION: NO
HGB BLD-MCNC: 10.4 G/DL (ref 12–15.9)
INHALED O2 CONCENTRATION: 70 %
MCH RBC QN AUTO: 29.8 PG (ref 26.6–33)
MCHC RBC AUTO-ENTMCNC: 33.2 G/DL (ref 31.5–35.7)
MCV RBC AUTO: 89.7 FL (ref 79–97)
MODALITY: ABNORMAL
PCO2 BLDA: 31.6 MM HG (ref 35–48)
PH BLDA: 7.47 PH UNITS (ref 7.35–7.45)
PLATELET # BLD AUTO: 74 10*3/MM3 (ref 140–450)
PMV BLD AUTO: 9.7 FL (ref 6–12)
PO2 BLDA: 155.8 MM HG (ref 83–108)
POTASSIUM SERPL-SCNC: 3.4 MMOL/L (ref 3.5–5.2)
QT INTERVAL: 457 MS
RBC # BLD AUTO: 3.48 10*6/MM3 (ref 3.77–5.28)
SAO2 % BLDCOA: 99.5 % (ref 94–98)
SODIUM SERPL-SCNC: 145 MMOL/L (ref 136–145)
WBC NRBC COR # BLD: 11.6 10*3/MM3 (ref 3.4–10.8)

## 2023-05-17 PROCEDURE — 25010000002 FUROSEMIDE PER 20 MG: Performed by: NURSE PRACTITIONER

## 2023-05-17 PROCEDURE — 97116 GAIT TRAINING THERAPY: CPT

## 2023-05-17 PROCEDURE — 93005 ELECTROCARDIOGRAM TRACING: CPT | Performed by: NURSE PRACTITIONER

## 2023-05-17 PROCEDURE — 94799 UNLISTED PULMONARY SVC/PX: CPT

## 2023-05-17 PROCEDURE — 97530 THERAPEUTIC ACTIVITIES: CPT

## 2023-05-17 PROCEDURE — 82948 REAGENT STRIP/BLOOD GLUCOSE: CPT

## 2023-05-17 PROCEDURE — 97535 SELF CARE MNGMENT TRAINING: CPT

## 2023-05-17 PROCEDURE — 0 MILRINONE LACTATE IN DEXTROSE 20-5 MG/100ML-% SOLUTION: Performed by: NURSE PRACTITIONER

## 2023-05-17 PROCEDURE — 93010 ELECTROCARDIOGRAM REPORT: CPT | Performed by: INTERNAL MEDICINE

## 2023-05-17 PROCEDURE — 85027 COMPLETE CBC AUTOMATED: CPT | Performed by: NURSE PRACTITIONER

## 2023-05-17 PROCEDURE — 0 ACETAMINOPHEN 1000 MG/100ML SOLUTION: Performed by: NURSE PRACTITIONER

## 2023-05-17 PROCEDURE — 71045 X-RAY EXAM CHEST 1 VIEW: CPT

## 2023-05-17 PROCEDURE — 82803 BLOOD GASES ANY COMBINATION: CPT

## 2023-05-17 PROCEDURE — 99233 SBSQ HOSP IP/OBS HIGH 50: CPT | Performed by: INTERNAL MEDICINE

## 2023-05-17 PROCEDURE — 80048 BASIC METABOLIC PNL TOTAL CA: CPT | Performed by: NURSE PRACTITIONER

## 2023-05-17 PROCEDURE — 94761 N-INVAS EAR/PLS OXIMETRY MLT: CPT

## 2023-05-17 RX ORDER — POTASSIUM CHLORIDE 750 MG/1
10 TABLET, FILM COATED, EXTENDED RELEASE ORAL ONCE
Status: COMPLETED | OUTPATIENT
Start: 2023-05-17 | End: 2023-05-17

## 2023-05-17 RX ORDER — FUROSEMIDE 10 MG/ML
20 INJECTION INTRAMUSCULAR; INTRAVENOUS ONCE
Status: COMPLETED | OUTPATIENT
Start: 2023-05-17 | End: 2023-05-17

## 2023-05-17 RX ORDER — ATENOLOL 25 MG/1
25 TABLET ORAL
Status: DISCONTINUED | OUTPATIENT
Start: 2023-05-17 | End: 2023-05-18

## 2023-05-17 RX ORDER — IBUPROFEN 600 MG/1
1 TABLET ORAL
Status: DISCONTINUED | OUTPATIENT
Start: 2023-05-18 | End: 2023-05-20 | Stop reason: HOSPADM

## 2023-05-17 RX ORDER — POLYETHYLENE GLYCOL 3350 17 G/17G
17 POWDER, FOR SOLUTION ORAL 2 TIMES DAILY PRN
Status: DISCONTINUED | OUTPATIENT
Start: 2023-05-17 | End: 2023-05-20 | Stop reason: HOSPADM

## 2023-05-17 RX ORDER — INSULIN LISPRO 100 [IU]/ML
2-7 INJECTION, SOLUTION INTRAVENOUS; SUBCUTANEOUS
Status: DISCONTINUED | OUTPATIENT
Start: 2023-05-18 | End: 2023-05-20 | Stop reason: HOSPADM

## 2023-05-17 RX ORDER — NICOTINE POLACRILEX 4 MG
15 LOZENGE BUCCAL
Status: DISCONTINUED | OUTPATIENT
Start: 2023-05-18 | End: 2023-05-20 | Stop reason: HOSPADM

## 2023-05-17 RX ORDER — POTASSIUM CHLORIDE 20 MEQ/1
20 TABLET, EXTENDED RELEASE ORAL ONCE
Status: COMPLETED | OUTPATIENT
Start: 2023-05-17 | End: 2023-05-17

## 2023-05-17 RX ORDER — ENOXAPARIN SODIUM 100 MG/ML
40 INJECTION SUBCUTANEOUS EVERY 24 HOURS
Status: DISCONTINUED | OUTPATIENT
Start: 2023-05-18 | End: 2023-05-20 | Stop reason: HOSPADM

## 2023-05-17 RX ORDER — AMOXICILLIN 250 MG
2 CAPSULE ORAL 2 TIMES DAILY PRN
Status: DISCONTINUED | OUTPATIENT
Start: 2023-05-17 | End: 2023-05-20 | Stop reason: HOSPADM

## 2023-05-17 RX ORDER — DEXTROSE MONOHYDRATE 25 G/50ML
25 INJECTION, SOLUTION INTRAVENOUS
Status: DISCONTINUED | OUTPATIENT
Start: 2023-05-18 | End: 2023-05-20 | Stop reason: HOSPADM

## 2023-05-17 RX ADMIN — MILRINONE LACTATE 0.12 MCG/KG/MIN: 0.2 INJECTION, SOLUTION INTRAVENOUS at 05:33

## 2023-05-17 RX ADMIN — CHLORHEXIDINE GLUCONATE 15 ML: 1.2 RINSE ORAL at 09:44

## 2023-05-17 RX ADMIN — ASPIRIN 81 MG: 81 TABLET, COATED ORAL at 09:45

## 2023-05-17 RX ADMIN — ACETAMINOPHEN 1000 MG: 10 INJECTION INTRAVENOUS at 02:58

## 2023-05-17 RX ADMIN — CHLORHEXIDINE GLUCONATE 15 ML: 1.2 RINSE ORAL at 20:06

## 2023-05-17 RX ADMIN — FUROSEMIDE 20 MG: 10 INJECTION, SOLUTION INTRAMUSCULAR; INTRAVENOUS at 19:29

## 2023-05-17 RX ADMIN — POTASSIUM CHLORIDE 20 MEQ: 1500 TABLET, EXTENDED RELEASE ORAL at 12:33

## 2023-05-17 RX ADMIN — ATENOLOL 25 MG: 25 TABLET ORAL at 12:34

## 2023-05-17 RX ADMIN — POTASSIUM CHLORIDE 40 MEQ: 1.5 POWDER, FOR SOLUTION ORAL at 05:47

## 2023-05-17 RX ADMIN — POTASSIUM CHLORIDE 40 MEQ: 1500 TABLET, EXTENDED RELEASE ORAL at 09:44

## 2023-05-17 RX ADMIN — NICARDIPINE HYDROCHLORIDE 3 MG/HR: 25 INJECTION, SOLUTION INTRAVENOUS at 05:33

## 2023-05-17 RX ADMIN — POTASSIUM CHLORIDE 10 MEQ: 750 TABLET, EXTENDED RELEASE ORAL at 19:30

## 2023-05-17 RX ADMIN — MUPIROCIN 1 APPLICATION: 20 OINTMENT TOPICAL at 09:44

## 2023-05-17 RX ADMIN — LEVOTHYROXINE SODIUM 50 MCG: 0.05 TABLET ORAL at 09:45

## 2023-05-17 RX ADMIN — MUPIROCIN 1 APPLICATION: 20 OINTMENT TOPICAL at 20:07

## 2023-05-17 RX ADMIN — FUROSEMIDE 20 MG: 20 INJECTION, SOLUTION INTRAMUSCULAR; INTRAVENOUS at 12:34

## 2023-05-17 RX ADMIN — PANTOPRAZOLE SODIUM 40 MG: 40 TABLET, DELAYED RELEASE ORAL at 05:33

## 2023-05-17 RX ADMIN — INSULIN HUMAN 1.1 UNITS/HR: 1 INJECTION, SOLUTION INTRAVENOUS at 22:37

## 2023-05-17 NOTE — PROGRESS NOTES
[facility] Medicine Services   Daily Progress Note    Patient Name: Thi Allen  : 1940  MRN: [id number]  Primary Care Physician:  Camila Lazcano MD  Date of admission: 5/15/2023  Date and Time of Service: 2023      Subjective      Chief Complaint: chest pain      Patient Reports: A 83 y.o. female with PMH of CAD, CHF, hypothyroidism, hypertension, mitral regurgitation was admitted to the hospital by CTS for mitral valve repair.  Patient had mitral valve repair on 5/15 and was subsequently extubated.  Postextubation, developed right-sided pneumothorax and critical care was consulted specifically to manage right pneumothorax. S/p right-sided chest tube placement after which pneumothorax resolved.     Patient is feeling better, pain is controlled, poor appetite added ensure and looking for dietitian consult.    ROS all review of systems is negative except what I mentioned above.      Objective      Vitals:   Temp:  [97.3 °F (36.3 °C)-97.9 °F (36.6 °C)] 97.3 °F (36.3 °C)  Heart Rate:  [80-87] 82  Resp:  [11-25] 25  BP: (103-145)/(45-63) 135/55  Flow (L/min):  [50] 50    Physical Exam  Constitutional:       Appearance: Normal appearance.   HENT:      Head: Normocephalic.      Right Ear: Tympanic membrane normal.      Left Ear: Tympanic membrane normal.      Nose: Nose normal.      Mouth/Throat:      Mouth: Mucous membranes are moist.   Eyes:      Extraocular Movements: Extraocular movements intact.      Pupils: Pupils are equal, round, and reactive to light.   Cardiovascular:      Rate and Rhythm: Normal rate.      Pulses: Normal pulses.   Pulmonary:      Effort: Pulmonary effort is normal.      Comments: Chest tubes on right chest    Abdominal:      General: Abdomen is flat.   Musculoskeletal:         General: Normal range of motion.      Cervical back: Normal range of motion.   Skin:     General: Skin is warm.      Capillary Refill: Capillary refill takes less than 2 seconds.    Neurological:      General: No focal deficit present.      Mental Status: She is alert.   Psychiatric:         Mood and Affect: Mood normal.             Result Review    Result Review:  I have personally reviewed the results from the time of this admission to 5/17/2023 15:19 EDT and agree with these findings:  [x]  Laboratory  []  Microbiology  [x]  Radiology  []  EKG/Telemetry   []  Cardiology/Vascular   []  Pathology  []  Old records  []  Other:  Most notable findings include: PH 7.468, PCO2 31.6 PO2 155.8    Glucose 141, Na 145 K 3.4 Cr 0.80, BUN 22    WBC 11.6 HB 10.4  Ht 31.2  PLT 74    Wounds (last 24 hours)     LDA Wound     Row Name 05/17/23 0400 05/17/23 0000 05/16/23 2000       Wound 05/15/23 0804 midline sternal Incision    Wound - Properties Group Placement Date: 05/15/23  -TR Placement Time: 0804  -TR Present on Hospital Admission: N  -TR Orientation: midline  -TR Location: sternal  -TR Primary Wound Type: Incision  -TR    Dressing Appearance intact;no drainage  -AS intact;no drainage  -AS intact;dry;no drainage  -AS    Closure OCTAVIO  -AS OCTAVIO  -AS OCTAVIO  -AS    Drainage Amount none  -AS none  -AS none  -AS    Dressing Care silver impregnated;silicone  -AS -- silver impregnated;silicone  -AS    Retired Wound - Properties Group Placement Date: 05/15/23  -TR Placement Time: 0804  -TR Present on Hospital Admission: N  -TR Orientation: midline  -TR Location: sternal  -TR Primary Wound Type: Incision  -TR    Retired Wound - Properties Group Date first assessed: 05/15/23  -TR Time first assessed: 0804  -TR Present on Hospital Admission: N  -TR Location: sternal  -TR Primary Wound Type: Incision  -TR    Row Name 05/16/23 1600             Wound 05/15/23 0804 midline sternal Incision    Wound - Properties Group Placement Date: 05/15/23  -TR Placement Time: 0804  -TR Present on Hospital Admission: N  -TR Orientation: midline  -TR Location: sternal  -TR Primary Wound Type: Incision  -TR    Dressing Appearance  dry;intact;no drainage  -AJ      Closure OCTAVIO  -AJ      Drainage Amount none  -AJ      Dressing Care silicone;silver impregnated  -AJ      Retired Wound - Properties Group Placement Date: 05/15/23  -TR Placement Time: 0804 -TR Present on Hospital Admission: N  -TR Orientation: midline  -TR Location: sternal  -TR Primary Wound Type: Incision  -TR    Retired Wound - Properties Group Date first assessed: 05/15/23  -TR Time first assessed: 0804  -TR Present on Hospital Admission: N  -TR Location: sternal  -TR Primary Wound Type: Incision  -TR          User Key  (r) = Recorded By, (t) = Taken By, (c) = Cosigned By    Initials Name Provider Type    TR Trey Dominguez, RN Registered Nurse    Yuridia France, RN Registered Nurse    AS Maria Elena Ferrari RN Registered Nurse                  Assessment & Plan      Brief Patient Summary:        aspirin, 81 mg, Oral, Daily  atenolol, 25 mg, Oral, Q24H  chlorhexidine, 15 mL, Mouth/Throat, Q12H  [START ON 5/18/2023] enoxaparin, 40 mg, Subcutaneous, Q24H  [START ON 5/18/2023] insulin lispro, 2-7 Units, Subcutaneous, 4x Daily With Meals & Nightly  levothyroxine, 50 mcg, Oral, Daily  mupirocin, 1 application, Each Nare, BID  pantoprazole, 40 mg, Oral, Q AM  potassium chloride, 20 mEq, Oral, Daily       insulin, 0-100 Units/hr, Last Rate: 1.6 Units/hr (05/17/23 1407)  sodium chloride, 30 mL/hr, Last Rate: 30 mL/hr (05/15/23 1130)         Active Hospital Problems:  Active Hospital Problems    Diagnosis    • **Severe mitral regurgitation    • S/P MVR (mitral valve repair)    • Hypokalemia    • Coronary artery disease involving native coronary artery of native heart without angina pectoris    • Chronic combined systolic and diastolic congestive heart failure    • Pacemaker    • Hypothyroidism due to acquired atrophy of thyroid    • Hypertriglyceridemia    • Essential hypertension with goal blood pressure less than 130/80    • History of DVT (deep vein thrombosis)      Plan:   Acute  respiratory failure with hypoxia: Improved, switch to Airvo to nasal cannula.  Wean off oxygen as tolerated.     Right pneumothorax: Avoid any positive pressure ventilation including BiPAP, if possible.  Chest x-ray with improvement.  Leave on waterseal and clamp chest tube in AM.    Hypokalemia.    Poor appetite and poor nutrition    Mitral valve regurgitation s/p repair 5/15/2023      DVT prophylaxis:  Medical and mechanical DVT prophylaxis orders are present.    CODE STATUS:    Code Status (Patient has no pulse and is not breathing): CPR (Attempt to Resuscitate)  Medical Interventions (Patient has pulse or is breathing): Full      Disposition:  I expect patient to be discharged per CT surgery    This patient has been examined wearing appropriate Personal Protective Equipment and discussed with hospital infection control department. 05/17/23      Electronically signed by Jose Antonio Agrawal MD, 05/17/23, 15:19 EDT.  [facility] Hospitalist Team

## 2023-05-17 NOTE — THERAPY TREATMENT NOTE
Subjective: Pt agreeable to therapeutic plan of care.    Phase 1 Cardiac Rehab Initiated    Sitting tolerance: >10min  Standing tolerance: >10min    Precautions:  Mid-sternal incision; avoid scapular retraction and depression.  Cardiovascular impairment post-sx; encourage energy conservation strategies.      MET level equivalent: 1.4-2.0 (Self care ADLs in sitting / slow ambulation in room, light intensity activities)    Objective:     Bed mobility - N/A or Not attempted.  Transfers - Min-A and with rolling walker  Ambulation - 75 feet CGA and with rolling walker with assistance for line management, cueing for AD management with sternal precautions.    Vitals: WNL on 15L O2, maintained >91%    Pain: 3 VAS   Location: incisional  Intervention for pain: Repositioned and Increased Activity    Education: Provided education on the importance of mobility in the acute care setting, Verbal/Tactile Cues, Transfer Training and Gait Training    Assessment: Thi Allen presents with functional mobility impairments which indicate the need for skilled intervention. Tolerating session today without incident. Pt with excellent progress this date. PT assisted to toilet transfer with cueing for sternal precautions. Pt then stood and ambulated with RWx for 75 feet with mild c/o fatigue, but demonstrates good ability to ambulate. Pt requires increased time with all mobiltiy, but likely to progress well.  Will continue to follow and progress as tolerated.     Plan/Recommendations:   No ongoing therapy recommended post-acute care. No therapy needs.. Pt requires no DME at discharge.     Pt desires Home with family assist at discharge. Pt cooperative; agreeable to therapeutic recommendations and plan of care.         Basic Mobility 6-click:  Rollin = Total, A lot = 2, A little = 3; 4 = None  Supine>Sit:   1 = Total, A lot = 2, A little = 3; 4 = None   Sit>Stand with arms:  1 = Total, A lot = 2, A little = 3; 4 =  None  Bed>Chair:   1 = Total, A lot = 2, A little = 3; 4 = None  Ambulate in room:  1 = Total, A lot = 2, A little = 3; 4 = None  3-5 Steps with railin = Total, A lot = 2, A little = 3; 4 = None  Score: 16    Modified [place]: N/A = No pre-op stroke/TIA    Post-Tx Position: Up in Chair, Alarms activated and Call light and personal items within reach  PPE: gloves

## 2023-05-17 NOTE — PROGRESS NOTES
S/P POD# 2 MV repair--Pam  EF 40-45% (CARMITA)    Subjective: Reports feeling better    No events overnight  Remains on AirVo 40% / 50 L  Drips: milr .125, insulin  No CVP reported  Wt is up 4 kgs from preop  CXR: Resolved right ptx      Intake/Output Summary (Last 24 hours) at 5/17/2023 0756  Last data filed at 5/17/2023 0550  Gross per 24 hour   Intake 3019 ml   Output 2180 ml   Net 839 ml     Temp:  [96.3 °F (35.7 °C)-98.1 °F (36.7 °C)] 97.8 °F (36.6 °C)  Heart Rate:  [] 83  Resp:  [10-21] 21  BP: (102-145)/(44-65) 103/48  /8  R PCT 80/8    Results from last 7 days   Lab Units 05/17/23  0352 05/16/23  0434 05/16/23  0248 05/15/23  1124 05/15/23  1109 05/15/23  0805 05/15/23  0701 05/11/23  0821   WBC 10*3/mm3 11.60*  --  12.40*   < >  --   --  4.80  4.80 4.90   HEMOGLOBIN g/dL 10.4*  --  11.8*   < >  --   --  11.8*  11.7* 12.8   HEMOGLOBIN, POC g/dL  --  11.7*  --    < >  --    < >  --   --    HEMATOCRIT % 31.2*  --  35.0   < >  --   --  35.2  34.4 37.7   HEMATOCRIT POC %  --  34*  --    < >  --    < >  --   --    PLATELETS 10*3/mm3 74*  --  109*   < >  --   --  203  198 240   INR   --   --   --   --  1.46*  --  1.07 2.66*    < > = values in this interval not displayed.     Results from last 7 days   Lab Units 05/17/23  0352 05/16/23  1307 05/16/23  0248   CREATININE mg/dL 0.80   < > 0.93   POTASSIUM mmol/L 3.4*   < > 3.4*   SODIUM mmol/L 145   < > 145   MAGNESIUM mg/dL  --   --  2.4   PHOSPHORUS mg/dL  --   --  4.1    < > = values in this interval not displayed.       Physical Exam:  Neuro intact, nad, up in recliner  Tele: SR 80s with occasional paced beat  Diminished bases, 96%   dsg c/d/i, no drainage  Benign abd, + BM  No edema    Assessment/Plan:  Principal Problem:    Severe mitral regurgitation  Active Problems:    History of DVT (deep vein thrombosis)    Essential hypertension with goal blood pressure less than 130/80    Hypertriglyceridemia    Hypothyroidism due to acquired atrophy of  thyroid    Pacemaker    Chronic combined systolic and diastolic congestive heart failure    Coronary artery disease involving native coronary artery of native heart without angina pectoris    S/P MVR (mitral valve repair)    Hypokalemia    -Severe mitral regurgitation, moderate AI, mod to severe TR, EF 40-45% (CARMITA)--s/p MV repair (Pagni)  -Moderate pulmonary hypertension--RVSP 45-55  -Chronic combined HFrEF, NYHA class III-IV  -Dilated, NICM--EF 40 to 45% (CARMITA)  -HTN  -Hypothyroidism--levothyroxine  -Hx DVT--on warfarin, bridged with Lovenox prior to surgery  -PAF  -Hx PPM--[employer] DDDR mode  -Postop right ptx--s/p CT insertion 5/16  -Postop acute resp insufficiency with hypercapnia--BiPap, transition to AirVo, critical care intensivist following  -Postop leukocytosis, likely r/t atel--watch closely    POD# 2.  Right pneumothorax resolved with chest tube placement.  Remains on AirVo. Gentle diuresis.  Restart low-dose home atenolol after discussion with cardiology.  Mobilize.  Aggressive pulmonary toileting.  DC central line.  Keep A-line for now.     Addendum:  DC mediastinal chest tubes.  Right pneumocath placed on waterseal per intensivist team.  Additional diuresis this evening.  DC Maurice.  She has walked today.  Oxygen requirements are down to 9 L high flow.    Routine care--as above  D/w pt/, nsg, Dr. Orozco, cardiology  DC plan CAMI Herrera, APRN  5/17/2023  07:56 EDT

## 2023-05-17 NOTE — THERAPY TREATMENT NOTE
Subjective: Pt agreeable to therapeutic plan of care.  Cognition: oriented to Person, Place, Time and Situation    Objective:     Bed Mobility: N/A or Not attempted. In chair on arrival  Functional Transfers: Min-A and with rolling walker     Balance: supported, static, dynamic and standing CGA  Functional Ambulation: CGA and with rolling walker plus one for management of equipment    Toileting: Mod-A  ADL Position: supported sitting and supported standing  ADL Comments: Patient had bowel incontinence in the chair, assisted to bedside commode where she had further BM. Assistance required for ludy-hygiene.    Phase 1 Cardiac Rehab Initiated    Sitting tolerance: >10min  Standing tolerance: 5-10min    Precautions:  Mid-sternal incision; avoid scapular retraction and depression.  Cardiovascular impairment post-sx; encourage energy conservation strategies.      MET level equivalent: 1.4-2.0 (Self care ADLs in sitting / slow ambulation in room, light intensity activities)    Vitals: WNL on 15 L O2, BP stable throughout    Pain: 4 VAS  Location: Sternal  Interventions for pain: Repositioned and Therapeutic Presence  Education: Provided education on the importance of mobility in the acute care setting, Verbal/Tactile Cues, ADL training, Transfer Training and Post-Op Precautions      Assessment: Thi Allen presents with ADL impairments affecting function including endurance / activity tolerance and shortness of breath. Demonstrated functioning below baseline abilities indicate the need for continued skilled intervention while inpatient. Much improved performance today. Pt down to 15L O2 and able to ambulate approx 100' at a slow pace with RW. CGA +1 person required for equipment management. Mod A needed for toileting this date, incont of bowels and assistance needed for hygiene. Tolerating session today without incident. Will continue to follow and progress as tolerated.     Plan/Recommendations:   Low Intensity  "Therapy recommended post-acute care - This is recommended as therapy feels this patient would require 2-3 visits per week. OP or HH would be the best option depending on patient's home bound status. Consider, if the patient has other  \"skilled\" needs such as wounds, IV antibiotics, etc. Combined with \"low intensity\" could also equate to a SNF. If patient is medically complex, consider LTAC.. Pt requires no DME at discharge.     Pt desires Home with family assist at discharge. Pt cooperative; agreeable to therapeutic recommendations and plan of care.     Modified Yue: N/A = No pre-op stroke/TIA    Post-Tx Position: Up in Chair, Alarms activated and Call light and personal items within reach  PPE: gloves    "

## 2023-05-17 NOTE — PROGRESS NOTES
Referring Provider: Veronica Orozco MD    Reason for follow-up: Mitral regurgitation status post mitral valve repair     Patient Care Team:  Camila Lazcano MD as PCP - General  Trace Marcus MD as Cardiologist (Cardiology)      SUBJECTIVE  She is currently on Airvo and sitting up in chair.  Appears weak     ROS  Review of all systems negative except as indicated.    Since I have last seen, the patient has been without any chest discomfort, shortness of breath, palpitations, dizziness or syncope.  Denies having any headache, abdominal pain, nausea, vomiting, diarrhea, constipation, loss of weight or loss of appetite.  Denies having any excessive bruising, hematuria or blood in the stool.  ROS      Personal History:    Past Medical History:   Diagnosis Date   • AV block 02/22/2021   • Bilateral renal cysts 01/01/2020   • Carotid stenosis, bilateral 06/01/2019    Overview:  <50% bilateral    • Carpal tunnel syndrome, bilateral 06/15/2018   • Chronic combined systolic (congestive) and diastolic (congestive) heart failure    • DVT (deep venous thrombosis) (CMS/[facility]) [I82.409]     recurrent   • Hordeolum externum 08/05/2015   • Hyperlipidemia    • Hypertension    • Hypothyroidism due to acquired atrophy of thyroid 10/21/2019   • Long term (current) use of anticoagulants 09/29/2022   • Mitral regurgitation 05/17/2016   • Osteopenia    • Pacemaker 12/28/2021       Past Surgical History:   Procedure Laterality Date   • CARDIAC CATHETERIZATION N/A 2/25/2023    Procedure: Left Heart Cath and coronary angiogram;  Surgeon: Trace Marcus MD;  Location:  [facility];  Service: Cardiovascular;  Laterality: N/A;   • CARDIAC CATHETERIZATION Bilateral 2/25/2023    Procedure: Right and Left Heart Cath;  Surgeon: Trace Marcus MD;  Location:  [facility];  Service: Cardiovascular;  Laterality: Bilateral;   • HYSTERECTOMY     • PACEMAKER IMPLANTATION     • THYROID SURGERY         Family History    Problem Relation Age of Onset   • No Known Problems Mother    • No Known Problems Father    • No Known Problems Sister    • No Known Problems Brother    • No Known Problems Maternal Aunt    • No Known Problems Maternal Uncle    • No Known Problems Paternal Aunt    • No Known Problems Paternal Uncle    • No Known Problems Maternal Grandmother    • No Known Problems Maternal Grandfather    • No Known Problems Paternal Grandmother    • No Known Problems Paternal Grandfather    • No Known Problems Other    • Anemia Neg Hx    • Arrhythmia Neg Hx    • Asthma Neg Hx    • Clotting disorder Neg Hx    • Fainting Neg Hx    • Heart attack Neg Hx    • Heart disease Neg Hx    • Heart failure Neg Hx    • Hyperlipidemia Neg Hx    • Hypertension Neg Hx        Social History     Tobacco Use   • Smoking status: Former     Passive exposure: Past   • Smokeless tobacco: Never   • Tobacco comments:     quit 35 yrs ago   Vaping Use   • Vaping Use: Never used   Substance Use Topics   • Alcohol use: Not Currently     Comment: rare   • Drug use: No        Medications Prior to Admission   Medication Sig Dispense Refill Last Dose   • atenolol (TENORMIN) 100 MG tablet Take 1 tablet by mouth Daily.   2023   • [] Enoxaparin Sodium (LOVENOX) 60 MG/0.6ML solution prefilled syringe syringe Inject 0.5 mL under the skin into the appropriate area as directed Every 12 (Twelve) Hours for 5 doses. Warfarin being stopped for cardiac surgery.  Lovenox to start  BID and last dose  morning. 2.5 mL 0 5/15/2023 at 0400   • famotidine (PEPCID) 20 MG tablet Take 1 tablet by mouth 2 (Two) Times a Day As Needed.   Past Week   • fenofibrate 160 MG tablet Take 1 tablet by mouth Daily.   2023   • hydroCHLOROthiazide (HYDRODIURIL) 25 MG tablet Take 2 tablets by mouth Every Other Day.   2023   • latanoprost (XALATAN) 0.005 % ophthalmic solution Administer 1 drop to both eyes Every Night.   2023   • levothyroxine (SYNTHROID,  LEVOTHROID) 50 MCG tablet TAKE 1 TABLET BY MOUTH ONCE DAILY   5/14/2023   • lisinopril (PRINIVIL,ZESTRIL) 20 MG tablet Take 1 tablet by mouth Daily. 30 tablet 0 5/12/2023   • mupirocin (BACTROBAN) 2 % ointment Apply to nares (inside each nostril) twice daily as directed for procedure 22 g 0 5/15/2023   • warfarin (COUMADIN) 2.5 MG tablet Take 1 tablet by mouth Every Night. T,Th,Sat,Sun   5/9/2023   • warfarin (COUMADIN) 5 MG tablet Take 1 tablet by mouth Daily. M,W,F   5/10/2023       Allergies:  Atorvastatin, Colesevelam hcl, Colestipol hcl, Ezetimibe, Pitavastatin, Rosuvastatin, Rosuvastatin calcium, Simvastatin, Statins, and Keflex [cephalexin]    Scheduled Meds:aspirin, 81 mg, Oral, Daily  chlorhexidine, 15 mL, Mouth/Throat, Q12H  enoxaparin, 40 mg, Subcutaneous, Q24H  levothyroxine, 50 mcg, Oral, Daily  mupirocin, 1 application, Each Nare, BID  pantoprazole, 40 mg, Oral, Q AM  potassium chloride, 20 mEq, Oral, Daily      Continuous Infusions:insulin, 0-100 Units/hr, Last Rate: 0.3 Units/hr (05/17/23 0557)  milrinone, 0.125 mcg/kg/min, Last Rate: 0.125 mcg/kg/min (05/17/23 0533)  niCARdipine, 5-15 mg/hr, Last Rate: 2 mg/hr (05/17/23 0700)  sodium chloride, 30 mL/hr, Last Rate: 30 mL/hr (05/15/23 1130)      PRN Meds:.•  acetaminophen **OR** acetaminophen **OR** acetaminophen  •  Calcium Replacement - Follow Nurse / BPA Driven Protocol  •  dextrose  •  dextrose  •  glucagon (human recombinant)  •  HYDROcodone-acetaminophen  •  Magnesium Standard Dose Replacement - Follow Nurse / BPA Driven Protocol  •  metoclopramide  •  Morphine **AND** naloxone  •  niCARdipine  •  ondansetron  •  Phosphorus Replacement - Follow Nurse / BPA Driven Protocol  •  polyethylene glycol  •  potassium chloride **OR** potassium chloride  •  Potassium Replacement - Follow Nurse / BPA Driven Protocol  •  senna-docusate sodium      OBJECTIVE    Vital Signs  Vitals:    05/17/23 0729 05/17/23 0744 05/17/23 0751 05/17/23 0800   BP:   103/48   "  BP Location:       Patient Position:       Pulse:  84 83 84   Resp:       Temp: 97.5 °F (36.4 °C)      TempSrc: Axillary      SpO2:  97%  98%   Weight:       Height:           Flowsheet Rows    Flowsheet Row First Filed Value   Admission Height 149.9 cm (59\") Documented at 05/15/2023 0516   Admission Weight 48 kg (105 lb 12.8 oz) Documented at 05/15/2023 0549            Intake/Output Summary (Last 24 hours) at 5/17/2023 0936  Last data filed at 5/17/2023 0550  Gross per 24 hour   Intake 3019 ml   Output 2175 ml   Net 844 ml          Telemetry: Paced rhythm    Physical Exam:  The patient is alert, oriented and in no distress.  Vital signs as noted above.  Head and neck revealed no carotid bruits or jugular venous distention.  No thyromegaly or lymphadenopathy is present  Lungs clear.  No wheezing.  Breath sounds are normal bilaterally.  She is on BiPAP  Heart normal first and second heart sounds.  No murmur. No precordial rub is present.  No gallop is present.  Abdomen soft and nontender.  No organomegaly is present.  Extremities with good peripheral pulses without any pedal edema.  Skin warm and dry.  Musculoskeletal system is grossly normal.  CNS grossly normal.       Results Review:  I have personally reviewed the results from the time of this admission to 5/17/2023 09:36 EDT and agree with these findings:  []  Laboratory  []  Microbiology  []  Radiology  []  EKG/Telemetry   []  Cardiology/Vascular   []  Pathology  []  Old records  []  Other:    Most notable findings include:    Lab Results (last 24 hours)     Procedure Component Value Units Date/Time    POC Glucose Once [[id number]]  (Abnormal) Collected: 05/17/23 0557    Specimen: Blood Updated: 05/17/23 0600     Glucose 141 mg/dL      Comment: Serial Number: [id number]:  [id number]       Basic Metabolic Panel [[id number]]  (Abnormal) Collected: 05/17/23 0352    Specimen: Blood Updated: 05/17/23 0420     Glucose 147 mg/dL      BUN 22 mg/dL      Creatinine " 0.80 mg/dL      Sodium 145 mmol/L      Potassium 3.4 mmol/L      Chloride 107 mmol/L      CO2 23.0 mmol/L      Calcium 8.6 mg/dL      BUN/Creatinine Ratio 27.5     Anion Gap 15.0 mmol/L      eGFR 74.1 mL/min/1.73     Narrative:      GFR Normal >60  Chronic Kidney Disease <60  Kidney Failure <15    The GFR formula is only valid for adults with stable renal function between ages 18 and 70.    CBC (No Diff) [[id number]]  (Abnormal) Collected: 05/17/23 0352    Specimen: Blood Updated: 05/17/23 0402     WBC 11.60 10*3/mm3      RBC 3.48 10*6/mm3      Hemoglobin 10.4 g/dL      Hematocrit 31.2 %      MCV 89.7 fL      MCH 29.8 pg      MCHC 33.2 g/dL      RDW 15.0 %      RDW-SD 49.4 fl      MPV 9.7 fL      Platelets 74 10*3/mm3     POC Glucose Once [[id number]]  (Abnormal) Collected: 05/17/23 0352    Specimen: Blood Updated: 05/17/23 0355     Glucose 145 mg/dL      Comment: Serial Number: [id number]:  [id number]       POC Glucose Once [[id number]]  (Abnormal) Collected: 05/17/23 0208    Specimen: Blood Updated: 05/17/23 0209     Glucose 140 mg/dL      Comment: Serial Number: [id number]:  [id number]       POC Glucose Once [[id number]]  (Abnormal) Collected: 05/17/23 0033    Specimen: Blood Updated: 05/17/23 0034     Glucose 136 mg/dL      Comment: Serial Number: [id number]:  [id number]       POC Glucose Once [[id number]]  (Abnormal) Collected: 05/16/23 2246    Specimen: Blood Updated: 05/16/23 2248     Glucose 131 mg/dL      Comment: Serial Number: [id number]:  [id number]       POC Glucose Once [[id number]]  (Abnormal) Collected: 05/16/23 2015    Specimen: Blood Updated: 05/16/23 2233     Glucose 133 mg/dL      Comment: Serial Number: [id number]:  [id number]       POC Glucose Once [[id number]]  (Abnormal) Collected: 05/16/23 1846    Specimen: Blood Updated: 05/16/23 1848     Glucose 144 mg/dL      Comment: Serial Number: [id number]:  [id number]       POC Glucose Once [[id number]]  (Normal)  Collected: 05/16/23 1703    Specimen: Blood Updated: 05/16/23 1705     Glucose 104 mg/dL      Comment: Serial Number: [id number]:  [id number]       POC Glucose Once [[id number]]  (Abnormal) Collected: 05/16/23 1542    Specimen: Blood Updated: 05/16/23 1544     Glucose 133 mg/dL      Comment: Serial Number: [id number]:  [id number]       Blood Gas, Arterial - [[id number]]  (Abnormal) Collected: 05/16/23 1527    Specimen: Arterial Blood Updated: 05/16/23 1531     Site Arterial Line     Rosalio's Test N/A     pH, Arterial 7.428 pH units      pCO2, Arterial 42.4 mm Hg      pO2, Arterial 126.1 mm Hg      HCO3, Arterial 28.0 mmol/L      Base Excess, Arterial 3.3 mmol/L      Comment: Serial Number: [id number]:  [id number]        O2 Saturation, Arterial 98.9 %      CO2 Content 29.3 mmol/L      Barometric Pressure for Blood Gas --     Comment: N/A        Modality Vapotherm     FIO2 75 %      Hemodilution No    POC Glucose Once [[id number]]  (Abnormal) Collected: 05/16/23 1402    Specimen: Blood Updated: 05/16/23 1403     Glucose 132 mg/dL      Comment: Serial Number: [id number]:  [id number]       Basic Metabolic Panel [[id number]]  (Abnormal) Collected: 05/16/23 1307    Specimen: Blood Updated: 05/16/23 1335     Glucose 164 mg/dL      BUN 19 mg/dL      Creatinine 0.81 mg/dL      Sodium 147 mmol/L      Potassium 3.7 mmol/L      Chloride 109 mmol/L      CO2 26.0 mmol/L      Calcium 8.9 mg/dL      BUN/Creatinine Ratio 23.5     Anion Gap 12.0 mmol/L      eGFR 73.0 mL/min/1.73     Narrative:      GFR Normal >60  Chronic Kidney Disease <60  Kidney Failure <15    The GFR formula is only valid for adults with stable renal function between ages 18 and 70.    POC Glucose Once [[id number]]  (Abnormal) Collected: 05/16/23 1232    Specimen: Blood Updated: 05/16/23 1234     Glucose 170 mg/dL      Comment: Serial Number: [id number]:  [id number]       Blood Gas, Arterial - [[id number]]  (Abnormal) Collected: 05/16/23 1113     Specimen: Arterial Blood Updated: 05/16/23 1116     Site Arterial Line     Rosalio's Test N/A     pH, Arterial 7.405 pH units      pCO2, Arterial 40.6 mm Hg      pO2, Arterial 77.1 mm Hg      HCO3, Arterial 25.4 mmol/L      Base Excess, Arterial 0.6 mmol/L      Comment: Serial Number: [id number]:  [id number]        O2 Saturation, Arterial 95.3 %      CO2 Content 26.7 mmol/L      Barometric Pressure for Blood Gas --     Comment: N/A        Modality Vapotherm     FIO2 80 %      Hemodilution No    POC Glucose Once [[id number]]  (Abnormal) Collected: 05/16/23 1053    Specimen: Blood Updated: 05/16/23 1055     Glucose 114 mg/dL      Comment: Serial Number: [id number]:  [id number]             Imaging Results (Last 24 Hours)     Procedure Component Value Units Date/Time    XR Chest 1 View [[id number]] Collected: 05/17/23 0756     Updated: 05/17/23 0800    Narrative:      XR CHEST 1 VW    Date of Exam: 5/17/2023 1:41 AM EDT    Indication: Post-Op Heart Surgery    Comparison: 5/16/2023    Findings:  The [place]-Enrike catheter has been removed. Right IJ vascular sheath in place. Pigtail drainage catheter overlying the right midlung unchanged. No residual right pneumothorax. No pneumothorax on the left. Left-sided AICD noted. Status post sternotomy and   valve replacement. Mediastinal chest tube and left basilar chest tube are stable in position. Persistent bibasilar consolidation is unchanged. No significant pleural effusion.      Impression:      Impression:  1. Removal of right IJ [place]-Enrike catheter. Vascular sheath in place.  2. Stable chest tube overlying the right midlung. No pneumothorax.  3. Persistent bibasilar consolidation left greater than right that may relate to atelectasis.      Electronically Signed: Randy Martínez    5/17/2023 7:58 AM EDT    Workstation ID: [id number]    XR Chest 1 View [[id number]] Collected: 05/16/23 1128     Updated: 05/16/23 1132    Narrative:      XR CHEST 1 VW    Date of Exam: 5/16/2023 10:24 AM  EDT    Indication: Chest tube placement    Comparison: 5/16/2023    Findings:  There are postoperative changes of sternotomy and valve replacement. A left-sided transvenous pacemaker is in place. A [place]-Enrike catheter is in place with the tip in the main pulmonary artery. There is a pigtail drain in the right hemithorax with   evacuation of the right pneumothorax seen on the last study. The patient does have pulmonary vascular congestion and interstitial luminary edema. There is atelectasis in both lung bases. There is a very small residual right pneumothorax.      Impression:      Impression:    1. Interval placement of a right-sided pigtail drain with evacuation of the right pneumothorax. There is a tiny residual apical pneumothorax present.  2. Increase in pulmonary vascular congestion and edema.  3. Increasing atelectasis in both lung bases.      Electronically Signed: Domingo NGUYEN Chris    5/16/2023 11:30 AM EDT    Workstation ID: [id number]          LAB RESULTS (LAST 7 DAYS)    CBC  Results from last 7 days   Lab Units 05/17/23  0352 05/16/23  0434 05/16/23  0248 05/16/23  0225 05/15/23  2317 05/15/23  2006 05/15/23  1829 05/15/23  1214 05/15/23  1202 05/15/23  0805 05/15/23  0701 05/11/23  0821   WBC 10*3/mm3 11.60*  --  12.40*  --   --   --   --   --  10.00  --  4.80  4.80 4.90   RBC 10*6/mm3 3.48*  --  3.90  --   --   --   --   --  4.44  --  3.83  3.83 4.18   HEMOGLOBIN g/dL 10.4*  --  11.8*  --   --   --   --   --  13.7  --  11.8*  11.7* 12.8   HEMOGLOBIN, POC g/dL  --  11.7*  --  10.7* 10.9* 10.3* 10.6*   < >  --    < >  --   --    HEMATOCRIT % 31.2*  --  35.0  --   --   --   --   --  40.5  --  35.2  34.4 37.7   HEMATOCRIT POC %  --  34*  --  31* 32* 30* 31*   < >  --    < >  --   --    MCV fL 89.7  --  89.8  --   --   --   --   --  91.3  --  92.0  89.9 90.1   PLATELETS 10*3/mm3 74*  --  109*  --   --   --   --   --  101*  --  203  198 240    < > = values in this interval not displayed.       BMP  Results  from last 7 days   Lab Units 05/17/23  0352 05/16/23  1307 05/16/23  0248 05/15/23  1808 05/15/23  1109 05/11/23  0821   SODIUM mmol/L 145 147* 145  --  139 142   POTASSIUM mmol/L 3.4* 3.7 3.4* 3.2* 2.5* 4.2   CHLORIDE mmol/L 107 109* 108*  --  103 103   CO2 mmol/L 23.0 26.0 22.0  --  22.0 30.0*   BUN mg/dL 22 19 19  --  18 21   CREATININE mg/dL 0.80 0.81 0.93  --  0.75 0.80   GLUCOSE mg/dL 147* 164* 219*  --  108* 119*   MAGNESIUM mg/dL  --   --  2.4  --   --   --    PHOSPHORUS mg/dL  --   --  4.1  --  2.0*  --        CMP   Results from last 7 days   Lab Units 05/17/23 0352 05/16/23  1307 05/16/23  0248 05/15/23  1808 05/15/23  1109 05/11/23  0821   SODIUM mmol/L 145 147* 145  --  139 142   POTASSIUM mmol/L 3.4* 3.7 3.4* 3.2* 2.5* 4.2   CHLORIDE mmol/L 107 109* 108*  --  103 103   CO2 mmol/L 23.0 26.0 22.0  --  22.0 30.0*   BUN mg/dL 22 19 19  --  18 21   CREATININE mg/dL 0.80 0.81 0.93  --  0.75 0.80   GLUCOSE mg/dL 147* 164* 219*  --  108* 119*   ALBUMIN g/dL  --   --  4.7  --  4.6 4.5   BILIRUBIN mg/dL  --   --   --   --   --  0.3   ALK PHOS U/L  --   --   --   --   --  38*   AST (SGOT) U/L  --   --   --   --   --  24   ALT (SGPT) U/L  --   --   --   --   --  15       BNP        TROPONIN        CoAg  Results from last 7 days   Lab Units 05/15/23  1109 05/15/23  0701 05/11/23  0821   INR  1.46* 1.07 2.66*   APTT seconds 33.4* 35.1* 34.2*       Creatinine Clearance  Estimated Creatinine Clearance: 45.2 mL/min (by C-G formula based on SCr of 0.8 mg/dL).    ABG  Results from last 7 days   Lab Units 05/16/23  1527 05/16/23  1113 05/16/23  0742 05/16/23  0602 05/16/23  0434 05/16/23  0357 05/16/23  0225   PH, ARTERIAL pH units 7.428 7.405 7.360 7.339* 7.312* 7.342* 7.288*   PCO2, ARTERIAL mm Hg 42.4 40.6 50.1* 54.1* 48.7* 45.2 47.6   PO2 ART mm Hg 126.1* 77.1* 97.0 108.8* 149.8* 143.3* 146.5*   O2 SATURATION ART % 98.9* 95.3 97.1 97.8 99.1* 99.1* 99.0*   BASE EXCESS ART mmol/L 3.3* 0.6 2.1 2.4 -2.0* -1.4* -3.9*        Radiology  XR Chest 1 View    Result Date: 5/17/2023  Impression: 1. Removal of right IJ [place]-Enrike catheter. Vascular sheath in place. 2. Stable chest tube overlying the right midlung. No pneumothorax. 3. Persistent bibasilar consolidation left greater than right that may relate to atelectasis. Electronically Signed: Randy Martínez  5/17/2023 7:58 AM EDT  Workstation ID: [id number]    XR Chest 1 View    Result Date: 5/16/2023  Impression: 1. Interval placement of a right-sided pigtail drain with evacuation of the right pneumothorax. There is a tiny residual apical pneumothorax present. 2. Increase in pulmonary vascular congestion and edema. 3. Increasing atelectasis in both lung bases. Electronically Signed: Domingo NGUYEN Chris  5/16/2023 11:30 AM EDT  Workstation ID: [id number]    XR Chest 1 View    Result Date: 5/16/2023  Impression: 1. Interval development of moderate right-sided pneumothorax. 2. Small left apical pneumothorax appears stable. 3. Lines and tubes as above Findings were communicated to the patient's nurse in the ICU, Gita Guzman RN. Electronically Signed: oLnnie Kessler  5/16/2023 8:19 AM EDT  Workstation ID: [id number]    XR Chest 1 View    Result Date: 5/15/2023  Impression: 1. No significant interval change in probable small left apical pneumothorax. Right-sided pneumothorax is not definitely identified on the current study, but exam is limited by overlying appliances. 2. Increasing haziness in the perihilar regions may be due to early pulmonary edema. 3. Tubes and lines as above. Electronically Signed: Buck Wright  5/15/2023 3:41 PM EDT  Workstation ID: [id number]    XR Chest 1 View    Result Date: 5/15/2023  Impression: 1. Lines and tubes as above in expected position 2. Trace pneumothorax at the left apex and possible right-sided pneumothorax peripherally versus artifact. Electronically Signed: Lonnie Kessler  5/15/2023 11:42 AM EDT  Workstation ID: [id number]    XR Abdomen KUB    Result Date:  5/15/2023  Impression: NG tube projects in expected position. Electronically Signed: Lonnie Kessler  5/15/2023 11:46 AM EDT  Workstation ID: [id number]        EKG  I personally viewed and interpreted the patient's EKG/Telemetry data:  ECG 12 Lead   Preliminary Result   HEART RATE= 84  bpm   RR Interval= 717  ms   MN Interval= 41  ms   P Horizontal Axis= 60  deg   P Front Axis=   deg   QRSD Interval= 89  ms   QT Interval= 457  ms   QRS Axis= 8  deg   T Wave Axis= -87  deg   - ABNORMAL ECG -   Atrial-paced complexes   LVH with secondary repolarization abnormality   Prolonged QT interval   Electronically Signed By:    Date and Time of Study: 2023-05-17 05:00:43      ECG 12 Lead   Preliminary Result   HEART RATE= 80  bpm   RR Interval= 752  ms   MN Interval= 253  ms   P Horizontal Axis= 199  deg   P Front Axis= 108  deg   QRSD Interval= 158  ms   QT Interval= 465  ms   QRS Axis= -71  deg   T Wave Axis= 103  deg   - ABNORMAL ECG -   Atrial-ventricular dual-paced rhythm   Electronically Signed By:    Date and Time of Study: 2023-05-16 03:49:28      ECG 12 Lead    (Results Pending)         Echocardiogram:    Results for orders placed in visit on 05/15/23    Intra-Op Anesthesia CARMITA    Narrative  Intra-Op Anesthesia CARMITA    Procedure Performed: Intra-Op Anesthesia CARMITA  Start Time:  5/15/2023 7:25 AM  End Time:   5/15/2023 7:35 AM    Preanesthesia Checklist:  Patient identified, IV assessed, risks and benefits discussed, monitors and equipment assessed, procedure being performed at surgeon's request and anesthesia consent obtained.    General Procedure Information  CARMITA Placed for monitoring purposes only -- This is not a diagnostic CARMITA  Diagnostic Indications for Echo:  assessment of ascending aorta, assessment of surgical repair, defect repair evaluation and hemodynamic monitoring  Location performed:  OR  Intubated  Bite block placed  Probe Insertion:  Easy  Probe Type:  Multiplane  Modalities:  Color flow mapping, continuous  wave Doppler and pulse wave Doppler    Echocardiographic and Doppler Measurements    Ventricles    Right Ventricle:  Cavity size normal.  Hypertrophy not present.  Thrombus not present.  Global function normal.  Left Ventricle:  Cavity size dilated.  Thrombus not present.  Global Function moderately impaired.  Ejection Fraction 30%.        Valves    Aortic Valve:  Annulus normal.  Stenosis not present.  Regurgitation mild.  Leaflets normal.  Leaflet motions normal.    Mitral Valve:  Annulus dilated.  Stenosis not present.  Regurgitation severe.  Leaflets normal.  Leaflet motions normal.    Tricuspid Valve:  Annulus normal.  Stenosis not present.  Regurgitation mild.  Leaflets normal.  Leaflet motions normal.      Aorta    Ascending Aorta:  Size normal.  Dissection not present.  Plaque thickness less than 3 mm.  Mobile plaque not present.  Aortic Arch:  Size normal.  Dissection not present.  Plaque thickness less than 3 mm.  Mobile plaque not present.  Descending Aorta:  Size normal.  Dissection not present.  Plaque thickness less than 3 mm.  Mobile plaque not present.      Atria    Right Atrium:  Size dilated.  Spontaneous echo contrast not present.  Thrombus not present.  Tumor not present.  Device present.    Left Atrium:  Size dilated.  Spontaneous echo contrast present.  Thrombus not present.  Tumor not present.  Device not present.  Left atrial appendage normal.      Septa        Ventricular Septum:  Intra-ventricular septum morphology normal.        Other Findings  Pericardium:  normal  Pleural Effusion:  none  Pulmonary Arteries:  normal  Pulmonary Venous Flow:  normal    Anesthesia Information  Anesthesiologist:  Ronnie Craft MD      Echocardiogram Comments:  MILD AI; SEVERE MR DILATE LA/MV ANNULUS, CENTRAL JET  TV: MILD TI  EF 30        Stress Test:         Cardiac Catheterization:  Results for orders placed during the hospital encounter of 02/24/23    Cardiac Catheterization/Vascular  Study    Narrative  OPERATORS  Trace Marcus M.D. (Attending Cardiologist)      PROCEDURE PERFORMED  Ultrasound guided vascular access  Right heart catheterization  Coronary Angiogram  Left Heart Catheterization [id number]  Moderate Sedation 35 minutes    INDICATIONS FOR PROCEDURE  82-year-old woman with valvular heart disease including mitral and tricuspid regurgitation, pulmonary hypertension presented with heart failure exacerbation.  After IV diuresis, discussion of risk and benefit she was brought into the cardiac Cath Lab for right and left heart cath.    PROCEDURE IN DETAIL  Informed consent was obtained from the patient after explaining the risks, benefits, and alternative options of the procedure. After obtaining informed consent, the patient was brought to the cath lab and was prepped in a sterile fashion. Lidocaine 2% was used for local anesthesia into the right femoral venous access site. Right femoral vein was accessed using the micropuncture needle under ultrasound guidance and micropuncture wire advanced under flouroscopy. A 7 [place] vascular sheath was put into place percutaneously over guide-wire. Guide wires were removed. A 6Fr swan aneta catheter was advanced to wedge position. RA, RV and PA and wedge pressures were recorded.  PA sat and arterial sats recorded.  The patient tolerated the procedure well without any complications.    Lidocaine 2% was used for local anesthesia into the right femoral arterial access site. The right femoral artery was accessed with a micropuncture needle via modified Seldinger technique under ultrasound guidance. A 6F was inserted successfully.  Afterwards, 6F JR4 and JL4 diagnostic catheters were advanced over a wire into the ascending aorta and were used to engage the ostia of the left main and RCA respectively. JR4 used to cross the AV and obtain LV pressures and gradient across the AV measured via pullback technique. Images of the right and left coronary systems were  obtained. All the catheters were exchanged over a wire and subsequently removed. Angiogram of the femoral access site was obtained and did not show complications. The patient tolerated the procedure well without any complications. The pictures were reviewed at the end of the procedure. A Mynx closure device was applied.    HEMODYNAMICS    RHC  RA 5/3, 3 mmHg  RV 26/2, 4 mmHg  PA 27/8, 16 mmHg  PCW 7/7, 6 mmHg  AO Sat 96%  PA Sat 74%    Rob CO 4.73    Rob CI 3.42    LHC  LV: 108/5, 7 mmHg  AO: 116/46, 72 mmHg    No significant gradient across the aortic valve during pullback of JR4 catheter.  LV gram was not performed due to recent echocardiogram.    FINDINGS  Coronary Angiogram  Left dominant circulation  Left main: Left main is a large caliber vessel which gives rise to the Left Anterior Descending and the Left circumflex.  Left main is angiographically free from any significant disease  Left Anterior Descending Artery: LAD is a medium caliber vessel which gives rise to several septal perforators and several diagonal branches.  LAD has diffuse luminal irregularities with 20 to 30% lesion in the midsegment.  Left Circumflex: Left circumflex artery is a dominant vessel which gives rise to obtuse marginal.  Left circumflex has luminal irregularities only.  Right Coronary Artery: RCA is a small nondominant vessel.    ESTIMATED BLOOD LOSS:  10 ml    COMPLICATIONS:  None    PROCEDURE DATA:  Contrast Used: 25 cc  Sedation Time: 35 minutes    IMPRESSIONS  Non obstructive CAD  Normal filling pressure, no evidence of pulmonary hypertension.  Normal wedge pressure    RECOMMENDATIONS  -Switch IV to p.o. diuresis.  -Proceed with transesophageal echocardiogram to evaluate mitral and tricuspid regurgitation.  -Uptitrate GDMT for HFrEF         Other:         ASSESSMENT & PLAN:    Principal Problem:    Severe mitral regurgitation  Active Problems:    History of DVT (deep vein thrombosis)    Essential hypertension with goal blood  pressure less than 130/80    Hypertriglyceridemia    Hypothyroidism due to acquired atrophy of thyroid    Pacemaker    Chronic combined systolic and diastolic congestive heart failure    Coronary artery disease involving native coronary artery of native heart without angina pectoris    S/P MVR (mitral valve repair)    Hypokalemia    Severe Mitral regurgitation, S/P MVR (mitral valve repair)  Status post repair with 25 mm physio 2 ring on 5/15/2023.  Currently on Airvo  Epinephrine weaned off  Plan to wean off milrinone and Cardene drip today  Pneumothorax resolved with chest tube placement     Hypokalemia  Potassium corrected  Replace to keep potassium more than 4     Anemia  Significant anemia of blood loss with hemoglobin down to 6.  Blood transfusion as needed to keep hemoglobin more than 8     Chronic combined systolic and diastolic congestive heart failure  Lasix today  We will resume beta-blocker and ACE inhibitor when blood pressure is able to tolerate  Elective echocardiogram to evaluate LV function and tricuspid valve     Coronary artery disease involving native coronary artery of native heart without angina pectoris  Nonobstructive coronary disease per coronary angiogram.  Continue aspirin.  She is intolerant to statins     Essential hypertension with goal blood pressure less than 130/80  Plan to wean off milrinone and Cardene drip today     Hypertriglyceridemia  We will resume fenofibrate at the time of discharge     Pacemaker, h/o SSS  [employer] permanent pacemaker is in place  Pacemaker interrogation with no significant events     History of DVT (deep venous thrombosis)  We will resume warfarin prior to discharge     Hypothyroidism due to acquired atrophy of thyroid  Continue Synthroid      Trace Marcus MD  05/17/23  09:36 EDT

## 2023-05-17 NOTE — PROGRESS NOTES
Critical Care Progress Note   Thi Allen : 1940 MRN:[id number] LOS:2     Principal Problem: Severe mitral regurgitation     Reason for follow up: All the medical problems listed below    Summary     A 83 y.o. female with PMH of CAD, CHF, hypothyroidism, hypertension, mitral regurgitation was admitted to the hospital by CTS for mitral valve repair.  Patient had mitral valve repair on 5/15 and was subsequently extubated.  Postextubation, developed right-sided pneumothorax and critical care was consulted specifically to manage right pneumothorax. S/p right-sided chest tube placement after which pneumothorax resolved.    Significant events     23 : Leave chest tube to waterseal.    Assessment / Plan     Acute respiratory failure with hypoxia: Improved, switch to Airvo to nasal cannula.  Wean off oxygen as tolerated.    Right pneumothorax: Avoid any positive pressure ventilation including BiPAP, if possible.  Chest x-ray with improvement.  Leave on waterseal and clamp chest tube in AM.    Rest of the management per CTS    Code status:   Code Status (Patient has no pulse and is not breathing): CPR (Attempt to Resuscitate)  Medical Interventions (Patient has pulse or is breathing): Full       Nutrition: Diet: Liquid Diets; Clear Liquid; Texture: Regular Texture (IDDSI 7); Fluid Consistency: Thin (IDDSI 0)   Patient isn't on Tube Feeding    DVT prophylaxis:  Medical and mechanical DVT prophylaxis orders are present.     Subjective / Review of systems     Review of Systems   Feels better, sitting comfortably in chair, breathing has improved, denies any cough or phlegm.  No chest pain.    Objective / Physical Exam   Vital signs:  Temp: 97.3 °F (36.3 °C)  BP: 145/63  Heart Rate: 84  Resp: 18  SpO2: 100 %  Weight: 51.9 kg (114 lb 6.7 oz)    Admission Weight: Weight: 48 kg (105 lb 12.8 oz)  Current Weight: Weight: 51.9 kg (114 lb 6.7 oz)    Input/Output in last 24 hours:    Intake/Output Summary (Last 24  hours) at 5/17/2023 1346  Last data filed at 5/17/2023 0550  Gross per 24 hour   Intake 3019 ml   Output 1670 ml   Net 1349 ml      Physical Exam  Vitals and nursing note reviewed.   Constitutional:       General: She is not in acute distress.     Appearance: Normal appearance.   HENT:      Nose:      Comments: On Airvo     Mouth/Throat:      Mouth: Mucous membranes are moist.      Pharynx: Oropharynx is clear.   Eyes:      General: No scleral icterus.     Extraocular Movements: Extraocular movements intact.      Conjunctiva/sclera: Conjunctivae normal.   Cardiovascular:      Rate and Rhythm: Normal rate.      Heart sounds: No murmur heard.    No gallop.      Comments: 2 mediastinal chest tubes  Pulmonary:      Breath sounds: Normal breath sounds. No wheezing or rales.      Comments: Right-sided chest tube with no air leak  Abdominal:      General: Bowel sounds are normal.      Palpations: Abdomen is soft.      Tenderness: There is no abdominal tenderness.   Musculoskeletal:         General: No tenderness.      Right lower leg: No edema.      Left lower leg: No edema.   Skin:     Comments: Sternal incision is clean, no hematoma   Neurological:      General: No focal deficit present.      Mental Status: She is alert and oriented to person, place, and time.        Radiology and Labs     Results from last 7 days   Lab Units 05/17/23  0352 05/16/23  0434 05/16/23  0248 05/16/23  0225 05/15/23  2317 05/15/23  1214 05/15/23  1202 05/15/23  0805 05/15/23  0701 05/11/23  0821   WBC 10*3/mm3 11.60*  --  12.40*  --   --   --  10.00  --  4.80  4.80 4.90   HEMATOCRIT % 31.2*  --  35.0  --   --   --  40.5  --  35.2  34.4 37.7   HEMATOCRIT POC %  --  34*  --  31* 32*   < >  --    < >  --   --    PLATELETS 10*3/mm3 74*  --  109*  --   --   --  101*  --  203  198 240    < > = values in this interval not displayed.      Results from last 7 days   Lab Units 05/17/23  0352 05/16/23  1307 05/16/23  0248 05/15/23  1808 05/15/23  1109  05/11/23  0821   SODIUM mmol/L 145 147* 145  --  139 142   POTASSIUM mmol/L 3.4* 3.7 3.4* 3.2* 2.5* 4.2   CHLORIDE mmol/L 107 109* 108*  --  103 103   CO2 mmol/L 23.0 26.0 22.0  --  22.0 30.0*   BUN mg/dL 22 19 19  --  18 21   CREATININE mg/dL 0.80 0.81 0.93  --  0.75 0.80      Current medications   Scheduled Meds: aspirin, 81 mg, Oral, Daily  atenolol, 25 mg, Oral, Q24H  chlorhexidine, 15 mL, Mouth/Throat, Q12H  [START ON 5/18/2023] enoxaparin, 40 mg, Subcutaneous, Q24H  levothyroxine, 50 mcg, Oral, Daily  mupirocin, 1 application, Each Nare, BID  pantoprazole, 40 mg, Oral, Q AM  potassium chloride, 20 mEq, Oral, Daily      Continuous Infusions: insulin, 0-100 Units/hr, Last Rate: 1.9 Units/hr (05/17/23 1250)  sodium chloride, 30 mL/hr, Last Rate: 30 mL/hr (05/15/23 1130)        Plan discussed with RN. Reviewed all other data in the last 24 hours, including but not limited to vitals, labs, microbiology, imaging and pertinent notes from other providers.     Han De Souza MD   Critical Care  05/17/23   13:46 EDT

## 2023-05-17 NOTE — PLAN OF CARE
Assessment: Thi Allen presents with functional mobility impairments which indicate the need for skilled intervention. Tolerating session today without incident. Pt with excellent progress this date. PT assisted to toilet transfer with cueing for sternal precautions. Pt then stood and ambulated with RWx for 75 feet with mild c/o fatigue, but demonstrates good ability to ambulate. Pt requires increased time with all mobiltiy, but likely to progress well.  Will continue to follow and progress as tolerated.

## 2023-05-17 NOTE — PLAN OF CARE
"Assessment: Thi Allen presents with ADL impairments affecting function including endurance / activity tolerance and shortness of breath. Demonstrated functioning below baseline abilities indicate the need for continued skilled intervention while inpatient. Much improved performance today. Pt down to 15L O2 and able to ambulate approx 100' at a slow pace with RW. CGA +1 person required for equipment management. Mod A needed for toileting this date, incont of bowels and assistance needed for hygiene. Tolerating session today without incident. Will continue to follow and progress as tolerated.      Plan/Recommendations:   Low Intensity Therapy recommended post-acute care - This is recommended as therapy feels this patient would require 2-3 visits per week. OP or HH would be the best option depending on patient's home bound status. Consider, if the patient has other  \"skilled\" needs such as wounds, IV antibiotics, etc. Combined with \"low intensity\" could also equate to a SNF. If patient is medically complex, consider LTAC.. Pt requires no DME at discharge.      "

## 2023-05-18 ENCOUNTER — APPOINTMENT (OUTPATIENT)
Dept: GENERAL RADIOLOGY | Facility: HOSPITAL | Age: 83
DRG: 219 | End: 2023-05-18
Payer: MEDICARE

## 2023-05-18 LAB
ANION GAP SERPL CALCULATED.3IONS-SCNC: 12 MMOL/L (ref 5–15)
BUN SERPL-MCNC: 24 MG/DL (ref 8–23)
BUN/CREAT SERPL: 36.4 (ref 7–25)
CALCIUM SPEC-SCNC: 9 MG/DL (ref 8.6–10.5)
CHLORIDE SERPL-SCNC: 107 MMOL/L (ref 98–107)
CO2 SERPL-SCNC: 23 MMOL/L (ref 22–29)
CREAT SERPL-MCNC: 0.66 MG/DL (ref 0.57–1)
DEPRECATED RDW RBC AUTO: 48.1 FL (ref 37–54)
EGFRCR SERPLBLD CKD-EPI 2021: 88.3 ML/MIN/1.73
ERYTHROCYTE [DISTWIDTH] IN BLOOD BY AUTOMATED COUNT: 15 % (ref 12.3–15.4)
GLUCOSE BLDC GLUCOMTR-MCNC: 101 MG/DL (ref 70–105)
GLUCOSE BLDC GLUCOMTR-MCNC: 103 MG/DL (ref 70–105)
GLUCOSE BLDC GLUCOMTR-MCNC: 103 MG/DL (ref 70–105)
GLUCOSE BLDC GLUCOMTR-MCNC: 107 MG/DL (ref 70–105)
GLUCOSE BLDC GLUCOMTR-MCNC: 107 MG/DL (ref 70–105)
GLUCOSE BLDC GLUCOMTR-MCNC: 111 MG/DL (ref 70–105)
GLUCOSE BLDC GLUCOMTR-MCNC: 126 MG/DL (ref 70–105)
GLUCOSE BLDC GLUCOMTR-MCNC: 141 MG/DL (ref 70–105)
GLUCOSE BLDC GLUCOMTR-MCNC: 159 MG/DL (ref 70–105)
GLUCOSE BLDC GLUCOMTR-MCNC: 167 MG/DL (ref 70–105)
GLUCOSE BLDC GLUCOMTR-MCNC: 219 MG/DL (ref 70–105)
GLUCOSE SERPL-MCNC: 111 MG/DL (ref 65–99)
HCT VFR BLD AUTO: 30.8 % (ref 34–46.6)
HGB BLD-MCNC: 10 G/DL (ref 12–15.9)
INR PPP: 0.96 (ref 2–3)
MCH RBC QN AUTO: 29.8 PG (ref 26.6–33)
MCHC RBC AUTO-ENTMCNC: 32.4 G/DL (ref 31.5–35.7)
MCV RBC AUTO: 92.2 FL (ref 79–97)
PLATELET # BLD AUTO: 91 10*3/MM3 (ref 140–450)
PMV BLD AUTO: 9.8 FL (ref 6–12)
POTASSIUM SERPL-SCNC: 3.9 MMOL/L (ref 3.5–5.2)
PROTHROMBIN TIME: 10.3 SECONDS (ref 19.4–28.5)
RBC # BLD AUTO: 3.34 10*6/MM3 (ref 3.77–5.28)
SODIUM SERPL-SCNC: 142 MMOL/L (ref 136–145)
WBC NRBC COR # BLD: 11.4 10*3/MM3 (ref 3.4–10.8)

## 2023-05-18 PROCEDURE — 85610 PROTHROMBIN TIME: CPT | Performed by: NURSE PRACTITIONER

## 2023-05-18 PROCEDURE — 63710000001 INSULIN LISPRO (HUMAN) PER 5 UNITS: Performed by: NURSE PRACTITIONER

## 2023-05-18 PROCEDURE — 25010000002 ENOXAPARIN PER 10 MG: Performed by: NURSE PRACTITIONER

## 2023-05-18 PROCEDURE — 97116 GAIT TRAINING THERAPY: CPT

## 2023-05-18 PROCEDURE — 82948 REAGENT STRIP/BLOOD GLUCOSE: CPT

## 2023-05-18 PROCEDURE — 80048 BASIC METABOLIC PNL TOTAL CA: CPT | Performed by: NURSE PRACTITIONER

## 2023-05-18 PROCEDURE — 71045 X-RAY EXAM CHEST 1 VIEW: CPT

## 2023-05-18 PROCEDURE — 97530 THERAPEUTIC ACTIVITIES: CPT

## 2023-05-18 PROCEDURE — 25010000002 MORPHINE PER 10 MG: Performed by: NURSE PRACTITIONER

## 2023-05-18 PROCEDURE — 99232 SBSQ HOSP IP/OBS MODERATE 35: CPT | Performed by: INTERNAL MEDICINE

## 2023-05-18 PROCEDURE — 94799 UNLISTED PULMONARY SVC/PX: CPT

## 2023-05-18 PROCEDURE — 85027 COMPLETE CBC AUTOMATED: CPT | Performed by: NURSE PRACTITIONER

## 2023-05-18 RX ORDER — ATENOLOL 25 MG/1
25 TABLET ORAL ONCE
Status: COMPLETED | OUTPATIENT
Start: 2023-05-18 | End: 2023-05-18

## 2023-05-18 RX ORDER — FUROSEMIDE 40 MG/1
40 TABLET ORAL DAILY
Status: DISCONTINUED | OUTPATIENT
Start: 2023-05-18 | End: 2023-05-19

## 2023-05-18 RX ORDER — INSULIN LISPRO 100 [IU]/ML
2-7 INJECTION, SOLUTION INTRAVENOUS; SUBCUTANEOUS
Status: DISCONTINUED | OUTPATIENT
Start: 2023-05-18 | End: 2023-05-18 | Stop reason: SDUPTHER

## 2023-05-18 RX ORDER — ATENOLOL 50 MG/1
50 TABLET ORAL
Status: DISCONTINUED | OUTPATIENT
Start: 2023-05-19 | End: 2023-05-20

## 2023-05-18 RX ORDER — WARFARIN SODIUM 2.5 MG/1
2.5 TABLET ORAL
Status: COMPLETED | OUTPATIENT
Start: 2023-05-18 | End: 2023-05-18

## 2023-05-18 RX ORDER — LATANOPROST 50 UG/ML
1 SOLUTION/ DROPS OPHTHALMIC NIGHTLY
Status: DISCONTINUED | OUTPATIENT
Start: 2023-05-18 | End: 2023-05-20 | Stop reason: HOSPADM

## 2023-05-18 RX ORDER — POTASSIUM CHLORIDE 20 MEQ/1
20 TABLET, EXTENDED RELEASE ORAL DAILY
Status: DISCONTINUED | OUTPATIENT
Start: 2023-05-18 | End: 2023-05-18

## 2023-05-18 RX ADMIN — ENOXAPARIN SODIUM 40 MG: 100 INJECTION SUBCUTANEOUS at 16:38

## 2023-05-18 RX ADMIN — PANTOPRAZOLE SODIUM 40 MG: 40 TABLET, DELAYED RELEASE ORAL at 06:12

## 2023-05-18 RX ADMIN — ATENOLOL 25 MG: 25 TABLET ORAL at 10:35

## 2023-05-18 RX ADMIN — FUROSEMIDE 40 MG: 40 TABLET ORAL at 10:34

## 2023-05-18 RX ADMIN — MORPHINE SULFATE 2 MG: 2 INJECTION, SOLUTION INTRAMUSCULAR; INTRAVENOUS at 06:08

## 2023-05-18 RX ADMIN — POTASSIUM CHLORIDE 20 MEQ: 1500 TABLET, EXTENDED RELEASE ORAL at 08:42

## 2023-05-18 RX ADMIN — LATANOPROST 1 DROP: 50 SOLUTION OPHTHALMIC at 21:02

## 2023-05-18 RX ADMIN — ATENOLOL 25 MG: 25 TABLET ORAL at 08:41

## 2023-05-18 RX ADMIN — ACETAMINOPHEN 650 MG: 325 TABLET, FILM COATED ORAL at 13:25

## 2023-05-18 RX ADMIN — MUPIROCIN 1 APPLICATION: 20 OINTMENT TOPICAL at 21:02

## 2023-05-18 RX ADMIN — MUPIROCIN 1 APPLICATION: 20 OINTMENT TOPICAL at 08:43

## 2023-05-18 RX ADMIN — CHLORHEXIDINE GLUCONATE 15 ML: 1.2 RINSE ORAL at 21:02

## 2023-05-18 RX ADMIN — INSULIN LISPRO 3 UNITS: 100 INJECTION, SOLUTION INTRAVENOUS; SUBCUTANEOUS at 17:32

## 2023-05-18 RX ADMIN — WARFARIN SODIUM 2.5 MG: 2.5 TABLET ORAL at 17:33

## 2023-05-18 RX ADMIN — CHLORHEXIDINE GLUCONATE 15 ML: 1.2 RINSE ORAL at 08:42

## 2023-05-18 RX ADMIN — LEVOTHYROXINE SODIUM 50 MCG: 0.05 TABLET ORAL at 08:42

## 2023-05-18 RX ADMIN — ASPIRIN 81 MG: 81 TABLET, COATED ORAL at 08:42

## 2023-05-18 NOTE — THERAPY TREATMENT NOTE
Subjective: Pt agreeable to therapeutic plan of care.    Objective:     Bed mobility - N/A or Not attempted.  Transfers - CGA and with rolling walker with VC for sternal precautions  Ambulation - 140 feet CGA and with rolling walker       Vitals: WNL on Room air, O2 maintained above 90%    Pain: 8 VAS   Location: R side, chest tube site.  Intervention for pain: Repositioned    Education: Provided education on the importance of mobility in the acute care setting, Verbal/Tactile Cues, Transfer Training and Gait Training    Assessment: Thi Allen presents with functional mobility impairments which indicate the need for skilled intervention. Tolerating session today without incident. Pt with good improvement this date, continuing to require cueing for transfer training, but ambulating increased distance without O2, utilizing RWx. Pt with significant pain but participating well. Will continue to follow and progress as tolerated.     Plan/Recommendations:   No ongoing therapy recommended post-acute care. No therapy needs.. Pt requires no DME at discharge.     Pt desires Home with family assist at discharge. Pt cooperative; agreeable to therapeutic recommendations and plan of care.         Basic Mobility 6-click:  Rollin = Total, A lot = 2, A little = 3; 4 = None  Supine>Sit:   1 = Total, A lot = 2, A little = 3; 4 = None   Sit>Stand with arms:  1 = Total, A lot = 2, A little = 3; 4 = None  Bed>Chair:   1 = Total, A lot = 2, A little = 3; 4 = None  Ambulate in room:  1 = Total, A lot = 2, A little = 3; 4 = None  3-5 Steps with railin = Total, A lot = 2, A little = 3; 4 = None  Score: 17    Modified Drew: N/A = No pre-op stroke/TIA    Post-Tx Position: Up in Chair, Alarms activated and Call light and personal items within reach  PPE: gloves

## 2023-05-18 NOTE — CONSULTS
Nutrition Services    Patient Name: Thi Allen  YOB: 1940  MRN: [id number]  Admission date: 5/15/2023    Comment:  Addition of Boost Plus q daily (Provides 360 kcals, 14 g of protein if consumed)     Moderate chronic disease related malnutrition related to multiple chronic diseases as evidenced by PO intake meeting less than 75% of estimated energy requirement for greater than or equal to 3 months and moderate muscle wasting per NFPE.    See MSA below.    PPE Documentation        PPE Worn By Provider mask   PPE Worn By Patient  N/A     CLINICAL NUTRITION ASSESSMENT      Reason for Assessment 5/18: MST: 5     H&P      Past Medical History:   Diagnosis Date   • AV block 02/22/2021   • Bilateral renal cysts 01/01/2020   • Carotid stenosis, bilateral 06/01/2019    Overview:  <50% bilateral    • Carpal tunnel syndrome, bilateral 06/15/2018   • Chronic combined systolic (congestive) and diastolic (congestive) heart failure    • DVT (deep venous thrombosis) (CMS/[facility]) [I82.409]     recurrent   • Hordeolum externum 08/05/2015   • Hyperlipidemia    • Hypertension    • Hypothyroidism due to acquired atrophy of thyroid 10/21/2019   • Long term (current) use of anticoagulants 09/29/2022   • Mitral regurgitation 05/17/2016   • Osteopenia    • Pacemaker 12/28/2021       Past Surgical History:   Procedure Laterality Date   • CARDIAC CATHETERIZATION N/A 2/25/2023    Procedure: Left Heart Cath and coronary angiogram;  Surgeon: Trace Marcus MD;  Location: [facility] CATH INVASIVE LOCATION;  Service: Cardiovascular;  Laterality: N/A;   • CARDIAC CATHETERIZATION Bilateral 2/25/2023    Procedure: Right and Left Heart Cath;  Surgeon: Trace Marcus MD;  Location: [facility] CATH INVASIVE LOCATION;  Service: Cardiovascular;  Laterality: Bilateral;   • HYSTERECTOMY     • PACEMAKER IMPLANTATION     • THYROID SURGERY          Current Problems   Severe mitral regurgitation   -s/p mv repair  cardiothoracic surgery  "following  -cardiology following  -HFrEF    R pneumothorax  -chest tube    Poor appetite   -dietitian to see     Encounter Information        Trending Narrative     5/18: Pt admitted to [facility] with severe mitral regurgitation s/p MV repair. Pt reports ~35# wt loss x 1 year. Pt denied appetite changes. Pt reports she eats 1 meal daily and does not like ONS because she is not a big fan of sweet things, but she is drinking one daily while admitted. NFPE completed, consistent with nutrition diagnosis of malnutrition using AND/ASPEN criteria. See MSA below.        Anthropometrics        Current Height, Weight Height: 149.9 cm (59\")  Weight: 53.3 kg (117 lb 8.1 oz) (05/18/23 0200)       Ideal Body Weight (IBW) 145#   Usual Body Weight (UBW) unknown       Trending Weight Hx     This admission: 5/18: 117# - scale             PTA: 15% wt gain x 3 months    Wt Readings from Last 30 Encounters:   05/18/23 0200 53.3 kg (117 lb 8.1 oz)   05/17/23 0539 51.9 kg (114 lb 6.7 oz)   05/16/23 0600 54.1 kg (119 lb 4.3 oz)   05/15/23 0549 48 kg (105 lb 12.8 oz)   05/11/23 0955 48.5 kg (107 lb)   04/27/23 1322 48.1 kg (106 lb)   04/24/23 1312 48.1 kg (106 lb)   04/12/23 1346 48.1 kg (106 lb)   03/29/23 1331 48.1 kg (106 lb)   03/08/23 1251 47.6 kg (105 lb)   03/06/23 1401 47.6 kg (105 lb)   03/06/23 1300 47.6 kg (105 lb)   02/27/23 0319 44.7 kg (98 lb 8.7 oz)   02/26/23 0532 45.5 kg (100 lb 6.4 oz)   02/25/23 0425 45.8 kg (101 lb)   02/24/23 0505 49.9 kg (110 lb 0.2 oz)   02/20/23 1041 49 kg (108 lb)   02/06/23 1208 48.5 kg (107 lb)   01/22/23 1152 49 kg (108 lb)   01/04/23 1141 49 kg (108 lb)   11/29/22 1207 49 kg (108 lb)   11/01/22 1253 48.1 kg (106 lb)   10/26/22 1408 47.6 kg (105 lb)   10/13/22 1011 48.1 kg (106 lb)   09/28/22 1359 48.1 kg (106 lb)   09/20/22 1132 47.6 kg (105 lb)   08/17/22 1238 61.2 kg (135 lb)   02/27/22 1039 61.2 kg (135 lb)   02/22/21 1324 61.2 kg (135 lb)   10/21/19 1557 61.2 kg (135 lb)   04/23/19 1016 59 kg (130 " lb)   02/26/19 1251 59 kg (130 lb)   07/20/18 1158 58.2 kg (128 lb 3.2 oz)   08/30/17 1141 57.6 kg (127 lb)   09/25/16 1448 59 kg (130 lb)      BMI kg/m2 Body mass index is 23.73 kg/m².       Labs        Pertinent Labs Reviewed, management per attending.   Results from last 7 days   Lab Units 05/18/23  0216 05/17/23  0352 05/16/23  1307   SODIUM mmol/L 142 145 147*   POTASSIUM mmol/L 3.9 3.4* 3.7   CHLORIDE mmol/L 107 107 109*   CO2 mmol/L 23.0 23.0 26.0   BUN mg/dL 24* 22 19   CREATININE mg/dL 0.66 0.80 0.81   CALCIUM mg/dL 9.0 8.6 8.9   GLUCOSE mg/dL 111* 147* 164*     Results from last 7 days   Lab Units 05/18/23  0216 05/16/23  0434 05/16/23  0248   MAGNESIUM mg/dL  --   --  2.4   PHOSPHORUS mg/dL  --   --  4.1   HEMOGLOBIN g/dL 10.0*   < > 11.8*   HEMOGLOBIN, POC   --    < >  --    HEMATOCRIT % 30.8*   < > 35.0   HEMATOCRIT POC   --    < >  --     < > = values in this interval not displayed.     COVID19   Date Value Ref Range Status   05/11/2023 Not Detected Not Detected - Ref. Range Final     Lab Results   Component Value Date    HGBA1C 5.80 (H) 05/11/2023        Medications    Scheduled Medications aspirin, 81 mg, Oral, Daily  atenolol, 25 mg, Oral, Once  [START ON 5/19/2023] atenolol, 50 mg, Oral, Q24H  chlorhexidine, 15 mL, Mouth/Throat, Q12H  enoxaparin, 40 mg, Subcutaneous, Q24H  furosemide, 40 mg, Oral, Daily  insulin lispro, 2-7 Units, Subcutaneous, 4x Daily With Meals & Nightly  latanoprost, 1 drop, Both Eyes, Nightly  levothyroxine, 50 mcg, Oral, Daily  mupirocin, 1 application, Each Nare, BID  pantoprazole, 40 mg, Oral, Q AM  potassium chloride, 20 mEq, Oral, Daily  potassium chloride, 20 mEq, Oral, Daily  warfarin, 2.5 mg, Oral, Once        Infusions      PRN Medications •  acetaminophen **OR** acetaminophen **OR** acetaminophen  •  Calcium Replacement - Follow Nurse / BPA Driven Protocol  •  dextrose  •  dextrose  •  dextrose  •  dextrose  •  glucagon (human recombinant)  •  glucagon (human  recombinant)  •  HYDROcodone-acetaminophen  •  Magnesium Standard Dose Replacement - Follow Nurse / BPA Driven Protocol  •  metoclopramide  •  Morphine **AND** naloxone  •  ondansetron  •  Phosphorus Replacement - Follow Nurse / BPA Driven Protocol  •  polyethylene glycol  •  Potassium Replacement - Follow Nurse / BPA Driven Protocol  •  senna-docusate sodium     Physical Findings        Trending Physical   Appearance, NFPE 5/18: NFPE completed, consistent with nutrition diagnosis of malnutrition using AND/ASPEN criteria. See MSA below.      --  Edema  None documented     Bowel Function Last documented BM on 5/170     Tubes No feeding tube     Chewing/Swallowing No reported difficulty     Skin Intact      --  Current Nutrition Orders & Evaluation of Intake       Oral Nutrition     Food Allergies NKFA   Current PO Diet Diet: Cardiac Diets; Healthy Heart (2-3 Na+); Texture: Regular Texture (IDDSI 7); Fluid Consistency: Thin (IDDSI 0)   Supplement None ordered but pt receiving them   PO Evaluation     Trending % PO Intake 5/18: 0-25% at meals, pt drinking Boost shakes   --  Nutritional Risk Screening        NRS-2002 Score          Nutrition Diagnosis         Nutrition Dx Problem 1 Moderate chronic disease related malnutrition related to multiple chronic diseases as evidenced by PO intake meeting less than 75% of estimated energy requirement for greater than or equal to 3 months and moderate muscle wasting per NFPE.      Nutrition Dx Problem 2        Intervention Goal         Intervention Goal(s) PO intake > 75%, ONS acceptance     Nutrition Intervention        RD Action Addition of Boost Plus q daily (Provides 360 kcals, 14 g of protein if consumed)        Nutrition Prescription          Diet Prescription Healthy heart   Supplement Prescription Addition of Boost Plus q daily (Provides 360 kcals, 14 g of protein if consumed)      --  Monitor/Evaluation        Monitor Per protocol, PO intake, Supplement intake, Pertinent  labs, Weight, Skin status, GI status, Symptoms, POC/GOC     Malnutrition Severity Assessment      Patient meets criteria for : Moderate (non-severe) Malnutrition  Malnutrition Type (last 8 hours)     Malnutrition Severity Assessment     Row Name 05/18/23 1206       Malnutrition Severity Assessment    Malnutrition Type Chronic Disease - Related Malnutrition    Row Name 05/18/23 1206       Insufficient Energy Intake     Insufficient Energy Intake Findings Severe    Insufficient Energy Intake  <75% of est. energy requirement for > or equal to 3 months    Row Name 05/18/23 1206       Muscle Loss    Loss of Muscle Mass Findings Moderate    Pentecostal Region Moderate - slight depression    Clavicle Bone Region Moderate - some protrusion in females, visible in males    Row Name 05/18/23 1206       Criteria Met (Must meet criteria for severity in at least 2 of these categories: M Wasting, Fat Loss, Fluid, Secondary Signs, Wt. Status, Intake)    Patient meets criteria for  Moderate (non-severe) Malnutrition                       Electronically signed by:  Elena Sands RD  05/18/23 10:18 EDT

## 2023-05-18 NOTE — PROGRESS NOTES
Referring Provider: Veronica Orozco MD    Reason for follow-up: Mitral regurgitation status post mitral valve repair     Patient Care Team:  Camila Lazcano MD as PCP - General  Trace Marcus MD as Cardiologist (Cardiology)      SUBJECTIVE  She has been weaned off oxygen and is now on room air sitting up in chair.     ROS  Review of all systems negative except as indicated.    Since I have last seen, the patient has been without any chest discomfort, shortness of breath, palpitations, dizziness or syncope.  Denies having any headache, abdominal pain, nausea, vomiting, diarrhea, constipation, loss of weight or loss of appetite.  Denies having any excessive bruising, hematuria or blood in the stool.  ROS      Personal History:    Past Medical History:   Diagnosis Date   • AV block 02/22/2021   • Bilateral renal cysts 01/01/2020   • Carotid stenosis, bilateral 06/01/2019    Overview:  <50% bilateral    • Carpal tunnel syndrome, bilateral 06/15/2018   • Chronic combined systolic (congestive) and diastolic (congestive) heart failure    • DVT (deep venous thrombosis) (CMS/[facility]) [I82.409]     recurrent   • Hordeolum externum 08/05/2015   • Hyperlipidemia    • Hypertension    • Hypothyroidism due to acquired atrophy of thyroid 10/21/2019   • Long term (current) use of anticoagulants 09/29/2022   • Mitral regurgitation 05/17/2016   • Osteopenia    • Pacemaker 12/28/2021       Past Surgical History:   Procedure Laterality Date   • CARDIAC CATHETERIZATION N/A 2/25/2023    Procedure: Left Heart Cath and coronary angiogram;  Surgeon: Trace Marcus MD;  Location: [facility] CATH INVASIVE LOCATION;  Service: Cardiovascular;  Laterality: N/A;   • CARDIAC CATHETERIZATION Bilateral 2/25/2023    Procedure: Right and Left Heart Cath;  Surgeon: Trace Marcus MD;  Location:  [facility] CATH INVASIVE LOCATION;  Service: Cardiovascular;  Laterality: Bilateral;   • HYSTERECTOMY     • PACEMAKER IMPLANTATION     • THYROID SURGERY         Family  History   Problem Relation Age of Onset   • No Known Problems Mother    • No Known Problems Father    • No Known Problems Sister    • No Known Problems Brother    • No Known Problems Maternal Aunt    • No Known Problems Maternal Uncle    • No Known Problems Paternal Aunt    • No Known Problems Paternal Uncle    • No Known Problems Maternal Grandmother    • No Known Problems Maternal Grandfather    • No Known Problems Paternal Grandmother    • No Known Problems Paternal Grandfather    • No Known Problems Other    • Anemia Neg Hx    • Arrhythmia Neg Hx    • Asthma Neg Hx    • Clotting disorder Neg Hx    • Fainting Neg Hx    • Heart attack Neg Hx    • Heart disease Neg Hx    • Heart failure Neg Hx    • Hyperlipidemia Neg Hx    • Hypertension Neg Hx        Social History     Tobacco Use   • Smoking status: Former     Passive exposure: Past   • Smokeless tobacco: Never   • Tobacco comments:     quit 35 yrs ago   Vaping Use   • Vaping Use: Never used   Substance Use Topics   • Alcohol use: Not Currently     Comment: rare   • Drug use: No        Medications Prior to Admission   Medication Sig Dispense Refill Last Dose   • atenolol (TENORMIN) 100 MG tablet Take 1 tablet by mouth Daily.   2023   • [] Enoxaparin Sodium (LOVENOX) 60 MG/0.6ML solution prefilled syringe syringe Inject 0.5 mL under the skin into the appropriate area as directed Every 12 (Twelve) Hours for 5 doses. Warfarin being stopped for cardiac surgery.  Lovenox to start  BID and last dose  morning. 2.5 mL 0 5/15/2023 at 0400   • famotidine (PEPCID) 20 MG tablet Take 1 tablet by mouth 2 (Two) Times a Day As Needed.   Past Week   • fenofibrate 160 MG tablet Take 1 tablet by mouth Daily.   2023   • hydroCHLOROthiazide (HYDRODIURIL) 25 MG tablet Take 2 tablets by mouth Every Other Day.   2023   • latanoprost (XALATAN) 0.005 % ophthalmic solution Administer 1 drop to both eyes Every Night.   2023   • levothyroxine (SYNTHROID,  LEVOTHROID) 50 MCG tablet TAKE 1 TABLET BY MOUTH ONCE DAILY   2023   • lisinopril (PRINIVIL,ZESTRIL) 20 MG tablet Take 1 tablet by mouth Daily. 30 tablet 0 2023   • mupirocin (BACTROBAN) 2 % ointment Apply to nares (inside each nostril) twice daily as directed for procedure 22 g 0 5/15/2023   • warfarin (COUMADIN) 2.5 MG tablet Take 1 tablet by mouth Every Night. T,Th,Sat,Sun   2023   • warfarin (COUMADIN) 5 MG tablet Take 1 tablet by mouth Daily. M,W,F   5/10/2023       Allergies:  Atorvastatin, Colesevelam hcl, Colestipol hcl, Ezetimibe, Pitavastatin, Rosuvastatin, Rosuvastatin calcium, Simvastatin, Statins, and Keflex [cephalexin]    Scheduled Meds:aspirin, 81 mg, Oral, Daily  [START ON 2023] atenolol, 50 mg, Oral, Q24H  chlorhexidine, 15 mL, Mouth/Throat, Q12H  enoxaparin, 40 mg, Subcutaneous, Q24H  furosemide, 40 mg, Oral, Daily  insulin lispro, 2-7 Units, Subcutaneous, 4x Daily With Meals & Nightly  latanoprost, 1 drop, Both Eyes, Nightly  levothyroxine, 50 mcg, Oral, Daily  mupirocin, 1 application, Each Nare, BID  pantoprazole, 40 mg, Oral, Q AM  potassium chloride, 20 mEq, Oral, Daily  warfarin, 2.5 mg, Oral, Once      Continuous Infusions:   PRN Meds:.•  acetaminophen **OR** acetaminophen **OR** acetaminophen  •  Calcium Replacement - Follow Nurse / BPA Driven Protocol  •  dextrose  •  dextrose  •  dextrose  •  dextrose  •  glucagon (human recombinant)  •  glucagon (human recombinant)  •  HYDROcodone-acetaminophen  •  Magnesium Standard Dose Replacement - Follow Nurse / BPA Driven Protocol  •  metoclopramide  •  [] Morphine **AND** naloxone  •  ondansetron  •  Phosphorus Replacement - Follow Nurse / BPA Driven Protocol  •  polyethylene glycol  •  Potassium Replacement - Follow Nurse / BPA Driven Protocol  •  senna-docusate sodium      OBJECTIVE    Vital Signs  Vitals:    23 0754 23 0841 23 1034 23 1146   BP: 178/50 135/50 151/73 148/68   BP Location: Other  "(Comment)   Right arm   Patient Position: Sitting   Lying   Pulse: 88 87 89 82   Resp: 18   16   Temp: 97.4 °F (36.3 °C)   98 °F (36.7 °C)   TempSrc: Oral   Oral   SpO2: 98%      Weight:       Height:           Flowsheet Rows    Flowsheet Row First Filed Value   Admission Height 149.9 cm (59\") Documented at 05/15/2023 0516   Admission Weight 48 kg (105 lb 12.8 oz) Documented at 05/15/2023 0549            Intake/Output Summary (Last 24 hours) at 5/18/2023 1528  Last data filed at 5/18/2023 0925  Gross per 24 hour   Intake 1110 ml   Output 1795 ml   Net -685 ml          Telemetry: Paced rhythm    Physical Exam:  The patient is alert, oriented and in no distress.  Vital signs as noted above.  Head and neck revealed no carotid bruits or jugular venous distention.  No thyromegaly or lymphadenopathy is present  Lungs clear.  No wheezing.  Breath sounds are normal bilaterally.  She is on 3 L of nasal cannula however switching to room air soon.  Heart normal first and second heart sounds.  No murmur. No precordial rub is present.  No gallop is present.  Abdomen soft and nontender.  No organomegaly is present.  Extremities with good peripheral pulses without any pedal edema.  Skin warm and dry.  Musculoskeletal system is grossly normal.  CNS grossly normal.       Results Review:  I have personally reviewed the results from the time of this admission to 5/18/2023 15:28 EDT and agree with these findings:  []  Laboratory  []  Microbiology  []  Radiology  []  EKG/Telemetry   []  Cardiology/Vascular   []  Pathology  []  Old records  []  Other:    Most notable findings include:    Lab Results (last 24 hours)     Procedure Component Value Units Date/Time    POC Glucose Once [[id number]]  (Abnormal) Collected: 05/18/23 1133    Specimen: Blood Updated: 05/18/23 1135     Glucose 159 mg/dL      Comment: Serial Number: [id number]:  [id number]       POC Glucose Once [[id number]]  (Abnormal) Collected: 05/18/23 0908    Specimen: Blood " Updated: 05/18/23 1133     Glucose 167 mg/dL      Comment: Serial Number: [id number]:  [id number]       Protime-INR [[id number]]  (Abnormal) Collected: 05/18/23 1016    Specimen: Blood Updated: 05/18/23 1035     Protime 10.3 Seconds      INR 0.96    POC Glucose Once [[id number]]  (Abnormal) Collected: 05/18/23 0726    Specimen: Blood Updated: 05/18/23 0731     Glucose 111 mg/dL      Comment: Serial Number: [id number]:  [id number]       POC Glucose Once [[id number]]  (Normal) Collected: 05/18/23 0559    Specimen: Blood Updated: 05/18/23 0601     Glucose 103 mg/dL      Comment: Serial Number: [id number]:  [id number]       POC Glucose Once [[id number]]  (Abnormal) Collected: 05/18/23 0445    Specimen: Blood Updated: 05/18/23 0447     Glucose 107 mg/dL      Comment: Serial Number: [id number]:  [id number]       POC Glucose Once [[id number]]  (Normal) Collected: 05/18/23 0329    Specimen: Blood Updated: 05/18/23 0330     Glucose 103 mg/dL      Comment: Serial Number: [id number]:  [id number]       Basic Metabolic Panel [[id number]]  (Abnormal) Collected: 05/18/23 0216    Specimen: Blood Updated: 05/18/23 0246     Glucose 111 mg/dL      BUN 24 mg/dL      Creatinine 0.66 mg/dL      Sodium 142 mmol/L      Potassium 3.9 mmol/L      Chloride 107 mmol/L      CO2 23.0 mmol/L      Calcium 9.0 mg/dL      BUN/Creatinine Ratio 36.4     Anion Gap 12.0 mmol/L      eGFR 88.3 mL/min/1.73     Narrative:      GFR Normal >60  Chronic Kidney Disease <60  Kidney Failure <15    The GFR formula is only valid for adults with stable renal function between ages 18 and 70.    CBC (No Diff) [[id number]]  (Abnormal) Collected: 05/18/23 0216    Specimen: Blood Updated: 05/18/23 0224     WBC 11.40 10*3/mm3      RBC 3.34 10*6/mm3      Hemoglobin 10.0 g/dL      Hematocrit 30.8 %      MCV 92.2 fL      MCH 29.8 pg      MCHC 32.4 g/dL      RDW 15.0 %      RDW-SD 48.1 fl      MPV 9.8 fL      Platelets 91 10*3/mm3     POC Glucose Once  [[id number]]  (Abnormal) Collected: 05/18/23 0215    Specimen: Blood Updated: 05/18/23 0216     Glucose 107 mg/dL      Comment: Serial Number: [id number]:  [id number]       POC Glucose Once [[id number]]  (Normal) Collected: 05/18/23 0105    Specimen: Blood Updated: 05/18/23 0106     Glucose 101 mg/dL      Comment: Serial Number: [id number]:  [id number]       POC Glucose Once [[id number]]  (Normal) Collected: 05/17/23 2348    Specimen: Blood Updated: 05/17/23 2349     Glucose 86 mg/dL      Comment: Serial Number: [id number]:  [id number]       POC Glucose Once [[id number]]  (Normal) Collected: 05/17/23 2313    Specimen: Blood Updated: 05/17/23 2314     Glucose 78 mg/dL      Comment: Serial Number: [id number]:  [id number]       POC Glucose Once [[id number]]  (Abnormal) Collected: 05/17/23 2236    Specimen: Blood Updated: 05/17/23 2237     Glucose 116 mg/dL      Comment: Serial Number: [id number]:  [id number]       POC Glucose Once [[id number]]  (Abnormal) Collected: 05/17/23 2120    Specimen: Blood Updated: 05/17/23 2122     Glucose 236 mg/dL      Comment: Serial Number: [id number]:  [id number]       POC Glucose Once [[id number]]  (Abnormal) Collected: 05/17/23 2010    Specimen: Blood Updated: 05/17/23 2011     Glucose 201 mg/dL      Comment: Serial Number: [id number]:  [id number]       POC Glucose Once [[id number]]  (Normal) Collected: 05/17/23 1829    Specimen: Blood Updated: 05/17/23 1830     Glucose 76 mg/dL      Comment: Serial Number: [id number]:  [id number]             Imaging Results (Last 24 Hours)     Procedure Component Value Units Date/Time    XR Chest 1 View [[id number]] Collected: 05/18/23 1422     Updated: 05/18/23 1425    Narrative:      XR CHEST 1 VW    Date of Exam: 5/18/2023 2:00 PM EDT    Indication: SOB    Comparison: AP portable chest 5/18/2023    Findings:  Right pleural pigtail drain appears unchanged in position. No pneumothorax is visible. Small bilateral  pleural effusions persist with left greater than right basilar airspace disease. Right basilar airspace disease appears slightly increased, obscuring   the diaphragmatic margin. Left chest wall pacemaker unchanged. Stable heart size at upper limits normal with signs of median sternotomy and valve replacement. No acute osseous abnormality.         Impression:      Impression:    1. Stable small bilateral pleural effusions.  2. Left greater than right basilar airspace disease which may resent atelectasis or pneumonia. Right basilar airspace disease appears slightly increased.  3. No visible pneumothorax.      Electronically Signed: Adrienne Hall    5/18/2023 2:23 PM EDT    Workstation ID: [id number]    XR Chest 1 View [[id number]] Collected: 05/18/23 0716     Updated: 05/18/23 0721    Narrative:      XR CHEST 1 VW    Date of Exam: 5/18/2023 4:15 AM EDT    Indication: Hypoxia    Comparison: 5/17/2023 and prior    Findings:  Study is limited by overlying support and monitoring apparatus.    Left subclavian approach pacemaker is in place. Patient is status post median sternotomy. Prosthetic valve is noted. Heart size is stable. Pulmonary vascularity is mildly congested and is indistinct. Right-sided chest tube is in place with a small   right-sided pleural effusion. No definite pneumothorax noted. Pleural-parenchymal changes at the left base compatible with small subpleural effusion and dependent atelectasis or edema noted. Osseous structures demonstrate no acute abnormality.      Impression:      Impression:    1. Right-sided chest tube remains in place with a trace right-sided pleural effusion and no evidence of pneumothorax appreciated.    2. Small left-sided pleural effusion and dependent opacity favored represent atelectasis    3. Mild pulmonary vascular congestion.      Electronically Signed: Lonnie Kessler    5/18/2023 7:18 AM EDT    Workstation ID: [id number]    XR Chest 1 View [[id number]] Collected: 05/17/23 2233      Updated: 05/17/23 2239    Narrative:      XR CHEST 1 VW    Date of Exam: 5/17/2023 9:07 PM EDT    Indication: Post Mediastinal CT removal X2. Were removed at 1830 postop day 2 from mitral valve repair    Comparison: Chest radiograph dated 5/17/2023    Findings:  The right IJ central venous catheter sheath has been removed. Mediastinal drains have been removed. A pigtail right thoracostomy tube remains in place and appears slightly kinked along its course. No pneumothorax is demonstrated. The costophrenic angles   are obscured bilaterally likely from small effusions and/or atelectasis. Bibasilar densities have slightly increased from earlier in the day. Pulmonary vascularity is normal. There our postsurgical changes from recent cardiac surgery. Cardiac mediastinal   and hilar silhouettes are unchanged from earlier in the day. A pacemaker/AICD remains in place.      Impression:      Impression:    1. Interval removal of the mediastinal drains and right IJ central venous catheter sheath. No pneumothorax. Bibasilar atelectasis and/or small effusions are present.  2. Pigtail thoracostomy tube is in place as described above.  3. Prosthetic cardiac valve and pacemaker/AICD are in place.      Electronically Signed: Toan Cutler    5/17/2023 10:37 PM EDT    Workstation ID: [id number]          LAB RESULTS (LAST 7 DAYS)    CBC  Results from last 7 days   Lab Units 05/18/23  0216 05/17/23  0352 05/16/23  0434 05/16/23  0248 05/16/23  0225 05/15/23  2317 05/15/23  2006 05/15/23  1214 05/15/23  1202 05/15/23  0805 05/15/23  0701   WBC 10*3/mm3 11.40* 11.60*  --  12.40*  --   --   --   --  10.00  --  4.80  4.80   RBC 10*6/mm3 3.34* 3.48*  --  3.90  --   --   --   --  4.44  --  3.83  3.83   HEMOGLOBIN g/dL 10.0* 10.4*  --  11.8*  --   --   --   --  13.7  --  11.8*  11.7*   HEMOGLOBIN, POC g/dL  --   --  11.7*  --  10.7* 10.9* 10.3*   < >  --    < >  --    HEMATOCRIT % 30.8* 31.2*  --  35.0  --   --   --   --  40.5  --  35.2   34.4   HEMATOCRIT POC %  --   --  34*  --  31* 32* 30*   < >  --    < >  --    MCV fL 92.2 89.7  --  89.8  --   --   --   --  91.3  --  92.0  89.9   PLATELETS 10*3/mm3 91* 74*  --  109*  --   --   --   --  101*  --  203  198    < > = values in this interval not displayed.       BMP  Results from last 7 days   Lab Units 05/18/23  0216 05/17/23  0352 05/16/23  1307 05/16/23  0248 05/15/23  1808 05/15/23  1109   SODIUM mmol/L 142 145 147* 145  --  139   POTASSIUM mmol/L 3.9 3.4* 3.7 3.4* 3.2* 2.5*   CHLORIDE mmol/L 107 107 109* 108*  --  103   CO2 mmol/L 23.0 23.0 26.0 22.0  --  22.0   BUN mg/dL 24* 22 19 19  --  18   CREATININE mg/dL 0.66 0.80 0.81 0.93  --  0.75   GLUCOSE mg/dL 111* 147* 164* 219*  --  108*   MAGNESIUM mg/dL  --   --   --  2.4  --   --    PHOSPHORUS mg/dL  --   --   --  4.1  --  2.0*       CMP   Results from last 7 days   Lab Units 05/18/23  0216 05/17/23  0352 05/16/23  1307 05/16/23  0248 05/15/23  1808 05/15/23  1109   SODIUM mmol/L 142 145 147* 145  --  139   POTASSIUM mmol/L 3.9 3.4* 3.7 3.4* 3.2* 2.5*   CHLORIDE mmol/L 107 107 109* 108*  --  103   CO2 mmol/L 23.0 23.0 26.0 22.0  --  22.0   BUN mg/dL 24* 22 19 19  --  18   CREATININE mg/dL 0.66 0.80 0.81 0.93  --  0.75   GLUCOSE mg/dL 111* 147* 164* 219*  --  108*   ALBUMIN g/dL  --   --   --  4.7  --  4.6       BNP        TROPONIN        CoAg  Results from last 7 days   Lab Units 05/18/23  1016 05/15/23  1109 05/15/23  0701   INR  0.96* 1.46* 1.07   APTT seconds  --  33.4* 35.1*       Creatinine Clearance  Estimated Creatinine Clearance: 56.2 mL/min (by C-G formula based on SCr of 0.66 mg/dL).    ABG  Results from last 7 days   Lab Units 05/17/23  1121 05/16/23  1527 05/16/23  1113 05/16/23  0742 05/16/23  0602 05/16/23  0434 05/16/23  0357   PH, ARTERIAL pH units 7.468* 7.428 7.405 7.360 7.339* 7.312* 7.342*   PCO2, ARTERIAL mm Hg 31.6* 42.4 40.6 50.1* 54.1* 48.7* 45.2   PO2 ART mm Hg 155.8* 126.1* 77.1* 97.0 108.8* 149.8* 143.3*   O2  SATURATION ART % 99.5* 98.9* 95.3 97.1 97.8 99.1* 99.1*   BASE EXCESS ART mmol/L -0.4* 3.3* 0.6 2.1 2.4 -2.0* -1.4*       Radiology  XR Chest 1 View    Result Date: 5/18/2023  Impression: 1. Stable small bilateral pleural effusions. 2. Left greater than right basilar airspace disease which may resent atelectasis or pneumonia. Right basilar airspace disease appears slightly increased. 3. No visible pneumothorax. Electronically Signed: Adrienne Hall  5/18/2023 2:23 PM EDT  Workstation ID: [id number]    XR Chest 1 View    Result Date: 5/18/2023  Impression: 1. Right-sided chest tube remains in place with a trace right-sided pleural effusion and no evidence of pneumothorax appreciated. 2. Small left-sided pleural effusion and dependent opacity favored represent atelectasis 3. Mild pulmonary vascular congestion. Electronically Signed: Lonnie Kessler  5/18/2023 7:18 AM EDT  Workstation ID: [id number]    XR Chest 1 View    Result Date: 5/17/2023  Impression: 1. Interval removal of the mediastinal drains and right IJ central venous catheter sheath. No pneumothorax. Bibasilar atelectasis and/or small effusions are present. 2. Pigtail thoracostomy tube is in place as described above. 3. Prosthetic cardiac valve and pacemaker/AICD are in place. Electronically Signed: Toan Cutler  5/17/2023 10:37 PM EDT  Workstation ID: [id number]    XR Chest 1 View    Result Date: 5/17/2023  Impression: 1. Removal of right IJ Homer-Enrike catheter. Vascular sheath in place. 2. Stable chest tube overlying the right midlung. No pneumothorax. 3. Persistent bibasilar consolidation left greater than right that may relate to atelectasis. Electronically Signed: Randy Martínez  5/17/2023 7:58 AM EDT  Workstation ID: [id number]        EKG  I personally viewed and interpreted the patient's EKG/Telemetry data:  ECG 12 Lead   Final Result   HEART RATE= 84  bpm   RR Interval= 717  ms   SD Interval= 41  ms   P Horizontal Axis= 60  deg   P Front Axis=   deg   QRSD  Interval= 89  ms   QT Interval= 457  ms   QRS Axis= 8  deg   T Wave Axis= -87  deg   - ABNORMAL ECG -   Atrial-paced complexes   LVH with secondary repolarization abnormality   Prolonged QT interval   When compared with ECG of 16-May-2023 3:49:28,   Significant repolarization change   Significant axis, voltage or hypertrophy change   Electronically Signed By: Josiah Ram ([facility]) 17-May-2023 16:02:01   Date and Time of Study: 2023-05-17 05:00:43      ECG 12 Lead   Preliminary Result   HEART RATE= 80  bpm   RR Interval= 752  ms   MT Interval= 253  ms   P Horizontal Axis= 199  deg   P Front Axis= 108  deg   QRSD Interval= 158  ms   QT Interval= 465  ms   QRS Axis= -71  deg   T Wave Axis= 103  deg   - ABNORMAL ECG -   Atrial-ventricular dual-paced rhythm   Electronically Signed By:    Date and Time of Study: 2023-05-16 03:49:28      ECG 12 Lead    (Results Pending)         Echocardiogram:    Results for orders placed in visit on 05/15/23    Intra-Op Anesthesia CARMITA    Narrative  Intra-Op Anesthesia CARMITA    Procedure Performed: Intra-Op Anesthesia CARMITA  Start Time:  5/15/2023 7:25 AM  End Time:   5/15/2023 7:35 AM    Preanesthesia Checklist:  Patient identified, IV assessed, risks and benefits discussed, monitors and equipment assessed, procedure being performed at surgeon's request and anesthesia consent obtained.    General Procedure Information  CARMITA Placed for monitoring purposes only -- This is not a diagnostic CARMITA  Diagnostic Indications for Echo:  assessment of ascending aorta, assessment of surgical repair, defect repair evaluation and hemodynamic monitoring  Location performed:  OR  Intubated  Bite block placed  Probe Insertion:  Easy  Probe Type:  Multiplane  Modalities:  Color flow mapping, continuous wave Doppler and pulse wave Doppler    Echocardiographic and Doppler Measurements    Ventricles    Right Ventricle:  Cavity size normal.  Hypertrophy not present.  Thrombus not present.  Global function  normal.  Left Ventricle:  Cavity size dilated.  Thrombus not present.  Global Function moderately impaired.  Ejection Fraction 30%.        Valves    Aortic Valve:  Annulus normal.  Stenosis not present.  Regurgitation mild.  Leaflets normal.  Leaflet motions normal.    Mitral Valve:  Annulus dilated.  Stenosis not present.  Regurgitation severe.  Leaflets normal.  Leaflet motions normal.    Tricuspid Valve:  Annulus normal.  Stenosis not present.  Regurgitation mild.  Leaflets normal.  Leaflet motions normal.      Aorta    Ascending Aorta:  Size normal.  Dissection not present.  Plaque thickness less than 3 mm.  Mobile plaque not present.  Aortic Arch:  Size normal.  Dissection not present.  Plaque thickness less than 3 mm.  Mobile plaque not present.  Descending Aorta:  Size normal.  Dissection not present.  Plaque thickness less than 3 mm.  Mobile plaque not present.      Atria    Right Atrium:  Size dilated.  Spontaneous echo contrast not present.  Thrombus not present.  Tumor not present.  Device present.    Left Atrium:  Size dilated.  Spontaneous echo contrast present.  Thrombus not present.  Tumor not present.  Device not present.  Left atrial appendage normal.      Septa        Ventricular Septum:  Intra-ventricular septum morphology normal.        Other Findings  Pericardium:  normal  Pleural Effusion:  none  Pulmonary Arteries:  normal  Pulmonary Venous Flow:  normal    Anesthesia Information  Anesthesiologist:  Ronnie Craft MD      Echocardiogram Comments:  MILD AI; SEVERE MR DILATE LA/MV ANNULUS, CENTRAL JET  TV: MILD TI  EF 30        Stress Test:         Cardiac Catheterization:  Results for orders placed during the hospital encounter of 02/24/23    Cardiac Catheterization/Vascular Study    Narrative  OPERATORS  Trace Marcus M.D. (Attending Cardiologist)      PROCEDURE PERFORMED  Ultrasound guided vascular access  Right heart catheterization  Coronary Angiogram  Left Heart Catheterization  [id number]  Moderate Sedation 35 minutes    INDICATIONS FOR PROCEDURE  82-year-old woman with valvular heart disease including mitral and tricuspid regurgitation, pulmonary hypertension presented with heart failure exacerbation.  After IV diuresis, discussion of risk and benefit she was brought into the cardiac Cath Lab for right and left heart cath.    PROCEDURE IN DETAIL  Informed consent was obtained from the patient after explaining the risks, benefits, and alternative options of the procedure. After obtaining informed consent, the patient was brought to the cath lab and was prepped in a sterile fashion. Lidocaine 2% was used for local anesthesia into the right femoral venous access site. Right femoral vein was accessed using the micropuncture needle under ultrasound guidance and micropuncture wire advanced under flouroscopy. A 7 [place] vascular sheath was put into place percutaneously over guide-wire. Guide wires were removed. A 6Fr swan aneta catheter was advanced to wedge position. RA, RV and PA and wedge pressures were recorded.  PA sat and arterial sats recorded.  The patient tolerated the procedure well without any complications.    Lidocaine 2% was used for local anesthesia into the right femoral arterial access site. The right femoral artery was accessed with a micropuncture needle via modified Seldinger technique under ultrasound guidance. A 6F was inserted successfully.  Afterwards, 6F JR4 and JL4 diagnostic catheters were advanced over a wire into the ascending aorta and were used to engage the ostia of the left main and RCA respectively. JR4 used to cross the AV and obtain LV pressures and gradient across the AV measured via pullback technique. Images of the right and left coronary systems were obtained. All the catheters were exchanged over a wire and subsequently removed. Angiogram of the femoral access site was obtained and did not show complications. The patient tolerated the procedure well without any  complications. The pictures were reviewed at the end of the procedure. A Mynx closure device was applied.    HEMODYNAMICS    RHC  RA 5/3, 3 mmHg  RV 26/2, 4 mmHg  PA 27/8, 16 mmHg  PCW 7/7, 6 mmHg  AO Sat 96%  PA Sat 74%    Rob CO 4.73    Rob CI 3.42    LHC  LV: 108/5, 7 mmHg  AO: 116/46, 72 mmHg    No significant gradient across the aortic valve during pullback of JR4 catheter.  LV gram was not performed due to recent echocardiogram.    FINDINGS  Coronary Angiogram  Left dominant circulation  Left main: Left main is a large caliber vessel which gives rise to the Left Anterior Descending and the Left circumflex.  Left main is angiographically free from any significant disease  Left Anterior Descending Artery: LAD is a medium caliber vessel which gives rise to several septal perforators and several diagonal branches.  LAD has diffuse luminal irregularities with 20 to 30% lesion in the midsegment.  Left Circumflex: Left circumflex artery is a dominant vessel which gives rise to obtuse marginal.  Left circumflex has luminal irregularities only.  Right Coronary Artery: RCA is a small nondominant vessel.    ESTIMATED BLOOD LOSS:  10 ml    COMPLICATIONS:  None    PROCEDURE DATA:  Contrast Used: 25 cc  Sedation Time: 35 minutes    IMPRESSIONS  Non obstructive CAD  Normal filling pressure, no evidence of pulmonary hypertension.  Normal wedge pressure    RECOMMENDATIONS  -Switch IV to p.o. diuresis.  -Proceed with transesophageal echocardiogram to evaluate mitral and tricuspid regurgitation.  -Uptitrate GDMT for HFrEF         Other:         ASSESSMENT & PLAN:    Principal Problem:    Severe mitral regurgitation  Active Problems:    History of DVT (deep vein thrombosis)    Essential hypertension with goal blood pressure less than 130/80    Hypertriglyceridemia    Hypothyroidism due to acquired atrophy of thyroid    Pacemaker    Chronic combined systolic and diastolic congestive heart failure    Coronary artery disease  involving native coronary artery of native heart without angina pectoris    S/P MVR (mitral valve repair)    Hypokalemia    Severe Mitral regurgitation, S/P MVR (mitral valve repair)  Status post repair with 25 mm physio 2 ring on 5/15/2023.  Currently on room air  Pressor and inotrope have been weaned off  Pneumothorax resolved with chest tube placement  Possible chest tube removal today  PT OT and incentive spirometry.     Hypokalemia  Replace to keep potassium more than 4     Anemia  Hemoglobin is 10 now  Blood transfusion as needed to keep hemoglobin more than 8     Chronic combined systolic and diastolic congestive heart failure  Started Lasix 40 mg p.o. daily  Increase atenolol to 50 mg p.o. daily  Elective echocardiogram to evaluate LV function and tricuspid valve     Coronary artery disease involving native coronary artery of native heart without angina pectoris  Nonobstructive coronary disease per coronary angiogram.  Continue aspirin.  She is intolerant to statins     Essential hypertension with goal blood pressure less than 130/80  Drips have been weaned off  Increase atenolol 50 mg p.o. daily     Hypertriglyceridemia  We will resume fenofibrate at the time of discharge     Pacemaker, h/o SSS  [employer] permanent pacemaker is in place  Pacemaker interrogation with no significant events     History of DVT (deep venous thrombosis)  Warfarin has been resumed     Hypothyroidism due to acquired atrophy of thyroid  Continue Synthroid      Trace Marcus MD  05/18/23  15:28 EDT

## 2023-05-18 NOTE — PROGRESS NOTES
Critical Care Progress Note   Thi Allen : 1940 MRN:[id number] LOS:3     Principal Problem: Severe mitral regurgitation     Reason for follow up: All the medical problems listed below    Summary     A 83 y.o. female with PMH of CAD, CHF, hypothyroidism, hypertension, mitral regurgitation was admitted to the hospital by CTS for mitral valve repair.  Patient had mitral valve repair on 5/15 and was subsequently extubated.  Postextubation, developed right-sided pneumothorax and critical care was consulted specifically to manage right pneumothorax. S/p right-sided chest tube placement after which pneumothorax resolved.    Significant events     23 : Clamp chest tube and repeat chest x-ray at 2 PM, will remove chest tube if no recurrence of pneumothorax.    Assessment / Plan     Acute respiratory failure with hypoxia: Improved, currently on nasal cannula.  Wean off oxygen as tolerated.    Right pneumothorax: Avoid any positive pressure ventilation including BiPAP, if possible.  DC incentive spirometry.  Clamp chest tube and repeat chest x-ray at 2 PM.  If no recurrence of pneumothorax, will remove chest tube.    Rest of the management per CTS    Code status:   Code Status (Patient has no pulse and is not breathing): CPR (Attempt to Resuscitate)  Medical Interventions (Patient has pulse or is breathing): Full       Nutrition: Diet: Cardiac Diets; Healthy Heart (2-3 Na+); Texture: Regular Texture (IDDSI 7); Fluid Consistency: Thin (IDDSI 0)   Patient isn't on Tube Feeding    DVT prophylaxis:  Medical and mechanical DVT prophylaxis orders are present.     Subjective / Review of systems     Review of Systems   Feels better, sitting comfortably in chair, no shortness of breath.  No cough or phlegm.  No chest pain.    Objective / Physical Exam   Vital signs:  Temp: 98 °F (36.7 °C)  BP: 148/68  Heart Rate: 82  Resp: 16  SpO2: 98 %  Weight: 53.3 kg (117 lb 8.1 oz)    Admission Weight: Weight: 48 kg (105 lb  12.8 oz)  Current Weight: Weight: 53.3 kg (117 lb 8.1 oz)    Input/Output in last 24 hours:    Intake/Output Summary (Last 24 hours) at 5/18/2023 1312  Last data filed at 5/18/2023 0925  Gross per 24 hour   Intake 2072 ml   Output 1795 ml   Net 277 ml      Physical Exam  Vitals and nursing note reviewed.   Constitutional:       General: She is not in acute distress.     Appearance: Normal appearance.   HENT:      Nose:      Comments: On nasal cannula     Mouth/Throat:      Mouth: Mucous membranes are moist.      Pharynx: Oropharynx is clear.   Eyes:      General: No scleral icterus.     Extraocular Movements: Extraocular movements intact.      Conjunctiva/sclera: Conjunctivae normal.   Cardiovascular:      Rate and Rhythm: Normal rate.      Heart sounds: No murmur heard.    No gallop.   Pulmonary:      Breath sounds: Normal breath sounds. No wheezing or rales.      Comments: Right-sided chest tube with no air leak  Abdominal:      General: Bowel sounds are normal.      Palpations: Abdomen is soft.      Tenderness: There is no abdominal tenderness.   Musculoskeletal:         General: No tenderness.      Right lower leg: No edema.      Left lower leg: No edema.   Skin:     Comments: Sternal incision is clean, no hematoma   Neurological:      General: No focal deficit present.      Mental Status: She is alert and oriented to person, place, and time.        Radiology and Labs     Results from last 7 days   Lab Units 05/18/23  0216 05/17/23  0352 05/16/23  0434 05/16/23  0248 05/16/23  0225 05/15/23  1214 05/15/23  1202 05/15/23  0805 05/15/23  0701   WBC 10*3/mm3 11.40* 11.60*  --  12.40*  --   --  10.00  --  4.80  4.80   HEMATOCRIT % 30.8* 31.2*  --  35.0  --   --  40.5  --  35.2  34.4   HEMATOCRIT POC %  --   --  34*  --  31*   < >  --    < >  --    PLATELETS 10*3/mm3 91* 74*  --  109*  --   --  101*  --  203  198    < > = values in this interval not displayed.      Results from last 7 days   Lab Units  05/18/23  0216 05/17/23  0352 05/16/23  1307 05/16/23  0248 05/15/23  1808 05/15/23  1109   SODIUM mmol/L 142 145 147* 145  --  139   POTASSIUM mmol/L 3.9 3.4* 3.7 3.4* 3.2* 2.5*   CHLORIDE mmol/L 107 107 109* 108*  --  103   CO2 mmol/L 23.0 23.0 26.0 22.0  --  22.0   BUN mg/dL 24* 22 19 19  --  18   CREATININE mg/dL 0.66 0.80 0.81 0.93  --  0.75      Current medications   Scheduled Meds: aspirin, 81 mg, Oral, Daily  [START ON 5/19/2023] atenolol, 50 mg, Oral, Q24H  chlorhexidine, 15 mL, Mouth/Throat, Q12H  enoxaparin, 40 mg, Subcutaneous, Q24H  furosemide, 40 mg, Oral, Daily  insulin lispro, 2-7 Units, Subcutaneous, 4x Daily With Meals & Nightly  latanoprost, 1 drop, Both Eyes, Nightly  levothyroxine, 50 mcg, Oral, Daily  mupirocin, 1 application, Each Nare, BID  pantoprazole, 40 mg, Oral, Q AM  potassium chloride, 20 mEq, Oral, Daily  warfarin, 2.5 mg, Oral, Once      Continuous Infusions:      Plan discussed with RN. Reviewed all other data in the last 24 hours, including but not limited to vitals, labs, microbiology, imaging and pertinent notes from other providers.     Han De Souza MD   Critical Care  05/18/23   13:12 EDT

## 2023-05-18 NOTE — PLAN OF CARE
Assessment: Thi Allen presents with functional mobility impairments which indicate the need for skilled intervention. Tolerating session today without incident. Pt with good improvement this date, continuing to require cueing for transfer training, but ambulating increased distance without O2, utilizing RWx. Pt with significant pain but participating well. Will continue to follow and progress as tolerated.

## 2023-05-18 NOTE — CASE MANAGEMENT/SOCIAL WORK
Continued Stay Note   [facility]     Patient Name: Thi Allen  MRN: [id number]  Today's Date: 5/18/2023    Admit Date: 5/15/2023    Plan: DC Plan: Anticipate Routine Home with Family. MVR 5/15/23.   Discharge Plan     Row Name 05/18/23 1152       Plan    Plan DC Plan: Anticipate Routine Home with Family. MVR 5/15/23.    Provided Post Acute Provider List? N/A    Provided Post Acute Provider Quality & Resource List? N/A    Plan Comments CM spoke with patient’s nurse and [facility] NP Yulissa Freeman to obtain clinical updates. PT/OT recommending home with family and NP agreeable. NP anticipates possible DC home tomorrow 5/19/23. CM will continue to follow for any further needs and adjust discharge plan accordingly. DC Barriers: POD 3 OHS, Pacer wires, and coumadin bridge to Lovenox.           Expected Discharge Date and Time     Expected Discharge Date Expected Discharge Time    May 20, 2023             Dee Alicia RN     Office Phone: [phone]  Office Cell:     [phone]

## 2023-05-18 NOTE — PROGRESS NOTES
S/P POD# 3 MV repair--Pam  EF 40-45% (CARMITA)    Subjective: Reports feeling better    No events overnight, right 8 plan for possible removal later today  Down to 3 L NC  Drips: insulin  Wt is up 5 kgs from preop  CXR: Resolved right ptx, small left pleural effusion      Intake/Output Summary (Last 24 hours) at 5/18/2023 0826  Last data filed at 5/18/2023 0600  Gross per 24 hour   Intake 2312 ml   Output 1955 ml   Net 357 ml     Temp:  [97.3 °F (36.3 °C)-98.4 °F (36.9 °C)] 97.4 °F (36.3 °C)  Heart Rate:  [81-88] 88  Resp:  [18-30] 18  BP: (130-178)/(50-76) 178/50  R PCT 60/8    Results from last 7 days   Lab Units 05/18/23  0216 05/17/23  0352 05/15/23  1124 05/15/23  1109 05/15/23  0805 05/15/23  0701   WBC 10*3/mm3 11.40* 11.60*   < >  --   --  4.80  4.80   HEMOGLOBIN g/dL 10.0* 10.4*   < >  --   --  11.8*  11.7*   HEMOGLOBIN, POC   --   --    < >  --    < >  --    HEMATOCRIT % 30.8* 31.2*   < >  --   --  35.2  34.4   HEMATOCRIT POC   --   --    < >  --    < >  --    PLATELETS 10*3/mm3 91* 74*   < >  --   --  203  198   INR   --   --   --  1.46*  --  1.07    < > = values in this interval not displayed.     Results from last 7 days   Lab Units 05/18/23  0216 05/16/23  1307 05/16/23  0248   CREATININE mg/dL 0.66   < > 0.93   POTASSIUM mmol/L 3.9   < > 3.4*   SODIUM mmol/L 142   < > 145   MAGNESIUM mg/dL  --   --  2.4   PHOSPHORUS mg/dL  --   --  4.1    < > = values in this interval not displayed.       Physical Exam:  Neuro intact, nad, up in recliner  Tele: SR 80s with occasional paced beat  Diminished bases, 96% 3L  Sternotomy healing well  Benign abd, + BM  No edema    Assessment/Plan:  Principal Problem:    Severe mitral regurgitation  Active Problems:    History of DVT (deep vein thrombosis)    Essential hypertension with goal blood pressure less than 130/80    Hypertriglyceridemia    Hypothyroidism due to acquired atrophy of thyroid    Pacemaker    Chronic combined systolic and diastolic congestive heart  failure    Coronary artery disease involving native coronary artery of native heart without angina pectoris    S/P MVR (mitral valve repair)    Hypokalemia    -Severe mitral regurgitation, moderate AI, mod to severe TR, EF 40-45% (CARMITA)--s/p MV repair (Pagni)  -Moderate pulmonary hypertension--RVSP 45-55  -Chronic combined HFrEF, NYHA class III-IV  -Dilated, NICM--EF 40 to 45% (CARMITA)  -HTN  -Hypothyroidism--levothyroxine  -Hx DVT--on warfarin, bridged with Lovenox prior to surgery  -PAF  -Hx PPM--[place] [employer] DDDR mode  -Postop right ptx--s/p CT insertion 5/16  -Postop acute resp insufficiency with hypercapnia--BiPap, transition to AirVo, critical care intensivist following  -Postop leukocytosis, likely r/t atel--watch closely    POD# 3.  Much improved.  Oxygen is down to 3 L and weaning. On asa/statin, atenolol increased due to HTN.  Mobilize.  Aggressive pulmonary toileting.  DC wires, A-line.  Right pneumocath clamped per pulmonary team, repeat chest x-ray this afternoon with possible removal.    Routine care--as above  D/w pt/, nsg, Dr. Pam BENTON plan is home with family    Yulissa Yoderhalima-Jose Alberto, APRN  5/18/2023  08:26 EDT

## 2023-05-19 ENCOUNTER — APPOINTMENT (OUTPATIENT)
Dept: GENERAL RADIOLOGY | Facility: HOSPITAL | Age: 83
DRG: 219 | End: 2023-05-19
Payer: MEDICARE

## 2023-05-19 PROBLEM — I50.23 ACUTE ON CHRONIC HFREF (HEART FAILURE WITH REDUCED EJECTION FRACTION): Status: ACTIVE | Noted: 2023-05-19

## 2023-05-19 PROBLEM — E44.0 MODERATE MALNUTRITION: Status: ACTIVE | Noted: 2023-05-19

## 2023-05-19 LAB
ANION GAP SERPL CALCULATED.3IONS-SCNC: 10 MMOL/L (ref 5–15)
BUN SERPL-MCNC: 21 MG/DL (ref 8–23)
BUN/CREAT SERPL: 38.2 (ref 7–25)
CALCIUM SPEC-SCNC: 8.7 MG/DL (ref 8.6–10.5)
CHLORIDE SERPL-SCNC: 104 MMOL/L (ref 98–107)
CO2 SERPL-SCNC: 26 MMOL/L (ref 22–29)
CREAT SERPL-MCNC: 0.55 MG/DL (ref 0.57–1)
EGFRCR SERPLBLD CKD-EPI 2021: 92.2 ML/MIN/1.73
GLUCOSE BLDC GLUCOMTR-MCNC: 129 MG/DL (ref 70–105)
GLUCOSE BLDC GLUCOMTR-MCNC: 130 MG/DL (ref 70–105)
GLUCOSE BLDC GLUCOMTR-MCNC: 197 MG/DL (ref 70–105)
GLUCOSE SERPL-MCNC: 129 MG/DL (ref 65–99)
INR PPP: 0.95 (ref 2–3)
POTASSIUM SERPL-SCNC: 4.1 MMOL/L (ref 3.5–5.2)
PROTHROMBIN TIME: 10.2 SECONDS (ref 19.4–28.5)
SODIUM SERPL-SCNC: 140 MMOL/L (ref 136–145)

## 2023-05-19 PROCEDURE — 80048 BASIC METABOLIC PNL TOTAL CA: CPT | Performed by: NURSE PRACTITIONER

## 2023-05-19 PROCEDURE — 97530 THERAPEUTIC ACTIVITIES: CPT

## 2023-05-19 PROCEDURE — 97110 THERAPEUTIC EXERCISES: CPT

## 2023-05-19 PROCEDURE — 71045 X-RAY EXAM CHEST 1 VIEW: CPT

## 2023-05-19 PROCEDURE — 25010000002 FUROSEMIDE PER 20 MG: Performed by: NURSE PRACTITIONER

## 2023-05-19 PROCEDURE — 85610 PROTHROMBIN TIME: CPT | Performed by: NURSE PRACTITIONER

## 2023-05-19 PROCEDURE — 63710000001 INSULIN LISPRO (HUMAN) PER 5 UNITS: Performed by: NURSE PRACTITIONER

## 2023-05-19 PROCEDURE — 25010000002 FUROSEMIDE PER 20 MG: Performed by: THORACIC SURGERY (CARDIOTHORACIC VASCULAR SURGERY)

## 2023-05-19 PROCEDURE — 99233 SBSQ HOSP IP/OBS HIGH 50: CPT | Performed by: INTERNAL MEDICINE

## 2023-05-19 PROCEDURE — 82948 REAGENT STRIP/BLOOD GLUCOSE: CPT

## 2023-05-19 PROCEDURE — 25010000002 ENOXAPARIN PER 10 MG: Performed by: NURSE PRACTITIONER

## 2023-05-19 RX ORDER — POTASSIUM CHLORIDE 750 MG/1
10 TABLET, FILM COATED, EXTENDED RELEASE ORAL 2 TIMES DAILY WITH MEALS
Status: DISCONTINUED | OUTPATIENT
Start: 2023-05-19 | End: 2023-05-20

## 2023-05-19 RX ORDER — FUROSEMIDE 10 MG/ML
20 INJECTION INTRAMUSCULAR; INTRAVENOUS ONCE
Status: COMPLETED | OUTPATIENT
Start: 2023-05-19 | End: 2023-05-19

## 2023-05-19 RX ORDER — FUROSEMIDE 10 MG/ML
20 INJECTION INTRAMUSCULAR; INTRAVENOUS EVERY 12 HOURS
Status: COMPLETED | OUTPATIENT
Start: 2023-05-19 | End: 2023-05-19

## 2023-05-19 RX ORDER — WARFARIN SODIUM 2 MG/1
4 TABLET ORAL
Status: DISCONTINUED | OUTPATIENT
Start: 2023-05-19 | End: 2023-05-20 | Stop reason: HOSPADM

## 2023-05-19 RX ORDER — ACETAMINOPHEN 500 MG
500 TABLET ORAL EVERY 4 HOURS PRN
Status: DISCONTINUED | OUTPATIENT
Start: 2023-05-19 | End: 2023-05-20 | Stop reason: HOSPADM

## 2023-05-19 RX ORDER — LISINOPRIL 5 MG/1
5 TABLET ORAL
Status: DISCONTINUED | OUTPATIENT
Start: 2023-05-19 | End: 2023-05-20 | Stop reason: HOSPADM

## 2023-05-19 RX ADMIN — ASPIRIN 81 MG: 81 TABLET, COATED ORAL at 09:52

## 2023-05-19 RX ADMIN — ENOXAPARIN SODIUM 40 MG: 100 INJECTION SUBCUTANEOUS at 17:46

## 2023-05-19 RX ADMIN — HYDROCODONE BITARTRATE AND ACETAMINOPHEN 1 TABLET: 5; 325 TABLET ORAL at 06:05

## 2023-05-19 RX ADMIN — FUROSEMIDE 20 MG: 20 INJECTION, SOLUTION INTRAMUSCULAR; INTRAVENOUS at 09:52

## 2023-05-19 RX ADMIN — WARFARIN SODIUM 4 MG: 2 TABLET ORAL at 17:46

## 2023-05-19 RX ADMIN — FUROSEMIDE 20 MG: 20 INJECTION, SOLUTION INTRAMUSCULAR; INTRAVENOUS at 20:10

## 2023-05-19 RX ADMIN — ATENOLOL 50 MG: 50 TABLET ORAL at 09:52

## 2023-05-19 RX ADMIN — PANTOPRAZOLE SODIUM 40 MG: 40 TABLET, DELAYED RELEASE ORAL at 05:14

## 2023-05-19 RX ADMIN — LEVOTHYROXINE SODIUM 50 MCG: 0.05 TABLET ORAL at 09:52

## 2023-05-19 RX ADMIN — INSULIN LISPRO 2 UNITS: 100 INJECTION, SOLUTION INTRAVENOUS; SUBCUTANEOUS at 20:15

## 2023-05-19 RX ADMIN — MUPIROCIN 1 APPLICATION: 20 OINTMENT TOPICAL at 20:16

## 2023-05-19 RX ADMIN — POTASSIUM CHLORIDE 10 MEQ: 750 TABLET, EXTENDED RELEASE ORAL at 17:46

## 2023-05-19 RX ADMIN — POTASSIUM CHLORIDE 20 MEQ: 1500 TABLET, EXTENDED RELEASE ORAL at 09:52

## 2023-05-19 RX ADMIN — LISINOPRIL 5 MG: 5 TABLET ORAL at 09:53

## 2023-05-19 RX ADMIN — FUROSEMIDE 20 MG: 20 INJECTION, SOLUTION INTRAMUSCULAR; INTRAVENOUS at 09:53

## 2023-05-19 NOTE — PROGRESS NOTES
Critical Care Progress Note   Thi Allen : 1940 MRN:[id number] LOS:4     Principal Problem: Severe mitral regurgitation     Reason for follow up: All the medical problems listed below    Summary     A 83 y.o. female with PMH of CAD, CHF, hypothyroidism, hypertension, mitral regurgitation was admitted to the hospital by CTS for mitral valve repair.  Patient had mitral valve repair on 5/15 and was subsequently extubated.  Postextubation, developed right-sided pneumothorax and critical care was consulted specifically to manage right pneumothorax. S/p right-sided chest tube placement after which pneumothorax resolved.    Significant events     23 : No events noted of night. Patient pulmonary status stable. Will sign off.     Assessment / Plan     Acute respiratory failure with hypoxia: Improved, switch to Airvo to nasal cannula.  Wean off oxygen as tolerated.    Right pneumothorax: Avoid any positive pressure ventilation including BiPAP, if possible.  Chest x-ray with improvement. Chest tube discontinued yesterday. Patient tolerating.      Rest of the management per CTS    Code status:   Code Status (Patient has no pulse and is not breathing): CPR (Attempt to Resuscitate)  Medical Interventions (Patient has pulse or is breathing): Full       Nutrition: Diet: Cardiac Diets; Healthy Heart (2-3 Na+); Texture: Regular Texture (IDDSI 7); Fluid Consistency: Thin (IDDSI 0)   Patient isn't on Tube Feeding    DVT prophylaxis:  Medical and mechanical DVT prophylaxis orders are present.     Subjective / Review of systems     Review of Systems   Constitutional: Negative for chills and fatigue.   Respiratory: Negative for chest tightness and shortness of breath.    Cardiovascular: Negative for chest pain.   Gastrointestinal: Negative for abdominal pain, nausea and vomiting.   Neurological: Negative for syncope, light-headedness and headaches.        Objective / Physical Exam   Vital signs:  Temp: (P) 97.9 °F  (36.6 °C)  BP: 162/83  Heart Rate: 89  Resp: (P) 11  SpO2: 94 %  Weight: 53.3 kg (117 lb 8.1 oz)    Admission Weight: Weight: 48 kg (105 lb 12.8 oz)  Current Weight: Weight: 53.3 kg (117 lb 8.1 oz)    Input/Output in last 24 hours:    Intake/Output Summary (Last 24 hours) at 5/19/2023 1105  Last data filed at 5/18/2023 1400  Gross per 24 hour   Intake 132 ml   Output 10 ml   Net 122 ml      Physical Exam  Vitals and nursing note reviewed.   Constitutional:       General: She is not in acute distress.     Appearance: Normal appearance.   HENT:      Nose:      Comments: 2L NC      Mouth/Throat:      Mouth: Mucous membranes are moist.      Pharynx: Oropharynx is clear.   Eyes:      General: No scleral icterus.     Extraocular Movements: Extraocular movements intact.      Conjunctiva/sclera: Conjunctivae normal.   Cardiovascular:      Rate and Rhythm: Normal rate.      Heart sounds: No murmur heard.    No gallop.   Pulmonary:      Breath sounds: Normal breath sounds. No wheezing or rales.   Abdominal:      General: Bowel sounds are normal.      Palpations: Abdomen is soft.      Tenderness: There is no abdominal tenderness.   Musculoskeletal:         General: No tenderness.      Right lower leg: No edema.      Left lower leg: No edema.   Skin:     Comments: Sternal incision is clean, no hematoma   Neurological:      General: No focal deficit present.      Mental Status: She is alert and oriented to person, place, and time.        Radiology and Labs     Results from last 7 days   Lab Units 05/18/23  0216 05/17/23  0352 05/16/23  0434 05/16/23  0248 05/16/23  0225 05/15/23  1214 05/15/23  1202 05/15/23  0805 05/15/23  0701   WBC 10*3/mm3 11.40* 11.60*  --  12.40*  --   --  10.00  --  4.80  4.80   HEMATOCRIT % 30.8* 31.2*  --  35.0  --   --  40.5  --  35.2  34.4   HEMATOCRIT POC %  --   --  34*  --  31*   < >  --    < >  --    PLATELETS 10*3/mm3 91* 74*  --  109*  --   --  101*  --  203  198    < > = values in this  interval not displayed.      Results from last 7 days   Lab Units 05/19/23  0441 05/18/23  0216 05/17/23  0352 05/16/23  1307 05/16/23  0248   SODIUM mmol/L 140 142 145 147* 145   POTASSIUM mmol/L 4.1 3.9 3.4* 3.7 3.4*   CHLORIDE mmol/L 104 107 107 109* 108*   CO2 mmol/L 26.0 23.0 23.0 26.0 22.0   BUN mg/dL 21 24* 22 19 19   CREATININE mg/dL 0.55* 0.66 0.80 0.81 0.93      Current medications   Scheduled Meds: aspirin, 81 mg, Oral, Daily  atenolol, 50 mg, Oral, Q24H  chlorhexidine, 15 mL, Mouth/Throat, Q12H  enoxaparin, 40 mg, Subcutaneous, Q24H  furosemide, 20 mg, Intravenous, Q12H  insulin lispro, 2-7 Units, Subcutaneous, 4x Daily With Meals & Nightly  latanoprost, 1 drop, Both Eyes, Nightly  levothyroxine, 50 mcg, Oral, Daily  lisinopril, 5 mg, Oral, Q24H  mupirocin, 1 application, Each Nare, BID  pantoprazole, 40 mg, Oral, Q AM  potassium chloride, 20 mEq, Oral, Daily  potassium chloride, 10 mEq, Oral, BID With Meals  warfarin, 4 mg, Oral, Daily      Continuous Infusions:      Plan discussed with RN. Reviewed all other data in the last 24 hours, including but not limited to vitals, labs, microbiology, imaging and pertinent notes from other providers.     HARIS Siu   Critical Care  05/19/23   11:05 EDT

## 2023-05-19 NOTE — PROGRESS NOTES
S/P POD# 4 MV repair--Pam  EF 40-45% (CARMITA)    Subjective: No complaints today    Right pneumocath removed yesterday  INR 0.98--Coumadin restarted yesterday for history of DVTs  On room air  Wt is up 5 kgs from preop  CXR: Tiny right apical ptx, small bilat pleural effusion      Intake/Output Summary (Last 24 hours) at 5/19/2023 1312  Last data filed at 5/18/2023 1400  Gross per 24 hour   Intake 132 ml   Output 10 ml   Net 122 ml     Temp:  [97.4 °F (36.3 °C)-98.3 °F (36.8 °C)] 98.3 °F (36.8 °C)  Heart Rate:  [80-89] 87  Resp:  [11-21] 18  BP: (129-169)/(56-84) 129/62      Results from last 7 days   Lab Units 05/19/23  0441 05/18/23  1016 05/18/23  0216 05/17/23  0352 05/15/23  1124 05/15/23  1109   WBC 10*3/mm3  --   --  11.40* 11.60*   < >  --    HEMOGLOBIN g/dL  --   --  10.0* 10.4*   < >  --    HEMOGLOBIN, POC   --   --   --   --    < >  --    HEMATOCRIT %  --   --  30.8* 31.2*   < >  --    HEMATOCRIT POC   --   --   --   --    < >  --    PLATELETS 10*3/mm3  --   --  91* 74*   < >  --    INR  0.95* 0.96*  --   --   --  1.46*    < > = values in this interval not displayed.     Results from last 7 days   Lab Units 05/19/23  0441 05/16/23  1307 05/16/23  0248   CREATININE mg/dL 0.55*   < > 0.93   POTASSIUM mmol/L 4.1   < > 3.4*   SODIUM mmol/L 140   < > 145   MAGNESIUM mg/dL  --   --  2.4   PHOSPHORUS mg/dL  --   --  4.1    < > = values in this interval not displayed.       Physical Exam:  Neuro intact, nad, sitting in bed comfortably  Tele: AV paced 90  Diminished bases, 96% RA  Sternotomy healing well  Benign abd, + BM  No edema    Assessment/Plan:  Principal Problem:    Severe mitral regurgitation  Active Problems:    History of DVT (deep vein thrombosis)    Essential hypertension with goal blood pressure less than 130/80    Hypertriglyceridemia    Hypothyroidism due to acquired atrophy of thyroid    Pacemaker    Chronic combined systolic and diastolic congestive heart failure    Coronary artery disease  involving native coronary artery of native heart without angina pectoris    S/P MVR (mitral valve repair)    Hypokalemia    Moderate Malnutrition (HCC)    -Severe mitral regurgitation, moderate AI, mod to severe TR, EF 40-45% (CARMITA)--s/p MV repair (Pagni)  -Moderate pulmonary hypertension--RVSP 45-55  -Chronic combined HFrEF, NYHA class III-IV  -Dilated, NICM--EF 40 to 45% (CARMITA)  -HTN  -Hypothyroidism--levothyroxine  -Hx DVT--on warfarin, bridged with Lovenox prior to surgery  -PAF  -Hx PPM--[employer] DDDR mode  -Postop right ptx--s/p CT insertion 5/16, removal 5/18  -Postop acute resp insufficiency with hypercapnia--BiPap, transition to AirVo, critical care intensivist following, resolved  -Postop leukocytosis, likely r/t atel--watch closely    POD# 4.  Doing well. On asa/statin, atenolol increased due to HTN.  Mobilize.  Coumadin started yest--4 mg today.  Diuretics ordered.    Routine care--as above  D/w pt, nsg, Dr. Pam BENTON plan is home with family--likely this weekend    Yulissa Herrera, HARIS  5/19/2023  13:12 EDT

## 2023-05-19 NOTE — THERAPY TREATMENT NOTE
"Subjective: Pt agreeable to therapeutic plan of care.  Cognition: arousal/alertness: Alert and Attentive    Objective:     Bed Mobility: N/A or Not attempted.   Functional Transfers: Supervision     Balance: dynamic, with UE support and standing Supervision  Functional Ambulation: Supervision     Phase 1 Cardiac Rehab Initiated    Sitting tolerance: >10min and supported  Standing tolerance: 5-10min and supported    Precautions:  Mid-sternal incision; avoid scapular retraction and depression.  Cardiovascular impairment post-sx; encourage energy conservation strategies.    Therapeutic Exercise: 10 reps UE and LE AROM in supported sitting    MET level equivalent: 2.0-3.0 (Unlimited sitting, ambulation on level ground <2mph, light housework)      Vitals: WNL    Pain: 5 VAS  Location: sternum  Interventions for pain: Increased Activity  Education: Provided education on the importance of mobility in the acute care setting      Assessment: Thi Allen presents with ADL impairments affecting function including endurance / activity tolerance, range of motion (ROM) and strength. Demonstrated functioning below baseline abilities indicate the need for continued skilled intervention while inpatient. Tolerating session today without incident. Will continue to follow and progress as tolerated.     Plan/Recommendations:   Low Intensity Therapy recommended post-acute care - This is recommended as therapy feels this patient would require 2-3 visits per week. OP or HH would be the best option depending on patient's home bound status. Consider, if the patient has other  \"skilled\" needs such as wounds, IV antibiotics, etc. Combined with \"low intensity\" could also equate to a SNF. If patient is medically complex, consider LTAC..      Pt desires Home with family assist at discharge. Pt cooperative; agreeable to therapeutic recommendations and plan of care.     Modified Yue: N/A = No pre-op stroke/TIA    Post-Tx Position: Up in " Chair, Alarms activated and Call light and personal items within reach  PPE: gloves

## 2023-05-19 NOTE — CASE MANAGEMENT/SOCIAL WORK
Continued Stay Note  SHILPI Crandall     Patient Name: Thi Allen  MRN: [id number]  Today's Date: 5/19/2023    Admit Date: 5/15/2023    Plan: DC Plan: Anticipate Routine Home with Family. MVR 5/15/23.   Discharge Plan     Row Name 05/19/23 1456       Plan    Plan DC Plan: Anticipate Routine Home with Family. MVR 5/15/23.    Provided Post Acute Provider List? N/A    Provided Post Acute Provider Quality & Resource List? N/A    Plan Comments CM spoke with patient’s nurse and [facility] NP Yulissa Freeman to obtain clinical updates. Potential plan for patient to discharge home tomorrow 5/20/23, Watch for home oxygen needs. CM will continue to follow for any further needs and adjust discharge plan accordingly. DC Barriers: POD 4 OHS, O2@2L nc, IV Lasix, and cardiac monitor.           Expected Discharge Date and Time     Expected Discharge Date Expected Discharge Time    May 20, 2023             Dee Alicia RN     Office Phone: [phone]  Office Cell:     [phone]

## 2023-05-19 NOTE — PROGRESS NOTES
Referring Provider: Veronica Orozco MD    Reason for follow-up: Mitral regurgitation status post mitral valve repair     Patient Care Team:  Camila Lazcano MD as PCP - General  Trace Marcus MD as Cardiologist (Cardiology)      SUBJECTIVE  She is sitting up in chair and using incentive spirometer.     ROS  Review of all systems negative except as indicated.    Since I have last seen, the patient has been without any chest discomfort, shortness of breath, palpitations, dizziness or syncope.  Denies having any headache, abdominal pain, nausea, vomiting, diarrhea, constipation, loss of weight or loss of appetite.  Denies having any excessive bruising, hematuria or blood in the stool.  ROS      Personal History:    Past Medical History:   Diagnosis Date   • AV block 02/22/2021   • Bilateral renal cysts 01/01/2020   • Carotid stenosis, bilateral 06/01/2019    Overview:  <50% bilateral    • Carpal tunnel syndrome, bilateral 06/15/2018   • Chronic combined systolic (congestive) and diastolic (congestive) heart failure    • DVT (deep venous thrombosis) (CMS/[facility]) [I82.409]     recurrent   • Hordeolum externum 08/05/2015   • Hyperlipidemia    • Hypertension    • Hypothyroidism due to acquired atrophy of thyroid 10/21/2019   • Long term (current) use of anticoagulants 09/29/2022   • Mitral regurgitation 05/17/2016   • Osteopenia    • Pacemaker 12/28/2021       Past Surgical History:   Procedure Laterality Date   • CARDIAC CATHETERIZATION N/A 2/25/2023    Procedure: Left Heart Cath and coronary angiogram;  Surgeon: Trace Marcus MD;  Location: [facility] CATH INVASIVE LOCATION;  Service: Cardiovascular;  Laterality: N/A;   • CARDIAC CATHETERIZATION Bilateral 2/25/2023    Procedure: Right and Left Heart Cath;  Surgeon: Trace Marcus MD;  Location: [facility] CATH INVASIVE LOCATION;  Service: Cardiovascular;  Laterality: Bilateral;   • HYSTERECTOMY     • PACEMAKER IMPLANTATION     • THYROID SURGERY         Family History   Problem  Relation Age of Onset   • No Known Problems Mother    • No Known Problems Father    • No Known Problems Sister    • No Known Problems Brother    • No Known Problems Maternal Aunt    • No Known Problems Maternal Uncle    • No Known Problems Paternal Aunt    • No Known Problems Paternal Uncle    • No Known Problems Maternal Grandmother    • No Known Problems Maternal Grandfather    • No Known Problems Paternal Grandmother    • No Known Problems Paternal Grandfather    • No Known Problems Other    • Anemia Neg Hx    • Arrhythmia Neg Hx    • Asthma Neg Hx    • Clotting disorder Neg Hx    • Fainting Neg Hx    • Heart attack Neg Hx    • Heart disease Neg Hx    • Heart failure Neg Hx    • Hyperlipidemia Neg Hx    • Hypertension Neg Hx        Social History     Tobacco Use   • Smoking status: Former     Passive exposure: Past   • Smokeless tobacco: Never   • Tobacco comments:     quit 35 yrs ago   Vaping Use   • Vaping Use: Never used   Substance Use Topics   • Alcohol use: Not Currently     Comment: rare   • Drug use: No        Medications Prior to Admission   Medication Sig Dispense Refill Last Dose   • atenolol (TENORMIN) 100 MG tablet Take 1 tablet by mouth Daily.   2023   • [] Enoxaparin Sodium (LOVENOX) 60 MG/0.6ML solution prefilled syringe syringe Inject 0.5 mL under the skin into the appropriate area as directed Every 12 (Twelve) Hours for 5 doses. Warfarin being stopped for cardiac surgery.  Lovenox to start  BID and last dose  morning. 2.5 mL 0 5/15/2023 at 0400   • famotidine (PEPCID) 20 MG tablet Take 1 tablet by mouth 2 (Two) Times a Day As Needed.   Past Week   • fenofibrate 160 MG tablet Take 1 tablet by mouth Daily.   2023   • hydroCHLOROthiazide (HYDRODIURIL) 25 MG tablet Take 2 tablets by mouth Every Other Day.   2023   • latanoprost (XALATAN) 0.005 % ophthalmic solution Administer 1 drop to both eyes Every Night.   2023   • levothyroxine (SYNTHROID, LEVOTHROID) 50 MCG  tablet TAKE 1 TABLET BY MOUTH ONCE DAILY   2023   • lisinopril (PRINIVIL,ZESTRIL) 20 MG tablet Take 1 tablet by mouth Daily. 30 tablet 0 2023   • mupirocin (BACTROBAN) 2 % ointment Apply to nares (inside each nostril) twice daily as directed for procedure 22 g 0 5/15/2023   • warfarin (COUMADIN) 2.5 MG tablet Take 1 tablet by mouth Every Night. T,Th,Sat,Sun   2023   • warfarin (COUMADIN) 5 MG tablet Take 1 tablet by mouth Daily. M,W,F   5/10/2023       Allergies:  Atorvastatin, Colesevelam hcl, Colestipol hcl, Ezetimibe, Pitavastatin, Rosuvastatin, Rosuvastatin calcium, Simvastatin, Statins, and Keflex [cephalexin]    Scheduled Meds:aspirin, 81 mg, Oral, Daily  atenolol, 50 mg, Oral, Q24H  chlorhexidine, 15 mL, Mouth/Throat, Q12H  enoxaparin, 40 mg, Subcutaneous, Q24H  furosemide, 20 mg, Intravenous, Q12H  insulin lispro, 2-7 Units, Subcutaneous, 4x Daily With Meals & Nightly  latanoprost, 1 drop, Both Eyes, Nightly  levothyroxine, 50 mcg, Oral, Daily  mupirocin, 1 application, Each Nare, BID  pantoprazole, 40 mg, Oral, Q AM  potassium chloride, 20 mEq, Oral, Daily  potassium chloride, 10 mEq, Oral, BID With Meals      Continuous Infusions:   PRN Meds:.•  acetaminophen **OR** [DISCONTINUED] acetaminophen **OR** [DISCONTINUED] acetaminophen  •  Calcium Replacement - Follow Nurse / BPA Driven Protocol  •  dextrose  •  dextrose  •  dextrose  •  dextrose  •  glucagon (human recombinant)  •  glucagon (human recombinant)  •  HYDROcodone-acetaminophen  •  Magnesium Standard Dose Replacement - Follow Nurse / BPA Driven Protocol  •  metoclopramide  •  [] Morphine **AND** naloxone  •  ondansetron  •  Phosphorus Replacement - Follow Nurse / BPA Driven Protocol  •  polyethylene glycol  •  Potassium Replacement - Follow Nurse / BPA Driven Protocol  •  senna-docusate sodium      OBJECTIVE    Vital Signs  Vitals:    23 0400 23 0500 23 0700 23 0800   BP: 169/84 153/78 142/68 162/83  "  Pulse: 88 87 89 89   Resp: 16      Temp: 98.1 °F (36.7 °C)      TempSrc: Oral      SpO2: 97% 99% 99% 94%   Weight:       Height:           Flowsheet Rows    Flowsheet Row First Filed Value   Admission Height 149.9 cm (59\") Documented at 05/15/2023 0516   Admission Weight 48 kg (105 lb 12.8 oz) Documented at 05/15/2023 0549            Intake/Output Summary (Last 24 hours) at 5/19/2023 0847  Last data filed at 5/18/2023 1400  Gross per 24 hour   Intake 372 ml   Output 310 ml   Net 62 ml          Telemetry: Normal sinus rhythm    Physical Exam:  The patient is alert, oriented and in no distress.  Vital signs as noted above.  Head and neck revealed no carotid bruits or jugular venous distention.  No thyromegaly or lymphadenopathy is present  Lungs clear.  No wheezing.  Breath sounds are normal bilaterally.  She is on 3 L of nasal cannula however switching to room air soon.  Heart normal first and second heart sounds.  No murmur. No precordial rub is present.  No gallop is present.  Abdomen soft and nontender.  No organomegaly is present.  Extremities with good peripheral pulses without any pedal edema.  Skin warm and dry.  Musculoskeletal system is grossly normal.  CNS grossly normal.       Results Review:  I have personally reviewed the results from the time of this admission to 5/19/2023 08:47 EDT and agree with these findings:  []  Laboratory  []  Microbiology  []  Radiology  []  EKG/Telemetry   []  Cardiology/Vascular   []  Pathology  []  Old records  []  Other:    Most notable findings include:    Lab Results (last 24 hours)     Procedure Component Value Units Date/Time    POC Glucose Once [[id number]]  (Abnormal) Collected: 05/19/23 0757    Specimen: Blood Updated: 05/19/23 0758     Glucose 130 mg/dL      Comment: Serial Number: [id number]:  [id number]       Protime-INR [[id number]]  (Abnormal) Collected: 05/19/23 0441    Specimen: Blood Updated: 05/19/23 0513     Protime 10.2 Seconds      INR 0.95    Basic " Metabolic Panel [[id number]]  (Abnormal) Collected: 05/19/23 0441    Specimen: Blood Updated: 05/19/23 0510     Glucose 129 mg/dL      BUN 21 mg/dL      Creatinine 0.55 mg/dL      Sodium 140 mmol/L      Potassium 4.1 mmol/L      Chloride 104 mmol/L      CO2 26.0 mmol/L      Calcium 8.7 mg/dL      BUN/Creatinine Ratio 38.2     Anion Gap 10.0 mmol/L      eGFR 92.2 mL/min/1.73     Narrative:      GFR Normal >60  Chronic Kidney Disease <60  Kidney Failure <15    The GFR formula is only valid for adults with stable renal function between ages 18 and 70.    CBC (No Diff) [[id number]] Updated: 05/19/23 0457    Specimen: Blood     POC Glucose Once [[id number]]  (Abnormal) Collected: 05/18/23 2058    Specimen: Blood Updated: 05/18/23 2059     Glucose 126 mg/dL      Comment: Serial Number: [id number]:  [id number]       POC Glucose Once [[id number]]  (Abnormal) Collected: 05/18/23 1728    Specimen: Blood Updated: 05/18/23 1729     Glucose 219 mg/dL      Comment: Serial Number: [id number]:  [id number]       POC Glucose Once [[id number]]  (Abnormal) Collected: 05/18/23 1635    Specimen: Blood Updated: 05/18/23 1637     Glucose 141 mg/dL      Comment: Serial Number: [id number]:  [id number]       POC Glucose Once [[id number]]  (Abnormal) Collected: 05/18/23 1133    Specimen: Blood Updated: 05/18/23 1135     Glucose 159 mg/dL      Comment: Serial Number: [id number]:  [id number]       POC Glucose Once [[id number]]  (Abnormal) Collected: 05/18/23 0908    Specimen: Blood Updated: 05/18/23 1133     Glucose 167 mg/dL      Comment: Serial Number: [id number]:  [id number]       Protime-INR [[id number]]  (Abnormal) Collected: 05/18/23 1016    Specimen: Blood Updated: 05/18/23 1035     Protime 10.3 Seconds      INR 0.96          Imaging Results (Last 24 Hours)     Procedure Component Value Units Date/Time    XR Chest 1 View [[id number]] Collected: 05/19/23 0817     Updated: 05/19/23 0821    Narrative:      XR CHEST 1  VW    Date of Exam: 5/19/2023 12:55 AM EDT    Indication: Hypoxia    Comparison: AP portable chest 5/18/2013.    Findings:  Heart size is borderline enlarged with stable signs of prior median sternotomy and valve replacement. Pacemaker device appears unchanged. Small bilateral pleural effusions, right greater than left, persist. Bibasilar airspace disease, right greater than   left, which may represent passive atelectasis, pneumonia not excluded, without significant change. Tiny right apical pneumothorax appears unchanged.      Impression:      Impression:    1. Stable tiny right apical pneumothorax.  2. Small bilateral pleural effusions with bibasilar atelectasis or pneumonia, without significant change.      Electronically Signed: Adrienne Hall    5/19/2023 8:19 AM EDT    Workstation ID: [id number]    XR Chest 1 View [[id number]] Collected: 05/19/23 0013     Updated: 05/19/23 0017    Narrative:      XR CHEST 1 VW    Date of Exam: 5/18/2023 6:16 PM EDT    Indication: CT removal    Comparison: Chest radiograph from earlier this day    Findings:  The right sided chest tube has been removed. There is a very small right apical pneumothorax. There is improving bibasilar atelectasis. Small bilateral pleural effusions not significantly changed. No acute infiltrates. There are stable changes from   cardiac valve replacement and dual chambered pacemaker. Mild cardiomegaly and senescent change in the thoracic aorta are unchanged.      Impression:      Impression:    1. Very small right apical pneumothorax after chest tube removal.  2. Improving bibasilar densities likely improving atelectasis with small bilateral pleural effusions.  3. Otherwise, no change from earlier in the day.      Electronically Signed: Toan Cutler    5/19/2023 12:15 AM EDT    Workstation ID: [id number]    XR Chest 1 View [[id number]] Collected: 05/18/23 1422     Updated: 05/18/23 1425    Narrative:      XR CHEST 1 VW    Date of Exam: 5/18/2023 2:00 PM  EDT    Indication: SOB    Comparison: AP portable chest 5/18/2023    Findings:  Right pleural pigtail drain appears unchanged in position. No pneumothorax is visible. Small bilateral pleural effusions persist with left greater than right basilar airspace disease. Right basilar airspace disease appears slightly increased, obscuring   the diaphragmatic margin. Left chest wall pacemaker unchanged. Stable heart size at upper limits normal with signs of median sternotomy and valve replacement. No acute osseous abnormality.         Impression:      Impression:    1. Stable small bilateral pleural effusions.  2. Left greater than right basilar airspace disease which may resent atelectasis or pneumonia. Right basilar airspace disease appears slightly increased.  3. No visible pneumothorax.      Electronically Signed: Adrienne Hall    5/18/2023 2:23 PM EDT    Workstation ID: [id number]          LAB RESULTS (LAST 7 DAYS)    CBC  Results from last 7 days   Lab Units 05/18/23  0216 05/17/23  0352 05/16/23  0434 05/16/23  0248 05/16/23  0225 05/15/23  2317 05/15/23  2006 05/15/23  1214 05/15/23  1202 05/15/23  0805 05/15/23  0701   WBC 10*3/mm3 11.40* 11.60*  --  12.40*  --   --   --   --  10.00  --  4.80  4.80   RBC 10*6/mm3 3.34* 3.48*  --  3.90  --   --   --   --  4.44  --  3.83  3.83   HEMOGLOBIN g/dL 10.0* 10.4*  --  11.8*  --   --   --   --  13.7  --  11.8*  11.7*   HEMOGLOBIN, POC g/dL  --   --  11.7*  --  10.7* 10.9* 10.3*   < >  --    < >  --    HEMATOCRIT % 30.8* 31.2*  --  35.0  --   --   --   --  40.5  --  35.2  34.4   HEMATOCRIT POC %  --   --  34*  --  31* 32* 30*   < >  --    < >  --    MCV fL 92.2 89.7  --  89.8  --   --   --   --  91.3  --  92.0  89.9   PLATELETS 10*3/mm3 91* 74*  --  109*  --   --   --   --  101*  --  203  198    < > = values in this interval not displayed.       BMP  Results from last 7 days   Lab Units 05/19/23  0441 05/18/23  0216 05/17/23  0352 05/16/23  1307 05/16/23  0248  05/15/23  1808 05/15/23  1109   SODIUM mmol/L 140 142 145 147* 145  --  139   POTASSIUM mmol/L 4.1 3.9 3.4* 3.7 3.4* 3.2* 2.5*   CHLORIDE mmol/L 104 107 107 109* 108*  --  103   CO2 mmol/L 26.0 23.0 23.0 26.0 22.0  --  22.0   BUN mg/dL 21 24* 22 19 19  --  18   CREATININE mg/dL 0.55* 0.66 0.80 0.81 0.93  --  0.75   GLUCOSE mg/dL 129* 111* 147* 164* 219*  --  108*   MAGNESIUM mg/dL  --   --   --   --  2.4  --   --    PHOSPHORUS mg/dL  --   --   --   --  4.1  --  2.0*       CMP   Results from last 7 days   Lab Units 05/19/23  0441 05/18/23  0216 05/17/23  0352 05/16/23  1307 05/16/23  0248 05/15/23  1808 05/15/23  1109   SODIUM mmol/L 140 142 145 147* 145  --  139   POTASSIUM mmol/L 4.1 3.9 3.4* 3.7 3.4* 3.2* 2.5*   CHLORIDE mmol/L 104 107 107 109* 108*  --  103   CO2 mmol/L 26.0 23.0 23.0 26.0 22.0  --  22.0   BUN mg/dL 21 24* 22 19 19  --  18   CREATININE mg/dL 0.55* 0.66 0.80 0.81 0.93  --  0.75   GLUCOSE mg/dL 129* 111* 147* 164* 219*  --  108*   ALBUMIN g/dL  --   --   --   --  4.7  --  4.6       BNP        TROPONIN        CoAg  Results from last 7 days   Lab Units 05/19/23  0441 05/18/23  1016 05/15/23  1109 05/15/23  0701   INR  0.95* 0.96* 1.46* 1.07   APTT seconds  --   --  33.4* 35.1*       Creatinine Clearance  Estimated Creatinine Clearance: 67.5 mL/min (A) (by C-G formula based on SCr of 0.55 mg/dL (L)).    ABG  Results from last 7 days   Lab Units 05/17/23  1121 05/16/23  1527 05/16/23  1113 05/16/23  0742 05/16/23  0602 05/16/23  0434 05/16/23  0357   PH, ARTERIAL pH units 7.468* 7.428 7.405 7.360 7.339* 7.312* 7.342*   PCO2, ARTERIAL mm Hg 31.6* 42.4 40.6 50.1* 54.1* 48.7* 45.2   PO2 ART mm Hg 155.8* 126.1* 77.1* 97.0 108.8* 149.8* 143.3*   O2 SATURATION ART % 99.5* 98.9* 95.3 97.1 97.8 99.1* 99.1*   BASE EXCESS ART mmol/L -0.4* 3.3* 0.6 2.1 2.4 -2.0* -1.4*       Radiology  XR Chest 1 View    Result Date: 5/19/2023  Impression: 1. Stable tiny right apical pneumothorax. 2. Small bilateral pleural  effusions with bibasilar atelectasis or pneumonia, without significant change. Electronically Signed: Adrienne Kwandar  5/19/2023 8:19 AM EDT  Workstation ID: [id number]    XR Chest 1 View    Result Date: 5/19/2023  Impression: 1. Very small right apical pneumothorax after chest tube removal. 2. Improving bibasilar densities likely improving atelectasis with small bilateral pleural effusions. 3. Otherwise, no change from earlier in the day. Electronically Signed: Toanfloyd Cutler  5/19/2023 12:15 AM EDT  Workstation ID: [id number]    XR Chest 1 View    Result Date: 5/18/2023  Impression: 1. Stable small bilateral pleural effusions. 2. Left greater than right basilar airspace disease which may resent atelectasis or pneumonia. Right basilar airspace disease appears slightly increased. 3. No visible pneumothorax. Electronically Signed: Adrienne Aquilesdar  5/18/2023 2:23 PM EDT  Workstation ID: [id number]    XR Chest 1 View    Result Date: 5/18/2023  Impression: 1. Right-sided chest tube remains in place with a trace right-sided pleural effusion and no evidence of pneumothorax appreciated. 2. Small left-sided pleural effusion and dependent opacity favored represent atelectasis 3. Mild pulmonary vascular congestion. Electronically Signed: Lonnie Kessler  5/18/2023 7:18 AM EDT  Workstation ID: [id number]    XR Chest 1 View    Result Date: 5/17/2023  Impression: 1. Interval removal of the mediastinal drains and right IJ central venous catheter sheath. No pneumothorax. Bibasilar atelectasis and/or small effusions are present. 2. Pigtail thoracostomy tube is in place as described above. 3. Prosthetic cardiac valve and pacemaker/AICD are in place. Electronically Signed: Toanfloyd Cutler  5/17/2023 10:37 PM EDT  Workstation ID: [id number]        EKG  I personally viewed and interpreted the patient's EKG/Telemetry data:  ECG 12 Lead   Final Result   HEART RATE= 84  bpm   RR Interval= 717  ms   RI Interval= 41  ms   P Horizontal Axis= 60  deg   P  Front Axis=   deg   QRSD Interval= 89  ms   QT Interval= 457  ms   QRS Axis= 8  deg   T Wave Axis= -87  deg   - ABNORMAL ECG -   Atrial-paced complexes   LVH with secondary repolarization abnormality   Prolonged QT interval   When compared with ECG of 16-May-2023 3:49:28,   Significant repolarization change   Significant axis, voltage or hypertrophy change   Electronically Signed By: Josiah Ram (CAROL) 17-May-2023 16:02:01   Date and Time of Study: 2023-05-17 05:00:43      ECG 12 Lead   Preliminary Result   HEART RATE= 80  bpm   RR Interval= 752  ms   MT Interval= 253  ms   P Horizontal Axis= 199  deg   P Front Axis= 108  deg   QRSD Interval= 158  ms   QT Interval= 465  ms   QRS Axis= -71  deg   T Wave Axis= 103  deg   - ABNORMAL ECG -   Atrial-ventricular dual-paced rhythm   Electronically Signed By:    Date and Time of Study: 2023-05-16 03:49:28      ECG 12 Lead    (Results Pending)         Echocardiogram:    Results for orders placed in visit on 05/15/23    Intra-Op Anesthesia CARMITA    Narrative  Intra-Op Anesthesia CARMITA    Procedure Performed: Intra-Op Anesthesia CARMITA  Start Time:  5/15/2023 7:25 AM  End Time:   5/15/2023 7:35 AM    Preanesthesia Checklist:  Patient identified, IV assessed, risks and benefits discussed, monitors and equipment assessed, procedure being performed at surgeon's request and anesthesia consent obtained.    General Procedure Information  CARMITA Placed for monitoring purposes only -- This is not a diagnostic CARMITA  Diagnostic Indications for Echo:  assessment of ascending aorta, assessment of surgical repair, defect repair evaluation and hemodynamic monitoring  Location performed:  OR  Intubated  Bite block placed  Probe Insertion:  Easy  Probe Type:  Multiplane  Modalities:  Color flow mapping, continuous wave Doppler and pulse wave Doppler    Echocardiographic and Doppler Measurements    Ventricles    Right Ventricle:  Cavity size normal.  Hypertrophy not present.  Thrombus not present.   Global function normal.  Left Ventricle:  Cavity size dilated.  Thrombus not present.  Global Function moderately impaired.  Ejection Fraction 30%.        Valves    Aortic Valve:  Annulus normal.  Stenosis not present.  Regurgitation mild.  Leaflets normal.  Leaflet motions normal.    Mitral Valve:  Annulus dilated.  Stenosis not present.  Regurgitation severe.  Leaflets normal.  Leaflet motions normal.    Tricuspid Valve:  Annulus normal.  Stenosis not present.  Regurgitation mild.  Leaflets normal.  Leaflet motions normal.      Aorta    Ascending Aorta:  Size normal.  Dissection not present.  Plaque thickness less than 3 mm.  Mobile plaque not present.  Aortic Arch:  Size normal.  Dissection not present.  Plaque thickness less than 3 mm.  Mobile plaque not present.  Descending Aorta:  Size normal.  Dissection not present.  Plaque thickness less than 3 mm.  Mobile plaque not present.      Atria    Right Atrium:  Size dilated.  Spontaneous echo contrast not present.  Thrombus not present.  Tumor not present.  Device present.    Left Atrium:  Size dilated.  Spontaneous echo contrast present.  Thrombus not present.  Tumor not present.  Device not present.  Left atrial appendage normal.      Septa        Ventricular Septum:  Intra-ventricular septum morphology normal.        Other Findings  Pericardium:  normal  Pleural Effusion:  none  Pulmonary Arteries:  normal  Pulmonary Venous Flow:  normal    Anesthesia Information  Anesthesiologist:  Ronnie Craft MD      Echocardiogram Comments:  MILD AI; SEVERE MR DILATE LA/MV ANNULUS, CENTRAL JET  TV: MILD TI  EF 30        Stress Test:         Cardiac Catheterization:  Results for orders placed during the hospital encounter of 02/24/23    Cardiac Catheterization/Vascular Study    Narrative  OPERATORS  Trace Marcus M.D. (Attending Cardiologist)      PROCEDURE PERFORMED  Ultrasound guided vascular access  Right heart catheterization  Coronary Angiogram  Left Heart  Catheterization [id number]  Moderate Sedation 35 minutes    INDICATIONS FOR PROCEDURE  82-year-old woman with valvular heart disease including mitral and tricuspid regurgitation, pulmonary hypertension presented with heart failure exacerbation.  After IV diuresis, discussion of risk and benefit she was brought into the cardiac Cath Lab for right and left heart cath.    PROCEDURE IN DETAIL  Informed consent was obtained from the patient after explaining the risks, benefits, and alternative options of the procedure. After obtaining informed consent, the patient was brought to the cath lab and was prepped in a sterile fashion. Lidocaine 2% was used for local anesthesia into the right femoral venous access site. Right femoral vein was accessed using the micropuncture needle under ultrasound guidance and micropuncture wire advanced under flouroscopy. A 7 Croatian vascular sheath was put into place percutaneously over guide-wire. Guide wires were removed. A 6Fr swan aneta catheter was advanced to wedge position. RA, RV and PA and wedge pressures were recorded.  PA sat and arterial sats recorded.  The patient tolerated the procedure well without any complications.    Lidocaine 2% was used for local anesthesia into the right femoral arterial access site. The right femoral artery was accessed with a micropuncture needle via modified Seldinger technique under ultrasound guidance. A 6F was inserted successfully.  Afterwards, 6F JR4 and JL4 diagnostic catheters were advanced over a wire into the ascending aorta and were used to engage the ostia of the left main and RCA respectively. JR4 used to cross the AV and obtain LV pressures and gradient across the AV measured via pullback technique. Images of the right and left coronary systems were obtained. All the catheters were exchanged over a wire and subsequently removed. Angiogram of the femoral access site was obtained and did not show complications. The patient tolerated the procedure  well without any complications. The pictures were reviewed at the end of the procedure. A Mynx closure device was applied.    HEMODYNAMICS    RHC  RA 5/3, 3 mmHg  RV 26/2, 4 mmHg  PA 27/8, 16 mmHg  PCW 7/7, 6 mmHg  AO Sat 96%  PA Sat 74%    Rob CO 4.73    Rob CI 3.42    LHC  LV: 108/5, 7 mmHg  AO: 116/46, 72 mmHg    No significant gradient across the aortic valve during pullback of JR4 catheter.  LV gram was not performed due to recent echocardiogram.    FINDINGS  Coronary Angiogram  Left dominant circulation  Left main: Left main is a large caliber vessel which gives rise to the Left Anterior Descending and the Left circumflex.  Left main is angiographically free from any significant disease  Left Anterior Descending Artery: LAD is a medium caliber vessel which gives rise to several septal perforators and several diagonal branches.  LAD has diffuse luminal irregularities with 20 to 30% lesion in the midsegment.  Left Circumflex: Left circumflex artery is a dominant vessel which gives rise to obtuse marginal.  Left circumflex has luminal irregularities only.  Right Coronary Artery: RCA is a small nondominant vessel.    ESTIMATED BLOOD LOSS:  10 ml    COMPLICATIONS:  None    PROCEDURE DATA:  Contrast Used: 25 cc  Sedation Time: 35 minutes    IMPRESSIONS  Non obstructive CAD  Normal filling pressure, no evidence of pulmonary hypertension.  Normal wedge pressure    RECOMMENDATIONS  -Switch IV to p.o. diuresis.  -Proceed with transesophageal echocardiogram to evaluate mitral and tricuspid regurgitation.  -Uptitrate GDMT for HFrEF         Other:         ASSESSMENT & PLAN:    Principal Problem:    Severe mitral regurgitation  Active Problems:    History of DVT (deep vein thrombosis)    Essential hypertension with goal blood pressure less than 130/80    Hypertriglyceridemia    Hypothyroidism due to acquired atrophy of thyroid    Pacemaker    Chronic combined systolic and diastolic congestive heart failure    Coronary  artery disease involving native coronary artery of native heart without angina pectoris    S/P MVR (mitral valve repair)    Hypokalemia    Severe Mitral regurgitation, S/P MVR (mitral valve repair)  Status post repair with 25 mm physio 2 ring on 5/15/2023.  Currently on room air  Pressor and inotrope have been weaned off  Pneumothorax resolved with chest tube placement.  Chest tube is now out  PT OT and incentive spirometry encouraged     Hypokalemia  Potassium has been corrected keep it more than 4     Anemia  Hemoglobin is 10 now  Blood transfusion as needed to keep hemoglobin more than 8     Chronic combined systolic and diastolic congestive heart failure  Continue Lasix  Increase atenolol to 50 mg p.o. daily.  We will add lisinopril 5 mg p.o. daily for better blood pressure control  Monitor renal function while on Lasix and lisinopril.  Elective echocardiogram to evaluate LV function and tricuspid valve can be done as an outpatient     Coronary artery disease involving native coronary artery of native heart without angina pectoris  Nonobstructive coronary disease per coronary angiogram.  Continue aspirin.  She is intolerant to statins     Essential hypertension with goal blood pressure less than 130/80  We will manage with lisinopril and atenolol.  Uptitrate lisinopril as tolerated  Monitor renal function while on Lasix and lisinopril     Hypertriglyceridemia  Fenofibrate can be resumed     Pacemaker, h/o SSS  [employer] permanent pacemaker is in place  Pacemaker interrogation with no significant events     History of DVT (deep venous thrombosis)  Warfarin has been resumed with no evidence of bleeding     Hypothyroidism due to acquired atrophy of thyroid  Continue Synthroid      Trace Marcus MD  05/19/23  08:47 EDT

## 2023-05-20 ENCOUNTER — READMISSION MANAGEMENT (OUTPATIENT)
Dept: CALL CENTER | Facility: HOSPITAL | Age: 83
End: 2023-05-20
Payer: MEDICARE

## 2023-05-20 VITALS
SYSTOLIC BLOOD PRESSURE: 118 MMHG | BODY MASS INDEX: 22.6 KG/M2 | WEIGHT: 112.1 LBS | HEART RATE: 98 BPM | RESPIRATION RATE: 17 BRPM | TEMPERATURE: 97.7 F | HEIGHT: 59 IN | DIASTOLIC BLOOD PRESSURE: 64 MMHG | OXYGEN SATURATION: 96 %

## 2023-05-20 LAB
ANION GAP SERPL CALCULATED.3IONS-SCNC: 13 MMOL/L (ref 5–15)
BUN SERPL-MCNC: 22 MG/DL (ref 8–23)
BUN/CREAT SERPL: 33.3 (ref 7–25)
CALCIUM SPEC-SCNC: 9.1 MG/DL (ref 8.6–10.5)
CHLORIDE SERPL-SCNC: 98 MMOL/L (ref 98–107)
CO2 SERPL-SCNC: 29 MMOL/L (ref 22–29)
CREAT SERPL-MCNC: 0.66 MG/DL (ref 0.57–1)
DEPRECATED RDW RBC AUTO: 49.9 FL (ref 37–54)
EGFRCR SERPLBLD CKD-EPI 2021: 88.3 ML/MIN/1.73
ERYTHROCYTE [DISTWIDTH] IN BLOOD BY AUTOMATED COUNT: 14.8 % (ref 12.3–15.4)
GLUCOSE BLDC GLUCOMTR-MCNC: 115 MG/DL (ref 70–105)
GLUCOSE SERPL-MCNC: 131 MG/DL (ref 65–99)
HCT VFR BLD AUTO: 31.7 % (ref 34–46.6)
HGB BLD-MCNC: 10.7 G/DL (ref 12–15.9)
INR PPP: 1.05 (ref 2–3)
MCH RBC QN AUTO: 30.6 PG (ref 26.6–33)
MCHC RBC AUTO-ENTMCNC: 33.7 G/DL (ref 31.5–35.7)
MCV RBC AUTO: 91 FL (ref 79–97)
PLATELET # BLD AUTO: 155 10*3/MM3 (ref 140–450)
PMV BLD AUTO: 9.5 FL (ref 6–12)
POTASSIUM SERPL-SCNC: 3.4 MMOL/L (ref 3.5–5.2)
PROTHROMBIN TIME: 11.2 SECONDS (ref 19.4–28.5)
QT INTERVAL: 437 MS
QT INTERVAL: 465 MS
RBC # BLD AUTO: 3.49 10*6/MM3 (ref 3.77–5.28)
SODIUM SERPL-SCNC: 140 MMOL/L (ref 136–145)
WBC NRBC COR # BLD: 6.5 10*3/MM3 (ref 3.4–10.8)

## 2023-05-20 PROCEDURE — 80048 BASIC METABOLIC PNL TOTAL CA: CPT | Performed by: NURSE PRACTITIONER

## 2023-05-20 PROCEDURE — 25010000002 CALCIUM GLUCONATE-NACL 1-0.675 GM/50ML-% SOLUTION: Performed by: THORACIC SURGERY (CARDIOTHORACIC VASCULAR SURGERY)

## 2023-05-20 PROCEDURE — 93005 ELECTROCARDIOGRAM TRACING: CPT | Performed by: THORACIC SURGERY (CARDIOTHORACIC VASCULAR SURGERY)

## 2023-05-20 PROCEDURE — 25010000002 MAGNESIUM SULFATE 2 GM/50ML SOLUTION: Performed by: THORACIC SURGERY (CARDIOTHORACIC VASCULAR SURGERY)

## 2023-05-20 PROCEDURE — 82948 REAGENT STRIP/BLOOD GLUCOSE: CPT

## 2023-05-20 PROCEDURE — 85027 COMPLETE CBC AUTOMATED: CPT | Performed by: NURSE PRACTITIONER

## 2023-05-20 PROCEDURE — 85610 PROTHROMBIN TIME: CPT | Performed by: NURSE PRACTITIONER

## 2023-05-20 RX ORDER — POTASSIUM CHLORIDE 20 MEQ/1
40 TABLET, EXTENDED RELEASE ORAL EVERY 4 HOURS
Status: COMPLETED | OUTPATIENT
Start: 2023-05-20 | End: 2023-05-20

## 2023-05-20 RX ORDER — ATENOLOL 50 MG/1
50 TABLET ORAL
Qty: 30 TABLET | Refills: 2 | Status: SHIPPED | OUTPATIENT
Start: 2023-05-21 | End: 2023-05-20 | Stop reason: HOSPADM

## 2023-05-20 RX ORDER — FUROSEMIDE 40 MG/1
40 TABLET ORAL DAILY
Qty: 5 TABLET | Refills: 0 | Status: SHIPPED | OUTPATIENT
Start: 2023-05-20 | End: 2023-06-05 | Stop reason: ALTCHOICE

## 2023-05-20 RX ORDER — POTASSIUM CHLORIDE 750 MG/1
10 TABLET, FILM COATED, EXTENDED RELEASE ORAL 2 TIMES DAILY WITH MEALS
Status: DISCONTINUED | OUTPATIENT
Start: 2023-05-20 | End: 2023-05-20 | Stop reason: ALTCHOICE

## 2023-05-20 RX ORDER — POTASSIUM CHLORIDE 20 MEQ/1
20 TABLET, EXTENDED RELEASE ORAL DAILY
Status: DISCONTINUED | OUTPATIENT
Start: 2023-05-21 | End: 2023-05-20 | Stop reason: HOSPADM

## 2023-05-20 RX ORDER — CALCIUM GLUCONATE 20 MG/ML
1 INJECTION, SOLUTION INTRAVENOUS ONCE
Status: COMPLETED | OUTPATIENT
Start: 2023-05-20 | End: 2023-05-20

## 2023-05-20 RX ORDER — MAGNESIUM SULFATE HEPTAHYDRATE 40 MG/ML
2 INJECTION, SOLUTION INTRAVENOUS ONCE
Status: COMPLETED | OUTPATIENT
Start: 2023-05-20 | End: 2023-05-20

## 2023-05-20 RX ORDER — FUROSEMIDE 40 MG/1
40 TABLET ORAL DAILY
Status: DISCONTINUED | OUTPATIENT
Start: 2023-05-20 | End: 2023-05-20 | Stop reason: HOSPADM

## 2023-05-20 RX ORDER — ATENOLOL 25 MG/1
75 TABLET ORAL
Qty: 45 TABLET | Refills: 1 | Status: SHIPPED | OUTPATIENT
Start: 2023-05-21 | End: 2023-06-05 | Stop reason: SDUPTHER

## 2023-05-20 RX ORDER — LISINOPRIL 5 MG/1
5 TABLET ORAL
Qty: 30 TABLET | Refills: 2 | Status: SHIPPED | OUTPATIENT
Start: 2023-05-21 | End: 2023-06-05 | Stop reason: SDUPTHER

## 2023-05-20 RX ORDER — HYDROCODONE BITARTRATE AND ACETAMINOPHEN 5; 325 MG/1; MG/1
1 TABLET ORAL EVERY 4 HOURS PRN
Qty: 42 TABLET | Refills: 0 | Status: SHIPPED | OUTPATIENT
Start: 2023-05-20 | End: 2023-05-27

## 2023-05-20 RX ORDER — ATENOLOL 25 MG/1
25 TABLET ORAL
Status: COMPLETED | OUTPATIENT
Start: 2023-05-20 | End: 2023-05-20

## 2023-05-20 RX ADMIN — MAGNESIUM SULFATE HEPTAHYDRATE 2 G: 2 INJECTION, SOLUTION INTRAVENOUS at 11:01

## 2023-05-20 RX ADMIN — POTASSIUM CHLORIDE 40 MEQ: 1500 TABLET, EXTENDED RELEASE ORAL at 08:42

## 2023-05-20 RX ADMIN — LEVOTHYROXINE SODIUM 50 MCG: 0.05 TABLET ORAL at 08:43

## 2023-05-20 RX ADMIN — ATENOLOL 50 MG: 50 TABLET ORAL at 08:43

## 2023-05-20 RX ADMIN — POTASSIUM CHLORIDE 40 MEQ: 1500 TABLET, EXTENDED RELEASE ORAL at 06:01

## 2023-05-20 RX ADMIN — ATENOLOL 25 MG: 25 TABLET ORAL at 11:01

## 2023-05-20 RX ADMIN — PANTOPRAZOLE SODIUM 40 MG: 40 TABLET, DELAYED RELEASE ORAL at 06:01

## 2023-05-20 RX ADMIN — FUROSEMIDE 40 MG: 40 TABLET ORAL at 11:01

## 2023-05-20 RX ADMIN — ASPIRIN 81 MG: 81 TABLET, COATED ORAL at 08:43

## 2023-05-20 RX ADMIN — LISINOPRIL 5 MG: 5 TABLET ORAL at 08:43

## 2023-05-20 RX ADMIN — CALCIUM GLUCONATE 1 G: 20 INJECTION, SOLUTION INTRAVENOUS at 11:02

## 2023-05-20 NOTE — CASE MANAGEMENT/SOCIAL WORK
Case Management Discharge Note      Final Note: home    Provided Post Acute Provider List?: N/A  Provided Post Acute Provider Quality & Resource List?: N/A      Transportation Services  Private: Car    Final Discharge Disposition Code: 01 - home or self-care

## 2023-05-20 NOTE — OUTREACH NOTE
Prep Survey    Flowsheet Row Responses   [facility]? [facility]   Is LACE score < 7 ? No   Eligibility Readm Mgmt   Discharge diagnosis Severe mitral regurgitation   Does the patient have one of the following disease processes/diagnoses(primary or secondary)? Other   Does the patient have Home health ordered? No   Is there a DME ordered? No   Prep survey completed? Yes          Jo MOLINA - Registered Nurse

## 2023-05-20 NOTE — PLAN OF CARE
Goal Outcome Evaluation:  Plan of Care Reviewed With: patient        Progress: no change  Outcome Evaluation: Patient currently resting in bed, VSS and patient has no complaints, recieved lasix earlier and shift and patient has been voiding frequently.

## 2023-05-20 NOTE — PROGRESS NOTES
LOS: 5 days   Patient Care Team:  Camila Lazcano MD as PCP - General  Trace Marcus MD as Cardiologist (Cardiology)    Chief Complaint:       Subjective      Vital Signs  Temp:  [97.7 °F (36.5 °C)-99.1 °F (37.3 °C)] 97.7 °F (36.5 °C)  Heart Rate:  [] 98  Resp:  [13-19] 17  BP: (126-156)/(59-83) 154/83  Body mass index is 22.64 kg/m².    Intake/Output Summary (Last 24 hours) at 5/20/2023 0951  Last data filed at 5/20/2023 0200  Gross per 24 hour   Intake 240 ml   Output 1350 ml   Net -1110 ml     No intake/output data recorded.      Wt Readings from Last 3 Encounters:   05/20/23 50.8 kg (112 lb 1.6 oz)   05/11/23 48.5 kg (107 lb)   04/27/23 48.1 kg (106 lb)         Objective    Results Review:        WBC No results found for: WBCS   HGB Hemoglobin   Date Value Ref Range Status   05/20/2023 10.7 (L) 12.0 - 15.9 g/dL Final   05/18/2023 10.0 (L) 12.0 - 15.9 g/dL Final      HCT Hematocrit   Date Value Ref Range Status   05/20/2023 31.7 (L) 34.0 - 46.6 % Final   05/18/2023 30.8 (L) 34.0 - 46.6 % Final      Platelets No results found for: LABPLAT     PT/INR:    Protime   Date Value Ref Range Status   05/20/2023 11.2 (L) 19.4 - 28.5 Seconds Final   05/19/2023 10.2 (L) 19.4 - 28.5 Seconds Final   05/18/2023 10.3 (L) 19.4 - 28.5 Seconds Final   /  INR   Date Value Ref Range Status   05/20/2023 1.05 (L) 2.00 - 3.00 Final   05/19/2023 0.95 (L) 2.00 - 3.00 Final   05/18/2023 0.96 (L) 2.00 - 3.00 Final       Sodium Sodium   Date Value Ref Range Status   05/20/2023 140 136 - 145 mmol/L Final   05/19/2023 140 136 - 145 mmol/L Final   05/18/2023 142 136 - 145 mmol/L Final      Potassium Potassium   Date Value Ref Range Status   05/20/2023 3.4 (L) 3.5 - 5.2 mmol/L Final   05/19/2023 4.1 3.5 - 5.2 mmol/L Final   05/18/2023 3.9 3.5 - 5.2 mmol/L Final      Chloride Chloride   Date Value Ref Range Status   05/20/2023 98 98 - 107 mmol/L Final   05/19/2023 104 98 - 107 mmol/L Final   05/18/2023 107 98 - 107 mmol/L Final       Bicarbonate No results found for: PLASMABICARB   BUN BUN   Date Value Ref Range Status   05/20/2023 22 8 - 23 mg/dL Final   05/19/2023 21 8 - 23 mg/dL Final   05/18/2023 24 (H) 8 - 23 mg/dL Final      Creatinine Creatinine   Date Value Ref Range Status   05/20/2023 0.66 0.57 - 1.00 mg/dL Final   05/19/2023 0.55 (L) 0.57 - 1.00 mg/dL Final   05/18/2023 0.66 0.57 - 1.00 mg/dL Final      Calcium Calcium   Date Value Ref Range Status   05/20/2023 9.1 8.6 - 10.5 mg/dL Final   05/19/2023 8.7 8.6 - 10.5 mg/dL Final   05/18/2023 9.0 8.6 - 10.5 mg/dL Final      Magnesium No results found for: MG      .imag    aspirin, 81 mg, Oral, Daily  atenolol, 25 mg, Oral, Q24H  [START ON 5/21/2023] atenolol, 75 mg, Oral, Q24H  calcium gluconate, 1 g, Intravenous, Once  chlorhexidine, 15 mL, Mouth/Throat, Q12H  enoxaparin, 40 mg, Subcutaneous, Q24H  furosemide, 40 mg, Oral, Daily  insulin lispro, 2-7 Units, Subcutaneous, 4x Daily With Meals & Nightly  latanoprost, 1 drop, Both Eyes, Nightly  levothyroxine, 50 mcg, Oral, Daily  lisinopril, 5 mg, Oral, Q24H  magnesium sulfate, 2 g, Intravenous, Once  mupirocin, 1 application, Each Nare, BID  pantoprazole, 40 mg, Oral, Q AM  [START ON 5/21/2023] potassium chloride, 20 mEq, Oral, Daily  warfarin, 4 mg, Oral, Daily             Patient Active Problem List   Diagnosis Code   • History of DVT (deep vein thrombosis) Z86.718   • Carotid stenosis, bilateral I65.23   • Carpal tunnel syndrome, bilateral G56.03   • Essential hypertension with goal blood pressure less than 130/80 I10   • Hypertriglyceridemia E78.1   • Hypothyroidism due to acquired atrophy of thyroid E03.4   • AV block, complete I44.2   • Bilateral renal cysts N28.1   • Cough R05.9   • Frequency of micturition R35.0   • Median neuropathy G56.10   • Mitral regurgitation I34.0   • Numbness and tingling in both hands R20.0, R20.2   • Osteopenia M85.80   • SSS (sick sinus syndrome) I49.5   • Hordeolum externum H00.019   • Pacemaker Z95.0    • Long term (current) use of anticoagulants Z79.01   • Chronic combined systolic and diastolic congestive heart failure I50.42   • Shortness of breath R06.02   • Aortic insufficiency I35.1   • Tricuspid regurgitation I07.1   • Pulmonary hypertension I27.20   • Coronary artery disease involving native coronary artery of native heart without angina pectoris I25.10   • Severe mitral regurgitation I34.0   • S/P MVR (mitral valve repair) Z98.890   • Hypokalemia E87.6   • Moderate Malnutrition (HCC) E44.0   • Acute on chronic HFrEF (heart failure with reduced ejection fraction) I50.23       Assessment & Plan    -Severe mitral regurgitation, moderate AI, mod to severe TR, EF 40-45% (CARMITA)--s/p MV repair (Dr. Orozco)  -Moderate pulmonary hypertension--RVSP 45-55  -Chronic combined HFrEF, NYHA class III-IV  -Dilated, NICM--EF 40 to 45% (CARMITA)  -HTN  -Hypothyroidism--levothyroxine  -Hx DVT--on warfarin, bridged with Lovenox prior to surgery  -PAF  -Hx PPM--[employer] DDDR mode  -Postop right ptx--s/p CT insertion 5/16, removal 5/18  -Postop acute resp insufficiency with hypercapnia--BiPap, transition to AirVo, critical care intensivist following, resolved  -Postop leukocytosis, likely r/t atel--watch closely     POD# 5.  Doing well. On asa/statin/ atenolol. On Coumadin, she can be loaded as an outpatient. Dr Marcus will address her coumadin. She is ready for discharge. Discussed with Dr Pam Granger MD  05/20/23  09:51 EDT

## 2023-05-22 NOTE — DISCHARGE SUMMARY
Date of Admission: 5/15/2023  Date of Discharge: 5/20/2023    Discharge Diagnosis:   -Severe mitral regurgitation, moderate AI, mod to severe TR, EF 40-45% (CARMITA)--s/p MV repair (Pagni)  -Moderate pulmonary hypertension--RVSP 45-55  -Chronic combined HFrEF, NYHA class III-IV  -Dilated, NICM--EF 40 to 45% (CARMITA)  -HTN  -Hypothyroidism--levothyroxine  -Hx DVT--on warfarin, bridged with Lovenox prior to surgery  -PAF  -Hx PPM--[employer] DDDR mode  -Postop right ptx--s/p CT insertion 5/16, removal 5/18  -Postop acute resp insufficiency with hypercapnia--BiPap, transition to AirVo, critical care intensivist following, resolved  -Postop leukocytosis, likely r/t atel--watch closely    Presenting Problem/History of Present Illness  Mitral valve insufficiency, unspecified etiology [I34.0]  Acute on chronic HFrEF (heart failure with reduced ejection fraction) [I50.23]     Hospital Course  Patient is a 83 y.o. female who presented from home the morning of surgery.  On 5/15/2023, she underwent mitral valve repair per Dr. Orozco.  Please see op note for full details.  She was extubated and to BiPAP.  She was noted to have a moderate right pneumothorax.  Critical care intensivist was consulted for right pneumocath placement and BiPAP management.  This was done without event with resolution of right pneumothorax.  She initially required low-dose dual inotropes.  Her permanent pacemaker was interrogated by [employer] postoperatively.  By postop day 2 she transition from BiPAP to Airvo..  She was diuresed aggressively.  By postop day 3, her oxygen requirements were down to 3 L.  Her home atenolol had been restarted and increased and the right pneumocath was removed.  Additionally on postop day 3, her Coumadin was restarted given her prior history of DVT twice.  By postop day 5, she was ready for discharge home.  Her INR was 1.05 at discharge.  She will resume her home regimen of Coumadin and INR checks.  Norco 5/325 as  needed for pain.    **I was not present for discharge.  Document completed for medical records.      Procedures Performed  Per Dr. Orozco 5/15/2023  Mitral valve repair using 25 mm Physio II ([employer]) and plication of excess P2 tissue. PRP application to sternum, Intraoperative transesophageal echocardiogram.       Consults:   Consults     Date and Time Order Name Status Description    5/16/2023  8:27 AM Inpatient Intensivist Consult Completed     5/15/2023 10:43 AM Inpatient Cardiology Consult Completed           Pertinent Test Results:    Lab Results   Component Value Date    WBC 6.50 05/20/2023    HGB 10.7 (L) 05/20/2023    HCT 31.7 (L) 05/20/2023    MCV 91.0 05/20/2023     05/20/2023      Lab Results   Component Value Date    GLUCOSE 131 (H) 05/20/2023    CALCIUM 9.1 05/20/2023     05/20/2023    K 3.4 (L) 05/20/2023    CO2 29.0 05/20/2023    CL 98 05/20/2023    BUN 22 05/20/2023    CREATININE 0.66 05/20/2023    BCR 33.3 (H) 05/20/2023    ANIONGAP 13.0 05/20/2023     Lab Results   Component Value Date    INR 1.05 (L) 05/20/2023    PROTIME 11.2 (L) 05/20/2023         Condition on Discharge: Stable for discharge to home with family assistance.    Vital Signs         Discharge Disposition  Home-Health Care [facility]    Discharge Medications     Discharge Medications      New Medications      Instructions Start Date   aspirin 81 MG EC tablet   81 mg, Oral, Daily      furosemide 40 MG tablet  Commonly known as: LASIX   40 mg, Oral, Daily      HYDROcodone-acetaminophen 5-325 MG per tablet  Commonly known as: NORCO   1 tablet, Oral, Every 4 Hours PRN         Changes to Medications      Instructions Start Date   atenolol 25 MG tablet  Commonly known as: TENORMIN  What changed:   · medication strength  · how much to take  · when to take this   75 mg, Oral, Every 24 Hours Scheduled      lisinopril 5 MG tablet  Commonly known as: PRINIVIL,ZESTRIL  What changed:   · medication strength  · how much to  take  · when to take this   5 mg, Oral, Every 24 Hours Scheduled         Continue These Medications      Instructions Start Date   famotidine 20 MG tablet  Commonly known as: PEPCID   20 mg, Oral, 2 Times Daily PRN      fenofibrate 160 MG tablet   160 mg, Oral, Daily      latanoprost 0.005 % ophthalmic solution  Commonly known as: XALATAN   1 drop, Both Eyes, Nightly      levothyroxine 50 MCG tablet  Commonly known as: SYNTHROID, LEVOTHROID   TAKE 1 TABLET BY MOUTH ONCE DAILY      warfarin 2.5 MG tablet  Commonly known as: COUMADIN   2.5 mg, Oral, Nightly, T,Th,Sat,Sun      warfarin 5 MG tablet  Commonly known as: COUMADIN   1 tablet, Oral, Daily, M,W,F         Stop These Medications    Enoxaparin Sodium 60 MG/0.6ML solution prefilled syringe syringe  Commonly known as: LOVENOX     hydroCHLOROthiazide 25 MG tablet  Commonly known as: HYDRODIURIL     mupirocin 2 % ointment  Commonly known as: BACTROBAN            Discharge Diet:   Healthy Heart Diet    Activity at Discharge:   As tolerated, no lifting > 10 pounds x 6 weeks    Follow-up Appointments  Future Appointments   Date Time Provider Department Center   6/5/2023  1:15 PM Trace Marcus MD [facility]   6/8/2023  2:00 PM Taylor-Yulissa Abrams APRN [facility]     Additional Instructions for the Follow-ups that You Need to Schedule     Call MD With Problems / Concerns   May 20, 2023      Instructions: Call for temp >101 or any surgical wound drainage    Order Comments: Instructions: Call for temp >101 or any surgical wound drainage          Discharge Follow-up with PCP   As directed       Currently Documented PCP:    Camila Lazcano MD    PCP Phone Number:    [phone]     Follow Up Details: in one month         Discharge Follow-up with Specialty: Cardiology; 2 Weeks   As directed      Specialty: Cardiology    Follow Up: 2 Weeks         Discharge Follow-up with Specified Provider: Cardiac Surgery; 6 Weeks   As directed      To: Cardiac  Surgery    Follow Up: 6 Weeks         Referral to Cardiac Rehab   As directed            Test Results Pending at Discharge    STS information:  Pt discharged on on aspirin/beta-blocker.  She resumed her fenofibrate, statins avoided due to statin intolerance.  Coumadin restarted due to history of DVTs.       Yulissa Herrera, HARIS  05/22/23  12:18 EDT

## 2023-05-24 ENCOUNTER — READMISSION MANAGEMENT (OUTPATIENT)
Dept: CALL CENTER | Facility: HOSPITAL | Age: 83
End: 2023-05-24
Payer: MEDICARE

## 2023-05-24 NOTE — OUTREACH NOTE
CT Surgery Week 1 Survey    Flowsheet Row Responses   [facility] patient discharged from? [facility]   Does the patient have one of the following disease processes/diagnoses(primary or secondary)? Cardiothoracic surgery   Week 1 attempt successful? Yes   Call start time 1121   Call end time 1132   Discharge diagnosis Severe mitral regurgitation   Medication alerts for this patient WARFARIN---encouraged to call to schedule appt for INR check, v/u   Meds reviewed with patient/caregiver? Yes   Is the patient having any side effects they believe may be caused by any medication additions or changes? No   Does the patient have all medications related to this admission filled (includes all antibiotics, pain medications, cardiac medications, etc.) Yes   Is the patient taking all medications as directed (includes completed medication regime)? Yes   Medication comments Reports she will soon have Lasix completed--   Does the patient have a primary care provider?  Yes   Does the patient have an appointment scheduled with their C/T surgeon? Yes  [6/8/23]   Has the patient kept scheduled appointments due by today? N/A   Comments Cardiology 6/5/23   Has home health visited the patient within 72 hours of discharge? N/A   Psychosocial issues? No   Did the patient receive a copy of their discharge instructions? Yes   Nursing interventions Reviewed instructions with patient   What is the patient's perception of their health status since discharge? Improving  [Pt reports still having some soreness, some SOA but reports positional as when she sits up straight it is improved. Using her IS and maintains 1000.  ]   Nursing interventions Nurse provided patient education  [reports she had an episode lasting about 15 seconds of feeling lightheaded--encouraged to monitor her BP readings and alert MD/bring readings to f/u appt and use caution with positioning, also observe HR for changes]   Nursing interventions Reassured on normal signs of  recovery   Is the patient /caregiver able to teach back basic post-op care? Continue use of incentive spirometry at least 1 week post discharge, Practice cough and deep breath every 4 hours while awake, Lifting as instructed by MD in discharge instructions, Use a clean wash cloth and antibacterial bar or liquid soap to clean incisions, No tub bath, swimming, or hot tub until instructed by MD  [Pt showers QOD, aware to not lift arm above shoulder height]   Is the patient/caregiver able to teach back signs and symptoms of incisional infection? Increased redness, swelling or pain at the incisonal site, Increased drainage or bleeding, Incisional warmth, Pus or odor from incision, Fever  [NO issues with incisions]   Is the patient/caregiver able to teach back steps to recovery at home? Rest and rebuild strength, gradually increase activity   Nursing interventions Referral made to cardiac rehab   Is the patient/caregiver able to teach back the hierarchy of who to call/visit for symptoms/problems? PCP, Specialist, Home health nurse, Urgent Care, ED, 911 Yes   Week 1 call completed? Yes            KISHA Horn Registered Nurse

## 2023-05-25 ENCOUNTER — ANTICOAGULATION VISIT (OUTPATIENT)
Dept: CARDIOLOGY | Facility: CLINIC | Age: 83
End: 2023-05-25
Payer: MEDICARE

## 2023-05-25 VITALS
SYSTOLIC BLOOD PRESSURE: 134 MMHG | BODY MASS INDEX: 21.61 KG/M2 | DIASTOLIC BLOOD PRESSURE: 51 MMHG | HEART RATE: 80 BPM | WEIGHT: 107 LBS

## 2023-05-25 DIAGNOSIS — Z79.01 LONG TERM (CURRENT) USE OF ANTICOAGULANTS: Chronic | ICD-10-CM

## 2023-05-25 DIAGNOSIS — Z86.718 HISTORY OF DVT (DEEP VEIN THROMBOSIS): Primary | ICD-10-CM

## 2023-05-25 LAB — INR PPP: 1.6 (ref 1.8–2.8)

## 2023-05-25 NOTE — PROGRESS NOTES
"Enter Query Response Below      Query Response: Acute on chronic combined systolic and diastolic CHF due to nonischemic cardiomyopathy         If applicable, please update the problem list.         Patient: Thi Allen        : 1940  Account: [id number]           Admit Date: 5/15/2023        How to Respond to this query:       a. Click New Note     b. Answer query within the yellow box.                c. Update the Problem List, if applicable.      If you have any questions about this query contact me at:  Sincerely,    Thee Mak RN, BSN  Clinical Documentation Integrity  [facility]  [email or web address]      Dr. Granger,     83 year old female with severe mitral regurgitation, nonischemic cardiomyopathy, hypertension, moderate pulmonary hypertension underwent mitral valve replacement on 5/15/2023.  Discharge summary mentions \"Acute on chronic HFrEF\" as a presenting problem with \"chronic combined HFrEF\" noted as a discharge diagnosis.  Operative note includes \"CARMITA showed LVEF 30%, severe MR, mild AI, mild TR. Following repair, no MR.\" Patient was \"aggressively diuresed\" in the stay, was given intravenous Lasix BID. Medication adjustments made per cardiology for \"better BP control.\"   ICD-10 coding directive assumes a causal relationship between hypertension and CHF.  After study, can the type/acuity of CHF with suspected predominant etiology exhibited in this case be clarified?    *Acute on chronic HFrEF due to _________(please specify predominant etiology)  *Acute on chronic combined systolic and diastolic CHF(please specify predominant etiology)  *Other________  *Clinically undetermined    By submitting this query, we are merely seeking further clarification of documentation to accurately reflect all conditions that you are monitoring, evaluating, treating or that extend the hospitalization or utilize additional resources of care. Please utilize your independent clinical " judgment when addressing the question(s) above.     This query and your response, once completed, will be entered into the legal medical record.    Sincerely,  Thee Mak  Clinical Documentation Integrity Program

## 2023-06-01 ENCOUNTER — ANTICOAGULATION VISIT (OUTPATIENT)
Dept: CARDIOLOGY | Facility: CLINIC | Age: 83
End: 2023-06-01

## 2023-06-01 VITALS
BODY MASS INDEX: 22.02 KG/M2 | HEART RATE: 80 BPM | DIASTOLIC BLOOD PRESSURE: 71 MMHG | SYSTOLIC BLOOD PRESSURE: 145 MMHG | WEIGHT: 109 LBS

## 2023-06-01 DIAGNOSIS — Z86.718 HISTORY OF DVT (DEEP VEIN THROMBOSIS): Primary | ICD-10-CM

## 2023-06-01 DIAGNOSIS — Z79.01 LONG TERM (CURRENT) USE OF ANTICOAGULANTS: Chronic | ICD-10-CM

## 2023-06-01 LAB — INR PPP: 3.2 (ref 0.9–1.1)

## 2023-06-04 NOTE — PROGRESS NOTES
Encounter Date:06/05/2023        Patient ID: Thi Allen is a 83 y.o. female.      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  Echocardiogram in September 2022 showed EF of 35% she was treated with IV diuretics.  She was noted to have severe mitral regurgitation and mild to moderate aortic insufficiency with moderate pulmonary hypertension.  A cardiac catheterization showed nonobstructive coronary disease.  She was recommended to undergo transesophageal echocardiogram to evaluate mitral and tricuspid regurgitation.  CARMITA shows severe mitral regurgitation and EF of 40%.  GDMT was started.  She also has a history of DVT for which warfarin was started.  On 5/15/2023 she had successful mitral valve repair.  She she is here for follow-up.  She is doing well and still waiting chest protection vest.  She is able to ambulate without any difficulties.  She is in NYHA class I.  She wants to know about cardiac rehab and lifting restrictions.  She also seeks clearance about her medications.      The following portions of the patient's history were reviewed and updated as appropriate: allergies, current medications, past family history, past medical history, past social history, past surgical history, and problem list.    Review of Systems   Constitutional: Negative for malaise/fatigue.   Cardiovascular:  Negative for chest pain, dyspnea on exertion, leg swelling and palpitations.   Respiratory:  Negative for cough and shortness of breath.    Gastrointestinal:  Negative for abdominal pain, nausea and vomiting.   Neurological:  Negative for dizziness, focal weakness, headaches, light-headedness and numbness.   All other systems reviewed and are negative.      Current Outpatient Medications:     aspirin 81 MG EC tablet, Take 1 tablet by mouth Daily for 90 days., Disp: 30 tablet, Rfl: 2    atenolol  "(TENORMIN) 25 MG tablet, Take 3 tablets by mouth Daily., Disp: 45 tablet, Rfl: 1    famotidine (PEPCID) 20 MG tablet, Take 1 tablet by mouth 2 (Two) Times a Day As Needed., Disp: , Rfl:     fenofibrate 160 MG tablet, Take 1 tablet by mouth Daily., Disp: , Rfl:     HYDROcodone-acetaminophen (NORCO) 5-325 MG per tablet, Take 1 tablet by mouth Every 6 (Six) Hours As Needed., Disp: , Rfl:     latanoprost (XALATAN) 0.005 % ophthalmic solution, Administer 1 drop to both eyes Every Night., Disp: , Rfl:     levothyroxine (SYNTHROID, LEVOTHROID) 50 MCG tablet, TAKE 1 TABLET BY MOUTH ONCE DAILY, Disp: , Rfl:     lisinopril (PRINIVIL,ZESTRIL) 5 MG tablet, Take 1 tablet by mouth Daily for 90 days., Disp: 30 tablet, Rfl: 2    warfarin (COUMADIN) 2.5 MG tablet, Take 1 tablet by mouth Every Night. T,Th,Sat,Sun, Disp: , Rfl:     warfarin (COUMADIN) 5 MG tablet, Take 1 tablet by mouth Daily. M,W,F, Disp: , Rfl:   No current facility-administered medications for this visit.    Facility-Administered Medications Ordered in Other Visits:     Chlorhexidine Gluconate Cloth 2 % pads 1 application, 1 application, Topical, Q12H PRN, Yulissa Herrera, HARIS    Current outpatient and discharge medications have been reconciled for the patient.  Reviewed by: Trace Marcus MD       Allergies   Allergen Reactions    Atorvastatin Myalgia    Colesevelam Hcl Myalgia     Made legs weak     Colestipol Hcl Other (See Comments)     MADE PATIENT FEEL BAD    Ezetimibe Hallucinations     Loss of energy - worn out - legs weak - feel like they way a \"ton \"     Pitavastatin Other (See Comments)     Elevated liver function tests     Rosuvastatin Myalgia    Rosuvastatin Calcium Myalgia     MADE LEGS WEAK    Simvastatin Myalgia    Statins Myalgia     Made legs weak       Keflex [Cephalexin] Hives       Family History   Problem Relation Age of Onset    No Known Problems Mother     No Known Problems Father     No Known Problems Sister     No Known Problems " Brother     No Known Problems Maternal Aunt     No Known Problems Maternal Uncle     No Known Problems Paternal Aunt     No Known Problems Paternal Uncle     No Known Problems Maternal Grandmother     No Known Problems Maternal Grandfather     No Known Problems Paternal Grandmother     No Known Problems Paternal Grandfather     No Known Problems Other     Anemia Neg Hx     Arrhythmia Neg Hx     Asthma Neg Hx     Clotting disorder Neg Hx     Fainting Neg Hx     Heart attack Neg Hx     Heart disease Neg Hx     Heart failure Neg Hx     Hyperlipidemia Neg Hx     Hypertension Neg Hx        Past Surgical History:   Procedure Laterality Date    CARDIAC CATHETERIZATION N/A 2/25/2023    Procedure: Left Heart Cath and coronary angiogram;  Surgeon: Trace Marcus MD;  Location: [facility] CATH INVASIVE LOCATION;  Service: Cardiovascular;  Laterality: N/A;    CARDIAC CATHETERIZATION Bilateral 2/25/2023    Procedure: Right and Left Heart Cath;  Surgeon: Trace Marcus MD;  Location: [facility] CATH INVASIVE LOCATION;  Service: Cardiovascular;  Laterality: Bilateral;    HYSTERECTOMY      MITRAL VALVE REPAIR/REPLACEMENT N/A 5/15/2023    Procedure: MITRAL VALVE REPAIR/REPLACEMENT;  Surgeon: Veronica Orozco MD;  Location: [facility];  Service: Cardiothoracic;  Laterality: N/A;  repaired with 26mm physio II annuloplasty ring    PACEMAKER IMPLANTATION      THYROID SURGERY      TRANSESOPHAGEAL ECHOCARDIOGRAM (CARMITA) N/A 5/15/2023    Procedure: TRANSESOPHAGEAL ECHOCARDIOGRAM WITH ANESTHESIA;  Surgeon: Veronica Oroczo MD;  Location: [facility];  Service: Cardiothoracic;  Laterality: N/A;       Past Medical History:   Diagnosis Date    AV block 02/22/2021    Bilateral renal cysts 01/01/2020    Carotid stenosis, bilateral 06/01/2019    Overview:  <50% bilateral     Carpal tunnel syndrome, bilateral 06/15/2018    Chronic combined systolic (congestive) and diastolic (congestive) heart failure     DVT (deep venous thrombosis) (CMS/[facility]) [I82.409]   "   recurrent    Hordeolum externum 08/05/2015    Hyperlipidemia     Hypertension     Hypothyroidism due to acquired atrophy of thyroid 10/21/2019    Long term (current) use of anticoagulants 09/29/2022    Mitral regurgitation 05/17/2016    Osteopenia     Pacemaker 12/28/2021       Family History   Problem Relation Age of Onset    No Known Problems Mother     No Known Problems Father     No Known Problems Sister     No Known Problems Brother     No Known Problems Maternal Aunt     No Known Problems Maternal Uncle     No Known Problems Paternal Aunt     No Known Problems Paternal Uncle     No Known Problems Maternal Grandmother     No Known Problems Maternal Grandfather     No Known Problems Paternal Grandmother     No Known Problems Paternal Grandfather     No Known Problems Other     Anemia Neg Hx     Arrhythmia Neg Hx     Asthma Neg Hx     Clotting disorder Neg Hx     Fainting Neg Hx     Heart attack Neg Hx     Heart disease Neg Hx     Heart failure Neg Hx     Hyperlipidemia Neg Hx     Hypertension Neg Hx        Social History     Socioeconomic History    Marital status:    Tobacco Use    Smoking status: Former     Passive exposure: Past    Smokeless tobacco: Never    Tobacco comments:     quit 35 yrs ago   Vaping Use    Vaping Use: Never used   Substance and Sexual Activity    Alcohol use: Not Currently     Comment: rare    Drug use: No    Sexual activity: Defer               Objective:       Physical Exam    /64 (BP Location: Left arm, Patient Position: Sitting)   Pulse 82   Ht 149.9 cm (59\")   Wt 50.3 kg (111 lb)   SpO2 98%   BMI 22.42 kg/m²   The patient is alert, oriented and in no distress.    Vital signs as noted above.  Head and neck revealed no carotid bruits or jugular venous distension.  No thyromegaly or lymphadenopathy is present.  Lungs clear.  No wheezing.  Breath sounds are normal bilaterally.  Heart normal first and second heart sounds.  No murmur..  No pericardial rub is present. "  No gallop is present.  Abdomen soft and nontender.  No organomegaly is present.  Extremities revealed good peripheral pulses without any pedal edema.  Skin warm and dry.  Musculoskeletal system is grossly normal.  CNS grossly normal.           Diagnosis Plan   1. Mitral valve insufficiency, unspecified etiology        2. Deep vein thrombosis (DVT) of proximal lower extremity, unspecified chronicity, unspecified laterality        3. Pacemaker        4. SSS (sick sinus syndrome)        5. PAD (peripheral artery disease)        6. Paroxysmal atrial fibrillation        7. Chronic HFrEF (heart failure with reduced ejection fraction)        8. S/P mitral valve repair        9. Hypothyroidism due to acquired atrophy of thyroid        LAB RESULTS (LAST 7 DAYS)    CBC        BMP        CMP         BNP        TROPONIN        CoAg  Results from last 7 days   Lab Units 06/01/23  1327   INR  3.20*       Creatinine Clearance  Estimated Creatinine Clearance: 53.1 mL/min (by C-G formula based on SCr of 0.66 mg/dL).    ABG        Radiology  No radiology results for the last day    EKG  Procedures    Stress test      Echocardiogram  Results for orders placed in visit on 05/15/23    Intra-Op Anesthesia CARMITA    Narrative  Intra-Op Anesthesia CARMITA    Procedure Performed: Intra-Op Anesthesia CARMITA  Start Time:  5/15/2023 7:25 AM  End Time:   5/15/2023 7:35 AM    Preanesthesia Checklist:  Patient identified, IV assessed, risks and benefits discussed, monitors and equipment assessed, procedure being performed at surgeon's request and anesthesia consent obtained.    General Procedure Information  CARMITA Placed for monitoring purposes only -- This is not a diagnostic CARMITA  Diagnostic Indications for Echo:  assessment of ascending aorta, assessment of surgical repair, defect repair evaluation and hemodynamic monitoring  Location performed:  OR  Intubated  Bite block placed  Probe Insertion:  Easy  Probe Type:  Multiplane  Modalities:  Color flow  mapping, continuous wave Doppler and pulse wave Doppler    Echocardiographic and Doppler Measurements    Ventricles    Right Ventricle:  Cavity size normal.  Hypertrophy not present.  Thrombus not present.  Global function normal.  Left Ventricle:  Cavity size dilated.  Thrombus not present.  Global Function moderately impaired.  Ejection Fraction 30%.        Valves    Aortic Valve:  Annulus normal.  Stenosis not present.  Regurgitation mild.  Leaflets normal.  Leaflet motions normal.    Mitral Valve:  Annulus dilated.  Stenosis not present.  Regurgitation severe.  Leaflets normal.  Leaflet motions normal.    Tricuspid Valve:  Annulus normal.  Stenosis not present.  Regurgitation mild.  Leaflets normal.  Leaflet motions normal.      Aorta    Ascending Aorta:  Size normal.  Dissection not present.  Plaque thickness less than 3 mm.  Mobile plaque not present.  Aortic Arch:  Size normal.  Dissection not present.  Plaque thickness less than 3 mm.  Mobile plaque not present.  Descending Aorta:  Size normal.  Dissection not present.  Plaque thickness less than 3 mm.  Mobile plaque not present.      Atria    Right Atrium:  Size dilated.  Spontaneous echo contrast not present.  Thrombus not present.  Tumor not present.  Device present.    Left Atrium:  Size dilated.  Spontaneous echo contrast present.  Thrombus not present.  Tumor not present.  Device not present.  Left atrial appendage normal.      Septa        Ventricular Septum:  Intra-ventricular septum morphology normal.        Other Findings  Pericardium:  normal  Pleural Effusion:  none  Pulmonary Arteries:  normal  Pulmonary Venous Flow:  normal    Anesthesia Information  Anesthesiologist:  Ronnie Craft MD      Echocardiogram Comments:  MILD AI; SEVERE MR DILATE LA/MV ANNULUS, CENTRAL JET  TV: MILD TI  EF 30      Cardiac catheterization  Results for orders placed during the hospital encounter of 02/24/23    Cardiac Catheterization/Vascular  Study    Narrative  OPERATORS  Trace Marcus M.D. (Attending Cardiologist)      PROCEDURE PERFORMED  Ultrasound guided vascular access  Right heart catheterization  Coronary Angiogram  Left Heart Catheterization 63091  Moderate Sedation 35 minutes    INDICATIONS FOR PROCEDURE  82-year-old woman with valvular heart disease including mitral and tricuspid regurgitation, pulmonary hypertension presented with heart failure exacerbation.  After IV diuresis, discussion of risk and benefit she was brought into the cardiac Cath Lab for right and left heart cath.    PROCEDURE IN DETAIL  Informed consent was obtained from the patient after explaining the risks, benefits, and alternative options of the procedure. After obtaining informed consent, the patient was brought to the cath lab and was prepped in a sterile fashion. Lidocaine 2% was used for local anesthesia into the right femoral venous access site. Right femoral vein was accessed using the micropuncture needle under ultrasound guidance and micropuncture wire advanced under flouroscopy. A 7 Sudanese vascular sheath was put into place percutaneously over guide-wire. Guide wires were removed. A 6Fr swan aneta catheter was advanced to wedge position. RA, RV and PA and wedge pressures were recorded.  PA sat and arterial sats recorded.  The patient tolerated the procedure well without any complications.    Lidocaine 2% was used for local anesthesia into the right femoral arterial access site. The right femoral artery was accessed with a micropuncture needle via modified Seldinger technique under ultrasound guidance. A 6F was inserted successfully.  Afterwards, 6F JR4 and JL4 diagnostic catheters were advanced over a wire into the ascending aorta and were used to engage the ostia of the left main and RCA respectively. JR4 used to cross the AV and obtain LV pressures and gradient across the AV measured via pullback technique. Images of the right and left coronary systems were  obtained. All the catheters were exchanged over a wire and subsequently removed. Angiogram of the femoral access site was obtained and did not show complications. The patient tolerated the procedure well without any complications. The pictures were reviewed at the end of the procedure. A Mynx closure device was applied.    HEMODYNAMICS    RHC  RA 5/3, 3 mmHg  RV 26/2, 4 mmHg  PA 27/8, 16 mmHg  PCW 7/7, 6 mmHg  AO Sat 96%  PA Sat 74%    Rob CO 4.73    Rob CI 3.42    LHC  LV: 108/5, 7 mmHg  AO: 116/46, 72 mmHg    No significant gradient across the aortic valve during pullback of JR4 catheter.  LV gram was not performed due to recent echocardiogram.    FINDINGS  Coronary Angiogram  Left dominant circulation  Left main: Left main is a large caliber vessel which gives rise to the Left Anterior Descending and the Left circumflex.  Left main is angiographically free from any significant disease  Left Anterior Descending Artery: LAD is a medium caliber vessel which gives rise to several septal perforators and several diagonal branches.  LAD has diffuse luminal irregularities with 20 to 30% lesion in the midsegment.  Left Circumflex: Left circumflex artery is a dominant vessel which gives rise to obtuse marginal.  Left circumflex has luminal irregularities only.  Right Coronary Artery: RCA is a small nondominant vessel.    ESTIMATED BLOOD LOSS:  10 ml    COMPLICATIONS:  None    PROCEDURE DATA:  Contrast Used: 25 cc  Sedation Time: 35 minutes    IMPRESSIONS  Non obstructive CAD  Normal filling pressure, no evidence of pulmonary hypertension.  Normal wedge pressure    RECOMMENDATIONS  -Switch IV to p.o. diuresis.  -Proceed with transesophageal echocardiogram to evaluate mitral and tricuspid regurgitation.  -Uptitrate GDMT for HFrEF          Assessment and Plan       Diagnoses and all orders for this visit:    1. Mitral valve insufficiency, unspecified etiology (Primary)    2. Deep vein thrombosis (DVT) of proximal lower  extremity, unspecified chronicity, unspecified laterality    3. Pacemaker    4. SSS (sick sinus syndrome)    5. PAD (peripheral artery disease)    6. Paroxysmal atrial fibrillation    7. Chronic HFrEF (heart failure with reduced ejection fraction)    8. S/P mitral valve repair    9. Hypothyroidism due to acquired atrophy of thyroid         Severe Mitral regurgitation, S/P MVR (mitral valve repair)  Status post repair with 25 mm physio 2 ring on 5/15/2023.  She had postop pneumothorax requiring chest tube  He is doing well and sternotomy is healing well.    Hypokalemia  Potassium was corrected during hospital stay     Anemia  Postop anemia from blood loss.  Hemoglobin stabilized at 10     Chronic combined systolic and diastolic congestive heart failure  Atenolol 50 mg and lisinopril 20 mg daily  Diuretics have been discontinued  We will plan to repeat an echocardiogram in 6 months.     Coronary artery disease involving native coronary artery of native heart without angina pectoris  Nonobstructive coronary disease per coronary angiogram.  Continue aspirin.  She is intolerant to statins     Essential hypertension with goal blood pressure less than 130/80  We will manage with lisinopril and atenolol.  Uptitrate lisinopril as tolerated     Hypertriglyceridemia  We will resume fenofibrate     Pacemaker, h/o SSS  [employer] permanent pacemaker is in place  Pacemaker interrogation with no significant events     History of DVT (deep venous thrombosis)  Warfarin has been resumed with no evidence of bleeding  Goal INR 2-3     Hypothyroidism due to acquired atrophy of thyroid  Continue Synthroid

## 2023-06-05 ENCOUNTER — OFFICE VISIT (OUTPATIENT)
Dept: CARDIOLOGY | Facility: CLINIC | Age: 83
End: 2023-06-05
Payer: MEDICARE

## 2023-06-05 VITALS
HEIGHT: 59 IN | DIASTOLIC BLOOD PRESSURE: 64 MMHG | SYSTOLIC BLOOD PRESSURE: 133 MMHG | OXYGEN SATURATION: 98 % | BODY MASS INDEX: 22.38 KG/M2 | WEIGHT: 111 LBS | HEART RATE: 82 BPM

## 2023-06-05 DIAGNOSIS — Z95.0 PACEMAKER: ICD-10-CM

## 2023-06-05 DIAGNOSIS — I50.22 CHRONIC HFREF (HEART FAILURE WITH REDUCED EJECTION FRACTION): ICD-10-CM

## 2023-06-05 DIAGNOSIS — E03.4 HYPOTHYROIDISM DUE TO ACQUIRED ATROPHY OF THYROID: Chronic | ICD-10-CM

## 2023-06-05 DIAGNOSIS — I10 PRIMARY HYPERTENSION: ICD-10-CM

## 2023-06-05 DIAGNOSIS — I48.0 PAROXYSMAL ATRIAL FIBRILLATION: ICD-10-CM

## 2023-06-05 DIAGNOSIS — I82.4Y9 DEEP VEIN THROMBOSIS (DVT) OF PROXIMAL LOWER EXTREMITY, UNSPECIFIED CHRONICITY, UNSPECIFIED LATERALITY: ICD-10-CM

## 2023-06-05 DIAGNOSIS — Z98.890 S/P MITRAL VALVE REPAIR: ICD-10-CM

## 2023-06-05 DIAGNOSIS — I34.0 MITRAL VALVE INSUFFICIENCY, UNSPECIFIED ETIOLOGY: Primary | ICD-10-CM

## 2023-06-05 DIAGNOSIS — I73.9 PAD (PERIPHERAL ARTERY DISEASE): ICD-10-CM

## 2023-06-05 DIAGNOSIS — I49.5 SSS (SICK SINUS SYNDROME): ICD-10-CM

## 2023-06-05 RX ORDER — LISINOPRIL 20 MG/1
20 TABLET ORAL
Qty: 90 TABLET | Refills: 0 | Status: SHIPPED | OUTPATIENT
Start: 2023-06-05 | End: 2023-09-03

## 2023-06-05 RX ORDER — ATENOLOL 50 MG/1
50 TABLET ORAL
Qty: 90 TABLET | Refills: 3 | Status: SHIPPED | OUTPATIENT
Start: 2023-06-05

## 2023-06-05 RX ORDER — HYDROCODONE BITARTRATE AND ACETAMINOPHEN 5; 325 MG/1; MG/1
1 TABLET ORAL EVERY 6 HOURS PRN
COMMUNITY

## 2023-06-06 ENCOUNTER — OFFICE VISIT (OUTPATIENT)
Dept: CARDIAC SURGERY | Facility: CLINIC | Age: 83
End: 2023-06-06
Payer: MEDICARE

## 2023-06-06 ENCOUNTER — TRANSCRIBE ORDERS (OUTPATIENT)
Dept: CARDIAC REHAB | Facility: HOSPITAL | Age: 83
End: 2023-06-06
Payer: MEDICARE

## 2023-06-06 VITALS
BODY MASS INDEX: 21.71 KG/M2 | SYSTOLIC BLOOD PRESSURE: 163 MMHG | OXYGEN SATURATION: 90 % | WEIGHT: 110.6 LBS | HEART RATE: 80 BPM | HEIGHT: 60 IN | DIASTOLIC BLOOD PRESSURE: 75 MMHG | RESPIRATION RATE: 20 BRPM | TEMPERATURE: 97.1 F

## 2023-06-06 DIAGNOSIS — Z98.890 S/P MVR (MITRAL VALVE REPAIR): Primary | ICD-10-CM

## 2023-06-06 PROCEDURE — 1160F RVW MEDS BY RX/DR IN RCRD: CPT | Performed by: NURSE PRACTITIONER

## 2023-06-06 PROCEDURE — 3077F SYST BP >= 140 MM HG: CPT | Performed by: NURSE PRACTITIONER

## 2023-06-06 PROCEDURE — 1159F MED LIST DOCD IN RCRD: CPT | Performed by: NURSE PRACTITIONER

## 2023-06-06 PROCEDURE — 3078F DIAST BP <80 MM HG: CPT | Performed by: NURSE PRACTITIONER

## 2023-06-06 PROCEDURE — 99024 POSTOP FOLLOW-UP VISIT: CPT | Performed by: NURSE PRACTITIONER

## 2023-06-06 NOTE — LETTER
June 9, 2023       No Recipients    Patient: Thi Allen   YOB: 1940   Date of Visit: 6/6/2023       Dear Dr. Florentino Recipients:    Thank you for referring Thi Allen to me for evaluation. Below are the relevant portions of my assessment and plan of care.    If you have questions, please do not hesitate to call me. I look forward to following Thi along with you.         Sincerely,        HARIS Ambrocio        CC:   No Recipients    Yulissa Herrera APRN  06/09/23 1710  Sign when Signing Visit  CARDIOVASCULAR SURGERY FOLLOW-UP PROGRESS NOTE  Chief Complaint: Post-op Follow Up        HPI:   Dear Dr. Lazcano, Camila CASPER MD and colleagues:    It was nice to see Thi Allen in follow up today after cardiac surgery.  As you know, she is a 83 y.o. female with severe mitral regurgitation, moderate aortic insufficiency, moderate to severe tricuspid regurgitation, moderate pulmonary hypertension, chronic combined HFrEF, HTN, hypothyroidism, history of DVT on warfarin therapy, PAF, and permanent pacemaker who underwent mitral valve repair at [facility] by Dr. Orozco on 5/15/2023.  Her postop course was complicated by a postop right pneumothorax that required chest tube insertion on 5/16/2023 with removal on 5/18/2023.  Ring that time she also had acute respiratory insufficiency with hypercapnia and required BiPAP with transition to AirVo.  The critical care intensivist team did follow along during her postoperative stay.  Prior to discharge her warfarin therapy was restarted due to her strong history of DVTs.  She comes in today complaining of insomnia and continued soreness to her left anterior chest and left lateral ribs..  Her activity level has been good.  She has not started cardiac rehab.  She saw Dr. Marcus yesterday and he deferred cardiac rehab to us.  Her  is with her today for her appointment.    Physical Exam:         /75 (BP Location: Left arm,  "Patient Position: Sitting, Cuff Size: Adult)   Pulse 80   Temp 97.1 °F (36.2 °C)   Resp 20   Ht 151.8 cm (59.75\")   Wt 50.2 kg (110 lb 9.6 oz)   SpO2 90%   BMI 21.78 kg/m²   Heart:  regular rate and rhythm  Lungs:  clear to auscultation bilaterally  Extremities:  no edema  Incision(s):  mid chest healing well, sternum stable    Assessment/Plan:     S/P mitral valve repair using 28 mm physio 2 ([employer]) and plication of excess P2 tissue per Dr. Orozco. Overall, she is doing well.  We discussed her insomnia.  She reports that she is taking 1 hydrocodone every night to assist in sleep.  I encouraged her to try Tylenol PM.  She was asking for more hydrocodone because she was afraid she would be able to sleep without them.  So we agreed that she would try taking regular Tylenol or Tylenol PM in the evening before bed and if she can sleep then she will discontinue the hydrocodone.  If she is not sleeping because she is having pain then I will refill her hydrocodone.  He is also very concerned that she is going to become addicted.  We discussed that we will not give her enough narcotics to cause this to happen.  I do believe she needs to get started into cardiac rehab and have called GUS Crandall already at rehab to start the process.    Postop right pneumothorax and acute respiratory insufficiency--status post chest tube placement, resolved    Keep incisions clean and dry  OK to drive if not taking narcotic pain medicine  OK to begin cardiac rehab  Follow-up as scheduled with cardiology  Follow-up with CT surgery prn    Continue lifting restriction of 10 lbs until 6 weeks and 50 lbs until 12 weeks from the date of surgery, no excessive jarring motions or twisting motions until 12 weeks from the date of surgery.    Thank you for allowing me to participate in the care of your patient.    Regards,  Yulissa Herrera, HARIS      "

## 2023-06-07 ENCOUNTER — TELEPHONE (OUTPATIENT)
Dept: CARDIAC REHAB | Facility: HOSPITAL | Age: 83
End: 2023-06-07
Payer: MEDICARE

## 2023-06-08 ENCOUNTER — TELEPHONE (OUTPATIENT)
Dept: CARDIAC SURGERY | Facility: CLINIC | Age: 83
End: 2023-06-08
Payer: MEDICARE

## 2023-06-08 NOTE — TELEPHONE ENCOUNTER
PT CALLED 6/8/23 AND WANTED TO LET MORGAN KNOW THAT WHAT THEY DISCUSSED AT HER APPT ON 6/6/23 ABOUT STOPPING THE PAIN PILL AT NIGHT HAS WORKED. SHE HAS NOT TAKEN IT IN 2 NIGHTS AND EVERYTHING IS GOING WELL.

## 2023-06-09 NOTE — PROGRESS NOTES
"CARDIOVASCULAR SURGERY FOLLOW-UP PROGRESS NOTE  Chief Complaint: Post-op Follow Up        HPI:   Dear Dr. Lazcano, Camila CASPER MD and colleagues:    It was nice to see Thi Allen in follow up today after cardiac surgery.  As you know, she is a 83 y.o. female with severe mitral regurgitation, moderate aortic insufficiency, moderate to severe tricuspid regurgitation, moderate pulmonary hypertension, chronic combined HFrEF, HTN, hypothyroidism, history of DVT on warfarin therapy, PAF, and permanent pacemaker who underwent mitral valve repair at [facility] by Dr. Orozco on 5/15/2023.  Her postop course was complicated by a postop right pneumothorax that required chest tube insertion on 5/16/2023 with removal on 5/18/2023.  Ring that time she also had acute respiratory insufficiency with hypercapnia and required BiPAP with transition to AirVo.  The critical care intensivist team did follow along during her postoperative stay.  Prior to discharge her warfarin therapy was restarted due to her strong history of DVTs.  She comes in today complaining of insomnia and continued soreness to her left anterior chest and left lateral ribs..  Her activity level has been good.  She has not started cardiac rehab.  She saw Dr. Marcus yesterday and he deferred cardiac rehab to us.  Her  is with her today for her appointment.    Physical Exam:         /75 (BP Location: Left arm, Patient Position: Sitting, Cuff Size: Adult)   Pulse 80   Temp 97.1 °F (36.2 °C)   Resp 20   Ht 151.8 cm (59.75\")   Wt 50.2 kg (110 lb 9.6 oz)   SpO2 90%   BMI 21.78 kg/m²   Heart:  regular rate and rhythm  Lungs:  clear to auscultation bilaterally  Extremities:  no edema  Incision(s):  mid chest healing well, sternum stable    Assessment/Plan:     S/P mitral valve repair using 28 mm physio 2 ([employer]) and plication of excess P2 tissue per Dr. Orozco. Overall, she is doing well.  We discussed her insomnia.  She reports that she is " taking 1 hydrocodone every night to assist in sleep.  I encouraged her to try Tylenol PM.  She was asking for more hydrocodone because she was afraid she would be able to sleep without them.  So we agreed that she would try taking regular Tylenol or Tylenol PM in the evening before bed and if she can sleep then she will discontinue the hydrocodone.  If she is not sleeping because she is having pain then I will refill her hydrocodone.  He is also very concerned that she is going to become addicted.  We discussed that we will not give her enough narcotics to cause this to happen.  I do believe she needs to get started into cardiac rehab and have called [facility] already at rehab to start the process.    Postop right pneumothorax and acute respiratory insufficiency--status post chest tube placement, resolved    Keep incisions clean and dry  OK to drive if not taking narcotic pain medicine  OK to begin cardiac rehab  Follow-up as scheduled with cardiology  Follow-up with CT surgery prn    Continue lifting restriction of 10 lbs until 6 weeks and 50 lbs until 12 weeks from the date of surgery, no excessive jarring motions or twisting motions until 12 weeks from the date of surgery.    Thank you for allowing me to participate in the care of your patient.    Regards,  HARIS Ambrocio

## 2023-06-15 ENCOUNTER — ANTICOAGULATION VISIT (OUTPATIENT)
Dept: CARDIOLOGY | Facility: CLINIC | Age: 83
End: 2023-06-15
Payer: MEDICARE

## 2023-06-15 VITALS
HEART RATE: 84 BPM | SYSTOLIC BLOOD PRESSURE: 153 MMHG | BODY MASS INDEX: 21.66 KG/M2 | WEIGHT: 110 LBS | DIASTOLIC BLOOD PRESSURE: 73 MMHG

## 2023-06-15 DIAGNOSIS — Z86.718 HISTORY OF DVT (DEEP VEIN THROMBOSIS): Primary | ICD-10-CM

## 2023-06-15 DIAGNOSIS — Z79.01 LONG TERM (CURRENT) USE OF ANTICOAGULANTS: Chronic | ICD-10-CM

## 2023-06-15 LAB — INR PPP: 2.8 (ref 2–3)

## 2023-07-13 PROBLEM — I50.23 ACUTE ON CHRONIC HFREF (HEART FAILURE WITH REDUCED EJECTION FRACTION): Status: RESOLVED | Noted: 2023-05-19 | Resolved: 2023-07-13

## 2023-07-13 PROBLEM — E87.6 HYPOKALEMIA: Status: RESOLVED | Noted: 2023-05-15 | Resolved: 2023-07-13

## 2023-07-13 PROBLEM — I35.1 AORTIC INSUFFICIENCY: Chronic | Status: ACTIVE | Noted: 2023-02-24

## 2023-07-13 PROBLEM — I34.0 SEVERE MITRAL REGURGITATION: Chronic | Status: ACTIVE | Noted: 2023-05-15

## 2023-07-13 PROBLEM — I50.43 ACUTE ON CHRONIC COMBINED SYSTOLIC AND DIASTOLIC CONGESTIVE HEART FAILURE: Status: ACTIVE | Noted: 2023-07-13

## 2023-07-13 PROBLEM — I44.2 AV BLOCK, COMPLETE: Status: RESOLVED | Noted: 2021-02-22 | Resolved: 2023-07-13

## 2023-07-13 PROBLEM — I50.9 CHF (CONGESTIVE HEART FAILURE): Status: ACTIVE | Noted: 2023-07-13

## 2023-07-13 PROBLEM — R79.1 SUPRATHERAPEUTIC INR: Status: ACTIVE | Noted: 2023-07-13

## 2023-07-13 PROBLEM — I49.5 SSS (SICK SINUS SYNDROME): Status: RESOLVED | Noted: 2020-06-18 | Resolved: 2023-07-13

## 2023-07-13 PROBLEM — I07.1 TRICUSPID REGURGITATION: Chronic | Status: ACTIVE | Noted: 2023-02-24

## 2023-07-14 PROBLEM — I50.21 ACUTE HFREF (HEART FAILURE WITH REDUCED EJECTION FRACTION): Status: ACTIVE | Noted: 2023-07-14

## 2023-07-31 ENCOUNTER — TELEPHONE (OUTPATIENT)
Dept: CARDIOLOGY | Facility: CLINIC | Age: 83
End: 2023-07-31

## 2023-08-04 ENCOUNTER — ANTICOAGULATION VISIT (OUTPATIENT)
Dept: CARDIOLOGY | Facility: CLINIC | Age: 83
End: 2023-08-04
Payer: MEDICARE

## 2023-08-04 VITALS — DIASTOLIC BLOOD PRESSURE: 47 MMHG | SYSTOLIC BLOOD PRESSURE: 114 MMHG | HEART RATE: 85 BPM

## 2023-08-04 DIAGNOSIS — Z86.718 HISTORY OF DVT (DEEP VEIN THROMBOSIS): Primary | ICD-10-CM

## 2023-08-04 DIAGNOSIS — Z79.01 LONG TERM (CURRENT) USE OF ANTICOAGULANTS: Chronic | ICD-10-CM

## 2023-08-04 LAB — INR PPP: 4.3 (ref 2–3)

## 2023-08-06 NOTE — PROGRESS NOTES
Encounter Date:08/07/2023        Patient ID: Thi Allen is a 83 y.o. female.      Chief Complaint:      History of Present Illness  Thi Allen is a 83 y.o. female with multi valvular disease nonischemic cardiomyopathy status post mitral valve repair in May 2023, known moderate aortic and tricuspid regurgitation, nonobstructive coronary disease, hypertension, hypertriglyceridemia, sick sinus syndrome status post permanent pacemaker placement, hypothyroidism and DVT.  She was admitted to the hospital after running out of Lasix with heart failure exacerbation and was noted to have elevated proBNP of 32,000 and chest x-ray consistent with pulmonary edema.  She also had a supratherapeutic INR of 8  Of note she had an echocardiogram in June 2023 that showed EF of 20%, diastolic dysfunction, moderate aortic regurgitation, mild to moderate mitral stenosis, moderate tricuspid regurgitation and elevated RVSP.  She recently developed COVID however is now recuperating.  She has multiple questions about her medications and wants refills.    The following portions of the patient's history were reviewed and updated as appropriate: allergies, current medications, past family history, past medical history, past social history, past surgical history, and problem list.    Review of Systems   Constitutional: Negative for malaise/fatigue.   Cardiovascular:  Negative for chest pain, dyspnea on exertion, leg swelling and palpitations.   Respiratory:  Negative for cough and shortness of breath.    Gastrointestinal:  Negative for abdominal pain, nausea and vomiting.   Neurological:  Positive for light-headedness. Negative for dizziness, focal weakness, headaches and numbness.   All other systems reviewed and are negative.      Current Outpatient Medications:     aspirin 81 MG EC tablet, Take 1 tablet by mouth Every Night., Disp: , Rfl:     atenolol (TENORMIN) 50 MG tablet, Take 1 tablet by mouth Every Night., Disp: , Rfl:      "famotidine (PEPCID) 20 MG tablet, Take 1 tablet by mouth 2 (Two) Times a Day As Needed., Disp: , Rfl:     fenofibrate 160 MG tablet, Take 1 tablet by mouth Every Night., Disp: , Rfl:     furosemide (LASIX) 40 MG tablet, Take 1 tablet by mouth Daily As Needed (leg swelling, weight gain or shortness of breath)., Disp: 30 tablet, Rfl: 0    latanoprost (XALATAN) 0.005 % ophthalmic solution, Administer 1 drop to both eyes Every Night., Disp: , Rfl:     levothyroxine (SYNTHROID, LEVOTHROID) 50 MCG tablet, Take 1 tablet by mouth Every Night., Disp: , Rfl:     lisinopril (PRINIVIL,ZESTRIL) 20 MG tablet, Take 1 tablet by mouth Every Night., Disp: , Rfl:     spironolactone (ALDACTONE) 25 MG tablet, Take 1 tablet by mouth Daily., Disp: 30 tablet, Rfl: 0    warfarin (COUMADIN) 2.5 MG tablet, Take 1 tablet by mouth Daily. Monday and Friday evenings, Disp: , Rfl:   No current facility-administered medications for this visit.    Facility-Administered Medications Ordered in Other Visits:     Chlorhexidine Gluconate Cloth 2 % pads 1 application, 1 application , Topical, Q12H PRN, Yulissa Herrera APRN    Current outpatient and discharge medications have been reconciled for the patient.  Reviewed by: Trace Marcus MD       Allergies   Allergen Reactions    Atorvastatin Myalgia    Colesevelam Hcl Myalgia     Made legs weak     Colestipol Hcl Other (See Comments)     MADE PATIENT FEEL BAD    Ezetimibe Hallucinations     Loss of energy - worn out - legs weak - feel like they way a \"ton \"     Pitavastatin Other (See Comments)     Elevated liver function tests     Rosuvastatin Myalgia    Rosuvastatin Calcium Myalgia     MADE LEGS WEAK    Simvastatin Myalgia    Statins Myalgia     Made legs weak       Keflex [Cephalexin] Hives       Family History   Problem Relation Age of Onset    No Known Problems Mother     No Known Problems Father     No Known Problems Sister     No Known Problems Brother     No Known Problems Maternal Aunt  "    No Known Problems Maternal Uncle     No Known Problems Paternal Aunt     No Known Problems Paternal Uncle     No Known Problems Maternal Grandmother     No Known Problems Maternal Grandfather     No Known Problems Paternal Grandmother     No Known Problems Paternal Grandfather     No Known Problems Other     Anemia Neg Hx     Arrhythmia Neg Hx     Asthma Neg Hx     Clotting disorder Neg Hx     Fainting Neg Hx     Heart attack Neg Hx     Heart disease Neg Hx     Heart failure Neg Hx     Hyperlipidemia Neg Hx     Hypertension Neg Hx        Past Surgical History:   Procedure Laterality Date    CARDIAC CATHETERIZATION N/A 2/25/2023    Procedure: Left Heart Cath and coronary angiogram;  Surgeon: Trace Marcus MD;  Location: [facility] CATH INVASIVE LOCATION;  Service: Cardiovascular;  Laterality: N/A;    CARDIAC CATHETERIZATION Bilateral 2/25/2023    Procedure: Right and Left Heart Cath;  Surgeon: Trace Macrus MD;  Location: [facility] CATH INVASIVE LOCATION;  Service: Cardiovascular;  Laterality: Bilateral;    HYSTERECTOMY      MITRAL VALVE REPAIR/REPLACEMENT N/A 5/15/2023    Procedure: MITRAL VALVE REPAIR/REPLACEMENT;  Surgeon: Veronica Orozco MD;  Location: [facility];  Service: Cardiothoracic;  Laterality: N/A;  repaired with 26mm physio II annuloplasty ring    PACEMAKER IMPLANTATION      THYROID SURGERY      TRANSESOPHAGEAL ECHOCARDIOGRAM (CARMITA) N/A 5/15/2023    Procedure: TRANSESOPHAGEAL ECHOCARDIOGRAM WITH ANESTHESIA;  Surgeon: Veronica Orozco MD;  Location: [facility];  Service: Cardiothoracic;  Laterality: N/A;       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) heart failure     DVT (deep venous thrombosis) (CMS/[facility]) [I82.409]      "recurrent    Hordeolum externum 08/05/2015    Hyperlipidemia     Hypertension     Hypothyroidism due to acquired atrophy of thyroid 10/21/2019    Long term (current) use of anticoagulants 09/29/2022    Mitral regurgitation 05/17/2016    Osteopenia     Pacemaker 12/28/2021    S/P MVR (mitral valve repair) 05/15/2023    Tricuspid regurgitation 02/24/2023       Family History   Problem Relation Age of Onset    No Known Problems Mother     No Known Problems Father     No Known Problems Sister     No Known Problems Brother     No Known Problems Maternal Aunt     No Known Problems Maternal Uncle     No Known Problems Paternal Aunt     No Known Problems Paternal Uncle     No Known Problems Maternal Grandmother     No Known Problems Maternal Grandfather     No Known Problems Paternal Grandmother     No Known Problems Paternal Grandfather     No Known Problems Other     Anemia Neg Hx     Arrhythmia Neg Hx     Asthma Neg Hx     Clotting disorder Neg Hx     Fainting Neg Hx     Heart attack Neg Hx     Heart disease Neg Hx     Heart failure Neg Hx     Hyperlipidemia Neg Hx     Hypertension Neg Hx        Social History     Socioeconomic History    Marital status:    Tobacco Use    Smoking status: Former     Passive exposure: Past    Smokeless tobacco: Never    Tobacco comments:     quit 35 yrs ago   Vaping Use    Vaping Use: Never used   Substance and Sexual Activity    Alcohol use: Not Currently     Comment: rare    Drug use: No    Sexual activity: Defer               Objective:       Physical Exam    BP 93/40 (BP Location: Right arm, Patient Position: Sitting)   Pulse 80   Ht 152.4 cm (60\")   Wt 44.9 kg (99 lb)   SpO2 99%   BMI 19.33 kg/mý   The patient is alert, oriented and in no distress.    Vital signs as noted above.    Head and neck revealed no carotid bruits or jugular venous distension.  No thyromegaly or lymphadenopathy is present.    Lungs clear.  No wheezing.  Breath sounds are normal " bilaterally.    Heart normal first and second heart sounds.  No murmur..  No pericardial rub is present.  No gallop is present.    Abdomen soft and nontender.  No organomegaly is present.    Extremities revealed good peripheral pulses without any pedal edema.    Skin warm and dry.    Musculoskeletal system is grossly normal.    CNS grossly normal.           Diagnosis Plan   1. History of DVT (deep vein thrombosis)        2. Long term (current) use of anticoagulants        3. Deep vein thrombosis (DVT) of proximal lower extremity, unspecified chronicity, unspecified laterality        4. S/P mitral valve repair        5. Paroxysmal atrial fibrillation        6. Chronic HFrEF (heart failure with reduced ejection fraction)        7. SSS (sick sinus syndrome)        8. Nonrheumatic aortic valve insufficiency        9. Nonrheumatic tricuspid valve regurgitation        10. Mitral valve insufficiency, unspecified etiology        11. Pacemaker        12. PAD (peripheral artery disease)        13. Acute deep vein thrombosis (DVT) of lower extremity, unspecified laterality, unspecified vein        14. Hyperlipidemia, unspecified hyperlipidemia type        15. Hypothyroidism due to acquired atrophy of thyroid        LAB RESULTS (LAST 7 DAYS)    CBC        BMP        CMP         BNP        TROPONIN        CoAg  Results from last 7 days   Lab Units 08/04/23  1111   INR  4.30*       Creatinine Clearance  Estimated Creatinine Clearance: 25.6 mL/min (A) (by C-G formula based on SCr of 1.22 mg/dL (H)).    ABG        Radiology  No radiology results for the last day    EKG  Procedures    Stress test      Echocardiogram  Results for orders placed during the hospital encounter of 07/13/23    Adult Transthoracic Echo Complete W/ Cont if Necessary Per Protocol    Interpretation Summary    Left ventricular systolic function is severely decreased. Calculated left ventricular EF = 20% Left ventricular ejection fraction appears to be less than  20%.    Left ventricular diastolic dysfunction is noted.    Moderate to severe aortic valve regurgitation is present.    Status post mitral valve repair with minimal MR and no significant MS.      Cardiac catheterization  Results for orders placed during the hospital encounter of 02/24/23    Cardiac Catheterization/Vascular Study    Narrative  OPERATORS  Trace Marcus M.D. (Attending Cardiologist)      PROCEDURE PERFORMED  Ultrasound guided vascular access  Right heart catheterization  Coronary Angiogram  Left Heart Catheterization [id number]  Moderate Sedation 35 minutes    INDICATIONS FOR PROCEDURE  82-year-old woman with valvular heart disease including mitral and tricuspid regurgitation, pulmonary hypertension presented with heart failure exacerbation.  After IV diuresis, discussion of risk and benefit she was brought into the cardiac Cath Lab for right and left heart cath.    PROCEDURE IN DETAIL  Informed consent was obtained from the patient after explaining the risks, benefits, and alternative options of the procedure. After obtaining informed consent, the patient was brought to the cath lab and was prepped in a sterile fashion. Lidocaine 2% was used for local anesthesia into the right femoral venous access site. Right femoral vein was accessed using the micropuncture needle under ultrasound guidance and micropuncture wire advanced under flouroscopy. A 7 Polish vascular sheath was put into place percutaneously over guide-wire. Guide wires were removed. A 6Fr swan aneta catheter was advanced to wedge position. RA, RV and PA and wedge pressures were recorded.  PA sat and arterial sats recorded.  The patient tolerated the procedure well without any complications.    Lidocaine 2% was used for local anesthesia into the right femoral arterial access site. The right femoral artery was accessed with a micropuncture needle via modified Seldinger technique under ultrasound guidance. A 6F was inserted  successfully.  Afterwards, 6F JR4 and JL4 diagnostic catheters were advanced over a wire into the ascending aorta and were used to engage the ostia of the left main and RCA respectively. JR4 used to cross the AV and obtain LV pressures and gradient across the AV measured via pullback technique. Images of the right and left coronary systems were obtained. All the catheters were exchanged over a wire and subsequently removed. Angiogram of the femoral access site was obtained and did not show complications. The patient tolerated the procedure well without any complications. The pictures were reviewed at the end of the procedure. A Mynx closure device was applied.    HEMODYNAMICS    RHC  RA 5/3, 3 mmHg  RV 26/2, 4 mmHg  PA 27/8, 16 mmHg  PCW 7/7, 6 mmHg  AO Sat 96%  PA Sat 74%    Rob CO 4.73    Rob CI 3.42    LHC  LV: 108/5, 7 mmHg  AO: 116/46, 72 mmHg    No significant gradient across the aortic valve during pullback of JR4 catheter.  LV gram was not performed due to recent echocardiogram.    FINDINGS  Coronary Angiogram  Left dominant circulation  Left main: Left main is a large caliber vessel which gives rise to the Left Anterior Descending and the Left circumflex.  Left main is angiographically free from any significant disease  Left Anterior Descending Artery: LAD is a medium caliber vessel which gives rise to several septal perforators and several diagonal branches.  LAD has diffuse luminal irregularities with 20 to 30% lesion in the midsegment.  Left Circumflex: Left circumflex artery is a dominant vessel which gives rise to obtuse marginal.  Left circumflex has luminal irregularities only.  Right Coronary Artery: RCA is a small nondominant vessel.    ESTIMATED BLOOD LOSS:  10 ml    COMPLICATIONS:  None    PROCEDURE DATA:  Contrast Used: 25 cc  Sedation Time: 35 minutes    IMPRESSIONS  Non obstructive CAD  Normal filling pressure, no evidence of pulmonary hypertension.  Normal wedge  pressure    RECOMMENDATIONS  -Switch IV to p.o. diuresis.  -Proceed with transesophageal echocardiogram to evaluate mitral and tricuspid regurgitation.  -Uptitrate GDMT for HFrEF          Assessment and Plan       Diagnoses and all orders for this visit:    1. History of DVT (deep vein thrombosis) (Primary)    2. Long term (current) use of anticoagulants    3. Deep vein thrombosis (DVT) of proximal lower extremity, unspecified chronicity, unspecified laterality    4. S/P mitral valve repair    5. Paroxysmal atrial fibrillation    6. Chronic HFrEF (heart failure with reduced ejection fraction)    7. SSS (sick sinus syndrome)    8. Nonrheumatic aortic valve insufficiency    9. Nonrheumatic tricuspid valve regurgitation    10. Mitral valve insufficiency, unspecified etiology    11. Pacemaker    12. PAD (peripheral artery disease)    13. Acute deep vein thrombosis (DVT) of lower extremity, unspecified laterality, unspecified vein    14. Hyperlipidemia, unspecified hyperlipidemia type    15. Hypothyroidism due to acquired atrophy of thyroid           Acute on chronic combined systolic and diastolic congestive heart failure with valvular heart disease    she received 1 dose of IV diuretics during recent hospital presentation and became euvolemic  She will continue to use diuretics as needed as she is very sensitive to Lasix.  Repeat echocardiogram shows EF of less than 20%, moderate aortic and tricuspid regurgitation  Continue ACE inhibitor and beta-blocker  Continue Aldactone  Patient is unable to afford Jardiance or Farxiga.  Will prescribe and provide some samples  Previous echo showed EF of less than 20%  Blood pressure has been low and I have asked her to space out her medications     Supratherapeutic INR  Likely secondary to hepatic congestion  LFTs normal  Back on warfarin with goal INR of 2-3     Severe mitral regurgitation, S/P MVR (mitral valve repair)  Status post repair with 25 mm physio 2 ring on  5/15/2023  Repeat echocardiogram shows minimal MR but moderate aortic and tricuspid regurgitation     Aortic insufficiency  Moderate AI per most recent echocardiogram.  Continue diuretics as needed.     Tricuspid regurgitation  Moderate tricuspid regurgitation per the last echocardiogram.  Continue diuretics as needed     Coronary artery disease involving native coronary artery of native heart without angina pectoris  Essential hypertension with goal blood pressure less than 130/80  Hypertriglyceridemia  Preop cardiac catheterization showed nonobstructive coronary artery disease.  Continue aspirin, fenofibrate, atenolol and lisinopril.  Patient is intolerant to statins     Hypothyroidism due to acquired atrophy of thyroid  Continue Synthroid.  TSH is 6.1 and free T4 is 1.8 both elevated     Pacemaker, h/o SSS  Currently AV paced  Pacemaker was recently interrogated and working well  May need to be upgraded to ICD however benefit is limited in the setting of nonischemic cardiomyopathy     History of DVT (deep vein thrombosis)  Continue warfarin  Goal INR is 2-3

## 2023-08-07 ENCOUNTER — OFFICE VISIT (OUTPATIENT)
Dept: CARDIOLOGY | Facility: CLINIC | Age: 83
End: 2023-08-07
Payer: MEDICARE

## 2023-08-07 VITALS
OXYGEN SATURATION: 99 % | WEIGHT: 99 LBS | HEIGHT: 60 IN | SYSTOLIC BLOOD PRESSURE: 93 MMHG | BODY MASS INDEX: 19.44 KG/M2 | DIASTOLIC BLOOD PRESSURE: 40 MMHG | HEART RATE: 80 BPM

## 2023-08-07 DIAGNOSIS — I48.0 PAROXYSMAL ATRIAL FIBRILLATION: ICD-10-CM

## 2023-08-07 DIAGNOSIS — I73.9 PAD (PERIPHERAL ARTERY DISEASE): ICD-10-CM

## 2023-08-07 DIAGNOSIS — E03.4 HYPOTHYROIDISM DUE TO ACQUIRED ATROPHY OF THYROID: ICD-10-CM

## 2023-08-07 DIAGNOSIS — Z86.718 HISTORY OF DVT (DEEP VEIN THROMBOSIS): Primary | ICD-10-CM

## 2023-08-07 DIAGNOSIS — I36.1 NONRHEUMATIC TRICUSPID VALVE REGURGITATION: ICD-10-CM

## 2023-08-07 DIAGNOSIS — I82.4Y9 DEEP VEIN THROMBOSIS (DVT) OF PROXIMAL LOWER EXTREMITY, UNSPECIFIED CHRONICITY, UNSPECIFIED LATERALITY: ICD-10-CM

## 2023-08-07 DIAGNOSIS — I35.1 NONRHEUMATIC AORTIC VALVE INSUFFICIENCY: ICD-10-CM

## 2023-08-07 DIAGNOSIS — E78.5 HYPERLIPIDEMIA, UNSPECIFIED HYPERLIPIDEMIA TYPE: ICD-10-CM

## 2023-08-07 DIAGNOSIS — I50.22 CHRONIC HFREF (HEART FAILURE WITH REDUCED EJECTION FRACTION): ICD-10-CM

## 2023-08-07 DIAGNOSIS — Z98.890 S/P MITRAL VALVE REPAIR: ICD-10-CM

## 2023-08-07 DIAGNOSIS — Z79.01 LONG TERM (CURRENT) USE OF ANTICOAGULANTS: ICD-10-CM

## 2023-08-07 DIAGNOSIS — I34.0 MITRAL VALVE INSUFFICIENCY, UNSPECIFIED ETIOLOGY: ICD-10-CM

## 2023-08-07 DIAGNOSIS — Z95.0 PACEMAKER: ICD-10-CM

## 2023-08-07 DIAGNOSIS — I49.5 SSS (SICK SINUS SYNDROME): ICD-10-CM

## 2023-08-07 DIAGNOSIS — I82.409 ACUTE DEEP VEIN THROMBOSIS (DVT) OF LOWER EXTREMITY, UNSPECIFIED LATERALITY, UNSPECIFIED VEIN: ICD-10-CM

## 2023-08-07 PROCEDURE — 1160F RVW MEDS BY RX/DR IN RCRD: CPT | Performed by: INTERNAL MEDICINE

## 2023-08-07 PROCEDURE — 3074F SYST BP LT 130 MM HG: CPT | Performed by: INTERNAL MEDICINE

## 2023-08-07 PROCEDURE — 99214 OFFICE O/P EST MOD 30 MIN: CPT | Performed by: INTERNAL MEDICINE

## 2023-08-07 PROCEDURE — 3078F DIAST BP <80 MM HG: CPT | Performed by: INTERNAL MEDICINE

## 2023-08-07 PROCEDURE — 1159F MED LIST DOCD IN RCRD: CPT | Performed by: INTERNAL MEDICINE

## 2023-08-07 RX ORDER — FUROSEMIDE 40 MG/1
40 TABLET ORAL DAILY PRN
Qty: 90 TABLET | Refills: 3 | Status: SHIPPED | OUTPATIENT
Start: 2023-08-07

## 2023-08-07 RX ORDER — SPIRONOLACTONE 25 MG/1
25 TABLET ORAL DAILY
Qty: 90 TABLET | Refills: 3 | Status: SHIPPED | OUTPATIENT
Start: 2023-08-07

## 2023-08-18 ENCOUNTER — ANTICOAGULATION VISIT (OUTPATIENT)
Dept: CARDIOLOGY | Facility: CLINIC | Age: 83
End: 2023-08-18
Payer: MEDICARE

## 2023-08-18 VITALS
BODY MASS INDEX: 19.53 KG/M2 | HEART RATE: 92 BPM | WEIGHT: 100 LBS | DIASTOLIC BLOOD PRESSURE: 50 MMHG | SYSTOLIC BLOOD PRESSURE: 92 MMHG

## 2023-08-18 DIAGNOSIS — Z86.718 HISTORY OF DVT (DEEP VEIN THROMBOSIS): Primary | ICD-10-CM

## 2023-08-18 DIAGNOSIS — Z79.01 LONG TERM (CURRENT) USE OF ANTICOAGULANTS: Chronic | ICD-10-CM

## 2023-08-18 LAB — INR PPP: 2.9 (ref 0.9–1.1)

## 2023-08-31 RX ORDER — LISINOPRIL 20 MG/1
20 TABLET ORAL
Qty: 90 TABLET | Refills: 2 | Status: SHIPPED | OUTPATIENT
Start: 2023-08-31 | End: 2023-11-29

## 2023-08-31 NOTE — TELEPHONE ENCOUNTER
Rx Refill Note  Requested Prescriptions     Pending Prescriptions Disp Refills    lisinopril (PRINIVIL,ZESTRIL) 20 MG tablet [Pharmacy Med Name: Lisinopril 20 MG Oral Tablet] 90 tablet 0     Sig: Take 1 tablet by mouth once daily for 90 days      Last office visit with prescribing clinician: 8/7/2023   Last telemedicine visit with prescribing clinician: Visit date not found   Next office visit with prescribing clinician: 2/14/2024                         Would you like a call back once the refill request has been completed: [] Yes [] No    If the office needs to give you a call back, can they leave a voicemail: [] Yes [] No    Mia Marx MA  08/31/23, 09:52 EDT

## 2023-09-08 ENCOUNTER — TELEPHONE (OUTPATIENT)
Dept: CARDIOLOGY | Facility: CLINIC | Age: 83
End: 2023-09-08
Payer: MEDICARE

## 2023-09-08 ENCOUNTER — ANTICOAGULATION VISIT (OUTPATIENT)
Dept: CARDIOLOGY | Facility: CLINIC | Age: 83
End: 2023-09-08
Payer: MEDICARE

## 2023-09-08 VITALS
WEIGHT: 99 LBS | DIASTOLIC BLOOD PRESSURE: 48 MMHG | HEART RATE: 93 BPM | SYSTOLIC BLOOD PRESSURE: 111 MMHG | BODY MASS INDEX: 19.33 KG/M2

## 2023-09-08 DIAGNOSIS — Z79.01 LONG TERM (CURRENT) USE OF ANTICOAGULANTS: Chronic | ICD-10-CM

## 2023-09-08 DIAGNOSIS — Z86.718 HISTORY OF DVT (DEEP VEIN THROMBOSIS): Primary | ICD-10-CM

## 2023-09-08 DIAGNOSIS — I10 ESSENTIAL HYPERTENSION WITH GOAL BLOOD PRESSURE LESS THAN 130/80: Primary | Chronic | ICD-10-CM

## 2023-09-08 LAB — INR PPP: 2.6 (ref 2–3)

## 2023-09-08 RX ORDER — LISINOPRIL 10 MG/1
10 TABLET ORAL DAILY
Start: 2023-09-08

## 2023-09-08 NOTE — TELEPHONE ENCOUNTER
Pt in for protime today. Pt and spouse complain of pt sleeping more and increase in lightheadedness. She already modifies Pt blood pressure at visit was 111/48 HR 93. Pt stopped Jardiance because she did not feel well when taking it. She is currently taking Lisinopril 20mg in the evening. Lasix 40mg daily (but ordered PRN) in the morning. Spironolactone 25mg in the evening. And atenolol 50mg in the morning.       Spoke with Dr. Marcus with patient present advised decrease lisinopril to 10mg monitor blood pressure and report back. Pt verbalizes understanding apLPN

## 2023-10-02 ENCOUNTER — ANTICOAGULATION VISIT (OUTPATIENT)
Dept: CARDIOLOGY | Facility: CLINIC | Age: 83
End: 2023-10-02
Payer: MEDICARE

## 2023-10-02 VITALS
HEART RATE: 88 BPM | DIASTOLIC BLOOD PRESSURE: 52 MMHG | WEIGHT: 102 LBS | SYSTOLIC BLOOD PRESSURE: 119 MMHG | BODY MASS INDEX: 19.92 KG/M2

## 2023-10-02 DIAGNOSIS — Z86.718 HISTORY OF DVT (DEEP VEIN THROMBOSIS): Primary | ICD-10-CM

## 2023-10-02 DIAGNOSIS — Z79.01 LONG TERM (CURRENT) USE OF ANTICOAGULANTS: Chronic | ICD-10-CM

## 2023-10-02 LAB — INR PPP: 1.9 (ref 2–3)

## 2023-10-02 PROCEDURE — 36416 COLLJ CAPILLARY BLOOD SPEC: CPT | Performed by: NURSE PRACTITIONER

## 2023-10-02 PROCEDURE — 85610 PROTHROMBIN TIME: CPT | Performed by: NURSE PRACTITIONER

## 2023-11-06 ENCOUNTER — ANTICOAGULATION VISIT (OUTPATIENT)
Dept: CARDIOLOGY | Facility: CLINIC | Age: 83
End: 2023-11-06
Payer: MEDICARE

## 2023-11-06 VITALS — HEART RATE: 94 BPM | DIASTOLIC BLOOD PRESSURE: 45 MMHG | SYSTOLIC BLOOD PRESSURE: 122 MMHG

## 2023-11-06 DIAGNOSIS — Z86.718 HISTORY OF DVT (DEEP VEIN THROMBOSIS): Primary | ICD-10-CM

## 2023-11-06 DIAGNOSIS — Z79.01 LONG TERM (CURRENT) USE OF ANTICOAGULANTS: Chronic | ICD-10-CM

## 2023-11-06 LAB — INR PPP: 1.7 (ref 2–3)

## 2023-11-06 PROCEDURE — 85610 PROTHROMBIN TIME: CPT | Performed by: NURSE PRACTITIONER

## 2023-11-06 PROCEDURE — 36416 COLLJ CAPILLARY BLOOD SPEC: CPT | Performed by: NURSE PRACTITIONER

## 2023-11-19 ENCOUNTER — HOSPITAL ENCOUNTER (EMERGENCY)
Facility: HOSPITAL | Age: 83
Discharge: HOME | End: 2023-11-19
Attending: EMERGENCY MEDICINE | Admitting: EMERGENCY MEDICINE
Payer: MEDICARE

## 2023-11-19 VITALS
WEIGHT: 102 LBS | HEIGHT: 59 IN | DIASTOLIC BLOOD PRESSURE: 53 MMHG | HEART RATE: 88 BPM | SYSTOLIC BLOOD PRESSURE: 104 MMHG | BODY MASS INDEX: 20.56 KG/M2 | TEMPERATURE: 97.6 F | OXYGEN SATURATION: 100 % | RESPIRATION RATE: 16 BRPM

## 2023-11-19 DIAGNOSIS — R58 ECCHYMOSIS: Primary | ICD-10-CM

## 2023-11-19 LAB
D DIMER PPP FEU-MCNC: <0.19 MG/L (FEU) (ref 0–0.81)
INR PPP: 2.03 (ref 2–3)
PROTHROMBIN TIME: 21 SECONDS (ref 19.4–28.5)

## 2023-11-19 PROCEDURE — 99282 EMERGENCY DEPT VISIT SF MDM: CPT

## 2023-11-19 PROCEDURE — 85379 FIBRIN DEGRADATION QUANT: CPT | Performed by: EMERGENCY MEDICINE

## 2023-11-19 PROCEDURE — 85610 PROTHROMBIN TIME: CPT | Performed by: EMERGENCY MEDICINE

## 2023-11-19 NOTE — ED PROVIDER NOTES
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  States it does not feel like when she previously had a DVT but does not remember any trauma as it made her concerned.  States pain is only where the bruising is.  Does not have any swelling.  No chest pain or shortness of breath.  The INR of 1.7 it was when it was checked 2 weeks ago.  Review of Systems  See HPI.  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) heart failure     DVT (deep venous thrombosis) (CMS/LTAC, located within [facility]) [I82.409]     recurrent    Hordeolum externum 08/05/2015    Hyperlipidemia     Hypertension     Hypothyroidism due to acquired atrophy of thyroid 10/21/2019    Long term (current) use of anticoagulants 09/29/2022    Mitral regurgitation 05/17/2016    Osteopenia     Pacemaker 12/28/2021    S/P MVR (mitral valve repair) 05/15/2023    Tricuspid regurgitation 02/24/2023       Allergies   Allergen Reactions    Atorvastatin Myalgia    Colesevelam Hcl Myalgia     Made legs weak     Colestipol Hcl Other (See Comments)     MADE PATIENT FEEL BAD    Ezetimibe Hallucinations     Loss of energy - worn out - legs weak - feel like they way a \"ton \"     Pitavastatin Other (See Comments)     Elevated liver function tests     Rosuvastatin Myalgia    Rosuvastatin Calcium Myalgia     MADE LEGS WEAK    Simvastatin Myalgia    Statins Myalgia     Made legs weak       Keflex [Cephalexin] Hives       Past Surgical History:   Procedure Laterality Date    CARDIAC CATHETERIZATION N/A 2/25/2023    Procedure: Left Heart Cath and coronary angiogram;  Surgeon: Trace Marcus MD;  Location: [facility] [facility] CATH INVASIVE LOCATION;  Service: Cardiovascular;  Laterality: N/A;    CARDIAC CATHETERIZATION Bilateral 2/25/2023    Procedure: Right and Left Heart Cath;  Surgeon: Trace Marcus MD;  Location: [facility] CATH INVASIVE LOCATION;  Service: Cardiovascular;  Laterality: Bilateral;    HYSTERECTOMY      MITRAL VALVE REPAIR/REPLACEMENT N/A 5/15/2023    Procedure: MITRAL VALVE REPAIR/REPLACEMENT;  Surgeon: Veronica Orozco MD;  Location: [facility];  Service: Cardiothoracic;  Laterality: N/A;  repaired with 26mm physio II annuloplasty ring    PACEMAKER IMPLANTATION      THYROID SURGERY      TRANSESOPHAGEAL ECHOCARDIOGRAM (CARMITA) N/A 5/15/2023    Procedure: TRANSESOPHAGEAL ECHOCARDIOGRAM WITH ANESTHESIA;  Surgeon: Veronica Orozco MD;  Location: [facility];  Service: Cardiothoracic;  Laterality: N/A;       Family History   Problem Relation Age of Onset    No Known Problems Mother     No Known Problems Father     No Known Problems Sister     No Known Problems Brother     No Known Problems Maternal Aunt     No Known Problems Maternal Uncle     No Known Problems Paternal Aunt     No Known Problems Paternal Uncle     No Known Problems Maternal Grandmother     No Known Problems Maternal Grandfather     No Known Problems Paternal Grandmother     No Known Problems Paternal Grandfather     No Known Problems Other     Anemia Neg Hx     Arrhythmia Neg Hx     Asthma Neg Hx     Clotting disorder Neg Hx     Fainting Neg Hx     Heart attack Neg Hx     Heart disease Neg Hx     Heart failure Neg Hx     Hyperlipidemia Neg Hx     Hypertension Neg Hx        Social History     Socioeconomic History    Marital status:    Tobacco Use    Smoking status: Former     Passive exposure: Past    Smokeless tobacco: Never    Tobacco comments:     quit 35 yrs ago   Vaping Use    Vaping Use: Never used   Substance and Sexual Activity    Alcohol use: Not Currently     Comment: rare    Drug use: No    Sexual activity: Defer           Objective  "  Physical Exam  Patient with ecchymosis over superior medial calf.  Approximately 3 cm round.  Mildly tender to palpation.  Mild edema on left compared to right but patient states that that is normal for her ever since her prior DVT.  No acute distress.  No tachypnea.  Speaking in full sentences.  Intact distal pulses.  Procedures           ED Course      /61 (BP Location: Left arm, Patient Position: Sitting)   Pulse 106   Temp 97.6 °F (36.4 °C) (Oral)   Resp 20   Ht 149.9 cm (59\")   Wt 46.3 kg (102 lb)   SpO2 100%   BMI 20.60 kg/m²   Labs Reviewed   PROTIME-INR - Normal   D-DIMER, QUANTITATIVE - Normal    Narrative:     According to the assay 's published package insert, a normal (<0.50 mg/L (FEU)) D-dimer result in conjunction with a non-high clinical probability assessment, excludes deep vein thrombosis (DVT) and pulmonary embolism (PE) with high sensitivity.    D-dimer values increase with age and this can make VTE exclusion of an older population difficult. To address this, the [employer], based on best available evidence and recent guidelines, recommends that clinicians use age-adjusted D-dimer thresholds in patients greater than 50 years of age with: a) a low probability of PE who do not meet all Pulmonary Embolism Rule Out Criteria, or b) in those with intermediate probability of PE.   The formula for an age-adjusted D-dimer cut-off is \"age/100\".  For example, a 60 year old patient would have an age-adjusted cut-off of 0.60 mg/L (FEU) and an 80 year old 0.80 mg/L (FEU).     Medications - No data to display  No radiology results for the last day                                       Medical Decision Making  Problems Addressed:  Ecchymosis: acute illness or injury      INR improving.  Exam not consistent with DVT currently.  Dimer negative.  Will DC.  Return ER precautions discussed.  Patient agreeable with plan.  Final diagnoses:   Ecchymosis       ED " Disposition  ED Disposition       ED Disposition   Discharge    Condition   Stable    Comment   --               Camila Lazcano MD  [address] [address]  [phone]    In 3 days           Medication List      No changes were made to your prescriptions during this visit.            Goyo Arita MD  11/19/23 [id number]

## 2023-11-19 NOTE — ED NOTES
Patient has deep purple contusion to left lateral lower leg  just below knee that appeared this morning.  No injury.  Reports hx of DVT's x 2 in that leg before.  Contusion is sore to the touch but no pain, swelling or discoloration to remainder of leg.  Good distal pulse and capillary refill.

## 2023-12-06 ENCOUNTER — ANTICOAGULATION VISIT (OUTPATIENT)
Dept: CARDIOLOGY | Facility: CLINIC | Age: 83
End: 2023-12-06
Payer: MEDICARE

## 2023-12-06 VITALS
DIASTOLIC BLOOD PRESSURE: 81 MMHG | HEART RATE: 97 BPM | BODY MASS INDEX: 20.8 KG/M2 | WEIGHT: 103 LBS | SYSTOLIC BLOOD PRESSURE: 122 MMHG

## 2023-12-06 DIAGNOSIS — Z86.718 HISTORY OF DVT (DEEP VEIN THROMBOSIS): Primary | ICD-10-CM

## 2023-12-06 DIAGNOSIS — Z79.01 LONG TERM (CURRENT) USE OF ANTICOAGULANTS: Chronic | ICD-10-CM

## 2023-12-06 LAB — INR PPP: 2.5 (ref 0.9–1.1)

## 2024-01-10 ENCOUNTER — ANTICOAGULATION VISIT (OUTPATIENT)
Dept: CARDIOLOGY | Facility: CLINIC | Age: 84
End: 2024-01-10
Payer: MEDICARE

## 2024-01-10 VITALS
WEIGHT: 103 LBS | HEART RATE: 66 BPM | DIASTOLIC BLOOD PRESSURE: 71 MMHG | BODY MASS INDEX: 20.8 KG/M2 | SYSTOLIC BLOOD PRESSURE: 127 MMHG

## 2024-01-10 DIAGNOSIS — Z86.718 HISTORY OF DVT (DEEP VEIN THROMBOSIS): Primary | ICD-10-CM

## 2024-01-10 DIAGNOSIS — Z79.01 LONG TERM (CURRENT) USE OF ANTICOAGULANTS: Chronic | ICD-10-CM

## 2024-01-10 LAB — INR PPP: 2.9 (ref 2–3)

## 2024-02-14 ENCOUNTER — ANTICOAGULATION VISIT (OUTPATIENT)
Dept: CARDIOLOGY | Facility: CLINIC | Age: 84
End: 2024-02-14
Payer: MEDICARE

## 2024-02-14 ENCOUNTER — CLINICAL SUPPORT NO REQUIREMENTS (OUTPATIENT)
Dept: CARDIOLOGY | Facility: CLINIC | Age: 84
End: 2024-02-14
Payer: MEDICARE

## 2024-02-14 ENCOUNTER — OFFICE VISIT (OUTPATIENT)
Dept: CARDIOLOGY | Facility: CLINIC | Age: 84
End: 2024-02-14
Payer: MEDICARE

## 2024-02-14 VITALS
WEIGHT: 104 LBS | HEART RATE: 94 BPM | SYSTOLIC BLOOD PRESSURE: 162 MMHG | BODY MASS INDEX: 21.01 KG/M2 | DIASTOLIC BLOOD PRESSURE: 60 MMHG

## 2024-02-14 VITALS
DIASTOLIC BLOOD PRESSURE: 52 MMHG | WEIGHT: 104 LBS | HEART RATE: 94 BPM | SYSTOLIC BLOOD PRESSURE: 126 MMHG | BODY MASS INDEX: 20.96 KG/M2 | HEIGHT: 59 IN | OXYGEN SATURATION: 99 %

## 2024-02-14 DIAGNOSIS — E78.5 HYPERLIPIDEMIA, UNSPECIFIED HYPERLIPIDEMIA TYPE: ICD-10-CM

## 2024-02-14 DIAGNOSIS — Z79.01 LONG TERM (CURRENT) USE OF ANTICOAGULANTS: ICD-10-CM

## 2024-02-14 DIAGNOSIS — I36.1 NONRHEUMATIC TRICUSPID VALVE REGURGITATION: ICD-10-CM

## 2024-02-14 DIAGNOSIS — I82.4Y9 DEEP VEIN THROMBOSIS (DVT) OF PROXIMAL LOWER EXTREMITY, UNSPECIFIED CHRONICITY, UNSPECIFIED LATERALITY: ICD-10-CM

## 2024-02-14 DIAGNOSIS — I44.2 AV BLOCK, COMPLETE: ICD-10-CM

## 2024-02-14 DIAGNOSIS — I73.9 PAD (PERIPHERAL ARTERY DISEASE): ICD-10-CM

## 2024-02-14 DIAGNOSIS — Z86.718 HISTORY OF DVT (DEEP VEIN THROMBOSIS): Primary | ICD-10-CM

## 2024-02-14 DIAGNOSIS — I50.22 CHRONIC HFREF (HEART FAILURE WITH REDUCED EJECTION FRACTION): ICD-10-CM

## 2024-02-14 DIAGNOSIS — I35.1 NONRHEUMATIC AORTIC VALVE INSUFFICIENCY: ICD-10-CM

## 2024-02-14 DIAGNOSIS — I10 ESSENTIAL HYPERTENSION WITH GOAL BLOOD PRESSURE LESS THAN 130/80: ICD-10-CM

## 2024-02-14 DIAGNOSIS — E03.4 HYPOTHYROIDISM DUE TO ACQUIRED ATROPHY OF THYROID: ICD-10-CM

## 2024-02-14 DIAGNOSIS — Z95.0 PACEMAKER: Primary | Chronic | ICD-10-CM

## 2024-02-14 DIAGNOSIS — Z79.01 LONG TERM (CURRENT) USE OF ANTICOAGULANTS: Chronic | ICD-10-CM

## 2024-02-14 DIAGNOSIS — I34.0 MITRAL VALVE INSUFFICIENCY, UNSPECIFIED ETIOLOGY: ICD-10-CM

## 2024-02-14 DIAGNOSIS — I49.5 SSS (SICK SINUS SYNDROME): ICD-10-CM

## 2024-02-14 DIAGNOSIS — Z95.0 PACEMAKER: ICD-10-CM

## 2024-02-14 DIAGNOSIS — I10 PRIMARY HYPERTENSION: ICD-10-CM

## 2024-02-14 DIAGNOSIS — I48.0 PAROXYSMAL ATRIAL FIBRILLATION: ICD-10-CM

## 2024-02-14 DIAGNOSIS — Z98.890 S/P MITRAL VALVE REPAIR: ICD-10-CM

## 2024-02-14 DIAGNOSIS — I82.409 ACUTE DEEP VEIN THROMBOSIS (DVT) OF LOWER EXTREMITY, UNSPECIFIED LATERALITY, UNSPECIFIED VEIN: ICD-10-CM

## 2024-02-14 LAB — INR PPP: 1.8 (ref 0.9–1.1)

## 2024-02-14 RX ORDER — METOPROLOL SUCCINATE 25 MG/1
25 TABLET, EXTENDED RELEASE ORAL DAILY
COMMUNITY

## 2024-03-01 ENCOUNTER — HOSPITAL ENCOUNTER (OUTPATIENT)
Dept: CARDIOLOGY | Facility: HOSPITAL | Age: 84
Discharge: HOME | End: 2024-03-01
Payer: MEDICARE

## 2024-03-01 VITALS
BODY MASS INDEX: 20.96 KG/M2 | WEIGHT: 104 LBS | SYSTOLIC BLOOD PRESSURE: 139 MMHG | HEIGHT: 59 IN | DIASTOLIC BLOOD PRESSURE: 68 MMHG

## 2024-03-01 LAB
BH CV ECHO MEAS - AI P1/2T: 648 MSEC
BH CV ECHO MEAS - AO MAX PG: 6.8 MMHG
BH CV ECHO MEAS - AO MEAN PG: 3.7 MMHG
BH CV ECHO MEAS - AO ROOT DIAM: 3.2 CM
BH CV ECHO MEAS - AO V2 MAX: 130.5 CM/SEC
BH CV ECHO MEAS - AO V2 VTI: 21.4 CM
BH CV ECHO MEAS - AVA(I,D): 1.37 CM2
BH CV ECHO MEAS - EDV(CUBED): 55.9 ML
BH CV ECHO MEAS - EDV(MOD-SP2): 59.1 ML
BH CV ECHO MEAS - EDV(MOD-SP4): 72 ML
BH CV ECHO MEAS - EF(MOD-BP): 33 %
BH CV ECHO MEAS - EF(MOD-SP2): 43.2 %
BH CV ECHO MEAS - EF(MOD-SP4): 32.6 %
BH CV ECHO MEAS - ESV(CUBED): 36.5 ML
BH CV ECHO MEAS - ESV(MOD-SP2): 33.6 ML
BH CV ECHO MEAS - ESV(MOD-SP4): 48.5 ML
BH CV ECHO MEAS - FS: 13.2 %
BH CV ECHO MEAS - IVS/LVPW: 1.06 CM
BH CV ECHO MEAS - IVSD: 1.16 CM
BH CV ECHO MEAS - LA DIMENSION: 3.4 CM
BH CV ECHO MEAS - LV MASS(C)D: 140.2 GRAMS
BH CV ECHO MEAS - LV MAX PG: 2.7 MMHG
BH CV ECHO MEAS - LV MEAN PG: 1.49 MMHG
BH CV ECHO MEAS - LV V1 MAX: 81.9 CM/SEC
BH CV ECHO MEAS - LV V1 VTI: 14.1 CM
BH CV ECHO MEAS - LVIDD: 3.8 CM
BH CV ECHO MEAS - LVIDS: 3.3 CM
BH CV ECHO MEAS - LVOT AREA: 2.07 CM2
BH CV ECHO MEAS - LVOT DIAM: 1.62 CM
BH CV ECHO MEAS - LVPWD: 1.09 CM
BH CV ECHO MEAS - MR MAX PG: 91.1 MMHG
BH CV ECHO MEAS - MR MAX VEL: 477.2 CM/SEC
BH CV ECHO MEAS - MV A MAX VEL: 0.12 CM/SEC
BH CV ECHO MEAS - MV DEC SLOPE: 1051 CM/SEC2
BH CV ECHO MEAS - MV DEC TIME: 0.17 SEC
BH CV ECHO MEAS - MV E MAX VEL: 175.2 CM/SEC
BH CV ECHO MEAS - MV E/A: 1467
BH CV ECHO MEAS - MV MAX PG: 14.1 MMHG
BH CV ECHO MEAS - MV MEAN PG: 5.9 MMHG
BH CV ECHO MEAS - MV V2 VTI: 33.4 CM
BH CV ECHO MEAS - MVA(VTI): 0.87 CM2
BH CV ECHO MEAS - PA V2 MAX: 86.7 CM/SEC
BH CV ECHO MEAS - RAP SYSTOLE: 3 MMHG
BH CV ECHO MEAS - RV MAX PG: 2.9 MMHG
BH CV ECHO MEAS - RV V1 MAX: 85.6 CM/SEC
BH CV ECHO MEAS - RV V1 VTI: 15.9 CM
BH CV ECHO MEAS - RVDD: 1.95 CM
BH CV ECHO MEAS - RVSP: 28.9 MMHG
BH CV ECHO MEAS - SV(LVOT): 29.2 ML
BH CV ECHO MEAS - SV(MOD-SP2): 25.5 ML
BH CV ECHO MEAS - SV(MOD-SP4): 23.5 ML
BH CV ECHO MEAS - TR MAX PG: 25.9 MMHG
BH CV ECHO MEAS - TR MAX VEL: 254.1 CM/SEC

## 2024-03-01 PROCEDURE — 93306 TTE W/DOPPLER COMPLETE: CPT

## 2024-03-14 ENCOUNTER — TELEPHONE (OUTPATIENT)
Dept: CARDIOLOGY | Facility: CLINIC | Age: 84
End: 2024-03-14

## 2024-03-14 ENCOUNTER — ANTICOAGULATION VISIT (OUTPATIENT)
Dept: CARDIOLOGY | Facility: CLINIC | Age: 84
End: 2024-03-14
Payer: MEDICARE

## 2024-03-14 VITALS
DIASTOLIC BLOOD PRESSURE: 69 MMHG | HEART RATE: 90 BPM | SYSTOLIC BLOOD PRESSURE: 149 MMHG | BODY MASS INDEX: 21.21 KG/M2 | WEIGHT: 105 LBS

## 2024-03-14 DIAGNOSIS — Z79.01 LONG TERM (CURRENT) USE OF ANTICOAGULANTS: Chronic | ICD-10-CM

## 2024-03-14 DIAGNOSIS — Z86.718 HISTORY OF DVT (DEEP VEIN THROMBOSIS): Primary | ICD-10-CM

## 2024-03-14 LAB — INR PPP: 2.1 (ref 2–3)

## 2024-03-14 NOTE — TELEPHONE ENCOUNTER
Pt in for protime today. Pt PCP stopped Lisinopril around 1/23/24 because pt blood pressures were running really low and the patient was not feeling well. The patient and spouse report that the patient could barely walk without having to take multiple breaks. Since stopping medication pt is feeling much better. However, pt is concerned that now her blood pressure may be running to high. Please see attached blood pressure reading and advise.       BP at today's visit 149/69 HR 90 blood pressure rechecked after sitting for 15-20 mins and reading was 145/64 HR 96.    ASSESSMENT/PATIENT HISTORY - SCAN - BP READINGS, [facility] UZIEL GUNDERSON, 03/14/2024 (03/14/2024)         Please Advise

## 2024-03-14 NOTE — TELEPHONE ENCOUNTER
BP log reviewed. No need to start an antihypertensive at this time. She can continue monitoring her blood pressure once a day. If she consistently runs greater than 140/80, she should give us a call. Please advise patient.

## 2024-04-09 ENCOUNTER — ANTICOAGULATION VISIT (OUTPATIENT)
Dept: CARDIOLOGY | Facility: CLINIC | Age: 84
End: 2024-04-09
Payer: MEDICARE

## 2024-04-09 VITALS
DIASTOLIC BLOOD PRESSURE: 55 MMHG | BODY MASS INDEX: 21.41 KG/M2 | HEART RATE: 86 BPM | WEIGHT: 106 LBS | SYSTOLIC BLOOD PRESSURE: 159 MMHG

## 2024-04-09 DIAGNOSIS — Z86.718 HISTORY OF DVT (DEEP VEIN THROMBOSIS): Primary | ICD-10-CM

## 2024-04-09 DIAGNOSIS — Z79.01 LONG TERM (CURRENT) USE OF ANTICOAGULANTS: Chronic | ICD-10-CM

## 2024-04-09 LAB — INR PPP: 2 (ref 0.9–1.1)

## 2024-04-09 PROCEDURE — 36416 COLLJ CAPILLARY BLOOD SPEC: CPT | Performed by: NURSE PRACTITIONER

## 2024-04-09 PROCEDURE — 85610 PROTHROMBIN TIME: CPT | Performed by: NURSE PRACTITIONER

## 2024-05-15 ENCOUNTER — ANTICOAGULATION VISIT (OUTPATIENT)
Dept: CARDIOLOGY | Facility: CLINIC | Age: 84
End: 2024-05-15
Payer: MEDICARE

## 2024-05-15 VITALS
SYSTOLIC BLOOD PRESSURE: 129 MMHG | DIASTOLIC BLOOD PRESSURE: 50 MMHG | BODY MASS INDEX: 22.02 KG/M2 | WEIGHT: 109 LBS | HEART RATE: 83 BPM

## 2024-05-15 DIAGNOSIS — Z79.01 LONG TERM (CURRENT) USE OF ANTICOAGULANTS: Chronic | ICD-10-CM

## 2024-05-15 DIAGNOSIS — Z86.718 HISTORY OF DVT (DEEP VEIN THROMBOSIS): Primary | ICD-10-CM

## 2024-05-15 LAB — INR PPP: 1.9 (ref 0.9–1.1)

## 2024-06-13 ENCOUNTER — ANTICOAGULATION VISIT (OUTPATIENT)
Dept: CARDIOLOGY | Facility: CLINIC | Age: 84
End: 2024-06-13
Payer: MEDICARE

## 2024-06-13 VITALS
DIASTOLIC BLOOD PRESSURE: 49 MMHG | SYSTOLIC BLOOD PRESSURE: 134 MMHG | BODY MASS INDEX: 22.02 KG/M2 | WEIGHT: 109 LBS | HEART RATE: 91 BPM

## 2024-06-13 DIAGNOSIS — Z86.718 HISTORY OF DVT (DEEP VEIN THROMBOSIS): Primary | ICD-10-CM

## 2024-06-13 DIAGNOSIS — Z79.01 LONG TERM (CURRENT) USE OF ANTICOAGULANTS: Chronic | ICD-10-CM

## 2024-06-13 LAB — INR PPP: 2 (ref 0.9–1.1)

## 2024-07-14 DIAGNOSIS — I50.22 CHRONIC HFREF (HEART FAILURE WITH REDUCED EJECTION FRACTION): ICD-10-CM

## 2024-07-15 RX ORDER — FUROSEMIDE 40 MG/1
TABLET ORAL
Qty: 90 TABLET | Refills: 0 | Status: SHIPPED | OUTPATIENT
Start: 2024-07-15

## 2024-07-15 RX ORDER — SPIRONOLACTONE 25 MG/1
25 TABLET ORAL DAILY
Qty: 90 TABLET | Refills: 0 | Status: SHIPPED | OUTPATIENT
Start: 2024-07-15

## 2024-07-15 NOTE — TELEPHONE ENCOUNTER
Rx Refill Note  Requested Prescriptions     Pending Prescriptions Disp Refills    spironolactone (ALDACTONE) 25 MG tablet [Pharmacy Med Name: Spironolactone 25 MG Oral Tablet] 90 tablet 0     Sig: Take 1 tablet by mouth once daily    furosemide (LASIX) 40 MG tablet [Pharmacy Med Name: Furosemide 40 MG Oral Tablet] 90 tablet 0     Sig: TAKE 1 TABLET BY MOUTH ONCE DAILY AS NEEDED (LEG  SWELLING,  WEIGHT  GAIN  OR  SHORTNESS  OF  BREATH)      Last office visit with prescribing clinician: 2/14/2024     Next office visit with prescribing clinician: 9/11/2024                           Dana Alcaraz MA  07/15/24, 09:39 EDT

## 2024-07-24 ENCOUNTER — ANTICOAGULATION VISIT (OUTPATIENT)
Dept: CARDIOLOGY | Facility: CLINIC | Age: 84
End: 2024-07-24
Payer: MEDICARE

## 2024-07-24 VITALS
SYSTOLIC BLOOD PRESSURE: 165 MMHG | BODY MASS INDEX: 21.81 KG/M2 | DIASTOLIC BLOOD PRESSURE: 66 MMHG | WEIGHT: 108 LBS | HEART RATE: 102 BPM

## 2024-07-24 DIAGNOSIS — Z86.718 HISTORY OF DVT (DEEP VEIN THROMBOSIS): Primary | ICD-10-CM

## 2024-07-24 DIAGNOSIS — Z79.01 LONG TERM (CURRENT) USE OF ANTICOAGULANTS: Chronic | ICD-10-CM

## 2024-07-24 LAB — INR PPP: 1.8 (ref 0.9–1.1)

## 2024-08-21 ENCOUNTER — ANTICOAGULATION VISIT (OUTPATIENT)
Dept: CARDIOLOGY | Facility: CLINIC | Age: 84
End: 2024-08-21
Payer: MEDICARE

## 2024-08-21 VITALS
BODY MASS INDEX: 21.85 KG/M2 | WEIGHT: 108.2 LBS | SYSTOLIC BLOOD PRESSURE: 138 MMHG | DIASTOLIC BLOOD PRESSURE: 50 MMHG | HEART RATE: 82 BPM

## 2024-08-21 DIAGNOSIS — Z79.01 LONG TERM (CURRENT) USE OF ANTICOAGULANTS: Chronic | ICD-10-CM

## 2024-08-21 DIAGNOSIS — Z86.718 HISTORY OF DVT (DEEP VEIN THROMBOSIS): Primary | ICD-10-CM

## 2024-08-21 LAB — INR PPP: 1.9 (ref 0.9–1.1)

## 2024-09-04 ENCOUNTER — TELEPHONE (OUTPATIENT)
Dept: CARDIOLOGY | Facility: CLINIC | Age: 84
End: 2024-09-04
Payer: MEDICARE

## 2024-09-04 NOTE — TELEPHONE ENCOUNTER
Patient is in office with her  today and wanted to have Mrs Lai or Dr Marcus to check her Pm she stated that she has seen her PM moving in her chest to the point she can see it through her shirt. Her PCP advised her that she needs to get in sooner to be seen to see what is going on.

## 2024-09-05 NOTE — TELEPHONE ENCOUNTER
Spoke to patient, 3 times her heart beat so hard it her pacemaker moved her shirt. Advised her to monitor for this and she has an appt on 9/11, we will check her device and look for any causes. She is ok with this appt.

## 2024-09-11 ENCOUNTER — ANTICOAGULATION VISIT (OUTPATIENT)
Dept: CARDIOLOGY | Facility: CLINIC | Age: 84
End: 2024-09-11
Payer: MEDICARE

## 2024-09-11 ENCOUNTER — OFFICE VISIT (OUTPATIENT)
Dept: CARDIOLOGY | Facility: CLINIC | Age: 84
End: 2024-09-11
Payer: MEDICARE

## 2024-09-11 ENCOUNTER — CLINICAL SUPPORT NO REQUIREMENTS (OUTPATIENT)
Dept: CARDIOLOGY | Facility: CLINIC | Age: 84
End: 2024-09-11
Payer: MEDICARE

## 2024-09-11 VITALS
DIASTOLIC BLOOD PRESSURE: 56 MMHG | WEIGHT: 110 LBS | SYSTOLIC BLOOD PRESSURE: 147 MMHG | HEART RATE: 83 BPM | BODY MASS INDEX: 22.22 KG/M2

## 2024-09-11 VITALS
BODY MASS INDEX: 22.18 KG/M2 | SYSTOLIC BLOOD PRESSURE: 147 MMHG | HEIGHT: 59 IN | WEIGHT: 110 LBS | OXYGEN SATURATION: 97 % | HEART RATE: 82 BPM | DIASTOLIC BLOOD PRESSURE: 56 MMHG

## 2024-09-11 DIAGNOSIS — I73.9 PAD (PERIPHERAL ARTERY DISEASE): ICD-10-CM

## 2024-09-11 DIAGNOSIS — I10 ESSENTIAL HYPERTENSION WITH GOAL BLOOD PRESSURE LESS THAN 130/80: ICD-10-CM

## 2024-09-11 DIAGNOSIS — I35.1 NONRHEUMATIC AORTIC VALVE INSUFFICIENCY: ICD-10-CM

## 2024-09-11 DIAGNOSIS — Z79.01 LONG TERM (CURRENT) USE OF ANTICOAGULANTS: ICD-10-CM

## 2024-09-11 DIAGNOSIS — Z86.718 HISTORY OF DVT (DEEP VEIN THROMBOSIS): ICD-10-CM

## 2024-09-11 DIAGNOSIS — I10 PRIMARY HYPERTENSION: ICD-10-CM

## 2024-09-11 DIAGNOSIS — E03.4 HYPOTHYROIDISM DUE TO ACQUIRED ATROPHY OF THYROID: ICD-10-CM

## 2024-09-11 DIAGNOSIS — I82.4Y9 DEEP VEIN THROMBOSIS (DVT) OF PROXIMAL LOWER EXTREMITY, UNSPECIFIED CHRONICITY, UNSPECIFIED LATERALITY: ICD-10-CM

## 2024-09-11 DIAGNOSIS — Z95.0 PACEMAKER: Primary | Chronic | ICD-10-CM

## 2024-09-11 DIAGNOSIS — I48.0 PAROXYSMAL ATRIAL FIBRILLATION: ICD-10-CM

## 2024-09-11 DIAGNOSIS — Z98.890 S/P MITRAL VALVE REPAIR: ICD-10-CM

## 2024-09-11 DIAGNOSIS — E78.5 HYPERLIPIDEMIA, UNSPECIFIED HYPERLIPIDEMIA TYPE: ICD-10-CM

## 2024-09-11 DIAGNOSIS — I82.409 ACUTE DEEP VEIN THROMBOSIS (DVT) OF LOWER EXTREMITY, UNSPECIFIED LATERALITY, UNSPECIFIED VEIN: ICD-10-CM

## 2024-09-11 DIAGNOSIS — I34.0 MITRAL VALVE INSUFFICIENCY, UNSPECIFIED ETIOLOGY: ICD-10-CM

## 2024-09-11 DIAGNOSIS — I49.5 SSS (SICK SINUS SYNDROME): ICD-10-CM

## 2024-09-11 DIAGNOSIS — Z86.718 HISTORY OF DVT (DEEP VEIN THROMBOSIS): Primary | ICD-10-CM

## 2024-09-11 DIAGNOSIS — I36.1 NONRHEUMATIC TRICUSPID VALVE REGURGITATION: ICD-10-CM

## 2024-09-11 DIAGNOSIS — I50.22 CHRONIC HFREF (HEART FAILURE WITH REDUCED EJECTION FRACTION): Primary | ICD-10-CM

## 2024-09-11 DIAGNOSIS — Z79.01 LONG TERM (CURRENT) USE OF ANTICOAGULANTS: Chronic | ICD-10-CM

## 2024-09-11 DIAGNOSIS — Z95.0 PACEMAKER: ICD-10-CM

## 2024-09-11 DIAGNOSIS — I44.2 AV BLOCK, COMPLETE: ICD-10-CM

## 2024-09-11 LAB — INR PPP: 2.4 (ref 2–3)

## 2024-09-11 PROCEDURE — 36416 COLLJ CAPILLARY BLOOD SPEC: CPT | Performed by: INTERNAL MEDICINE

## 2024-09-11 PROCEDURE — 85610 PROTHROMBIN TIME: CPT | Performed by: INTERNAL MEDICINE

## 2024-10-08 DIAGNOSIS — I50.22 CHRONIC HFREF (HEART FAILURE WITH REDUCED EJECTION FRACTION): ICD-10-CM

## 2024-10-08 RX ORDER — SPIRONOLACTONE 25 MG/1
25 TABLET ORAL DAILY
Qty: 90 TABLET | Refills: 0 | Status: SHIPPED | OUTPATIENT
Start: 2024-10-08

## 2024-10-08 RX ORDER — FUROSEMIDE 40 MG
TABLET ORAL
Qty: 90 TABLET | Refills: 0 | Status: SHIPPED | OUTPATIENT
Start: 2024-10-08

## 2024-10-16 ENCOUNTER — ANTICOAGULATION VISIT (OUTPATIENT)
Dept: CARDIOLOGY | Facility: CLINIC | Age: 84
End: 2024-10-16
Payer: MEDICARE

## 2024-10-16 VITALS
SYSTOLIC BLOOD PRESSURE: 126 MMHG | DIASTOLIC BLOOD PRESSURE: 58 MMHG | HEART RATE: 80 BPM | WEIGHT: 110 LBS | BODY MASS INDEX: 22.22 KG/M2

## 2024-10-16 DIAGNOSIS — Z86.718 HISTORY OF DVT (DEEP VEIN THROMBOSIS): Primary | ICD-10-CM

## 2024-10-16 DIAGNOSIS — Z79.01 LONG TERM (CURRENT) USE OF ANTICOAGULANTS: Chronic | ICD-10-CM

## 2024-10-16 LAB — INR PPP: 2.3 (ref 0.9–1.1)

## 2024-10-16 PROCEDURE — 85610 PROTHROMBIN TIME: CPT | Performed by: INTERNAL MEDICINE

## 2024-10-16 PROCEDURE — 36416 COLLJ CAPILLARY BLOOD SPEC: CPT | Performed by: INTERNAL MEDICINE

## 2024-11-12 ENCOUNTER — ANTICOAGULATION VISIT (OUTPATIENT)
Dept: CARDIOLOGY | Facility: CLINIC | Age: 84
End: 2024-11-12
Payer: MEDICARE

## 2024-11-12 VITALS
DIASTOLIC BLOOD PRESSURE: 55 MMHG | SYSTOLIC BLOOD PRESSURE: 122 MMHG | WEIGHT: 111 LBS | BODY MASS INDEX: 22.42 KG/M2 | HEART RATE: 81 BPM

## 2024-11-12 DIAGNOSIS — Z79.01 LONG TERM (CURRENT) USE OF ANTICOAGULANTS: Chronic | ICD-10-CM

## 2024-11-12 DIAGNOSIS — Z86.718 HISTORY OF DVT (DEEP VEIN THROMBOSIS): Primary | ICD-10-CM

## 2024-11-12 LAB — INR PPP: 2.2 (ref 2–3)

## 2024-11-12 PROCEDURE — 36416 COLLJ CAPILLARY BLOOD SPEC: CPT | Performed by: INTERNAL MEDICINE

## 2024-11-12 PROCEDURE — 85610 PROTHROMBIN TIME: CPT | Performed by: INTERNAL MEDICINE

## 2024-12-17 ENCOUNTER — ANTICOAGULATION VISIT (OUTPATIENT)
Dept: CARDIOLOGY | Facility: CLINIC | Age: 84
End: 2024-12-17
Payer: MEDICARE

## 2024-12-17 VITALS
WEIGHT: 111 LBS | BODY MASS INDEX: 22.42 KG/M2 | DIASTOLIC BLOOD PRESSURE: 54 MMHG | HEART RATE: 87 BPM | SYSTOLIC BLOOD PRESSURE: 134 MMHG

## 2024-12-17 DIAGNOSIS — Z79.01 LONG TERM (CURRENT) USE OF ANTICOAGULANTS: Chronic | ICD-10-CM

## 2024-12-17 DIAGNOSIS — Z86.718 HISTORY OF DVT (DEEP VEIN THROMBOSIS): Primary | ICD-10-CM

## 2024-12-17 LAB — INR PPP: 2.6 (ref 2–3)

## 2024-12-17 PROCEDURE — 85610 PROTHROMBIN TIME: CPT | Performed by: INTERNAL MEDICINE

## 2024-12-17 PROCEDURE — 36416 COLLJ CAPILLARY BLOOD SPEC: CPT | Performed by: INTERNAL MEDICINE

## 2025-01-01 DIAGNOSIS — I50.22 CHRONIC HFREF (HEART FAILURE WITH REDUCED EJECTION FRACTION): ICD-10-CM

## 2025-01-02 RX ORDER — SPIRONOLACTONE 25 MG/1
25 TABLET ORAL DAILY
Qty: 90 TABLET | Refills: 2 | Status: SHIPPED | OUTPATIENT
Start: 2025-01-02

## 2025-01-02 RX ORDER — FUROSEMIDE 40 MG/1
TABLET ORAL
Qty: 90 TABLET | Refills: 2 | Status: SHIPPED | OUTPATIENT
Start: 2025-01-02

## 2025-01-02 NOTE — TELEPHONE ENCOUNTER
Rx Refill Note  Requested Prescriptions     Pending Prescriptions Disp Refills    furosemide (LASIX) 40 MG tablet [Pharmacy Med Name: Furosemide 40 MG Oral Tablet] 90 tablet 0     Sig: TAKE 1 TABLET BY MOUTH ONCE DAILY AS NEEDED (LEG  SWELLING,  WEIGHT  GAIN  OR  SHORTNESS  OF  BREATH)    spironolactone (ALDACTONE) 25 MG tablet [Pharmacy Med Name: Spironolactone 25 MG Oral Tablet] 90 tablet 0     Sig: Take 1 tablet by mouth once daily      Last office visit with prescribing clinician: 9/11/2024   Last telemedicine visit with prescribing clinician: Visit date not found   Next office visit with prescribing clinician: 3/12/2025                         Would you like a call back once the refill request has been completed: [] Yes [] No    If the office needs to give you a call back, can they leave a voicemail: [] Yes [] No    Mia Marx MA  01/02/25, 10:39 EST

## 2025-01-21 ENCOUNTER — ANTICOAGULATION VISIT (OUTPATIENT)
Dept: CARDIOLOGY | Facility: CLINIC | Age: 85
End: 2025-01-21
Payer: MEDICARE

## 2025-01-21 VITALS
DIASTOLIC BLOOD PRESSURE: 55 MMHG | WEIGHT: 111 LBS | BODY MASS INDEX: 22.42 KG/M2 | HEART RATE: 84 BPM | SYSTOLIC BLOOD PRESSURE: 127 MMHG

## 2025-01-21 DIAGNOSIS — Z86.718 HISTORY OF DVT (DEEP VEIN THROMBOSIS): Primary | ICD-10-CM

## 2025-01-21 DIAGNOSIS — Z79.01 LONG TERM (CURRENT) USE OF ANTICOAGULANTS: Chronic | ICD-10-CM

## 2025-01-21 LAB — INR PPP: 2.1 (ref 0.9–1.1)

## 2025-01-21 PROCEDURE — 36416 COLLJ CAPILLARY BLOOD SPEC: CPT | Performed by: INTERNAL MEDICINE

## 2025-01-21 PROCEDURE — 85610 PROTHROMBIN TIME: CPT | Performed by: INTERNAL MEDICINE

## 2025-03-02 NOTE — PROGRESS NOTES
Encounter Date:03/12/2025        Patient ID: Thi Allen is a 85 y.o. female.      Chief Complaint:      History of Present Illness  Thi Allen is a 85 y.o. female with multi valvular disease nonischemic cardiomyopathy status post mitral valve repair in May 2023, known moderate aortic and tricuspid regurgitation, similar nonobstructive coronary disease, hypertension, hypertriglyceridemia, sick sinus syndrome status post permanent pacemaker placement, hypothyroidism and DVT.  She was admitted to the hospital after running out of Lasix with heart failure exacerbation and was noted to have elevated proBNP of 32,000 and chest x-ray consistent with pulmonary edema.  She also had a supratherapeutic INR of 8  Of note she had an echocardiogram in June 2023 that showed EF of 20%, diastolic dysfunction, moderate aortic regurgitation, mild to moderate mitral stenosis, moderate tricuspid regurgitation and elevated RVSP.  History of COVID.  Her symptoms have resolved.  Jardiance was discontinued due to dizziness.  She also stopped lisinopril because she felt unwell and her voice was hoarse.     Current cardiac medications include aspirin, warfarin, Lasix, Toprol-XL and Aldactone     The following portions of the patient's history were reviewed and updated as appropriate: allergies, current medications, past family history, past medical history, past social history, past surgical history, and problem list.    Review of Systems   All other systems reviewed and are negative.        Current Outpatient Medications:     aspirin 81 MG EC tablet, Take 1 tablet by mouth Every Night., Disp: , Rfl:     famotidine (PEPCID) 20 MG tablet, Take 1 tablet by mouth 2 (Two) Times a Day As Needed., Disp: , Rfl:     fenofibrate 160 MG tablet, Take 1 tablet by mouth Every Night., Disp: , Rfl:     furosemide (LASIX) 40 MG tablet, TAKE 1 TABLET BY MOUTH ONCE DAILY AS NEEDED (LEG  SWELLING,  WEIGHT  GAIN  OR  SHORTNESS  OF  BREATH), Disp:  "90 tablet, Rfl: 2    latanoprost (XALATAN) 0.005 % ophthalmic solution, Administer 1 drop to both eyes Every Night., Disp: , Rfl:     levothyroxine (SYNTHROID, LEVOTHROID) 50 MCG tablet, Take 1 tablet by mouth Every Night., Disp: , Rfl:     metoprolol succinate XL (TOPROL-XL) 25 MG 24 hr tablet, Take 1 tablet by mouth Daily., Disp: , Rfl:     spironolactone (ALDACTONE) 25 MG tablet, Take 1 tablet by mouth once daily, Disp: 90 tablet, Rfl: 2    warfarin (COUMADIN) 2.5 MG tablet, Take 1 tablet by mouth Daily. Monday and Friday evenings, Disp: , Rfl:   No current facility-administered medications for this visit.    Facility-Administered Medications Ordered in Other Visits:     Chlorhexidine Gluconate Cloth 2 % pads 1 application, 1 application , Topical, Q12H PRN, Yulissa Herrera, APRN    Current outpatient and discharge medications have been reconciled for the patient.  Reviewed by: Trace Marcus MD       Allergies   Allergen Reactions    Atorvastatin Myalgia    Colesevelam Hcl Myalgia     Made legs weak     Colestipol Hcl Other (See Comments)     MADE PATIENT FEEL BAD    Ezetimibe Hallucinations     Loss of energy - worn out - legs weak - feel like they way a \"ton \"     Pitavastatin Other (See Comments)     Elevated liver function tests     Rosuvastatin Calcium Myalgia     MADE LEGS WEAK    Simvastatin Myalgia    Statins Myalgia     Made legs weak       Jardiance [Empagliflozin] Dizziness    Keflex [Cephalexin] Hives       Family History   Problem Relation Age of Onset    No Known Problems Mother     No Known Problems Father     No Known Problems Sister     No Known Problems Brother     No Known Problems Maternal Aunt     No Known Problems Maternal Uncle     No Known Problems Paternal Aunt     No Known Problems Paternal Uncle     No Known Problems Maternal Grandmother     No Known Problems Maternal Grandfather     No Known Problems Paternal Grandmother     No Known Problems Paternal Grandfather     No " Known Problems Other     Anemia Neg Hx     Arrhythmia Neg Hx     Asthma Neg Hx     Clotting disorder Neg Hx     Fainting Neg Hx     Heart attack Neg Hx     Heart disease Neg Hx     Heart failure Neg Hx     Hyperlipidemia Neg Hx     Hypertension Neg Hx        Past Surgical History:   Procedure Laterality Date    CARDIAC CATHETERIZATION N/A 2/25/2023    Procedure: Left Heart Cath and coronary angiogram;  Surgeon: Trace Marcus MD;  Location: [facility] CATH INVASIVE LOCATION;  Service: Cardiovascular;  Laterality: N/A;    CARDIAC CATHETERIZATION Bilateral 2/25/2023    Procedure: Right and Left Heart Cath;  Surgeon: Trace Marcus MD;  Location: [facility] CATH INVASIVE LOCATION;  Service: Cardiovascular;  Laterality: Bilateral;    HYSTERECTOMY      MITRAL VALVE REPAIR/REPLACEMENT N/A 5/15/2023    Procedure: MITRAL VALVE REPAIR/REPLACEMENT;  Surgeon: Veronica Orozco MD;  Location: [facility];  Service: Cardiothoracic;  Laterality: N/A;  repaired with 26mm physio II annuloplasty ring    PACEMAKER IMPLANTATION      THYROID SURGERY      TRANSESOPHAGEAL ECHOCARDIOGRAM (CARMITA) N/A 5/15/2023    Procedure: TRANSESOPHAGEAL ECHOCARDIOGRAM WITH ANESTHESIA;  Surgeon: Veronica Orozco MD;  Location: [facility];  Service: Cardiothoracic;  Laterality: N/A;       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) heart failure     DVT (deep venous thrombosis) (CMS/[facility]) [I82.409]     recurrent    Hordeolum externum 08/05/2015    Hyperlipidemia     Hypertension     Hypothyroidism due to acquired atrophy of thyroid 10/21/2019    Long term (current) use of anticoagulants 09/29/2022    Mitral regurgitation 05/17/2016    Osteopenia     Pacemaker 12/28/2021    S/P MVR (mitral  "valve repair) 05/15/2023    Tricuspid regurgitation 02/24/2023       Family History   Problem Relation Age of Onset    No Known Problems Mother     No Known Problems Father     No Known Problems Sister     No Known Problems Brother     No Known Problems Maternal Aunt     No Known Problems Maternal Uncle     No Known Problems Paternal Aunt     No Known Problems Paternal Uncle     No Known Problems Maternal Grandmother     No Known Problems Maternal Grandfather     No Known Problems Paternal Grandmother     No Known Problems Paternal Grandfather     No Known Problems Other     Anemia Neg Hx     Arrhythmia Neg Hx     Asthma Neg Hx     Clotting disorder Neg Hx     Fainting Neg Hx     Heart attack Neg Hx     Heart disease Neg Hx     Heart failure Neg Hx     Hyperlipidemia Neg Hx     Hypertension Neg Hx        Social History     Socioeconomic History    Marital status:    Tobacco Use    Smoking status: Former     Passive exposure: Past    Smokeless tobacco: Never    Tobacco comments:     quit 35 yrs ago   Vaping Use    Vaping status: Never Used   Substance and Sexual Activity    Alcohol use: Not Currently     Comment: rare    Drug use: No    Sexual activity: Defer               Objective:       Physical Exam    /57 (BP Location: Right arm, Patient Position: Sitting, Cuff Size: Adult)   Pulse 84   Ht 149.9 cm (59\")   Wt 53.1 kg (117 lb)   SpO2 97%   BMI 23.63 kg/m²   The patient is alert, oriented and in no distress.    Vital signs as noted above.    Head and neck revealed no carotid bruits or jugular venous distension.  No thyromegaly or lymphadenopathy is present.    Lungs clear.  No wheezing.  Breath sounds are normal bilaterally.    Heart normal first and second heart sounds.  No murmur..  No pericardial rub is present.  No gallop is present.    Abdomen soft and nontender.  No organomegaly is present.    Extremities revealed good peripheral pulses without any pedal edema.    Skin warm and " dry.    Musculoskeletal system is grossly normal.    CNS grossly normal.           Diagnosis Plan   1. History of DVT (deep vein thrombosis)        2. Chronic HFrEF (heart failure with reduced ejection fraction)        3. SSS (sick sinus syndrome)        4. S/P mitral valve repair        5. Essential hypertension with goal blood pressure less than 130/80        6. Paroxysmal atrial fibrillation        7. Mitral valve insufficiency, unspecified etiology        8. Hypothyroidism due to acquired atrophy of thyroid        9. Acute deep vein thrombosis (DVT) of lower extremity, unspecified laterality, unspecified vein        10. Nonrheumatic aortic valve insufficiency        11. Nonrheumatic tricuspid valve regurgitation        12. AV block, complete        13. Hyperlipidemia, unspecified hyperlipidemia type        14. Deep vein thrombosis (DVT) of proximal lower extremity, unspecified chronicity, unspecified laterality        LAB RESULTS (LAST 7 DAYS)    CBC        BMP        CMP         BNP        TROPONIN        CoAg        Creatinine Clearance  CrCl cannot be calculated (Patient's most recent lab result is older than the maximum 30 days allowed.).    ABG        Radiology  No radiology results for the last day    EKG  Procedures    Stress test      Echocardiogram  Results for orders placed in visit on 02/14/24    Adult Transthoracic Echo Complete W/ Cont if Necessary Per Protocol    Interpretation Summary    Left ventricular systolic function is moderately decreased. Calculated left ventricular EF = 33% Left ventricular ejection fraction appears to be 31 - 35%.    Left ventricular diastolic dysfunction is noted.    The left atrial cavity is dilated.    Estimated right ventricular systolic pressure from tricuspid regurgitation is normal (<35 mmHg).    Status post surgical mitral valve repair.  There is mild mitral regurgitation.  Mean gradient is 5.9 mmHg.    There is mild to moderate aortic valve  regurgitation.      Cardiac catheterization  Results for orders placed during the hospital encounter of 02/24/23    Cardiac Catheterization/Vascular Study    Conclusion  OPERATORS  Trace Marcus M.D. (Attending Cardiologist)      PROCEDURE PERFORMED  Ultrasound guided vascular access  Right heart catheterization  Coronary Angiogram  Left Heart Catheterization [id number]  Moderate Sedation 35 minutes    INDICATIONS FOR PROCEDURE  82-year-old woman with valvular heart disease including mitral and tricuspid regurgitation, pulmonary hypertension presented with heart failure exacerbation.  After IV diuresis, discussion of risk and benefit she was brought into the cardiac Cath Lab for right and left heart cath.    PROCEDURE IN DETAIL  Informed consent was obtained from the patient after explaining the risks, benefits, and alternative options of the procedure. After obtaining informed consent, the patient was brought to the cath lab and was prepped in a sterile fashion. Lidocaine 2% was used for local anesthesia into the right femoral venous access site. Right femoral vein was accessed using the micropuncture needle under ultrasound guidance and micropuncture wire advanced under flouroscopy. A 7 [place] vascular sheath was put into place percutaneously over guide-wire. Guide wires were removed. A 6Fr swan aneta catheter was advanced to wedge position. RA, RV and PA and wedge pressures were recorded.  PA sat and arterial sats recorded.  The patient tolerated the procedure well without any complications.    Lidocaine 2% was used for local anesthesia into the right femoral arterial access site. The right femoral artery was accessed with a micropuncture needle via modified Seldinger technique under ultrasound guidance. A 6F was inserted successfully.  Afterwards, 6F JR4 and JL4 diagnostic catheters were advanced over a wire into the ascending aorta and were used to engage the ostia of the left main and RCA respectively. JR4 used to  cross the AV and obtain LV pressures and gradient across the AV measured via pullback technique. Images of the right and left coronary systems were obtained. All the catheters were exchanged over a wire and subsequently removed. Angiogram of the femoral access site was obtained and did not show complications. The patient tolerated the procedure well without any complications. The pictures were reviewed at the end of the procedure. A Mynx closure device was applied.    HEMODYNAMICS    RHC  RA 5/3, 3 mmHg  RV 26/2, 4 mmHg  PA 27/8, 16 mmHg  PCW 7/7, 6 mmHg  AO Sat 96%  PA Sat 74%    Rob CO 4.73    Rob CI 3.42    LHC  LV: 108/5, 7 mmHg  AO: 116/46, 72 mmHg    No significant gradient across the aortic valve during pullback of JR4 catheter.  LV gram was not performed due to recent echocardiogram.    FINDINGS  Coronary Angiogram  Left dominant circulation  Left main: Left main is a large caliber vessel which gives rise to the Left Anterior Descending and the Left circumflex.  Left main is angiographically free from any significant disease  Left Anterior Descending Artery: LAD is a medium caliber vessel which gives rise to several septal perforators and several diagonal branches.  LAD has diffuse luminal irregularities with 20 to 30% lesion in the midsegment.  Left Circumflex: Left circumflex artery is a dominant vessel which gives rise to obtuse marginal.  Left circumflex has luminal irregularities only.  Right Coronary Artery: RCA is a small nondominant vessel.    ESTIMATED BLOOD LOSS:  10 ml    COMPLICATIONS:  None    PROCEDURE DATA:  Contrast Used: 25 cc  Sedation Time: 35 minutes    IMPRESSIONS  Non obstructive CAD  Normal filling pressure, no evidence of pulmonary hypertension.  Normal wedge pressure    RECOMMENDATIONS  -Switch IV to p.o. diuresis.  -Proceed with transesophageal echocardiogram to evaluate mitral and tricuspid regurgitation.  -Uptitrate GDMT for HFrEF          Assessment and Plan       Diagnoses and  all orders for this visit:    1. History of DVT (deep vein thrombosis) (Primary)    2. Chronic HFrEF (heart failure with reduced ejection fraction)    3. SSS (sick sinus syndrome)    4. S/P mitral valve repair    5. Essential hypertension with goal blood pressure less than 130/80    6. Paroxysmal atrial fibrillation    7. Mitral valve insufficiency, unspecified etiology    8. Hypothyroidism due to acquired atrophy of thyroid    9. Acute deep vein thrombosis (DVT) of lower extremity, unspecified laterality, unspecified vein    10. Nonrheumatic aortic valve insufficiency    11. Nonrheumatic tricuspid valve regurgitation    12. AV block, complete    13. Hyperlipidemia, unspecified hyperlipidemia type    14. Deep vein thrombosis (DVT) of proximal lower extremity, unspecified chronicity, unspecified laterality         Acute on chronic combined systolic and diastolic congestive heart failure with valvular heart disease    she received 1 dose of IV diuretics during recent hospital presentation and became euvolemic  She will continue to use diuretics as needed as she is very sensitive to Lasix.  Repeat echocardiogram shows improvement in LV function from 20% to 30 to 35%.  Unable to tolerate ACE inhibitor due to hoarseness of voice and cough.  Continue beta-blocker   Stop Aldactone due to worsening renal function  Unable to tolerate Jardiance or Farxiga due to dizziness.  Echocardiogram March 2024 showed EF of 33%/  Blood pressure has now recovered to normal.  Pacemaker was recently interrogated and working well  May need to be upgraded to ICD however benefit is limited in the setting of nonischemic cardiomyopathy     Supratherapeutic INR  Likely secondary to hepatic congestion  LFTs normal  Back on warfarin with goal INR of 2-3  INR today is 2.4    Chronic kidney disease  Creatinine 1.4, GFR is 39  Stop Aldactone  Recheck BMP and proBNP      Severe mitral regurgitation, S/P MVR (mitral valve repair)  Status post repair  with 25 mm physio 2 ring on 5/15/2023  Repeat echocardiogram shows minimal MR but moderate aortic and tricuspid regurgitation  Repeat echocardiogram shows only mild mitral regurgitation.  Patient is currently asymptomatic     Aortic insufficiency  Moderate AI per most recent echocardiogram.  Continue diuretics as needed.  Repeat echocardiogram shows mild to moderate mitral valve regurgitation.     Tricuspid regurgitation  Moderate tricuspid regurgitation per the last echocardiogram.  Continue diuretics as needed     Coronary artery disease involving native coronary artery of native heart without angina pectoris  Essential hypertension with goal blood pressure less than 130/80  Hypertriglyceridemia  Preop cardiac catheterization showed nonobstructive coronary artery disease.  Continue aspirin, fenofibrate, Toprol-XL and lisinopril.  , HDL 41, triglycerides 182, total cholesterol 234.  Goal LDL is less than 100.  Patient is intolerant to statins: Tried multiple statins of different intensity and dosages  Already on fenofibrate  We will start Repatha     Hypothyroidism due to acquired atrophy of thyroid  Continue Synthroid.  Most recent TSH is normal      Pacemaker, h/o SSS  Currently AV paced  Pacemaker was recently interrogated and working well.  0% atrial fibrillation   EF 33%  May need to be upgraded to ICD     History of DVT (deep vein thrombosis)  Continue warfarin  Goal INR is 2-3

## 2025-03-12 ENCOUNTER — CLINICAL SUPPORT NO REQUIREMENTS (OUTPATIENT)
Dept: CARDIOLOGY | Facility: CLINIC | Age: 85
End: 2025-03-12
Payer: MEDICARE

## 2025-03-12 ENCOUNTER — ANTICOAGULATION VISIT (OUTPATIENT)
Dept: CARDIOLOGY | Facility: CLINIC | Age: 85
End: 2025-03-12
Payer: MEDICARE

## 2025-03-12 ENCOUNTER — OFFICE VISIT (OUTPATIENT)
Dept: CARDIOLOGY | Facility: CLINIC | Age: 85
End: 2025-03-12
Payer: MEDICARE

## 2025-03-12 VITALS
DIASTOLIC BLOOD PRESSURE: 57 MMHG | BODY MASS INDEX: 23.59 KG/M2 | OXYGEN SATURATION: 97 % | HEIGHT: 59 IN | WEIGHT: 117 LBS | SYSTOLIC BLOOD PRESSURE: 126 MMHG | HEART RATE: 84 BPM

## 2025-03-12 VITALS
BODY MASS INDEX: 23.63 KG/M2 | WEIGHT: 117 LBS | SYSTOLIC BLOOD PRESSURE: 126 MMHG | HEART RATE: 84 BPM | DIASTOLIC BLOOD PRESSURE: 57 MMHG

## 2025-03-12 DIAGNOSIS — I36.1 NONRHEUMATIC TRICUSPID VALVE REGURGITATION: ICD-10-CM

## 2025-03-12 DIAGNOSIS — E03.4 HYPOTHYROIDISM DUE TO ACQUIRED ATROPHY OF THYROID: ICD-10-CM

## 2025-03-12 DIAGNOSIS — E78.5 HYPERLIPIDEMIA, UNSPECIFIED HYPERLIPIDEMIA TYPE: ICD-10-CM

## 2025-03-12 DIAGNOSIS — I82.4Y9 DEEP VEIN THROMBOSIS (DVT) OF PROXIMAL LOWER EXTREMITY, UNSPECIFIED CHRONICITY, UNSPECIFIED LATERALITY: ICD-10-CM

## 2025-03-12 DIAGNOSIS — N18.31 STAGE 3A CHRONIC KIDNEY DISEASE: ICD-10-CM

## 2025-03-12 DIAGNOSIS — Z79.01 LONG TERM (CURRENT) USE OF ANTICOAGULANTS: Chronic | ICD-10-CM

## 2025-03-12 DIAGNOSIS — I44.2 AV BLOCK, COMPLETE: ICD-10-CM

## 2025-03-12 DIAGNOSIS — I10 ESSENTIAL (PRIMARY) HYPERTENSION: ICD-10-CM

## 2025-03-12 DIAGNOSIS — Z95.0 PACEMAKER: Chronic | ICD-10-CM

## 2025-03-12 DIAGNOSIS — Z98.890 S/P MITRAL VALVE REPAIR: ICD-10-CM

## 2025-03-12 DIAGNOSIS — I48.0 PAROXYSMAL ATRIAL FIBRILLATION: ICD-10-CM

## 2025-03-12 DIAGNOSIS — Z86.718 HISTORY OF DVT (DEEP VEIN THROMBOSIS): Primary | ICD-10-CM

## 2025-03-12 DIAGNOSIS — I49.5 SSS (SICK SINUS SYNDROME): ICD-10-CM

## 2025-03-12 DIAGNOSIS — I49.5 SSS (SICK SINUS SYNDROME): Primary | ICD-10-CM

## 2025-03-12 DIAGNOSIS — I35.1 NONRHEUMATIC AORTIC VALVE INSUFFICIENCY: ICD-10-CM

## 2025-03-12 DIAGNOSIS — I50.22 CHRONIC HFREF (HEART FAILURE WITH REDUCED EJECTION FRACTION): ICD-10-CM

## 2025-03-12 DIAGNOSIS — I34.0 MITRAL VALVE INSUFFICIENCY, UNSPECIFIED ETIOLOGY: ICD-10-CM

## 2025-03-12 DIAGNOSIS — I82.409 ACUTE DEEP VEIN THROMBOSIS (DVT) OF LOWER EXTREMITY, UNSPECIFIED LATERALITY, UNSPECIFIED VEIN: ICD-10-CM

## 2025-03-12 DIAGNOSIS — I10 ESSENTIAL HYPERTENSION WITH GOAL BLOOD PRESSURE LESS THAN 130/80: ICD-10-CM

## 2025-03-12 LAB — INR PPP: 2 (ref 0.9–1.1)

## 2025-03-12 PROCEDURE — 85610 PROTHROMBIN TIME: CPT | Performed by: INTERNAL MEDICINE

## 2025-03-12 PROCEDURE — 36416 COLLJ CAPILLARY BLOOD SPEC: CPT | Performed by: INTERNAL MEDICINE

## 2025-03-12 RX ORDER — TIMOLOL MALEATE 5 MG/ML
1 SOLUTION/ DROPS OPHTHALMIC 2 TIMES DAILY
COMMUNITY

## 2025-03-12 RX ORDER — METOPROLOL SUCCINATE 25 MG/1
25 TABLET, EXTENDED RELEASE ORAL DAILY
Qty: 90 TABLET | Refills: 3 | Status: SHIPPED | OUTPATIENT
Start: 2025-03-12

## 2025-03-12 RX ORDER — SPIRONOLACTONE 25 MG/1
25 TABLET ORAL DAILY
Qty: 90 TABLET | Refills: 3 | Status: CANCELLED | OUTPATIENT
Start: 2025-03-12

## 2025-03-12 RX ORDER — ASPIRIN 81 MG/1
81 TABLET ORAL NIGHTLY
Qty: 90 TABLET | Refills: 3 | Status: SHIPPED | OUTPATIENT
Start: 2025-03-12

## 2025-03-12 RX ORDER — FUROSEMIDE 40 MG/1
40 TABLET ORAL AS NEEDED
Qty: 90 TABLET | Refills: 3 | Status: SHIPPED | OUTPATIENT
Start: 2025-03-12

## 2025-03-18 ENCOUNTER — TELEPHONE (OUTPATIENT)
Dept: CARDIOLOGY | Facility: CLINIC | Age: 85
End: 2025-03-18
Payer: MEDICARE

## 2025-03-18 NOTE — TELEPHONE ENCOUNTER
Patient left message. New medication is too expensive.$350 for 30 day supply.   Dr. Marcus advised her to call in if she could not afford medication.

## 2025-03-19 NOTE — TELEPHONE ENCOUNTER
Is the medication that is too expensive, Repatha?.  If so, see if a PA is available to complete.  If not we can try switching to Praluent to see if more affordable.  If neither is a great option price wise then she can discuss Leo with Annabelle or Dr. Marcus.

## 2025-03-21 ENCOUNTER — LAB (OUTPATIENT)
Dept: LAB | Facility: HOSPITAL | Age: 85
End: 2025-03-21
Payer: MEDICARE

## 2025-03-21 DIAGNOSIS — I50.22 CHRONIC HFREF (HEART FAILURE WITH REDUCED EJECTION FRACTION): ICD-10-CM

## 2025-03-21 DIAGNOSIS — I10 ESSENTIAL (PRIMARY) HYPERTENSION: ICD-10-CM

## 2025-03-21 DIAGNOSIS — N18.31 STAGE 3A CHRONIC KIDNEY DISEASE: ICD-10-CM

## 2025-03-21 LAB
ANION GAP SERPL CALCULATED.3IONS-SCNC: 10.8 MMOL/L (ref 5–15)
BUN SERPL-MCNC: 35 MG/DL (ref 8–23)
BUN/CREAT SERPL: 30.4 (ref 7–25)
CALCIUM SPEC-SCNC: 9.4 MG/DL (ref 8.6–10.5)
CHLORIDE SERPL-SCNC: 98 MMOL/L (ref 98–107)
CO2 SERPL-SCNC: 30.2 MMOL/L (ref 22–29)
CREAT SERPL-MCNC: 1.15 MG/DL (ref 0.57–1)
EGFRCR SERPLBLD CKD-EPI 2021: 47.4 ML/MIN/1.73
GLUCOSE SERPL-MCNC: 222 MG/DL (ref 65–99)
NT-PROBNP SERPL-MCNC: 1116 PG/ML (ref 0–1800)
POTASSIUM SERPL-SCNC: 3.3 MMOL/L (ref 3.5–5.2)
SODIUM SERPL-SCNC: 139 MMOL/L (ref 136–145)

## 2025-03-21 PROCEDURE — 80048 BASIC METABOLIC PNL TOTAL CA: CPT

## 2025-03-21 PROCEDURE — 36415 COLL VENOUS BLD VENIPUNCTURE: CPT

## 2025-03-21 PROCEDURE — 83880 ASSAY OF NATRIURETIC PEPTIDE: CPT

## 2025-03-21 RX ORDER — ALIROCUMAB 75 MG/ML
75 INJECTION, SOLUTION SUBCUTANEOUS
Qty: 2.24 ML | Refills: 11 | Status: SHIPPED | OUTPATIENT
Start: 2025-03-21

## 2025-03-25 ENCOUNTER — TELEPHONE (OUTPATIENT)
Dept: CARDIOLOGY | Facility: CLINIC | Age: 85
End: 2025-03-25
Payer: MEDICARE

## 2025-03-25 DIAGNOSIS — I50.22 CHRONIC HFREF (HEART FAILURE WITH REDUCED EJECTION FRACTION): ICD-10-CM

## 2025-03-25 RX ORDER — POTASSIUM CHLORIDE 750 MG/1
10 TABLET, EXTENDED RELEASE ORAL DAILY
Qty: 90 TABLET | Refills: 3 | Status: SHIPPED | OUTPATIENT
Start: 2025-03-25

## 2025-03-25 RX ORDER — FUROSEMIDE 40 MG/1
40 TABLET ORAL DAILY
Start: 2025-03-25

## 2025-03-25 NOTE — TELEPHONE ENCOUNTER
I called and spoke to the patient. We discussed her recent lab results. Her potassium is low at 3.3. She states she stopped taking spironolactone recently, and has been taking her Lasix daily. I will start her on a potassium supplementation daily. New prescription sent to her pharmacy. She verbalized understanding and is agreeable.     The patient also reports that she cannot afford Repatha or Praluent. They are going to cost her over $300 and $600 per month, respectively. We discussed starting her on Leqvio if affordable. She is agreeable.

## 2025-04-03 ENCOUNTER — TELEPHONE (OUTPATIENT)
Dept: PHARMACY | Facility: HOSPITAL | Age: 85
End: 2025-04-03
Payer: MEDICARE

## 2025-04-03 DIAGNOSIS — E78.2 MIXED HYPERLIPIDEMIA: Primary | ICD-10-CM

## 2025-04-03 RX ORDER — SODIUM CHLORIDE 9 MG/ML
20 INJECTION, SOLUTION INTRAVENOUS ONCE
OUTPATIENT
Start: 2025-04-03

## 2025-04-03 RX ORDER — DIPHENHYDRAMINE HYDROCHLORIDE 50 MG/ML
50 INJECTION, SOLUTION INTRAMUSCULAR; INTRAVENOUS AS NEEDED
OUTPATIENT
Start: 2025-04-03

## 2025-04-03 RX ORDER — MEPERIDINE HYDROCHLORIDE 25 MG/ML
25 INJECTION INTRAMUSCULAR; INTRAVENOUS; SUBCUTANEOUS AS NEEDED
OUTPATIENT
Start: 2025-04-03

## 2025-04-03 RX ORDER — FAMOTIDINE 10 MG/ML
20 INJECTION, SOLUTION INTRAVENOUS AS NEEDED
OUTPATIENT
Start: 2025-04-03

## 2025-04-03 RX ORDER — HYDROCORTISONE SODIUM SUCCINATE 100 MG/2ML
100 INJECTION INTRAMUSCULAR; INTRAVENOUS AS NEEDED
OUTPATIENT
Start: 2025-04-03

## 2025-04-03 NOTE — TELEPHONE ENCOUNTER
Medication Management Clinic: [facility]  Clinical Outreach      Macrina Orlando is a 79 y.o. female and is a patient of [facility]. Medication Management Clinic is assisting with coordination of Leqvio injection for hypercholesterolemia.     Leqvio benefits investigation completed by Leqvio service center. Patient is covered for Leqvio therapy under her insurance with no cost responsibility to patient. Order placed for ambulatory referral to outpatient infusion center and Leqvio therapy plan entered. Clinic will now coordinate with scheduling department to have patient scheduled for first injection. Called patient to let her know of coverage approval for Leqvio and that scheduling would be reaching out to her soon.      Christie Carvalho, PharmD  [facility] Clinical Pharmacist  2/14/2025  11:51 EST

## 2025-04-11 ENCOUNTER — HOSPITAL ENCOUNTER (OUTPATIENT)
Dept: ONCOLOGY | Facility: HOSPITAL | Age: 85
Discharge: HOME | End: 2025-04-11
Payer: MEDICARE

## 2025-04-11 DIAGNOSIS — E78.2 MIXED HYPERLIPIDEMIA: Primary | ICD-10-CM

## 2025-04-11 PROCEDURE — 96372 THER/PROPH/DIAG INJ SC/IM: CPT

## 2025-04-11 RX ORDER — SODIUM CHLORIDE 9 MG/ML
20 INJECTION, SOLUTION INTRAVENOUS ONCE
OUTPATIENT
Start: 2025-07-10

## 2025-04-11 RX ORDER — DIPHENHYDRAMINE HYDROCHLORIDE 50 MG/ML
50 INJECTION, SOLUTION INTRAMUSCULAR; INTRAVENOUS AS NEEDED
OUTPATIENT
Start: 2025-07-10

## 2025-04-11 RX ORDER — HYDROCORTISONE SODIUM SUCCINATE 100 MG/2ML
100 INJECTION INTRAMUSCULAR; INTRAVENOUS AS NEEDED
OUTPATIENT
Start: 2025-07-10

## 2025-04-11 RX ORDER — MEPERIDINE HYDROCHLORIDE 25 MG/ML
25 INJECTION INTRAMUSCULAR; INTRAVENOUS; SUBCUTANEOUS AS NEEDED
OUTPATIENT
Start: 2025-07-10

## 2025-04-11 RX ORDER — FAMOTIDINE 10 MG/ML
20 INJECTION, SOLUTION INTRAVENOUS AS NEEDED
OUTPATIENT
Start: 2025-07-10

## 2025-04-11 NOTE — PROGRESS NOTES
Leqvio given per orders. Tolerated without difficulty. Observed for approx 20 min after injection with no c/o's. Lipid panel was drawn 2/10/25

## 2025-04-17 ENCOUNTER — ANTICOAGULATION VISIT (OUTPATIENT)
Dept: CARDIOLOGY | Facility: CLINIC | Age: 85
End: 2025-04-17
Payer: MEDICARE

## 2025-04-17 VITALS
DIASTOLIC BLOOD PRESSURE: 61 MMHG | HEART RATE: 82 BPM | BODY MASS INDEX: 23.63 KG/M2 | WEIGHT: 117 LBS | SYSTOLIC BLOOD PRESSURE: 135 MMHG

## 2025-04-17 DIAGNOSIS — Z79.01 LONG TERM (CURRENT) USE OF ANTICOAGULANTS: Chronic | ICD-10-CM

## 2025-04-17 DIAGNOSIS — Z86.718 HISTORY OF DVT (DEEP VEIN THROMBOSIS): Primary | ICD-10-CM

## 2025-04-17 LAB — INR PPP: 2.3 (ref 0.9–1.1)

## 2025-04-17 PROCEDURE — 85610 PROTHROMBIN TIME: CPT | Performed by: INTERNAL MEDICINE

## 2025-04-17 PROCEDURE — 36416 COLLJ CAPILLARY BLOOD SPEC: CPT | Performed by: INTERNAL MEDICINE

## 2025-05-13 ENCOUNTER — ANTICOAGULATION VISIT (OUTPATIENT)
Dept: CARDIOLOGY | Facility: CLINIC | Age: 85
End: 2025-05-13
Payer: MEDICARE

## 2025-05-13 VITALS
DIASTOLIC BLOOD PRESSURE: 48 MMHG | WEIGHT: 117 LBS | SYSTOLIC BLOOD PRESSURE: 116 MMHG | HEART RATE: 82 BPM | OXYGEN SATURATION: 97 % | BODY MASS INDEX: 23.63 KG/M2

## 2025-05-13 DIAGNOSIS — Z79.01 LONG TERM (CURRENT) USE OF ANTICOAGULANTS: Chronic | ICD-10-CM

## 2025-05-13 DIAGNOSIS — Z86.718 HISTORY OF DVT (DEEP VEIN THROMBOSIS): Primary | ICD-10-CM

## 2025-05-13 LAB — INR PPP: 1.5 (ref 0.9–1.1)

## 2025-05-13 PROCEDURE — 85610 PROTHROMBIN TIME: CPT | Performed by: INTERNAL MEDICINE

## 2025-05-13 PROCEDURE — 36416 COLLJ CAPILLARY BLOOD SPEC: CPT | Performed by: INTERNAL MEDICINE

## 2025-05-13 NOTE — PROGRESS NOTES
Patient's INR- 1.5, below therapeutic range. This Sn instructed patient to take 5mg this evening and recheck in 1 month. dvLPN

## 2025-06-13 ENCOUNTER — ANTICOAGULATION VISIT (OUTPATIENT)
Dept: CARDIOLOGY | Facility: CLINIC | Age: 85
End: 2025-06-13
Payer: MEDICARE

## 2025-06-13 VITALS
SYSTOLIC BLOOD PRESSURE: 136 MMHG | WEIGHT: 115 LBS | DIASTOLIC BLOOD PRESSURE: 48 MMHG | BODY MASS INDEX: 23.23 KG/M2 | HEART RATE: 84 BPM

## 2025-06-13 DIAGNOSIS — Z86.718 HISTORY OF DVT (DEEP VEIN THROMBOSIS): Primary | ICD-10-CM

## 2025-06-13 DIAGNOSIS — Z79.01 LONG TERM (CURRENT) USE OF ANTICOAGULANTS: Chronic | ICD-10-CM

## 2025-06-13 LAB — INR PPP: 2.1 (ref 0.9–1.1)

## 2025-06-13 PROCEDURE — 36416 COLLJ CAPILLARY BLOOD SPEC: CPT | Performed by: INTERNAL MEDICINE

## 2025-06-13 PROCEDURE — 85610 PROTHROMBIN TIME: CPT | Performed by: INTERNAL MEDICINE

## 2025-06-13 NOTE — PROGRESS NOTES
Patient's INR- 2.1, within therapeutic range. Continue current dosage and recheck in 1 month. [email or web address]

## 2025-06-18 ENCOUNTER — TELEPHONE (OUTPATIENT)
Dept: PHARMACY | Facility: HOSPITAL | Age: 85
End: 2025-06-18
Payer: MEDICARE

## 2025-06-18 NOTE — TELEPHONE ENCOUNTER
Medication Management Clinic: [facility]  Clinical Outreach     Thi Allen is a 85 y.o. female and is a patient of the Medication Management Clinic at [facility] for Lipid Management for Leqvio.     Reached out to patient today to remind patient to have follow up lipid panel before next Leqvio injection on 7/14.  Instructed patient that she can go to [facility] and orders are in EPIC.      Samm Pantoja PharmD  [facility] Clinical Pharmacist  6/18/2025  10:02 EDT

## 2025-06-24 ENCOUNTER — LAB (OUTPATIENT)
Dept: LAB | Facility: HOSPITAL | Age: 85
End: 2025-06-24
Payer: MEDICARE

## 2025-06-24 DIAGNOSIS — E78.2 MIXED HYPERLIPIDEMIA: ICD-10-CM

## 2025-06-24 LAB
CHOLEST SERPL-MCNC: 84 MG/DL (ref 0–200)
HDLC SERPL-MCNC: 27 MG/DL (ref 40–60)
LDLC SERPL CALC-MCNC: 26 MG/DL (ref 0–100)
LDLC/HDLC SERPL: 0.65 {RATIO}
TRIGL SERPL-MCNC: 197 MG/DL (ref 0–150)
VLDLC SERPL-MCNC: 31 MG/DL (ref 5–40)

## 2025-06-24 PROCEDURE — 80061 LIPID PANEL: CPT

## 2025-06-24 PROCEDURE — 36415 COLL VENOUS BLD VENIPUNCTURE: CPT

## 2025-07-14 ENCOUNTER — HOSPITAL ENCOUNTER (OUTPATIENT)
Dept: ONCOLOGY | Facility: HOSPITAL | Age: 85
Discharge: HOME | End: 2025-07-14
Admitting: INTERNAL MEDICINE
Payer: MEDICARE

## 2025-07-14 ENCOUNTER — ANTICOAGULATION VISIT (OUTPATIENT)
Dept: CARDIOLOGY | Facility: CLINIC | Age: 85
End: 2025-07-14
Payer: MEDICARE

## 2025-07-14 VITALS
HEART RATE: 82 BPM | SYSTOLIC BLOOD PRESSURE: 109 MMHG | BODY MASS INDEX: 23.03 KG/M2 | WEIGHT: 114 LBS | DIASTOLIC BLOOD PRESSURE: 69 MMHG

## 2025-07-14 DIAGNOSIS — Z79.01 LONG TERM (CURRENT) USE OF ANTICOAGULANTS: Chronic | ICD-10-CM

## 2025-07-14 DIAGNOSIS — Z86.718 HISTORY OF DVT (DEEP VEIN THROMBOSIS): Primary | ICD-10-CM

## 2025-07-14 DIAGNOSIS — E78.2 MIXED HYPERLIPIDEMIA: Primary | ICD-10-CM

## 2025-07-14 LAB — INR PPP: 2.7 (ref 0.9–1.1)

## 2025-07-14 PROCEDURE — 85610 PROTHROMBIN TIME: CPT | Performed by: INTERNAL MEDICINE

## 2025-07-14 PROCEDURE — 36416 COLLJ CAPILLARY BLOOD SPEC: CPT | Performed by: INTERNAL MEDICINE

## 2025-07-14 PROCEDURE — 96372 THER/PROPH/DIAG INJ SC/IM: CPT

## 2025-07-14 PROCEDURE — 25010000002 INCLISIRAN SODIUM 284 MG/1.5ML SOLUTION PREFILLED SYRINGE: Performed by: INTERNAL MEDICINE

## 2025-07-14 RX ORDER — HYDROCORTISONE SODIUM SUCCINATE 100 MG/2ML
100 INJECTION INTRAMUSCULAR; INTRAVENOUS AS NEEDED
OUTPATIENT
Start: 2025-10-08

## 2025-07-14 RX ORDER — MEPERIDINE HYDROCHLORIDE 25 MG/ML
25 INJECTION INTRAMUSCULAR; INTRAVENOUS; SUBCUTANEOUS AS NEEDED
OUTPATIENT
Start: 2025-10-08

## 2025-07-14 RX ORDER — DIPHENHYDRAMINE HYDROCHLORIDE 50 MG/ML
50 INJECTION, SOLUTION INTRAMUSCULAR; INTRAVENOUS AS NEEDED
OUTPATIENT
Start: 2025-10-08

## 2025-07-14 RX ORDER — SODIUM CHLORIDE 9 MG/ML
20 INJECTION, SOLUTION INTRAVENOUS ONCE
OUTPATIENT
Start: 2025-10-08

## 2025-07-14 RX ORDER — FAMOTIDINE 10 MG/ML
20 INJECTION, SOLUTION INTRAVENOUS AS NEEDED
OUTPATIENT
Start: 2025-10-08

## 2025-07-14 RX ADMIN — INCLISIRAN 284 MG: 284 INJECTION, SOLUTION SUBCUTANEOUS at 13:39

## 2025-07-14 NOTE — PROGRESS NOTES
Leqvio #2 given per orders. Lipid panel done 6/24/25. Observed for approx 10 min after injection with no c/o's.

## 2025-07-18 ENCOUNTER — APPOINTMENT (OUTPATIENT)
Dept: CARDIOLOGY | Facility: HOSPITAL | Age: 85
End: 2025-07-18
Payer: MEDICARE

## 2025-07-18 ENCOUNTER — APPOINTMENT (OUTPATIENT)
Dept: GENERAL RADIOLOGY | Facility: HOSPITAL | Age: 85
End: 2025-07-18
Payer: MEDICARE

## 2025-07-18 ENCOUNTER — HOSPITAL ENCOUNTER (EMERGENCY)
Facility: HOSPITAL | Age: 85
Discharge: HOME | End: 2025-07-18
Payer: MEDICARE

## 2025-07-18 VITALS
BODY MASS INDEX: 22.58 KG/M2 | DIASTOLIC BLOOD PRESSURE: 51 MMHG | HEART RATE: 80 BPM | RESPIRATION RATE: 18 BRPM | OXYGEN SATURATION: 98 % | HEIGHT: 59 IN | WEIGHT: 111.99 LBS | SYSTOLIC BLOOD PRESSURE: 110 MMHG | TEMPERATURE: 98 F

## 2025-07-18 DIAGNOSIS — I82.512 CHRONIC DEEP VEIN THROMBOSIS (DVT) OF FEMORAL VEIN OF LEFT LOWER EXTREMITY: ICD-10-CM

## 2025-07-18 DIAGNOSIS — N18.9 CHRONIC KIDNEY DISEASE, UNSPECIFIED CKD STAGE: ICD-10-CM

## 2025-07-18 DIAGNOSIS — Z79.01 CURRENT USE OF LONG TERM ANTICOAGULATION: ICD-10-CM

## 2025-07-18 DIAGNOSIS — M79.662 PAIN OF LEFT CALF: Primary | ICD-10-CM

## 2025-07-18 LAB
ALBUMIN SERPL-MCNC: 4.7 G/DL (ref 3.5–5.2)
ALBUMIN/GLOB SERPL: 1.7 G/DL
ALP SERPL-CCNC: 53 U/L (ref 39–117)
ALT SERPL W P-5'-P-CCNC: 15 U/L (ref 1–33)
ANION GAP SERPL CALCULATED.3IONS-SCNC: 13.1 MMOL/L (ref 5–15)
AST SERPL-CCNC: 28 U/L (ref 1–32)
BASOPHILS # BLD AUTO: 0.04 10*3/MM3 (ref 0–0.2)
BASOPHILS NFR BLD AUTO: 0.5 % (ref 0–1.5)
BH CV LOW VAS LEFT PROXIMAL FEMORAL SPONT: 1
BH CV LOWER VASCULAR LEFT COMMON FEMORAL AUGMENT: NORMAL
BH CV LOWER VASCULAR LEFT COMMON FEMORAL COMPETENT: NORMAL
BH CV LOWER VASCULAR LEFT COMMON FEMORAL COMPRESS: NORMAL
BH CV LOWER VASCULAR LEFT COMMON FEMORAL PHASIC: NORMAL
BH CV LOWER VASCULAR LEFT COMMON FEMORAL SPONT: NORMAL
BH CV LOWER VASCULAR LEFT DISTAL FEMORAL COMPRESS: NORMAL
BH CV LOWER VASCULAR LEFT GASTRONEMIUS COMPRESS: NORMAL
BH CV LOWER VASCULAR LEFT GREATER SAPH AK COMPRESS: NORMAL
BH CV LOWER VASCULAR LEFT GREATER SAPH BK COMPRESS: NORMAL
BH CV LOWER VASCULAR LEFT LESSER SAPH COMPRESS: NORMAL
BH CV LOWER VASCULAR LEFT MID FEMORAL AUGMENT: NORMAL
BH CV LOWER VASCULAR LEFT MID FEMORAL COMPETENT: NORMAL
BH CV LOWER VASCULAR LEFT MID FEMORAL COMPRESS: NORMAL
BH CV LOWER VASCULAR LEFT MID FEMORAL PHASIC: NORMAL
BH CV LOWER VASCULAR LEFT MID FEMORAL SPONT: NORMAL
BH CV LOWER VASCULAR LEFT PERONEAL COMPRESS: NORMAL
BH CV LOWER VASCULAR LEFT POPLITEAL AUGMENT: NORMAL
BH CV LOWER VASCULAR LEFT POPLITEAL COMPETENT: NORMAL
BH CV LOWER VASCULAR LEFT POPLITEAL COMPRESS: NORMAL
BH CV LOWER VASCULAR LEFT POPLITEAL PHASIC: NORMAL
BH CV LOWER VASCULAR LEFT POPLITEAL SPONT: NORMAL
BH CV LOWER VASCULAR LEFT POSTERIOR TIBIAL COMPRESS: NORMAL
BH CV LOWER VASCULAR LEFT PROXIMAL FEMORAL AUGMENT: NORMAL
BH CV LOWER VASCULAR LEFT PROXIMAL FEMORAL COMPETENT: NORMAL
BH CV LOWER VASCULAR LEFT PROXIMAL FEMORAL COMPRESS: NORMAL
BH CV LOWER VASCULAR LEFT PROXIMAL FEMORAL PHASIC: NORMAL
BH CV LOWER VASCULAR LEFT PROXIMAL FEMORAL SPONT: NORMAL
BH CV LOWER VASCULAR LEFT PROXIMAL FEMORAL THROMBUS: NORMAL
BH CV LOWER VASCULAR LEFT SAPHENOFEMORAL JUNCTION COMPRESS: NORMAL
BH CV LOWER VASCULAR RIGHT COMMON FEMORAL AUGMENT: NORMAL
BH CV LOWER VASCULAR RIGHT COMMON FEMORAL COMPETENT: NORMAL
BH CV LOWER VASCULAR RIGHT COMMON FEMORAL COMPRESS: NORMAL
BH CV LOWER VASCULAR RIGHT COMMON FEMORAL PHASIC: NORMAL
BH CV LOWER VASCULAR RIGHT COMMON FEMORAL SPONT: NORMAL
BILIRUB SERPL-MCNC: 0.7 MG/DL (ref 0–1.2)
BUN SERPL-MCNC: 28.5 MG/DL (ref 8–23)
BUN/CREAT SERPL: 20.1 (ref 7–25)
CALCIUM SPEC-SCNC: 10.7 MG/DL (ref 8.6–10.5)
CHLORIDE SERPL-SCNC: 98 MMOL/L (ref 98–107)
CO2 SERPL-SCNC: 28.9 MMOL/L (ref 22–29)
CREAT SERPL-MCNC: 1.42 MG/DL (ref 0.57–1)
DEPRECATED RDW RBC AUTO: 49.7 FL (ref 37–54)
EGFRCR SERPLBLD CKD-EPI 2021: 36.8 ML/MIN/1.73
EOSINOPHIL # BLD AUTO: 0.14 10*3/MM3 (ref 0–0.4)
EOSINOPHIL NFR BLD AUTO: 1.9 % (ref 0.3–6.2)
ERYTHROCYTE [DISTWIDTH] IN BLOOD BY AUTOMATED COUNT: 14.3 % (ref 12.3–15.4)
GEN 5 1HR TROPONIN T REFLEX: 21 NG/L
GLOBULIN UR ELPH-MCNC: 2.7 GM/DL
GLUCOSE SERPL-MCNC: 161 MG/DL (ref 65–99)
HCT VFR BLD AUTO: 38.7 % (ref 34–46.6)
HGB BLD-MCNC: 12.2 G/DL (ref 12–15.9)
HOLD SPECIMEN: NORMAL
HOLD SPECIMEN: NORMAL
IMM GRANULOCYTES # BLD AUTO: 0.02 10*3/MM3 (ref 0–0.05)
IMM GRANULOCYTES NFR BLD AUTO: 0.3 % (ref 0–0.5)
INR PPP: 2.39 (ref 2–3)
LYMPHOCYTES # BLD AUTO: 1.63 10*3/MM3 (ref 0.7–3.1)
LYMPHOCYTES NFR BLD AUTO: 22.4 % (ref 19.6–45.3)
MCH RBC QN AUTO: 29.9 PG (ref 26.6–33)
MCHC RBC AUTO-ENTMCNC: 31.5 G/DL (ref 31.5–35.7)
MCV RBC AUTO: 94.9 FL (ref 79–97)
MONOCYTES # BLD AUTO: 0.55 10*3/MM3 (ref 0.1–0.9)
MONOCYTES NFR BLD AUTO: 7.6 % (ref 5–12)
NEUTROPHILS NFR BLD AUTO: 4.9 10*3/MM3 (ref 1.7–7)
NEUTROPHILS NFR BLD AUTO: 67.3 % (ref 42.7–76)
NRBC BLD AUTO-RTO: 0 /100 WBC (ref 0–0.2)
PLATELET # BLD AUTO: 280 10*3/MM3 (ref 140–450)
PMV BLD AUTO: 10.8 FL (ref 6–12)
POTASSIUM SERPL-SCNC: 4.7 MMOL/L (ref 3.5–5.2)
PROT SERPL-MCNC: 7.4 G/DL (ref 6–8.5)
PROTHROMBIN TIME: 26.3 SECONDS (ref 19.4–28.5)
RBC # BLD AUTO: 4.08 10*6/MM3 (ref 3.77–5.28)
SODIUM SERPL-SCNC: 140 MMOL/L (ref 136–145)
TROPONIN T % DELTA: -19
TROPONIN T NUMERIC DELTA: -5 NG/L
TROPONIN T SERPL HS-MCNC: 26 NG/L
WBC NRBC COR # BLD AUTO: 7.28 10*3/MM3 (ref 3.4–10.8)
WHOLE BLOOD HOLD COAG: NORMAL
WHOLE BLOOD HOLD SPECIMEN: NORMAL

## 2025-07-18 PROCEDURE — 84484 ASSAY OF TROPONIN QUANT: CPT | Performed by: NURSE PRACTITIONER

## 2025-07-18 PROCEDURE — 93971 EXTREMITY STUDY: CPT | Performed by: SURGERY

## 2025-07-18 PROCEDURE — 93971 EXTREMITY STUDY: CPT

## 2025-07-18 PROCEDURE — 80053 COMPREHEN METABOLIC PANEL: CPT | Performed by: NURSE PRACTITIONER

## 2025-07-18 PROCEDURE — 36415 COLL VENOUS BLD VENIPUNCTURE: CPT

## 2025-07-18 PROCEDURE — 99284 EMERGENCY DEPT VISIT MOD MDM: CPT

## 2025-07-18 PROCEDURE — 71045 X-RAY EXAM CHEST 1 VIEW: CPT

## 2025-07-18 PROCEDURE — 85025 COMPLETE CBC W/AUTO DIFF WBC: CPT | Performed by: NURSE PRACTITIONER

## 2025-07-18 PROCEDURE — 85610 PROTHROMBIN TIME: CPT | Performed by: NURSE PRACTITIONER

## 2025-07-18 RX ORDER — SODIUM CHLORIDE 0.9 % (FLUSH) 0.9 %
10 SYRINGE (ML) INJECTION AS NEEDED
Status: DISCONTINUED | OUTPATIENT
Start: 2025-07-18 | End: 2025-07-18 | Stop reason: HOSPADM

## 2025-07-18 NOTE — ED PROVIDER NOTES
Subjective   History of Present Illness  85-year-old female with history significant for aortic insufficiency, AV block, carotid stenosis, heart failure, mitral valve regurgitation, pacemaker, tricuspid regurgitation DVT, hyperlipidemia, hypertension, hypothyroidism presents with a 2-day history of left calf pain.  She has history of DVT and is on Coumadin.  She is concerned she has a new blood clot.  She denies associated chest pain dyspnea.  Her cardiologist is Dr. Marcus.    History provided by:  Patient      Review of Systems   Constitutional:  Negative for activity change and fatigue.   Respiratory:  Negative for cough, chest tightness, shortness of breath and wheezing.    Cardiovascular:  Positive for leg swelling. Negative for chest pain and palpitations.   Gastrointestinal:  Negative for abdominal pain, nausea and vomiting.   Skin:  Negative for color change, pallor and rash.   Neurological:  Negative for dizziness, syncope, weakness and numbness.   Hematological:  Bruises/bleeds easily (On Coumadin).   All other systems reviewed and are negative.      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) heart failure     DVT (deep venous thrombosis) (CMS/[facility]) [I82.409]     recurrent    Hordeolum externum 08/05/2015    Hyperlipidemia     Hypertension     Hypothyroidism due to acquired atrophy of thyroid 10/21/2019    Long term (current) use of anticoagulants 09/29/2022    Mitral regurgitation 05/17/2016    Osteopenia     Pacemaker 12/28/2021    S/P MVR (mitral valve repair) 05/15/2023    Tricuspid regurgitation 02/24/2023       Allergies   Allergen Reactions    Atorvastatin Myalgia    Colesevelam Hcl Myalgia     Made legs weak     Colestipol Hcl  "Other (See Comments)     MADE PATIENT FEEL BAD    Ezetimibe Hallucinations     Loss of energy - worn out - legs weak - feel like they way a \"ton \"     Pitavastatin Other (See Comments)     Elevated liver function tests     Rosuvastatin Calcium Myalgia     MADE LEGS WEAK    Simvastatin Myalgia    Statins Myalgia     Made legs weak       Jardiance [Empagliflozin] Dizziness    Keflex [Cephalexin] Hives       Past Surgical History:   Procedure Laterality Date    CARDIAC CATHETERIZATION N/A 2/25/2023    Procedure: Left Heart Cath and coronary angiogram;  Surgeon: Trace Marcus MD;  Location: [facility] CATH INVASIVE LOCATION;  Service: Cardiovascular;  Laterality: N/A;    CARDIAC CATHETERIZATION Bilateral 2/25/2023    Procedure: Right and Left Heart Cath;  Surgeon: Trace Marcus MD;  Location: [facility] CATH INVASIVE LOCATION;  Service: Cardiovascular;  Laterality: Bilateral;    HYSTERECTOMY      MITRAL VALVE REPAIR/REPLACEMENT N/A 5/15/2023    Procedure: MITRAL VALVE REPAIR/REPLACEMENT;  Surgeon: Veronica Orozco MD;  Location: [facility];  Service: Cardiothoracic;  Laterality: N/A;  repaired with 26mm physio II annuloplasty ring    PACEMAKER IMPLANTATION      THYROID SURGERY      TRANSESOPHAGEAL ECHOCARDIOGRAM (CARMITA) N/A 5/15/2023    Procedure: TRANSESOPHAGEAL ECHOCARDIOGRAM WITH ANESTHESIA;  Surgeon: Veronica Orozco MD;  Location: [facility];  Service: Cardiothoracic;  Laterality: N/A;       Family History   Problem Relation Age of Onset    No Known Problems Mother     No Known Problems Father     No Known Problems Sister     No Known Problems Brother     No Known Problems Maternal Aunt     No Known Problems Maternal Uncle     No Known Problems Paternal Aunt     No Known Problems Paternal Uncle     No Known Problems Maternal Grandmother     No Known Problems Maternal Grandfather     No Known Problems Paternal Grandmother     No Known Problems Paternal Grandfather     No Known Problems Other     Anemia Neg Hx     " Arrhythmia Neg Hx     Asthma Neg Hx     Clotting disorder Neg Hx     Fainting Neg Hx     Heart attack Neg Hx     Heart disease Neg Hx     Heart failure Neg Hx     Hyperlipidemia Neg Hx     Hypertension Neg Hx        Social History     Socioeconomic History    Marital status:    Tobacco Use    Smoking status: Former     Passive exposure: Past    Smokeless tobacco: Never    Tobacco comments:     quit 35 yrs ago   Vaping Use    Vaping status: Never Used   Substance and Sexual Activity    Alcohol use: Not Currently     Comment: rare    Drug use: No    Sexual activity: Defer           Objective   Physical Exam  Vitals and nursing note reviewed.   Constitutional:       General: She is awake. She is not in acute distress.     Appearance: Normal appearance. She is well-developed and normal weight. She is not ill-appearing, toxic-appearing or diaphoretic.   HENT:      Head: Normocephalic and atraumatic.      Mouth/Throat:      Mouth: Mucous membranes are moist.      Pharynx: Oropharynx is clear.   Eyes:      Extraocular Movements: Extraocular movements intact.      Conjunctiva/sclera: Conjunctivae normal.      Pupils: Pupils are equal, round, and reactive to light.   Cardiovascular:      Rate and Rhythm: Normal rate and regular rhythm.      Pulses: Normal pulses.           Radial pulses are 2+ on the right side and 2+ on the left side.        Dorsalis pedis pulses are 2+ on the right side and 2+ on the left side.        Posterior tibial pulses are 2+ on the right side and 2+ on the left side.      Heart sounds: Normal heart sounds, S1 normal and S2 normal. Heart sounds not distant. No murmur heard.     No friction rub. No gallop.   Pulmonary:      Effort: Pulmonary effort is normal. No respiratory distress.      Breath sounds: Normal breath sounds and air entry. No wheezing or rales.   Chest:      Chest wall: No tenderness.   Abdominal:      General: Bowel sounds are normal. There is no distension.      Palpations:  "Abdomen is soft. Abdomen is not rigid. There is no mass.      Tenderness: There is no abdominal tenderness. There is no guarding or rebound.      Hernia: No hernia is present.   Musculoskeletal:         General: Tenderness present.      Cervical back: Normal range of motion and neck supple.      Right lower le+ Edema present.      Left lower le+ Edema present.      Right foot: Normal capillary refill. Normal pulse.      Left foot: Normal capillary refill. Normal pulse.        Legs:    Skin:     General: Skin is warm and dry.      Capillary Refill: Capillary refill takes less than 2 seconds.      Coloration: Skin is not pale.      Findings: No erythema or rash.   Neurological:      Mental Status: She is alert and oriented to person, place, and time.   Psychiatric:         Behavior: Behavior is cooperative.         Procedures           ED Course                                                       Medical Decision Making  Problems Addressed:  Chronic deep vein thrombosis (DVT) of femoral vein of left lower extremity: complicated acute illness or injury  Chronic kidney disease, unspecified CKD stage: complicated acute illness or injury  Current use of long term anticoagulation: complicated acute illness or injury  Pain of left calf: complicated acute illness or injury    Amount and/or Complexity of Data Reviewed  Labs: ordered.  Radiology: ordered.    Risk  Prescription drug management.    Interpreted by radiologist as below:     XR Chest 1 View  Result Date: 2025  Impression: No active cardiopulmonary disease Electronically Signed: Walt Gutiérrez  2025 10:46 AM EDT  Workstation ID: [id number]        /51   Pulse 80   Temp 98 °F (36.7 °C) (Oral)   Resp 18   Ht 149.9 cm (59\")   Wt 50.8 kg (111 lb 15.9 oz)   SpO2 98%   BMI 22.62 kg/m²      Lab Results (last 24 hours)       Procedure Component Value Units Date/Time    Comprehensive Metabolic Panel [[id number]]  (Abnormal) Collected: 25 " 1006    Specimen: Blood Updated: 07/18/25 1036     Glucose 161 mg/dL      BUN 28.5 mg/dL      Creatinine 1.42 mg/dL      Sodium 140 mmol/L      Potassium 4.7 mmol/L      Chloride 98 mmol/L      CO2 28.9 mmol/L      Calcium 10.7 mg/dL      Total Protein 7.4 g/dL      Albumin 4.7 g/dL      ALT (SGPT) 15 U/L      AST (SGOT) 28 U/L      Alkaline Phosphatase 53 U/L      Total Bilirubin 0.7 mg/dL      Globulin 2.7 gm/dL      A/G Ratio 1.7 g/dL      BUN/Creatinine Ratio 20.1     Anion Gap 13.1 mmol/L      eGFR 36.8 mL/min/1.73     Narrative:      GFR Categories in Chronic Kidney Disease (CKD)              GFR Category          GFR (mL/min/1.73)    Interpretation  G1                    90 or greater        Normal or high (1)  G2                    60-89                Mild decrease (1)  G3a                   45-59                Mild to moderate decrease  G3b                   30-44                Moderate to severe decrease  G4                    15-29                Severe decrease  G5                    14 or less           Kidney failure    (1)In the absence of evidence of kidney disease, neither GFR category G1 or G2 fulfill the criteria for CKD.    eGFR calculation 2021 CKD-EPI creatinine equation, which does not include race as a factor    High Sensitivity Troponin T [[id number]]  (Abnormal) Collected: 07/18/25 1006    Specimen: Blood Updated: 07/18/25 1036     HS Troponin T 26 ng/L     Narrative:      High Sensitive Troponin T Reference Range:  <14.0 ng/L- Negative Female for AMI  <22.0 ng/L- Negative Male for AMI  >=14 - Abnormal Female indicating possible myocardial injury.  >=22 - Abnormal Male indicating possible myocardial injury.   Clinicians would have to utilize clinical acumen, EKG, Troponin, and serial changes to determine if it is an Acute Myocardial Infarction or myocardial injury due to an underlying chronic condition.         CBC & Differential [[id number]]  (Normal) Collected: 07/18/25 1100    Specimen:  Blood from Arm, Left Updated: 07/18/25 1106    Narrative:      The following orders were created for panel order CBC & Differential.  Procedure                               Abnormality         Status                     ---------                               -----------         ------                     CBC Auto Differential[[id number]]        Normal              Final result                 Please view results for these tests on the individual orders.    Protime-INR [[id number]]  (Normal) Collected: 07/18/25 1100    Specimen: Blood from Arm, Left Updated: 07/18/25 1118     Protime 26.3 Seconds      INR 2.39    CBC Auto Differential [[id number]]  (Normal) Collected: 07/18/25 1100    Specimen: Blood from Arm, Left Updated: 07/18/25 1106     WBC 7.28 10*3/mm3      RBC 4.08 10*6/mm3      Hemoglobin 12.2 g/dL      Hematocrit 38.7 %      MCV 94.9 fL      MCH 29.9 pg      MCHC 31.5 g/dL      RDW 14.3 %      RDW-SD 49.7 fl      MPV 10.8 fL      Platelets 280 10*3/mm3      Neutrophil % 67.3 %      Lymphocyte % 22.4 %      Monocyte % 7.6 %      Eosinophil % 1.9 %      Basophil % 0.5 %      Immature Grans % 0.3 %      Neutrophils, Absolute 4.90 10*3/mm3      Lymphocytes, Absolute 1.63 10*3/mm3      Monocytes, Absolute 0.55 10*3/mm3      Eosinophils, Absolute 0.14 10*3/mm3      Basophils, Absolute 0.04 10*3/mm3      Immature Grans, Absolute 0.02 10*3/mm3      nRBC 0.0 /100 WBC     High Sensitivity Troponin T 1Hr [[id number]]  (Abnormal) Collected: 07/18/25 1100    Specimen: Blood from Arm, Left Updated: 07/18/25 1124     HS Troponin T 21 ng/L      Comment: 1 HR troponin collected less than 1 HR after the initial troponin.        Troponin T Numeric Delta -5 ng/L      Troponin T % Delta -19    Narrative:      High Sensitive Troponin T Reference Range:  <14.0 ng/L- Negative Female for AMI  <22.0 ng/L- Negative Male for AMI  >=14 - Abnormal Female indicating possible myocardial injury.  >=22 - Abnormal Male indicating possible  myocardial injury.   Clinicians would have to utilize clinical acumen, EKG, Troponin, and serial changes to determine if it is an Acute Myocardial Infarction or myocardial injury due to an underlying chronic condition.                  Medications - No data to display       Appropriate PPE worn during patient exam.  Appropriate monitoring initiated. Patient is alert and oriented x3.  No acute distress noted. S1-S2 heart sounds on exam.  Lungs are clear to auscultation. 1+ edema noted to the bilateral lower extremities.  IV established and labs obtained.  Cardiac work-up initiated.  Chest x-ray and venous Doppler of the left lower extremity obtained with the above findings.  CBC is unremarkable.  CMP is similar to previous, although creatinine is slightly elevated compared to previous at 1.4 and GFR is 36.  Patient has known CKD.  Troponin is slightly elevated at 26, delta decreased by 5.  This has continued to trend down over the last several visits.  INR 2.39.  Ultrasound shows chronic DVT, no acute DVT.  Chest x-ray showed no acute findings.  Patient was encouraged to continue her Coumadin, wear compression stockings, elevate when sitting.    I reviewed chart 7/14/2025 patient was seen for anticoagulation visit INR was 2.7. My radiology interpretation of chest x-ray shows no cardiomegaly pulmonary edema infiltrate. Imaging was independently interpreted by Dr. Arita. Differential Diagnoses, not all-inclusive and does not constitute entirety of all causes: DVT, contusion, electrolyte abnormality.  Chronic DVT noted on venous Doppler.  Electrolyte abnormality ruled out by labs.  There was a contusion noted on the anterior right lower leg.    Disposition/Treatment: Discussed results with patient, verbalized understanding. Discussed reasons to return, patient verbalized understanding. Agreeable with plan of care. Patient was stable upon disposition.    Upon reassessment, patient is flesh tone warm and dry no acute  distress noted.  Vital signs are stable. Discussed return precautions, patient verbalized understanding.     Part of this note may be an electronic transcription/translation of spoken language to printed text using the Dragon Dictation System.   Final diagnoses:   Pain of left calf   Chronic deep vein thrombosis (DVT) of femoral vein of left lower extremity   Current use of long term anticoagulation   Chronic kidney disease, unspecified CKD stage       ED Disposition  ED Disposition       ED Disposition   Discharge    Condition   Stable    Comment   --               Camila Lazcano MD  [address] [place] [address]  [phone]    Schedule an appointment as soon as possible for a visit       [facility] EMERGENCY DEPARTMENT  [address] [place] [id number] [phone]  Go to   If symptoms worsen         Medication List      No changes were made to your prescriptions during this visit.            Kena Burk, APRN  07/18/25 [id number]

## 2025-07-18 NOTE — DISCHARGE INSTRUCTIONS
Make sure you are drinking at least 56 ounces of water daily.  Continue to taking Coumadin as prescribed.  Wear compression stockings.  Elevate foot when sitting.  Keep scheduled follow-up with all of your specialist.  Return to the ER for any new or worsening symptoms.

## 2025-08-14 ENCOUNTER — ANTICOAGULATION VISIT (OUTPATIENT)
Dept: CARDIOLOGY | Facility: CLINIC | Age: 85
End: 2025-08-14
Payer: MEDICARE

## 2025-08-14 VITALS
SYSTOLIC BLOOD PRESSURE: 150 MMHG | HEART RATE: 84 BPM | WEIGHT: 116 LBS | BODY MASS INDEX: 23.43 KG/M2 | DIASTOLIC BLOOD PRESSURE: 48 MMHG

## 2025-08-14 DIAGNOSIS — Z86.718 HISTORY OF DVT (DEEP VEIN THROMBOSIS): Primary | ICD-10-CM

## 2025-08-14 DIAGNOSIS — Z79.01 LONG TERM (CURRENT) USE OF ANTICOAGULANTS: Chronic | ICD-10-CM

## 2025-08-14 LAB — INR PPP: 2.1 (ref 2–3)

## 2025-08-14 PROCEDURE — 36416 COLLJ CAPILLARY BLOOD SPEC: CPT | Performed by: INTERNAL MEDICINE

## 2025-08-14 PROCEDURE — 85610 PROTHROMBIN TIME: CPT | Performed by: INTERNAL MEDICINE

## (undated) DEVICE — SOL IRR NACL 0.9PCT BT 1000ML

## (undated) DEVICE — CABL BIPOL W/ALLGTR CLIP/SM 12FT

## (undated) DEVICE — TUBING, SUCTION, 1/4" X 12', STRAIGHT: Brand: MEDLINE

## (undated) DEVICE — Device

## (undated) DEVICE — SENSR CERBRL O2 PK/2

## (undated) DEVICE — SPONGE,DISSECTOR,ROUND CHERRY,XR,ST,5/PK: Brand: MEDLINE

## (undated) DEVICE — CATH DIAG IMPULSE FL4 6F 100CM

## (undated) DEVICE — SUT PROLN 3/0 V7 D/A 36IN 8976H

## (undated) DEVICE — IRRIGATOR BULB ASEPTO 60CC STRL

## (undated) DEVICE — PRESSURE TUBING: Brand: TRUWAVE

## (undated) DEVICE — PRESSURE MONITORING SET: Brand: TRUWAVE, VAMP PLUS

## (undated) DEVICE — SUT PDS 0 CT-1 Z340H

## (undated) DEVICE — SWAN-GANZ THERMODILUTION CATHETER: Brand: SWAN-GANZ

## (undated) DEVICE — CONNECT Y INTERSEPT W/LL 3/8 X 3/8 X 3/8IN

## (undated) DEVICE — KT CATH CV ACC MAC 2L SFTY 9F 4 1/2IN

## (undated) DEVICE — ORGANIZER SUT SHELIGH 3T 213013

## (undated) DEVICE — BLOOD TRANSFUSION FILTER: Brand: HAEMONETICS

## (undated) DEVICE — SUT SILK 2/0 TIES 18IN A185H

## (undated) DEVICE — BOOT SUT XRAY DETECT STD YEL/BLU CA/50

## (undated) DEVICE — SUT SILK 0 CT1 CR8 18IN C021D

## (undated) DEVICE — PK ATS CUST W CARDIOTOMY RESEVOIR

## (undated) DEVICE — SOL NACL 0.9PCT 1000ML

## (undated) DEVICE — PINNACLE INTRODUCER SHEATH: Brand: PINNACLE

## (undated) DEVICE — BIOPATCH™ ANTIMICROBIAL DRESSING WITH CHLORHEXIDINE GLUCONATE IS A HYDROPHILLIC POLYURETHANE ABSORPTIVE FOAM WITH CHLORHEXIDINE GLUCONATE (CHG) WHICH INHIBITS BACTERIAL GROWTH UNDER THE DRESSING. THE DRESSING IS INTENDED TO BE USED TO ABSORB EXUDATE, COVER A WOUND CAUSED BY VASCULAR AND NONVASCULAR PERCUTANEOUS MEDICAL DEVICES DURING SURGERY, AS WELL AS REDUCE LOCAL INFECTION AND COLONIZATION OF MICROORGANISMS.: Brand: BIOPATCH

## (undated) DEVICE — SUT PROLN 5/0 V5 36IN 8934H

## (undated) DEVICE — SOL IRR H2O BTL 1000ML STRL

## (undated) DEVICE — ST IV BLD W/HANDPUMP YSITE 125IN

## (undated) DEVICE — CANN VENI DLP SGL/STAGE DLP RT/ANGL EXT TP 24F

## (undated) DEVICE — SUT PROLN 4/0 V7 36IN 8975H

## (undated) DEVICE — SUT SILK 4/0 TIES 18IN A183H

## (undated) DEVICE — 3M™ TEGADERM™ IV TRANSPARENT FILM DRESSING WITH BORDER 100 BAGS/CARTON 4 CARTONS/CASE 1633: Brand: 3M™ TEGADERM™

## (undated) DEVICE — INTENDED FOR TISSUE SEPARATION, AND OTHER PROCEDURES THAT REQUIRE A SHARP SURGICAL BLADE TO PUNCTURE OR CUT.: Brand: BARD-PARKER ® CARBON RIB-BACK BLADES

## (undated) DEVICE — KT NDL GUIDE STRL 18GA

## (undated) DEVICE — PROVE COVER: Brand: UNBRANDED

## (undated) DEVICE — 28 FR STRAIGHT – SOFT PVC CATHETER: Brand: PVC THORACIC CATHETERS

## (undated) DEVICE — CANN AORT ROOT DLP VNT/8IN 14G 7F

## (undated) DEVICE — ELECTRD DEFIB M/FUNC PROPADZ STRL 2PK

## (undated) DEVICE — 500ML,PRESSURE INFUSER W/STOPCOCK: Brand: MEDLINE

## (undated) DEVICE — SUP ARMBRD HANDAID ART/LINE 9IN

## (undated) DEVICE — GW FC J TFE/COAT .025 3MM 180CM

## (undated) DEVICE — ELECTRD DEFIB M/FUNC PROPADZ RADIOL 2PK

## (undated) DEVICE — PK OPN HEART WHT WRP 50

## (undated) DEVICE — ANGIO-SEAL VIP VASCULAR CLOSURE DEVICE: Brand: ANGIO-SEAL

## (undated) DEVICE — TEMP PACING WIRE: Brand: MYO/WIRE

## (undated) DEVICE — SUT PROLN 4/0 V5 36IN 8935H

## (undated) DEVICE — CANN VENI DLP SGL/STAGE RT/ANGL MTL/TP 28F

## (undated) DEVICE — ADHS SKIN PREMIERPRO EXOFIN TOPICAL HI/VISC .5ML

## (undated) DEVICE — STPCK 3WY W 14IN PRESS LN

## (undated) DEVICE — NDL PERC 1PRT THNWALL W/BASEPLT 18G 7CM

## (undated) DEVICE — SUT SILK 2 SUTUPAK TIE 60IN SA8H 2STRAND

## (undated) DEVICE — CATHETER,URETHRAL,REDRUBBER,STERILE,20FR: Brand: MEDLINE

## (undated) DEVICE — GAUZE,SPONGE,4"X4",32PLY,XRAY,STRL,LF: Brand: MEDLINE

## (undated) DEVICE — HEMOCONCENTRATOR PERFUS LPS06

## (undated) DEVICE — PK TRY HEART CATH 50

## (undated) DEVICE — SCANLAN® VASCU-STATT® II SINGLE-USE BULLDOG CLAMP W/FIRMER CLAMPING PRESS - MIDI ANGLED 45° (YELLOW), CLAMPING PRESSURE 75-80 G (2/STERILE PKG): Brand: SCANLAN® VASCU-STATT® II SINGLE-USE BULLDOG CLAMP W/FIRMER CLAMPING PRESS

## (undated) DEVICE — CATH IV INSYTE AUTOGARD SHLD 22G 1IN BK

## (undated) DEVICE — SAFELINER OUTER SHELL SUCTION CANISTER: Brand: DEROYAL

## (undated) DEVICE — CATH ART RADL 20GA 1 3/4IN LF

## (undated) DEVICE — TP UMB COTN 1/8X36 U12T

## (undated) DEVICE — ELECTRD BLD EZ CLN STD 6.5IN

## (undated) DEVICE — COUNT NDL MAG XLG

## (undated) DEVICE — ACCESSRAIL PLATFORM (STANDARD BLADE): Brand: ACCESSRAIL PLATFORM (STANDARD BLADE)

## (undated) DEVICE — SUT ETHIB 2/0 CV V7 30IN 10X72

## (undated) DEVICE — 3M(TM) TEGADERM(TM) IV ADAVANCED SECUREMENT DRESSING 1685: Brand: 3M™ TEGADERM™

## (undated) DEVICE — CONTAINER,SPECIMEN,OR STERILE,4OZ: Brand: MEDLINE

## (undated) DEVICE — CATH TDILUT SWANGANZ VIP 7.5F 110CM

## (undated) DEVICE — PK PERFUS CUST W/CARDIOPLEGIA

## (undated) DEVICE — ANTIBACTERIAL UNDYED BRAIDED (POLYGLACTIN 910), SYNTHETIC ABSORBABLE SUTURE: Brand: COATED VICRYL

## (undated) DEVICE — MYNXGRIP 6F/7F: Brand: MYNXGRIP

## (undated) DEVICE — SUT SILK 0 CT1 18IN 424H

## (undated) DEVICE — CLAMP INSERT: Brand: STEALTH® CLAMP INSERT

## (undated) DEVICE — CANN RETRGR STYLET RSCP 15F

## (undated) DEVICE — TOWEL,OR,DSP,ST,WHITE,DLX,4/PK,20PK/CS: Brand: MEDLINE

## (undated) DEVICE — CATH DIAG IMPULSE FR4 6F 100CM

## (undated) DEVICE — ST ACC MICROPUNCTURE STFF/CANN PLAT/TP 4F 21G 40CM

## (undated) DEVICE — ST PERFUS M/

## (undated) DEVICE — GLV SURG BIOGEL LTX PF 8